# Patient Record
Sex: FEMALE | Race: WHITE | NOT HISPANIC OR LATINO | Employment: OTHER | URBAN - METROPOLITAN AREA
[De-identification: names, ages, dates, MRNs, and addresses within clinical notes are randomized per-mention and may not be internally consistent; named-entity substitution may affect disease eponyms.]

---

## 2017-10-09 ENCOUNTER — PROCEDURE VISIT (OUTPATIENT)
Dept: OBSTETRICS AND GYNECOLOGY | Facility: CLINIC | Age: 77
End: 2017-10-09

## 2017-10-09 ENCOUNTER — APPOINTMENT (OUTPATIENT)
Dept: WOMENS IMAGING | Facility: HOSPITAL | Age: 77
End: 2017-10-09

## 2017-10-09 ENCOUNTER — OFFICE VISIT (OUTPATIENT)
Dept: OBSTETRICS AND GYNECOLOGY | Facility: CLINIC | Age: 77
End: 2017-10-09

## 2017-10-09 VITALS
SYSTOLIC BLOOD PRESSURE: 136 MMHG | DIASTOLIC BLOOD PRESSURE: 76 MMHG | BODY MASS INDEX: 35.68 KG/M2 | HEIGHT: 64 IN | WEIGHT: 209 LBS | HEART RATE: 67 BPM

## 2017-10-09 DIAGNOSIS — Z01.419 PAP SMEAR, AS PART OF ROUTINE GYNECOLOGICAL EXAMINATION: ICD-10-CM

## 2017-10-09 DIAGNOSIS — Z12.31 VISIT FOR SCREENING MAMMOGRAM: Primary | ICD-10-CM

## 2017-10-09 DIAGNOSIS — Z78.0 MENOPAUSE: Primary | ICD-10-CM

## 2017-10-09 DIAGNOSIS — Z12.11 COLON CANCER SCREENING: ICD-10-CM

## 2017-10-09 DIAGNOSIS — Z01.419 ENCOUNTER FOR GYNECOLOGICAL EXAMINATION WITHOUT ABNORMAL FINDING: ICD-10-CM

## 2017-10-09 LAB — HEMOCCULT STL QL IA: NEGATIVE

## 2017-10-09 PROCEDURE — G0328 FECAL BLOOD SCRN IMMUNOASSAY: HCPCS | Performed by: OBSTETRICS & GYNECOLOGY

## 2017-10-09 PROCEDURE — G0202 SCR MAMMO BI INCL CAD: HCPCS | Performed by: RADIOLOGY

## 2017-10-09 PROCEDURE — G0202 SCR MAMMO BI INCL CAD: HCPCS | Performed by: OBSTETRICS & GYNECOLOGY

## 2017-10-09 PROCEDURE — G0101 CA SCREEN;PELVIC/BREAST EXAM: HCPCS | Performed by: OBSTETRICS & GYNECOLOGY

## 2017-10-09 NOTE — PROGRESS NOTES
Subjective   Gabbi Brandon is a 77 y.o. female  7, Para 5 AB 2, Living 5.  Last annual 1y, last pap , last mammogram today, last colonoscopy .  Cc: Annual exam  History of Present Illness  Patient denies any gynecologic problems at this time.  Patient has some urinary frequency (every 2 hours while awake.  Has been evaluated by urology with negative workup)  The following portions of the patient's history were reviewed and updated as appropriate: allergies, current medications, past family history, past medical history, past social history, past surgical history and problem list.    Review of Systems   Genitourinary: Positive for urgency.   All other systems reviewed and are negative.        Past Medical History:   Diagnosis Date   • Hypertension    • Seasonal allergies      Menstrual History:  OB History      Para Term  AB Living    7 5 5  2 5    SAB TAB Ectopic Multiple Live Births    2             No LMP recorded. Patient is postmenopausal.       Past Surgical History:   Procedure Laterality Date   • BREAST BIOPSY     • COLONOSCOPY N/A 2016    Procedure: COLONOSCOPY WITH POLYPECTOMY (HOT SNARE);  Surgeon: Paulino Narvaez MD;  Location: [facility] ENDOSCOPY;  Service:    • VAGINAL HYSTERECTOMY       OB History      Para Term  AB Living    7 5 5  2 5    SAB TAB Ectopic Multiple Live Births    2            Family History   Problem Relation Age of Onset   • Colon cancer Brother    • Heart attack Mother    • Stroke Father    • Lung cancer Sister    • Lung cancer Brother      3 brothers   • Brain cancer Brother      History   Smoking Status   • Never Smoker   Smokeless Tobacco   • Never Used     History   Alcohol Use   • Yes     Comment: occasional     Health Maintenance   Topic Date Due   • TDAP/TD VACCINES (1 - Tdap) 1957   • PNEUMOCOCCAL VACCINES (65+ LOW/MEDIUM RISK) (1 of 2 - PCV13) 2003   • MEDICARE ANNUAL WELLNESS  2016   • ZOSTER VACCINE   "09/07/2016   • INFLUENZA VACCINE  08/01/2017       Current Outpatient Prescriptions:   •  amLODIPine (NORVASC) 5 MG tablet, Take 5 mg by mouth daily., Disp: , Rfl:   •  atenolol (TENORMIN) 50 MG tablet, Take 50 mg by mouth daily., Disp: , Rfl:   •  cetirizine (ZyrTEC) 10 MG tablet, Take 10 mg by mouth daily., Disp: , Rfl:   •  omeprazole (PriLOSEC) 20 MG capsule, Take 20 mg by mouth daily., Disp: , Rfl:   •  simvastatin (ZOCOR) 20 MG tablet, Take 20 mg by mouth every night., Disp: , Rfl:   Sexual History: Not active  STD: Negative         Objective   Vitals:    10/09/17 1347   BP: 136/76   Pulse: 67   Weight: 209 lb (94.8 kg)   Height: 64\" (162.6 cm)     Physical Exam   Constitutional: She is oriented to person, place, and time. She appears well-developed and well-nourished.   HENT:   Head: Normocephalic.   Eyes: Pupils are equal, round, and reactive to light.   Neck: Normal range of motion. No thyromegaly present.   Cardiovascular: Normal rate, regular rhythm, normal heart sounds and intact distal pulses.    Pulmonary/Chest: Effort normal and breath sounds normal. No respiratory distress. She exhibits no tenderness. Right breast exhibits no inverted nipple, no mass, no nipple discharge, no skin change and no tenderness. Left breast exhibits no inverted nipple, no mass, no nipple discharge, no skin change and no tenderness. Breasts are symmetrical.   Abdominal: Soft. Bowel sounds are normal. Hernia confirmed negative in the right inguinal area and confirmed negative in the left inguinal area.   Genitourinary: Rectum normal, vagina normal and uterus normal. Rectal exam shows no external hemorrhoid, no internal hemorrhoid, no fissure, no mass, no tenderness, anal tone normal and guaiac negative stool. No breast tenderness or discharge. Pelvic exam was performed with patient supine. There is no rash, tenderness, lesion or injury on the right labia. There is no rash, tenderness, lesion or injury on the left labia. Right " adnexum displays no mass, no tenderness and no fullness. Left adnexum displays no mass, no tenderness and no fullness.   Genitourinary Comments: Cervix and uterus are surgically absent   Lymphadenopathy:     She has no cervical adenopathy.        Right: No inguinal adenopathy present.        Left: No inguinal adenopathy present.   Neurological: She is alert and oriented to person, place, and time. She has normal reflexes.   Skin: Skin is warm and dry.   Psychiatric: She has a normal mood and affect. Her behavior is normal. Judgment and thought content normal.         Assessment/Plan   Gabbi was seen today for gynecologic exam.    Diagnoses and all orders for this visit:    Menopause    Encounter for gynecological examination without abnormal finding      Discussed breast self-examination, mammograms, colonoscopy, frequency of exams

## 2017-10-11 ENCOUNTER — TELEPHONE (OUTPATIENT)
Dept: OBSTETRICS AND GYNECOLOGY | Facility: CLINIC | Age: 77
End: 2017-10-11

## 2017-10-11 LAB
CYTOLOGIST CVX/VAG CYTO: NORMAL
CYTOLOGY CVX/VAG DOC THIN PREP: NORMAL
DX ICD CODE: NORMAL
HIV 1 & 2 AB SER-IMP: NORMAL
HPV I/H RISK 4 DNA CVX QL PROBE+SIG AMP: NEGATIVE
OTHER STN SPEC: NORMAL
PATH REPORT.FINAL DX SPEC: NORMAL
STAT OF ADQ CVX/VAG CYTO-IMP: NORMAL

## 2017-10-11 NOTE — TELEPHONE ENCOUNTER
----- Message from Mello Green MD sent at 10/11/2017  1:09 PM EDT -----  Tell the patient she has a normal mammogram  ----- Message -----     From: Interface, Scans Incoming     Sent: 10/11/2017   7:37 AM       To: Mello Green MD

## 2018-10-15 ENCOUNTER — PROCEDURE VISIT (OUTPATIENT)
Dept: OBSTETRICS AND GYNECOLOGY | Facility: CLINIC | Age: 78
End: 2018-10-15

## 2018-10-15 ENCOUNTER — APPOINTMENT (OUTPATIENT)
Dept: WOMENS IMAGING | Facility: HOSPITAL | Age: 78
End: 2018-10-15

## 2018-10-15 DIAGNOSIS — Z12.31 VISIT FOR SCREENING MAMMOGRAM: Primary | ICD-10-CM

## 2018-10-15 PROCEDURE — 77067 SCR MAMMO BI INCL CAD: CPT | Performed by: RADIOLOGY

## 2018-10-15 PROCEDURE — 77067 SCR MAMMO BI INCL CAD: CPT | Performed by: OBSTETRICS & GYNECOLOGY

## 2018-10-18 ENCOUNTER — TELEPHONE (OUTPATIENT)
Dept: OBSTETRICS AND GYNECOLOGY | Facility: CLINIC | Age: 78
End: 2018-10-18

## 2018-10-18 NOTE — TELEPHONE ENCOUNTER
----- Message from Mello Green MD sent at 10/18/2018  9:22 AM EDT -----  Tell the patient her mammogram was negative  ----- Message -----  From: Interface, Scans Incoming  Sent: 10/18/2018   8:38 AM  To: Mello Green MD

## 2019-01-30 PROBLEM — I10 ESSENTIAL HYPERTENSION: Status: ACTIVE | Noted: 2018-01-10

## 2019-01-30 PROBLEM — N18.2 STAGE 2 CHRONIC KIDNEY DISEASE: Status: ACTIVE | Noted: 2018-09-18

## 2019-05-02 ENCOUNTER — OFFICE VISIT (OUTPATIENT)
Dept: INTERNAL MEDICINE | Facility: CLINIC | Age: 79
End: 2019-05-02

## 2019-05-02 VITALS
HEIGHT: 62 IN | WEIGHT: 214 LBS | SYSTOLIC BLOOD PRESSURE: 124 MMHG | BODY MASS INDEX: 39.38 KG/M2 | DIASTOLIC BLOOD PRESSURE: 68 MMHG

## 2019-05-02 DIAGNOSIS — N18.2 STAGE 2 CHRONIC KIDNEY DISEASE: Chronic | ICD-10-CM

## 2019-05-02 DIAGNOSIS — E78.5 DYSLIPIDEMIA: ICD-10-CM

## 2019-05-02 DIAGNOSIS — J30.2 SEASONAL ALLERGIES: ICD-10-CM

## 2019-05-02 DIAGNOSIS — I10 ESSENTIAL HYPERTENSION: Primary | ICD-10-CM

## 2019-05-02 DIAGNOSIS — K21.9 GASTROESOPHAGEAL REFLUX DISEASE WITHOUT ESOPHAGITIS: Chronic | ICD-10-CM

## 2019-05-02 LAB
ALBUMIN SERPL-MCNC: 3.9 G/DL (ref 3.5–5.2)
ALBUMIN/GLOB SERPL: 1.2 G/DL
ALP SERPL-CCNC: 75 U/L (ref 39–117)
ALT SERPL W P-5'-P-CCNC: 17 U/L (ref 1–33)
ANION GAP SERPL CALCULATED.3IONS-SCNC: 11.3 MMOL/L
AST SERPL-CCNC: 22 U/L (ref 1–32)
BASOPHILS # BLD AUTO: 0.02 10*3/MM3 (ref 0–0.2)
BASOPHILS NFR BLD AUTO: 0.3 % (ref 0–1.5)
BILIRUB SERPL-MCNC: 0.5 MG/DL (ref 0.2–1.2)
BUN BLD-MCNC: 17 MG/DL (ref 8–23)
BUN/CREAT SERPL: 15 (ref 7–25)
CALCIUM SPEC-SCNC: 9.4 MG/DL (ref 8.6–10.5)
CHLORIDE SERPL-SCNC: 105 MMOL/L (ref 98–107)
CHOLEST SERPL-MCNC: 118 MG/DL (ref 0–200)
CO2 SERPL-SCNC: 27.7 MMOL/L (ref 22–29)
CREAT BLD-MCNC: 1.13 MG/DL (ref 0.57–1)
DEPRECATED RDW RBC AUTO: 46.6 FL (ref 37–54)
EOSINOPHIL # BLD AUTO: 0.1 10*3/MM3 (ref 0–0.4)
EOSINOPHIL NFR BLD AUTO: 1.4 % (ref 0.3–6.2)
ERYTHROCYTE [DISTWIDTH] IN BLOOD BY AUTOMATED COUNT: 14 % (ref 12.3–15.4)
GFR SERPL CREATININE-BSD FRML MDRD: 46 ML/MIN/1.73
GLOBULIN UR ELPH-MCNC: 3.2 GM/DL
GLUCOSE BLD-MCNC: 120 MG/DL (ref 65–99)
HCT VFR BLD AUTO: 42.7 % (ref 34–46.6)
HDLC SERPL-MCNC: 47 MG/DL (ref 40–60)
HGB BLD-MCNC: 13.8 G/DL (ref 12–15.9)
LDLC SERPL CALC-MCNC: 55 MG/DL (ref 0–100)
LDLC/HDLC SERPL: 1.17 {RATIO}
LYMPHOCYTES # BLD AUTO: 3.2 10*3/MM3 (ref 0.7–3.1)
LYMPHOCYTES NFR BLD AUTO: 46.2 % (ref 19.6–45.3)
MCH RBC QN AUTO: 30.5 PG (ref 26.6–33)
MCHC RBC AUTO-ENTMCNC: 32.3 G/DL (ref 31.5–35.7)
MCV RBC AUTO: 94.5 FL (ref 79–97)
MONOCYTES # BLD AUTO: 0.62 10*3/MM3 (ref 0.1–0.9)
MONOCYTES NFR BLD AUTO: 8.9 % (ref 5–12)
NEUTROPHILS # BLD AUTO: 2.99 10*3/MM3 (ref 1.7–7)
NEUTROPHILS NFR BLD AUTO: 43.2 % (ref 42.7–76)
PLATELET # BLD AUTO: 180 10*3/MM3 (ref 140–450)
PMV BLD AUTO: 11.1 FL (ref 6–12)
POTASSIUM BLD-SCNC: 4.6 MMOL/L (ref 3.5–5.2)
PROT SERPL-MCNC: 7.1 G/DL (ref 6–8.5)
RBC # BLD AUTO: 4.52 10*6/MM3 (ref 3.77–5.28)
SODIUM BLD-SCNC: 144 MMOL/L (ref 136–145)
TRIGL SERPL-MCNC: 79 MG/DL (ref 0–150)
TSH SERPL DL<=0.05 MIU/L-ACNC: 3.19 MIU/ML (ref 0.27–4.2)
VLDLC SERPL-MCNC: 15.8 MG/DL (ref 5–40)
WBC NRBC COR # BLD: 6.93 10*3/MM3 (ref 3.4–10.8)

## 2019-05-02 PROCEDURE — 80061 LIPID PANEL: CPT | Performed by: INTERNAL MEDICINE

## 2019-05-02 PROCEDURE — 85025 COMPLETE CBC W/AUTO DIFF WBC: CPT | Performed by: INTERNAL MEDICINE

## 2019-05-02 PROCEDURE — 99204 OFFICE O/P NEW MOD 45 MIN: CPT | Performed by: INTERNAL MEDICINE

## 2019-05-02 PROCEDURE — 36415 COLL VENOUS BLD VENIPUNCTURE: CPT | Performed by: INTERNAL MEDICINE

## 2019-05-02 PROCEDURE — 84443 ASSAY THYROID STIM HORMONE: CPT | Performed by: INTERNAL MEDICINE

## 2019-05-02 PROCEDURE — 80053 COMPREHEN METABOLIC PANEL: CPT | Performed by: INTERNAL MEDICINE

## 2019-05-02 RX ORDER — FLUTICASONE PROPIONATE 50 MCG
SPRAY, SUSPENSION (ML) NASAL
COMMUNITY
End: 2019-05-03 | Stop reason: SDUPTHER

## 2019-05-02 NOTE — PROGRESS NOTES
Subjective   Gabbi Brandon is a 78 y.o. female.  Patient is here to transfer her primary care to this office.  The patient has hypertension that is well controlled, hyperlipidemia, gastroesophageal reflux disease that is well controlled chronic allergic rhinitis, moderate arthritis of her right knee that causes her some difficulty with walking but otherwise is doing exceptionally well for 78 years old.  She is very active and has a good support system around her.  She is still  and lives at home and she and her  have several children who are available to help them with any of their daily needs.  I will check a full set of labs today and continue her current medications.  One issue in particular that she has had in the past that I will be very careful about is that she has stage II kidney disease and I will check a CMP today.    History of Present Illness all of her current issues are stable    The following portions of the patient's history were reviewed and updated as appropriate: allergies, current medications, past family history, past medical history, past social history, past surgical history and problem list.    Review of Systems   Constitutional: Positive for fatigue.   HENT: Positive for congestion.    Eyes: Negative.    Respiratory: Negative.    Cardiovascular: Negative.    Gastrointestinal: Negative.    Endocrine: Negative.    Genitourinary: Negative.    Musculoskeletal: Positive for arthralgias.   Skin: Negative.    Allergic/Immunologic: Negative.    Neurological: Negative.    Hematological: Negative.    Psychiatric/Behavioral: Negative.        Objective   Physical Exam   Constitutional: She is oriented to person, place, and time. She appears well-developed and well-nourished.   HENT:   Head: Normocephalic and atraumatic.   Right Ear: External ear normal.   Left Ear: External ear normal.   Eyes: Conjunctivae and EOM are normal. Pupils are equal, round, and reactive to light.   Neck:  Normal range of motion. Neck supple.   Cardiovascular: Normal rate, regular rhythm and normal heart sounds.   Pulmonary/Chest: Effort normal and breath sounds normal.   Abdominal: Soft. Bowel sounds are normal.   Musculoskeletal: Normal range of motion.   Neurological: She is alert and oriented to person, place, and time.   Skin: Skin is warm and dry.   Psychiatric: She has a normal mood and affect. Her behavior is normal. Judgment and thought content normal.   Nursing note and vitals reviewed.        Assessment/Plan   Gabbi was seen today for new patient.    Diagnoses and all orders for this visit:    Essential hypertension  Comments:  well controlled  Orders:  -     Comprehensive metabolic panel; Future  -     CBC w AUTO Differential; Future  -     TSH; Future    Stage 2 chronic kidney disease  Comments:  check a CMP    Dyslipidemia  Comments:  check lipids  Orders:  -     Lipid Panel With LDL/HDL Ratio; Future    Gastroesophageal reflux disease without esophagitis  Comments:  well controlled    Seasonal allergies  Comments:  doing well

## 2019-05-03 ENCOUNTER — TELEPHONE (OUTPATIENT)
Dept: INTERNAL MEDICINE | Facility: CLINIC | Age: 79
End: 2019-05-03

## 2019-05-03 RX ORDER — ATENOLOL 50 MG/1
50 TABLET ORAL DAILY
Qty: 90 TABLET | Refills: 3 | Status: SHIPPED | OUTPATIENT
Start: 2019-05-03 | End: 2020-02-21

## 2019-05-03 RX ORDER — AMLODIPINE BESYLATE 5 MG/1
5 TABLET ORAL DAILY
Qty: 90 TABLET | Refills: 3 | Status: SHIPPED | OUTPATIENT
Start: 2019-05-03 | End: 2020-02-21

## 2019-05-03 RX ORDER — OMEPRAZOLE 20 MG/1
20 CAPSULE, DELAYED RELEASE ORAL DAILY
Qty: 90 CAPSULE | Refills: 3 | Status: SHIPPED | OUTPATIENT
Start: 2019-05-03 | End: 2020-05-04 | Stop reason: SDUPTHER

## 2019-05-03 RX ORDER — FLUTICASONE PROPIONATE 50 MCG
SPRAY, SUSPENSION (ML) NASAL
Qty: 3 BOTTLE | Refills: 3 | Status: SHIPPED | OUTPATIENT
Start: 2019-05-03 | End: 2020-02-21

## 2019-05-03 RX ORDER — SIMVASTATIN 20 MG
20 TABLET ORAL NIGHTLY
Qty: 90 TABLET | Refills: 3 | Status: SHIPPED | OUTPATIENT
Start: 2019-05-03 | End: 2020-02-21

## 2019-05-03 NOTE — TELEPHONE ENCOUNTER
----- Message from Zayra Melvin sent at 5/3/2019  2:14 PM EDT -----  Pt was in yesterday and needed refills on     atenolol (TENORMIN) 50 MG tablet 1/2 tablet per day  amLODIPine (NORVASC) 5 MG tablet  1 per day  simvastatin (ZOCOR) 20 MG tablet  1 per day  fluticasone (FLONASE) 50 MCG/ACT nasal spray  omeprazole (PriLOSEC) 20 MG capsule 1 per day    [facility] - [facility] [address] - [phone]  - [phone] FX

## 2019-09-06 ENCOUNTER — TELEPHONE (OUTPATIENT)
Dept: OBSTETRICS AND GYNECOLOGY | Facility: CLINIC | Age: 79
End: 2019-09-06

## 2019-09-06 DIAGNOSIS — Z12.39 SCREENING FOR BREAST CANCER: Primary | ICD-10-CM

## 2019-09-06 NOTE — TELEPHONE ENCOUNTER
Kourtney,     Order in Epic.   Please let her know.     Thanks   Dr. Perlita Bhat    [facility] pt called to request appt for mammogram.  States she is willing to go to [facility] to have this done since our mammo machine is down.  Please enter an order and I will be happy to schedule.    Thanks!  Kourtney     Pt # [phone]

## 2019-11-04 ENCOUNTER — OFFICE VISIT (OUTPATIENT)
Dept: INTERNAL MEDICINE | Facility: CLINIC | Age: 79
End: 2019-11-04

## 2019-11-04 VITALS
HEIGHT: 62 IN | BODY MASS INDEX: 39.38 KG/M2 | WEIGHT: 214 LBS | OXYGEN SATURATION: 98 % | DIASTOLIC BLOOD PRESSURE: 86 MMHG | HEART RATE: 65 BPM | SYSTOLIC BLOOD PRESSURE: 130 MMHG

## 2019-11-04 DIAGNOSIS — I10 ESSENTIAL HYPERTENSION: Primary | ICD-10-CM

## 2019-11-04 DIAGNOSIS — J30.2 SEASONAL ALLERGIES: ICD-10-CM

## 2019-11-04 DIAGNOSIS — E78.01 FAMILIAL HYPERCHOLESTEROLEMIA: ICD-10-CM

## 2019-11-04 DIAGNOSIS — N18.2 STAGE 2 CHRONIC KIDNEY DISEASE: ICD-10-CM

## 2019-11-04 DIAGNOSIS — N32.89 BLADDER SPASM: ICD-10-CM

## 2019-11-04 DIAGNOSIS — K21.9 GASTROESOPHAGEAL REFLUX DISEASE WITHOUT ESOPHAGITIS: Chronic | ICD-10-CM

## 2019-11-04 PROCEDURE — 99214 OFFICE O/P EST MOD 30 MIN: CPT | Performed by: INTERNAL MEDICINE

## 2019-11-04 NOTE — PROGRESS NOTES
Subjective   Gabbi Brandon is a 79 y.o. female.  Presents with chief complaint of chronic bladder spasms with frequent urination, essential hypertension, gastroesophageal reflux disease without esophagitis, stage II chronic kidney disease, chronic allergic rhinitis hyperlipidemia here for evaluation treatment and lab check.  She has never been on any medications to control her bladder spasms and her frequent urination and I recommended Myrbetriq 25 mg/day.    History of Present Illness doing well except for chronic bladder spasms and frequent urination    The following portions of the patient's history were reviewed and updated as appropriate: allergies, current medications, past family history, past medical history, past social history, past surgical history and problem list.    Review of Systems   Constitutional: Positive for fatigue.   HENT: Negative.    Eyes: Negative.    Respiratory: Negative.    Cardiovascular: Negative.    Gastrointestinal: Negative.    Endocrine: Negative.    Genitourinary: Positive for frequency.   Musculoskeletal: Positive for arthralgias.   Skin: Negative.    Allergic/Immunologic: Negative.    Neurological: Negative.    Hematological: Negative.    Psychiatric/Behavioral: Negative.        Objective   Physical Exam   Constitutional: She appears well-developed and well-nourished.   HENT:   Head: Normocephalic and atraumatic.   Eyes: EOM are normal. Pupils are equal, round, and reactive to light.   Neck: Normal range of motion. Neck supple.   Cardiovascular: Normal rate, regular rhythm and normal heart sounds.   Pulmonary/Chest: Effort normal and breath sounds normal.   Abdominal: Soft. Bowel sounds are normal.   Musculoskeletal: Normal range of motion.   Neurological: She is alert.   Skin: Skin is warm.   Psychiatric: She has a normal mood and affect.   Nursing note and vitals reviewed.        Assessment/Plan   Gabbi was seen today for hypertension and hyperlipidemia.    Diagnoses  and all orders for this visit:    Essential hypertension  Comments:  Check a TSH and CBC  Orders:  -     TSH; Future  -     CBC w AUTO Differential; Future    Gastroesophageal reflux disease without esophagitis  Comments:  No change in therapy at this time    Stage 2 chronic kidney disease  Comments:  Check a CMP    Seasonal allergies  Comments:  Currently doing well  Orders:  -     Comprehensive metabolic panel; Future    Familial hypercholesterolemia  Comments:  We will check a lipid panel today  Orders:  -     Lipid panel; Future    Bladder spasm  Comments:  Add Myrbetriq 25 mg/day    Other orders  -     Mirabegron ER (MYRBETRIQ) 25 MG tablet sustained-release 24 hour 24 hr tablet; Take 1 tablet by mouth Daily.

## 2019-11-05 ENCOUNTER — LAB (OUTPATIENT)
Dept: INTERNAL MEDICINE | Facility: CLINIC | Age: 79
End: 2019-11-05

## 2019-11-05 DIAGNOSIS — I10 ESSENTIAL HYPERTENSION: ICD-10-CM

## 2019-11-05 DIAGNOSIS — J30.2 SEASONAL ALLERGIES: ICD-10-CM

## 2019-11-05 DIAGNOSIS — E78.01 FAMILIAL HYPERCHOLESTEROLEMIA: ICD-10-CM

## 2019-11-05 LAB
ALBUMIN SERPL-MCNC: 4 G/DL (ref 3.5–5.2)
ALBUMIN/GLOB SERPL: 1.3 G/DL
ALP SERPL-CCNC: 71 U/L (ref 39–117)
ALT SERPL W P-5'-P-CCNC: 13 U/L (ref 1–33)
ANION GAP SERPL CALCULATED.3IONS-SCNC: 11.4 MMOL/L (ref 5–15)
AST SERPL-CCNC: 18 U/L (ref 1–32)
BASOPHILS # BLD AUTO: 0.02 10*3/MM3 (ref 0–0.2)
BASOPHILS NFR BLD AUTO: 0.2 % (ref 0–1.5)
BILIRUB SERPL-MCNC: 0.4 MG/DL (ref 0.2–1.2)
BUN BLD-MCNC: 15 MG/DL (ref 8–23)
BUN/CREAT SERPL: 11.3 (ref 7–25)
CALCIUM SPEC-SCNC: 8.8 MG/DL (ref 8.6–10.5)
CHLORIDE SERPL-SCNC: 102 MMOL/L (ref 98–107)
CHOLEST SERPL-MCNC: 108 MG/DL (ref 0–200)
CO2 SERPL-SCNC: 29.6 MMOL/L (ref 22–29)
CREAT BLD-MCNC: 1.33 MG/DL (ref 0.57–1)
DEPRECATED RDW RBC AUTO: 46.8 FL (ref 37–54)
EOSINOPHIL # BLD AUTO: 0.13 10*3/MM3 (ref 0–0.4)
EOSINOPHIL NFR BLD AUTO: 1.5 % (ref 0.3–6.2)
ERYTHROCYTE [DISTWIDTH] IN BLOOD BY AUTOMATED COUNT: 14 % (ref 12.3–15.4)
GFR SERPL CREATININE-BSD FRML MDRD: 38 ML/MIN/1.73
GLOBULIN UR ELPH-MCNC: 3.1 GM/DL
GLUCOSE BLD-MCNC: 114 MG/DL (ref 65–99)
HCT VFR BLD AUTO: 40.8 % (ref 34–46.6)
HDLC SERPL-MCNC: 40 MG/DL (ref 40–60)
HGB BLD-MCNC: 13.2 G/DL (ref 12–15.9)
LDLC SERPL CALC-MCNC: 52 MG/DL (ref 0–100)
LDLC/HDLC SERPL: 1.31 {RATIO}
LYMPHOCYTES # BLD AUTO: 3.95 10*3/MM3 (ref 0.7–3.1)
LYMPHOCYTES NFR BLD AUTO: 46.5 % (ref 19.6–45.3)
MCH RBC QN AUTO: 30.6 PG (ref 26.6–33)
MCHC RBC AUTO-ENTMCNC: 32.4 G/DL (ref 31.5–35.7)
MCV RBC AUTO: 94.7 FL (ref 79–97)
MONOCYTES # BLD AUTO: 0.78 10*3/MM3 (ref 0.1–0.9)
MONOCYTES NFR BLD AUTO: 9.2 % (ref 5–12)
NEUTROPHILS # BLD AUTO: 3.61 10*3/MM3 (ref 1.7–7)
NEUTROPHILS NFR BLD AUTO: 42.6 % (ref 42.7–76)
PLATELET # BLD AUTO: 186 10*3/MM3 (ref 140–450)
PMV BLD AUTO: 11.3 FL (ref 6–12)
POTASSIUM BLD-SCNC: 4.5 MMOL/L (ref 3.5–5.2)
PROT SERPL-MCNC: 7.1 G/DL (ref 6–8.5)
RBC # BLD AUTO: 4.31 10*6/MM3 (ref 3.77–5.28)
SODIUM BLD-SCNC: 143 MMOL/L (ref 136–145)
TRIGL SERPL-MCNC: 79 MG/DL (ref 0–150)
TSH SERPL-ACNC: 3.86 UIU/ML (ref 0.27–4.2)
VLDLC SERPL-MCNC: 15.8 MG/DL (ref 5–40)
WBC NRBC COR # BLD: 8.49 10*3/MM3 (ref 3.4–10.8)

## 2019-11-05 PROCEDURE — 85025 COMPLETE CBC W/AUTO DIFF WBC: CPT | Performed by: INTERNAL MEDICINE

## 2019-11-05 PROCEDURE — 80061 LIPID PANEL: CPT | Performed by: INTERNAL MEDICINE

## 2019-11-05 PROCEDURE — 80053 COMPREHEN METABOLIC PANEL: CPT | Performed by: INTERNAL MEDICINE

## 2019-11-14 ENCOUNTER — TELEPHONE (OUTPATIENT)
Dept: INTERNAL MEDICINE | Facility: CLINIC | Age: 79
End: 2019-11-14

## 2019-11-25 ENCOUNTER — PROCEDURE VISIT (OUTPATIENT)
Dept: OBSTETRICS AND GYNECOLOGY | Facility: CLINIC | Age: 79
End: 2019-11-25

## 2019-11-25 ENCOUNTER — APPOINTMENT (OUTPATIENT)
Dept: WOMENS IMAGING | Facility: HOSPITAL | Age: 79
End: 2019-11-25

## 2019-11-25 DIAGNOSIS — Z12.39 SCREENING FOR BREAST CANCER: ICD-10-CM

## 2019-11-25 DIAGNOSIS — Z12.31 VISIT FOR SCREENING MAMMOGRAM: Primary | ICD-10-CM

## 2019-11-25 PROCEDURE — 77067 SCR MAMMO BI INCL CAD: CPT | Performed by: OBSTETRICS & GYNECOLOGY

## 2019-11-25 PROCEDURE — 77063 BREAST TOMOSYNTHESIS BI: CPT | Performed by: RADIOLOGY

## 2019-11-25 PROCEDURE — 77067 SCR MAMMO BI INCL CAD: CPT | Performed by: RADIOLOGY

## 2019-11-25 PROCEDURE — 77063 BREAST TOMOSYNTHESIS BI: CPT | Performed by: OBSTETRICS & GYNECOLOGY

## 2019-12-02 ENCOUNTER — TELEPHONE (OUTPATIENT)
Dept: OBSTETRICS AND GYNECOLOGY | Facility: CLINIC | Age: 79
End: 2019-12-02

## 2019-12-02 DIAGNOSIS — R92.1 BREAST CALCIFICATION, LEFT: Primary | ICD-10-CM

## 2019-12-02 NOTE — TELEPHONE ENCOUNTER
----- Message from Larry Bhat MD sent at 12/2/2019 10:40 AM EST -----  Kourtney, Needs to be made aware they want to repeat mammogram on left for some calcifications. Please help her arrange. Order in Epic. Thanks, Dr Bhat

## 2019-12-04 ENCOUNTER — TELEPHONE (OUTPATIENT)
Dept: OBSTETRICS AND GYNECOLOGY | Facility: CLINIC | Age: 79
End: 2019-12-04

## 2019-12-04 ENCOUNTER — HOSPITAL ENCOUNTER (OUTPATIENT)
Dept: MAMMOGRAPHY | Facility: HOSPITAL | Age: 79
Discharge: HOME | End: 2019-12-04
Admitting: OBSTETRICS & GYNECOLOGY

## 2019-12-04 DIAGNOSIS — R92.1 BREAST CALCIFICATIONS ON MAMMOGRAM: Primary | ICD-10-CM

## 2019-12-04 PROCEDURE — 77065 DX MAMMO INCL CAD UNI: CPT

## 2019-12-04 RX ORDER — ASPIRIN 81 MG/1
81 TABLET ORAL DAILY
Status: ON HOLD | COMMUNITY
End: 2021-01-01 | Stop reason: SDUPTHER

## 2019-12-04 NOTE — TELEPHONE ENCOUNTER
----- Message from Larry Bhat MD sent at 12/4/2019 12:53 PM EST -----  Kourtney, per telephone note, stereotatic biopsy recommended and ordered in Epic. Patient is aware. Thanks, Dr. Bhat

## 2019-12-04 NOTE — TELEPHONE ENCOUNTER
Kylah,     I sent in order as requested.   Patient reports was set up for 12/11/19   Discussed with patient and she is aware.     Thanks,   Dr. Bhat    Patient had mammo/US today and was recommended for stereotactic biopsy of left breast.  Can you please put in the order?  Thank you

## 2019-12-11 ENCOUNTER — HOSPITAL ENCOUNTER (OUTPATIENT)
Dept: MAMMOGRAPHY | Facility: HOSPITAL | Age: 79
Discharge: HOME | End: 2019-12-11
Admitting: OBSTETRICS & GYNECOLOGY

## 2019-12-11 VITALS
WEIGHT: 200 LBS | DIASTOLIC BLOOD PRESSURE: 69 MMHG | HEIGHT: 64 IN | OXYGEN SATURATION: 95 % | HEART RATE: 90 BPM | BODY MASS INDEX: 34.15 KG/M2 | RESPIRATION RATE: 18 BRPM | TEMPERATURE: 98.2 F | SYSTOLIC BLOOD PRESSURE: 120 MMHG

## 2019-12-11 DIAGNOSIS — R92.1 BREAST CALCIFICATIONS ON MAMMOGRAM: ICD-10-CM

## 2019-12-11 PROCEDURE — 88305 TISSUE EXAM BY PATHOLOGIST: CPT | Performed by: OBSTETRICS & GYNECOLOGY

## 2019-12-11 RX ORDER — LIDOCAINE HYDROCHLORIDE AND EPINEPHRINE 10; 10 MG/ML; UG/ML
20 INJECTION, SOLUTION INFILTRATION; PERINEURAL ONCE
Status: COMPLETED | OUTPATIENT
Start: 2019-12-11 | End: 2019-12-11

## 2019-12-11 RX ADMIN — LIDOCAINE HYDROCHLORIDE AND EPINEPHRINE 10 ML: 10; 10 INJECTION, SOLUTION INFILTRATION; PERINEURAL at 09:02

## 2019-12-11 RX ADMIN — LIDOCAINE HYDROCHLORIDE AND EPINEPHRINE 20 ML: 10; 10 INJECTION, SOLUTION INFILTRATION; PERINEURAL at 08:50

## 2019-12-11 NOTE — H&P
Name: Gabbi Brandon ADMIT: 2019   : 1940  PCP: Gordo Mg MD    MRN: [id number] LOS: 0 days   AGE/SEX: 79 y.o. female  ROOM: Room/bed info not found       Chief complaint LEFT BREAST LESION    Present Illness or Internal History:  Patient is a 79 y.o. female presents with LEFT BREAST LESION.     Past Surgical History:  Past Surgical History:   Procedure Laterality Date   • BREAST BIOPSY     • COLONOSCOPY N/A 2016    Procedure: COLONOSCOPY WITH POLYPECTOMY (HOT SNARE);  Surgeon: Paulino Narvaez MD;  Location: [facility] ENDOSCOPY;  Service:    • VAGINAL HYSTERECTOMY         Past Medical History:  Past Medical History:   Diagnosis Date   • Hypertension    • Seasonal allergies        Home Medications:    (Not in a hospital admission)    Allergies:  Penicillins    Family History:  Family History   Problem Relation Age of Onset   • Colon cancer Brother    • Heart attack Mother    • Stroke Father    • Lung cancer Sister    • Lung cancer Brother         3 brothers   • Brain cancer Brother        Social History:  Social History     Tobacco Use   • Smoking status: Never Smoker   • Smokeless tobacco: Never Used   Substance Use Topics   • Alcohol use: Yes     Comment: occasional   • Drug use: No        Objective     Physical Exam:      General Appearance:    Alert, cooperative, in no acute distress   Head:    Normocephalic, without obvious abnormality, atraumatic   Eyes:            Lids and lashes normal, conjunctivae and sclerae normal, no   icterus, no pallor, corneas clear, PERRLA   Ears:    Ears appear intact with no abnormalities noted   Throat:   No oral lesions, no thrush, oral mucosa moist   Neck:   No adenopathy, supple, trachea midline, no thyromegaly, no     carotid bruit, no JVD   Back:     No kyphosis present, no scoliosis present, no skin lesions,       erythema or scars, no tenderness to percussion or                   palpation,   range of motion normal   Lungs:      Clear to auscultation,respirations regular, even and                   unlabored    Heart:    Regular rhythm and normal rate, normal S1 and S2, no            murmur, no gallop, no rub, no click   Breast Exam:    Deferred   Abdomen:     Normal bowel sounds, no masses, no organomegaly, soft        non-tender, non-distended, no guarding, no rebound                 tenderness   Genitalia:    Deferred   Extremities:   Moves all extremities well, no edema, no cyanosis, no              redness   Pulses:   Pulses palpable and equal bilaterally   Skin:   No bleeding, bruising or rash   Lymph nodes:   No palpable adenopathy   Neurologic:   Cranial nerves 2 - 12 grossly intact, sensation intact, DTR        present and equal bilaterally       Vital Signs  Temp:  [98.2 °F (36.8 °C)] 98.2 °F (36.8 °C)  Heart Rate:  [74] 74  Resp:  [18] 18  BP: (140)/(79) 140/79    Anticipated Surgical Procedure:  STEREOTACTIC LEFT BREAST BIOPSY    The risks, benefits and alternatives of this procedure have been discussed with the patient or responsible party: Yes        Srinivas Richards MD  12/11/19  8:31 AM

## 2019-12-11 NOTE — NURSING NOTE
Biopsy site to left mid, upper breast clear with Dermabond dry and intact. No firmness or swelling noted at or around biopsy site. Denies pain. Ice pack with protective covering applied to biopsy site. Discharge instructions discussed with understanding voiced by patient. Copies provided to patient. No distress noted. To home via private vehicle accompanied by her .

## 2019-12-12 LAB
CYTO UR: NORMAL
LAB AP CASE REPORT: NORMAL
LAB AP CLINICAL INFORMATION: NORMAL
LAB AP DIAGNOSIS COMMENT: NORMAL
PATH REPORT.FINAL DX SPEC: NORMAL
PATH REPORT.GROSS SPEC: NORMAL

## 2019-12-13 ENCOUNTER — TELEPHONE (OUTPATIENT)
Dept: OBSTETRICS AND GYNECOLOGY | Facility: CLINIC | Age: 79
End: 2019-12-13

## 2019-12-13 NOTE — TELEPHONE ENCOUNTER
----- Message from Larry Bhat MD sent at 12/12/2019  4:37 PM EST -----  Kourtney, I tried to call no answer. This is a benign - Non-cancerous result. Please let her know. Thanks, Dr. Bhat

## 2020-02-21 ENCOUNTER — OFFICE VISIT (OUTPATIENT)
Dept: OBSTETRICS AND GYNECOLOGY | Facility: CLINIC | Age: 80
End: 2020-02-21

## 2020-02-21 VITALS
WEIGHT: 213 LBS | HEART RATE: 69 BPM | BODY MASS INDEX: 36.37 KG/M2 | DIASTOLIC BLOOD PRESSURE: 78 MMHG | SYSTOLIC BLOOD PRESSURE: 134 MMHG | HEIGHT: 64 IN

## 2020-02-21 DIAGNOSIS — Z01.419 ENCOUNTER FOR GYNECOLOGICAL EXAMINATION WITHOUT ABNORMAL FINDING: Primary | ICD-10-CM

## 2020-02-21 DIAGNOSIS — Z91.89: ICD-10-CM

## 2020-02-21 DIAGNOSIS — Z09 ENCOUNTER FOR FOLLOW-UP EXAMINATION AFTER COMPLETED TREATMENT FOR CONDITIONS OTHER THAN MALIGNANT NEOPLASM: ICD-10-CM

## 2020-02-21 DIAGNOSIS — R39.15 URGENCY OF URINATION: ICD-10-CM

## 2020-02-21 PROCEDURE — 99213 OFFICE O/P EST LOW 20 MIN: CPT | Performed by: OBSTETRICS & GYNECOLOGY

## 2020-02-21 PROCEDURE — G0101 CA SCREEN;PELVIC/BREAST EXAM: HCPCS | Performed by: OBSTETRICS & GYNECOLOGY

## 2020-02-21 RX ORDER — AMLODIPINE BESYLATE 5 MG/1
TABLET ORAL
COMMUNITY
End: 2020-05-04 | Stop reason: SDUPTHER

## 2020-02-21 RX ORDER — SIMVASTATIN 20 MG
TABLET ORAL
COMMUNITY
End: 2020-05-04 | Stop reason: SDUPTHER

## 2020-02-21 RX ORDER — ATENOLOL 50 MG/1
TABLET ORAL
COMMUNITY
End: 2020-05-04 | Stop reason: SDUPTHER

## 2020-02-21 RX ORDER — FLUTICASONE PROPIONATE 50 MCG
SPRAY, SUSPENSION (ML) NASAL
COMMUNITY
End: 2020-05-07

## 2020-02-21 NOTE — PROGRESS NOTES
GYN Annual Exam     CC- Here for annual exam.     Gabbi Brandon is a 79 y.o. female who presents for annual well woman exam. Periods are absent due to Hysterectomy.    Pt c/o incontinence.     OB History        7    Para   5    Term   5            AB   2    Living   5       SAB   2    TAB        Ectopic        Molar        Multiple        Live Births                    Current contraception: status post hysterectomy  History of abnormal Pap smear: no - No prior TRISH 2+   Family history of uterine, colon or ovarian cancer: yes - Brother with colon cancer  History of abnormal mammogram: no  Family history of breast cancer: no  Last Pap : 10/09/2017 NL HPV neg  Last mammogram: 2019  Last colonoscopy: 2016  Last DEXA: 2012 NL  Parental Hip Fracture: none    Past Medical History:   Diagnosis Date   • Hypertension    • Seasonal allergies        Past Surgical History:   Procedure Laterality Date   • BREAST BIOPSY     • COLONOSCOPY N/A 2016    Procedure: COLONOSCOPY WITH POLYPECTOMY (HOT SNARE);  Surgeon: Paulino Narvaez MD;  Location: [facility] ENDOSCOPY;  Service:    • VAGINAL HYSTERECTOMY           Current Outpatient Medications:   •  amLODIPine (NORVASC) 5 MG tablet, amlodipine 5 mg tablet, Disp: , Rfl:   •  aspirin 81 MG EC tablet, Take 81 mg by mouth Daily., Disp: , Rfl:   •  atenolol (TENORMIN) 50 MG tablet, atenolol 50 mg tablet, Disp: , Rfl:   •  cetirizine (ZyrTEC) 10 MG tablet, Take 10 mg by mouth daily., Disp: , Rfl:   •  fluticasone (FLONASE) 50 MCG/ACT nasal spray, fluticasone propionate 50 mcg/actuation nasal spray,suspension, Disp: , Rfl:   •  omeprazole (priLOSEC) 20 MG capsule, Take 1 capsule by mouth Daily., Disp: 90 capsule, Rfl: 3  •  simvastatin (ZOCOR) 20 MG tablet, simvastatin 20 mg tablet, Disp: , Rfl:     Allergies   Allergen Reactions   • Penicillins Unknown (See Comments)     Caused a red streak down her leg.       Social History     Tobacco Use   •  "Smoking status: Never Smoker   • Smokeless tobacco: Never Used   Substance Use Topics   • Alcohol use: Yes     Comment: occasional   • Drug use: No       Family History   Problem Relation Age of Onset   • Colon cancer Brother    • Heart attack Mother    • Stroke Father    • Lung cancer Sister    • Lung cancer Brother         3 brothers   • Brain cancer Brother        Review of Systems   Constitutional: Negative for chills, fever and unexpected weight loss.   Gastrointestinal: Negative for abdominal pain.   Genitourinary: Positive for urgency. Negative for dysuria, pelvic pain, vaginal bleeding and vaginal discharge.   All other systems reviewed and are negative.      /78   Pulse 69   Ht 162.6 cm (64\")   Wt 96.6 kg (213 lb)   Breastfeeding No   BMI 36.56 kg/m²     Physical Exam   Constitutional: She is oriented to person, place, and time. She appears well-developed and well-nourished. No distress. She is not obese.  HENT:   Head: Normocephalic and atraumatic.   Eyes: Conjunctivae are normal. Right eye exhibits no discharge. Left eye exhibits no discharge.   Neck: Normal range of motion. Neck supple. No thyromegaly present.   Cardiovascular: Normal rate, regular rhythm and normal heart sounds.   No murmur heard.  Pulmonary/Chest: Effort normal and breath sounds normal. No respiratory distress. Right breast exhibits no inverted nipple, no mass and no nipple discharge. Left breast exhibits no inverted nipple, no mass and no nipple discharge.   Abdominal: Soft. Bowel sounds are normal. She exhibits no distension. There is no tenderness.   Genitourinary: Rectum normal. Rectal exam shows no mass and anal tone normal. Pelvic exam was performed with patient supine. There is no lesion or Bartholin's cyst on the right labia. There is no lesion or Bartholin's cyst on the left labia. Uterus is absent.   Cervix is absent. Right adnexum is non-palpable.Left adnexum is non-palpable.Vagina exhibits loss of rugae. No " bleeding in the vagina. No vaginal discharge found.   Musculoskeletal: Normal range of motion. She exhibits no edema.   Lymphadenopathy:     She has no cervical adenopathy.        Right: No inguinal adenopathy present.        Left: No inguinal adenopathy present.   Neurological: She is alert and oriented to person, place, and time.   Skin: Skin is warm and dry. No rash noted.   Psychiatric: She has a normal mood and affect. Her behavior is normal. Judgment and thought content normal.          Assessment     1) GYN annual well woman exam.   2) urgency   Check urine culture   Discussed 4 c's  Discussed use of antispasmodic   3) Increased risk of osteoporotic fracture   Check DEXA and discuss      Plan     1) Breast Health - Clinical breast exam & mammogram and ACS, Self breast awareness monthly  2) Pap - No longer needed   3) Smoking status- non-smoker   4) Colon health - screening colonoscopy up to date  5) Bone health - Weight bearing exercise, dietary calcium recommendations and vitamin D reviewed.   FRAX 23% and 6.9% without DEXA   6) Activity recommends - Adult 150-300 min/week of multi-component physical activities that include balance training, aerobic and physical strengthening.    Avoidance of distracted driving - texts/phone calls while driving.   7) Follow up prn and one year      Larry Bhat MD   2/21/2020  10:51 AM

## 2020-02-23 LAB
BACTERIA UR CULT: NORMAL
BACTERIA UR CULT: NORMAL

## 2020-02-24 ENCOUNTER — TELEPHONE (OUTPATIENT)
Dept: OBSTETRICS AND GYNECOLOGY | Facility: CLINIC | Age: 80
End: 2020-02-24

## 2020-02-24 NOTE — TELEPHONE ENCOUNTER
----- Message from Larry Bhat MD sent at 2/24/2020 11:18 AM EST -----  Cathi, normal urine culture. Please let her know. Thanks Dr. Bhat

## 2020-05-04 ENCOUNTER — OFFICE VISIT (OUTPATIENT)
Dept: INTERNAL MEDICINE | Facility: CLINIC | Age: 80
End: 2020-05-04

## 2020-05-04 DIAGNOSIS — K21.9 GASTROESOPHAGEAL REFLUX DISEASE WITHOUT ESOPHAGITIS: ICD-10-CM

## 2020-05-04 DIAGNOSIS — J30.2 SEASONAL ALLERGIES: ICD-10-CM

## 2020-05-04 DIAGNOSIS — E78.01 FAMILIAL HYPERCHOLESTEROLEMIA: Primary | ICD-10-CM

## 2020-05-04 DIAGNOSIS — I10 ESSENTIAL HYPERTENSION: ICD-10-CM

## 2020-05-04 DIAGNOSIS — N32.89 BLADDER SPASM: ICD-10-CM

## 2020-05-04 PROCEDURE — 99442 PR PHYS/QHP TELEPHONE EVALUATION 11-20 MIN: CPT | Performed by: INTERNAL MEDICINE

## 2020-05-04 RX ORDER — PHENAZOPYRIDINE HYDROCHLORIDE 95 MG/1
95 TABLET ORAL 3 TIMES DAILY PRN
Qty: 90 TABLET | Refills: 3 | Status: ON HOLD | OUTPATIENT
Start: 2020-05-04 | End: 2020-07-02

## 2020-05-04 RX ORDER — ATENOLOL 50 MG/1
50 TABLET ORAL DAILY
Qty: 90 TABLET | Refills: 3 | Status: SHIPPED | OUTPATIENT
Start: 2020-05-04 | End: 2020-05-07

## 2020-05-04 RX ORDER — SIMVASTATIN 20 MG
20 TABLET ORAL NIGHTLY
Qty: 90 TABLET | Refills: 3 | Status: SHIPPED | OUTPATIENT
Start: 2020-05-04 | End: 2020-07-23

## 2020-05-04 RX ORDER — AMLODIPINE BESYLATE 5 MG/1
5 TABLET ORAL DAILY
Qty: 90 TABLET | Refills: 3 | Status: SHIPPED | OUTPATIENT
Start: 2020-05-04 | End: 2020-05-07

## 2020-05-04 RX ORDER — OMEPRAZOLE 20 MG/1
20 CAPSULE, DELAYED RELEASE ORAL DAILY
Qty: 90 CAPSULE | Refills: 3 | Status: SHIPPED | OUTPATIENT
Start: 2020-05-04 | End: 2020-05-07

## 2020-05-04 NOTE — PROGRESS NOTES
Subjective   Gabbi Brandon is a 79 y.o. female. You have chosen to receive care through a telephone visit. Do you consent to use a telephone visit for your medical care today? Yes   Patient presents with a chief complaint of hyperlipidemia, hypertension, gastroesophageal reflux disease, bladder spasm that is chronic, seasonal allergic rhinitis, here for follow-up evaluation medication review and treatment.  The patient and her  are doing well with social isolation regarding the viral pandemic and thus far have remained safe at home.  She does need all of her medications renewed and so I reviewed each 1 with him and how she is taking them and she is on schedule without any difficulties whatsoever.  I spent 15 minutes on the telephone with this patient      There were no vitals taken for this visit.    There is no height or weight on file to calculate BMI.    History of Present Illness currently doing well except for some arthritic pain for which she takes Tylenol arthritis formula    The following portions of the patient's history were reviewed and updated as appropriate: allergies, current medications, past family history, past medical history, past social history, past surgical history and problem list.    Review of Systems   Constitutional: Negative.    Respiratory: Negative.    Cardiovascular: Negative.    Gastrointestinal: Negative.    Musculoskeletal: Positive for arthralgias.       Objective   Physical Exam   Constitutional: She appears well-developed and well-nourished.   Cardiovascular:   No cardiovascular complaints at this time   Pulmonary/Chest:   No pulmonary complaints at this time   Abdominal:   Abdominal complaints at this time   Genitourinary:   Genitourinary Comments: Some issues with bladder spasm for which I recommended Urispas 3 times daily as needed   Musculoskeletal:   Some problems with diffuse arthritis for which the patient takes Tylenol arthritis formula   Psychiatric:   Doing  very well with an excellent attitude overall   Nursing note reviewed.        Assessment/Plan   Diagnoses and all orders for this visit:    Familial hypercholesterolemia  Comments:  Well-controlled on current medications    Essential hypertension  Comments:  Well-controlled no changes needed    Gastroesophageal reflux disease without esophagitis  Comments:  Well compliant controlled, no new medications required    Bladder spasm  Comments:  Of asked the patient to use Urispas as needed    Seasonal allergies  Comments:  Continue Zyrtec and Flonase    Other orders  -     phenazopyridine (PYRIDIUM) 95 MG tablet; Take 1 tablet by mouth 3 (Three) Times a Day As Needed for Bladder Spasms.  -     Discontinue: amLODIPine (NORVASC) 5 MG tablet; Take 1 tablet by mouth Daily.  -     Discontinue: atenolol (TENORMIN) 50 MG tablet; Take 1 tablet by mouth Daily.  -     Discontinue: omeprazole (priLOSEC) 20 MG capsule; Take 1 capsule by mouth Daily.  -     simvastatin (ZOCOR) 20 MG tablet; Take 1 tablet by mouth Every Night.

## 2020-05-07 RX ORDER — OMEPRAZOLE 20 MG/1
CAPSULE, DELAYED RELEASE ORAL
Qty: 90 CAPSULE | Refills: 3 | Status: SHIPPED | OUTPATIENT
Start: 2020-05-07 | End: 2021-04-20 | Stop reason: SDUPTHER

## 2020-05-07 RX ORDER — FLUTICASONE PROPIONATE 50 MCG
SPRAY, SUSPENSION (ML) NASAL
Qty: 48 G | Refills: 2 | Status: SHIPPED | OUTPATIENT
Start: 2020-05-07 | End: 2020-12-28 | Stop reason: SDUPTHER

## 2020-05-07 RX ORDER — ATENOLOL 50 MG/1
TABLET ORAL
Qty: 90 TABLET | Refills: 3 | Status: SHIPPED | OUTPATIENT
Start: 2020-05-07 | End: 2021-01-01 | Stop reason: HOSPADM

## 2020-05-07 RX ORDER — AMLODIPINE BESYLATE 5 MG/1
TABLET ORAL
Qty: 90 TABLET | Refills: 3 | Status: SHIPPED | OUTPATIENT
Start: 2020-05-07 | End: 2021-04-20 | Stop reason: SDUPTHER

## 2020-05-29 ENCOUNTER — APPOINTMENT (OUTPATIENT)
Dept: CARDIOLOGY | Facility: HOSPITAL | Age: 80
End: 2020-05-29

## 2020-05-29 ENCOUNTER — HOSPITAL ENCOUNTER (EMERGENCY)
Facility: HOSPITAL | Age: 80
Discharge: HOME | End: 2020-05-29
Attending: EMERGENCY MEDICINE | Admitting: EMERGENCY MEDICINE

## 2020-05-29 VITALS
BODY MASS INDEX: 33.32 KG/M2 | HEART RATE: 90 BPM | TEMPERATURE: 98.6 F | WEIGHT: 200 LBS | SYSTOLIC BLOOD PRESSURE: 136 MMHG | HEIGHT: 65 IN | OXYGEN SATURATION: 97 % | DIASTOLIC BLOOD PRESSURE: 74 MMHG | RESPIRATION RATE: 16 BRPM

## 2020-05-29 DIAGNOSIS — M15.9 OSTEOARTHRITIS OF MULTIPLE JOINTS, UNSPECIFIED OSTEOARTHRITIS TYPE: Primary | ICD-10-CM

## 2020-05-29 DIAGNOSIS — R60.0 PEDAL EDEMA: ICD-10-CM

## 2020-05-29 LAB
ALBUMIN SERPL-MCNC: 3.7 G/DL (ref 3.5–5.2)
ALBUMIN/GLOB SERPL: 1.3 G/DL
ALP SERPL-CCNC: 54 U/L (ref 39–117)
ALT SERPL W P-5'-P-CCNC: 14 U/L (ref 1–33)
ANION GAP SERPL CALCULATED.3IONS-SCNC: 8.6 MMOL/L (ref 5–15)
AST SERPL-CCNC: 22 U/L (ref 1–32)
BASOPHILS # BLD AUTO: 0.02 10*3/MM3 (ref 0–0.2)
BASOPHILS NFR BLD AUTO: 0.2 % (ref 0–1.5)
BH CV LOWER VASCULAR LEFT COMMON FEMORAL AUGMENT: NORMAL
BH CV LOWER VASCULAR LEFT COMMON FEMORAL COMPETENT: NORMAL
BH CV LOWER VASCULAR LEFT COMMON FEMORAL COMPRESS: NORMAL
BH CV LOWER VASCULAR LEFT COMMON FEMORAL PHASIC: NORMAL
BH CV LOWER VASCULAR LEFT COMMON FEMORAL SPONT: NORMAL
BH CV LOWER VASCULAR LEFT DISTAL FEMORAL COMPRESS: NORMAL
BH CV LOWER VASCULAR LEFT GASTRONEMIUS COMPRESS: NORMAL
BH CV LOWER VASCULAR LEFT GREATER SAPH AK COMPRESS: NORMAL
BH CV LOWER VASCULAR LEFT GREATER SAPH BK COMPRESS: NORMAL
BH CV LOWER VASCULAR LEFT MID FEMORAL AUGMENT: NORMAL
BH CV LOWER VASCULAR LEFT MID FEMORAL COMPETENT: NORMAL
BH CV LOWER VASCULAR LEFT MID FEMORAL COMPRESS: NORMAL
BH CV LOWER VASCULAR LEFT MID FEMORAL PHASIC: NORMAL
BH CV LOWER VASCULAR LEFT MID FEMORAL SPONT: NORMAL
BH CV LOWER VASCULAR LEFT PERONEAL COMPRESS: NORMAL
BH CV LOWER VASCULAR LEFT POPLITEAL AUGMENT: NORMAL
BH CV LOWER VASCULAR LEFT POPLITEAL COMPETENT: NORMAL
BH CV LOWER VASCULAR LEFT POPLITEAL COMPRESS: NORMAL
BH CV LOWER VASCULAR LEFT POPLITEAL PHASIC: NORMAL
BH CV LOWER VASCULAR LEFT POPLITEAL SPONT: NORMAL
BH CV LOWER VASCULAR LEFT POSTERIOR TIBIAL COMPRESS: NORMAL
BH CV LOWER VASCULAR LEFT PROFUNDA FEMORAL COMPRESS: NORMAL
BH CV LOWER VASCULAR LEFT PROXIMAL FEMORAL COMPRESS: NORMAL
BH CV LOWER VASCULAR LEFT SAPHENOFEMORAL JUNCTION COMPRESS: NORMAL
BH CV LOWER VASCULAR RIGHT COMMON FEMORAL AUGMENT: NORMAL
BH CV LOWER VASCULAR RIGHT COMMON FEMORAL COMPETENT: NORMAL
BH CV LOWER VASCULAR RIGHT COMMON FEMORAL COMPRESS: NORMAL
BH CV LOWER VASCULAR RIGHT COMMON FEMORAL PHASIC: NORMAL
BH CV LOWER VASCULAR RIGHT COMMON FEMORAL SPONT: NORMAL
BILIRUB SERPL-MCNC: 0.3 MG/DL (ref 0.2–1.2)
BUN BLD-MCNC: 13 MG/DL (ref 8–23)
BUN/CREAT SERPL: 12.7 (ref 7–25)
CALCIUM SPEC-SCNC: 8.6 MG/DL (ref 8.6–10.5)
CHLORIDE SERPL-SCNC: 105 MMOL/L (ref 98–107)
CO2 SERPL-SCNC: 29.4 MMOL/L (ref 22–29)
CREAT BLD-MCNC: 1.02 MG/DL (ref 0.57–1)
CRP SERPL-MCNC: 0.33 MG/DL (ref 0–0.5)
DEPRECATED RDW RBC AUTO: 46.2 FL (ref 37–54)
EOSINOPHIL # BLD AUTO: 0.07 10*3/MM3 (ref 0–0.4)
EOSINOPHIL NFR BLD AUTO: 0.8 % (ref 0.3–6.2)
ERYTHROCYTE [DISTWIDTH] IN BLOOD BY AUTOMATED COUNT: 13.5 % (ref 12.3–15.4)
ERYTHROCYTE [SEDIMENTATION RATE] IN BLOOD: 34 MM/HR (ref 0–30)
GFR SERPL CREATININE-BSD FRML MDRD: 52 ML/MIN/1.73
GLOBULIN UR ELPH-MCNC: 2.8 GM/DL
GLUCOSE BLD-MCNC: 120 MG/DL (ref 65–99)
HCT VFR BLD AUTO: 36.6 % (ref 34–46.6)
HGB BLD-MCNC: 12 G/DL (ref 12–15.9)
IMM GRANULOCYTES # BLD AUTO: 0.03 10*3/MM3 (ref 0–0.05)
IMM GRANULOCYTES NFR BLD AUTO: 0.3 % (ref 0–0.5)
LYMPHOCYTES # BLD AUTO: 2.73 10*3/MM3 (ref 0.7–3.1)
LYMPHOCYTES NFR BLD AUTO: 29.5 % (ref 19.6–45.3)
MCH RBC QN AUTO: 30.5 PG (ref 26.6–33)
MCHC RBC AUTO-ENTMCNC: 32.8 G/DL (ref 31.5–35.7)
MCV RBC AUTO: 92.9 FL (ref 79–97)
MONOCYTES # BLD AUTO: 0.97 10*3/MM3 (ref 0.1–0.9)
MONOCYTES NFR BLD AUTO: 10.5 % (ref 5–12)
NEUTROPHILS # BLD AUTO: 5.42 10*3/MM3 (ref 1.7–7)
NEUTROPHILS NFR BLD AUTO: 58.7 % (ref 42.7–76)
NRBC BLD AUTO-RTO: 0 /100 WBC (ref 0–0.2)
PLATELET # BLD AUTO: 200 10*3/MM3 (ref 140–450)
PMV BLD AUTO: 9.9 FL (ref 6–12)
POTASSIUM BLD-SCNC: 3.5 MMOL/L (ref 3.5–5.2)
PROT SERPL-MCNC: 6.5 G/DL (ref 6–8.5)
RBC # BLD AUTO: 3.94 10*6/MM3 (ref 3.77–5.28)
SODIUM BLD-SCNC: 143 MMOL/L (ref 136–145)
URATE SERPL-MCNC: 5.3 MG/DL (ref 2.4–5.7)
WBC NRBC COR # BLD: 9.24 10*3/MM3 (ref 3.4–10.8)

## 2020-05-29 PROCEDURE — 93971 EXTREMITY STUDY: CPT

## 2020-05-29 PROCEDURE — 93005 ELECTROCARDIOGRAM TRACING: CPT | Performed by: EMERGENCY MEDICINE

## 2020-05-29 PROCEDURE — 99284 EMERGENCY DEPT VISIT MOD MDM: CPT

## 2020-05-29 PROCEDURE — 85025 COMPLETE CBC W/AUTO DIFF WBC: CPT | Performed by: EMERGENCY MEDICINE

## 2020-05-29 PROCEDURE — 86140 C-REACTIVE PROTEIN: CPT | Performed by: EMERGENCY MEDICINE

## 2020-05-29 PROCEDURE — 84550 ASSAY OF BLOOD/URIC ACID: CPT | Performed by: EMERGENCY MEDICINE

## 2020-05-29 PROCEDURE — 93010 ELECTROCARDIOGRAM REPORT: CPT | Performed by: INTERNAL MEDICINE

## 2020-05-29 PROCEDURE — 80053 COMPREHEN METABOLIC PANEL: CPT | Performed by: EMERGENCY MEDICINE

## 2020-05-29 PROCEDURE — 85652 RBC SED RATE AUTOMATED: CPT | Performed by: EMERGENCY MEDICINE

## 2020-05-29 RX ORDER — SODIUM CHLORIDE 0.9 % (FLUSH) 0.9 %
10 SYRINGE (ML) INJECTION AS NEEDED
Status: DISCONTINUED | OUTPATIENT
Start: 2020-05-29 | End: 2020-05-29 | Stop reason: HOSPADM

## 2020-05-29 RX ORDER — ACETAMINOPHEN 500 MG
1000 TABLET ORAL ONCE
Status: COMPLETED | OUTPATIENT
Start: 2020-05-29 | End: 2020-05-29

## 2020-05-29 RX ORDER — HYDROCHLOROTHIAZIDE 12.5 MG/1
12.5 TABLET ORAL DAILY
Qty: 14 TABLET | Refills: 0 | Status: SHIPPED | OUTPATIENT
Start: 2020-05-29 | End: 2020-06-19 | Stop reason: DRUGHIGH

## 2020-05-29 RX ADMIN — ACETAMINOPHEN 1000 MG: 500 TABLET, FILM COATED ORAL at 10:23

## 2020-06-04 ENCOUNTER — OFFICE VISIT (OUTPATIENT)
Dept: INTERNAL MEDICINE | Facility: CLINIC | Age: 80
End: 2020-06-04

## 2020-06-04 DIAGNOSIS — E78.01 FAMILIAL HYPERCHOLESTEROLEMIA: ICD-10-CM

## 2020-06-04 DIAGNOSIS — I10 ESSENTIAL HYPERTENSION: ICD-10-CM

## 2020-06-04 DIAGNOSIS — N18.2 STAGE 2 CHRONIC KIDNEY DISEASE: ICD-10-CM

## 2020-06-04 DIAGNOSIS — M19.90 ARTHRITIS: Primary | ICD-10-CM

## 2020-06-04 DIAGNOSIS — K21.9 GASTROESOPHAGEAL REFLUX DISEASE WITHOUT ESOPHAGITIS: ICD-10-CM

## 2020-06-04 PROCEDURE — 99214 OFFICE O/P EST MOD 30 MIN: CPT | Performed by: INTERNAL MEDICINE

## 2020-06-04 RX ORDER — CELECOXIB 200 MG/1
200 CAPSULE ORAL DAILY
Qty: 30 CAPSULE | Refills: 5 | Status: SHIPPED | OUTPATIENT
Start: 2020-06-04 | End: 2020-06-09 | Stop reason: SDUPTHER

## 2020-06-04 NOTE — PROGRESS NOTES
Subjective   Gabbi Brandon is a 79 y.o. female.  Patient presents with chief complaint by phone with worsening arthritic pain in her hips back knees and ankles over the last several months.  She has a concurrent issue with hyperlipidemia, hypertension, gastroesophageal reflux disease and stage II chronic kidney disease.  She is not currently taking any type of arthritic medication although she has chronic kidney disease she has such severe pain that I feel the risk is warranted to try her on Celebrex 200 mg/day.  I asked her to follow-up with me in the next 3 to 5 days and I will continue to monitor her kidney function closely.  I spent 15 minutes on the phone with this patient.      There were no vitals taken for this visit.    There is no height or weight on file to calculate BMI.    History of Present Illness worsening arthritic pain over the last several months    The following portions of the patient's history were reviewed and updated as appropriate: allergies, current medications, past family history, past medical history, past social history, past surgical history and problem list.    Review of Systems   Constitutional: Positive for fatigue.   Musculoskeletal: Positive for arthralgias, back pain and gait problem.       Objective   Physical Exam   Constitutional: She is oriented to person, place, and time. She appears well-developed and well-nourished.   Cardiovascular:   No cardiovascular complaints at this time   Pulmonary/Chest:   No pulmonary complaints at this time   Abdominal:   No abdominal complaints at this time   Musculoskeletal:   Worsening arthritis of her back hips and knees over the last several months   Neurological: She is alert and oriented to person, place, and time.   Psychiatric: She has a normal mood and affect. Her behavior is normal.   Nursing note reviewed.        Assessment/Plan   Diagnoses and all orders for this visit:    Arthritis  Comments:  Celebrex 200 mg/day    Familial  hypercholesterolemia  Comments:  No change in therapy warranted at this time    Essential hypertension  Comments:  No changes at this time    Gastroesophageal reflux disease without esophagitis  Comments:  Well-controlled no changes at this time    Stage 2 chronic kidney disease  Comments:  I will check a CMP at her next visit    Other orders  -     celecoxib (CeleBREX) 200 MG capsule; Take 1 capsule by mouth Daily.

## 2020-06-05 ENCOUNTER — TELEPHONE (OUTPATIENT)
Dept: INTERNAL MEDICINE | Facility: CLINIC | Age: 80
End: 2020-06-05

## 2020-06-09 ENCOUNTER — TELEPHONE (OUTPATIENT)
Dept: INTERNAL MEDICINE | Facility: CLINIC | Age: 80
End: 2020-06-09

## 2020-06-09 RX ORDER — MELOXICAM 15 MG/1
15 TABLET ORAL DAILY
Qty: 90 TABLET | Refills: 3 | Status: SHIPPED | OUTPATIENT
Start: 2020-06-09 | End: 2020-06-18 | Stop reason: SDUPTHER

## 2020-06-09 NOTE — TELEPHONE ENCOUNTER
----- Message from Marco A Betancur sent at 6/9/2020 10:51 AM EDT -----  Contact: patient   called in regards to her medication celecoxib for her arthritis and she said she has been taking it for a week now and it hasn't been helping her and she said  said she should feel a difference within a couple of days of taking it and she would like to get something else or see if she can take two a day instead of just one and would like for you to call her back at [phone]     Thank you

## 2020-06-09 NOTE — TELEPHONE ENCOUNTER
Patient wanted to know if she can take 2 Celebrex a day instead of taking Tylenol.  She said she used Tylenol Arthritis before, but it does not helo for eight hours, only about four.

## 2020-06-17 ENCOUNTER — TELEPHONE (OUTPATIENT)
Dept: INTERNAL MEDICINE | Facility: CLINIC | Age: 80
End: 2020-06-17

## 2020-06-17 NOTE — TELEPHONE ENCOUNTER
Message Summary      Patient called to request dosage increase for her arthritis medication.  Please  return call and advise.    Best Callback Number: [phone]    Patient Notes: n/a

## 2020-06-18 ENCOUNTER — TELEPHONE (OUTPATIENT)
Dept: INTERNAL MEDICINE | Facility: CLINIC | Age: 80
End: 2020-06-18

## 2020-06-18 RX ORDER — MELOXICAM 15 MG/1
15 TABLET ORAL DAILY
Qty: 90 TABLET | Refills: 3 | Status: SHIPPED | OUTPATIENT
Start: 2020-06-18 | End: 2020-08-04

## 2020-06-18 NOTE — TELEPHONE ENCOUNTER
PT CALLED STATING THE PHARMACY DOES NOT HAVE THE MEDICATION THAT WAS SUPPOSE TO BE CALLED IN     [facility] CONFIRMED     PT CALL BACK   [phone]

## 2020-06-22 ENCOUNTER — APPOINTMENT (OUTPATIENT)
Dept: CARDIOLOGY | Facility: HOSPITAL | Age: 80
End: 2020-06-22

## 2020-06-22 ENCOUNTER — HOSPITAL ENCOUNTER (EMERGENCY)
Facility: HOSPITAL | Age: 80
Discharge: HOME | End: 2020-06-22
Attending: EMERGENCY MEDICINE | Admitting: EMERGENCY MEDICINE

## 2020-06-22 VITALS
SYSTOLIC BLOOD PRESSURE: 160 MMHG | RESPIRATION RATE: 15 BRPM | TEMPERATURE: 98.6 F | BODY MASS INDEX: 34.84 KG/M2 | OXYGEN SATURATION: 94 % | DIASTOLIC BLOOD PRESSURE: 85 MMHG | WEIGHT: 209.1 LBS | HEART RATE: 69 BPM | HEIGHT: 65 IN

## 2020-06-22 DIAGNOSIS — M25.50 POLYARTHRALGIA: ICD-10-CM

## 2020-06-22 DIAGNOSIS — R60.0 PEDAL EDEMA: Primary | ICD-10-CM

## 2020-06-22 LAB
ALBUMIN SERPL-MCNC: 3.8 G/DL (ref 3.5–5.2)
ALBUMIN/GLOB SERPL: 1.2 G/DL
ALP SERPL-CCNC: 70 U/L (ref 39–117)
ALT SERPL W P-5'-P-CCNC: 14 U/L (ref 1–33)
ANION GAP SERPL CALCULATED.3IONS-SCNC: 8.2 MMOL/L (ref 5–15)
AST SERPL-CCNC: 18 U/L (ref 1–32)
BASOPHILS # BLD AUTO: 0.01 10*3/MM3 (ref 0–0.2)
BASOPHILS NFR BLD AUTO: 0.1 % (ref 0–1.5)
BH CV LOW VAS RIGHT POPLITEAL SPONT: 1
BH CV LOWER VASCULAR RIGHT COMMON FEMORAL AUGMENT: NORMAL
BH CV LOWER VASCULAR RIGHT COMMON FEMORAL COMPETENT: NORMAL
BH CV LOWER VASCULAR RIGHT COMMON FEMORAL COMPRESS: NORMAL
BH CV LOWER VASCULAR RIGHT COMMON FEMORAL PHASIC: NORMAL
BH CV LOWER VASCULAR RIGHT COMMON FEMORAL SPONT: NORMAL
BH CV LOWER VASCULAR RIGHT DISTAL FEMORAL COMPRESS: NORMAL
BH CV LOWER VASCULAR RIGHT GASTRONEMIUS COMPRESS: NORMAL
BH CV LOWER VASCULAR RIGHT GREATER SAPH AK COMPRESS: NORMAL
BH CV LOWER VASCULAR RIGHT GREATER SAPH BK COMPRESS: NORMAL
BH CV LOWER VASCULAR RIGHT MID FEMORAL AUGMENT: NORMAL
BH CV LOWER VASCULAR RIGHT MID FEMORAL COMPETENT: NORMAL
BH CV LOWER VASCULAR RIGHT MID FEMORAL COMPRESS: NORMAL
BH CV LOWER VASCULAR RIGHT MID FEMORAL PHASIC: NORMAL
BH CV LOWER VASCULAR RIGHT MID FEMORAL SPONT: NORMAL
BH CV LOWER VASCULAR RIGHT PERONEAL COMPRESS: NORMAL
BH CV LOWER VASCULAR RIGHT POPLITEAL AUGMENT: NORMAL
BH CV LOWER VASCULAR RIGHT POPLITEAL COMPETENT: NORMAL
BH CV LOWER VASCULAR RIGHT POPLITEAL COMPRESS: NORMAL
BH CV LOWER VASCULAR RIGHT POPLITEAL PHASIC: NORMAL
BH CV LOWER VASCULAR RIGHT POPLITEAL SPONT: NORMAL
BH CV LOWER VASCULAR RIGHT POSTERIOR TIBIAL COMPRESS: NORMAL
BH CV LOWER VASCULAR RIGHT PROFUNDA FEMORAL COMPRESS: NORMAL
BH CV LOWER VASCULAR RIGHT PROXIMAL FEMORAL COMPRESS: NORMAL
BH CV LOWER VASCULAR RIGHT SAPHENOFEMORAL JUNCTION COMPRESS: NORMAL
BH CV VAS POP FLUID COLLECTED: 1
BILIRUB SERPL-MCNC: 0.4 MG/DL (ref 0.2–1.2)
BUN BLD-MCNC: 12 MG/DL (ref 8–23)
BUN/CREAT SERPL: 11.4 (ref 7–25)
CALCIUM SPEC-SCNC: 9.2 MG/DL (ref 8.6–10.5)
CHLORIDE SERPL-SCNC: 96 MMOL/L (ref 98–107)
CO2 SERPL-SCNC: 30.8 MMOL/L (ref 22–29)
CREAT BLD-MCNC: 1.05 MG/DL (ref 0.57–1)
DEPRECATED RDW RBC AUTO: 48.6 FL (ref 37–54)
EOSINOPHIL # BLD AUTO: 0.04 10*3/MM3 (ref 0–0.4)
EOSINOPHIL NFR BLD AUTO: 0.4 % (ref 0.3–6.2)
ERYTHROCYTE [DISTWIDTH] IN BLOOD BY AUTOMATED COUNT: 13.9 % (ref 12.3–15.4)
GFR SERPL CREATININE-BSD FRML MDRD: 50 ML/MIN/1.73
GLOBULIN UR ELPH-MCNC: 3.3 GM/DL
GLUCOSE BLD-MCNC: 127 MG/DL (ref 65–99)
HCT VFR BLD AUTO: 39.1 % (ref 34–46.6)
HGB BLD-MCNC: 12.7 G/DL (ref 12–15.9)
IMM GRANULOCYTES # BLD AUTO: 0.02 10*3/MM3 (ref 0–0.05)
IMM GRANULOCYTES NFR BLD AUTO: 0.2 % (ref 0–0.5)
LYMPHOCYTES # BLD AUTO: 2.42 10*3/MM3 (ref 0.7–3.1)
LYMPHOCYTES NFR BLD AUTO: 25.9 % (ref 19.6–45.3)
MCH RBC QN AUTO: 30.6 PG (ref 26.6–33)
MCHC RBC AUTO-ENTMCNC: 32.5 G/DL (ref 31.5–35.7)
MCV RBC AUTO: 94.2 FL (ref 79–97)
MONOCYTES # BLD AUTO: 1.04 10*3/MM3 (ref 0.1–0.9)
MONOCYTES NFR BLD AUTO: 11.1 % (ref 5–12)
NEUTROPHILS # BLD AUTO: 5.8 10*3/MM3 (ref 1.7–7)
NEUTROPHILS NFR BLD AUTO: 62.3 % (ref 42.7–76)
NRBC BLD AUTO-RTO: 0 /100 WBC (ref 0–0.2)
NT-PROBNP SERPL-MCNC: 477.8 PG/ML (ref 5–1800)
PLATELET # BLD AUTO: 284 10*3/MM3 (ref 140–450)
PMV BLD AUTO: 9.8 FL (ref 6–12)
POTASSIUM BLD-SCNC: 3.7 MMOL/L (ref 3.5–5.2)
PROT SERPL-MCNC: 7.1 G/DL (ref 6–8.5)
RBC # BLD AUTO: 4.15 10*6/MM3 (ref 3.77–5.28)
SODIUM BLD-SCNC: 135 MMOL/L (ref 136–145)
WBC NRBC COR # BLD: 9.33 10*3/MM3 (ref 3.4–10.8)

## 2020-06-22 PROCEDURE — 83880 ASSAY OF NATRIURETIC PEPTIDE: CPT | Performed by: EMERGENCY MEDICINE

## 2020-06-22 PROCEDURE — 80053 COMPREHEN METABOLIC PANEL: CPT | Performed by: EMERGENCY MEDICINE

## 2020-06-22 PROCEDURE — 85025 COMPLETE CBC W/AUTO DIFF WBC: CPT | Performed by: EMERGENCY MEDICINE

## 2020-06-22 PROCEDURE — 99283 EMERGENCY DEPT VISIT LOW MDM: CPT

## 2020-06-22 PROCEDURE — 93971 EXTREMITY STUDY: CPT

## 2020-06-22 RX ORDER — SODIUM CHLORIDE 0.9 % (FLUSH) 0.9 %
10 SYRINGE (ML) INJECTION AS NEEDED
Status: DISCONTINUED | OUTPATIENT
Start: 2020-06-22 | End: 2020-06-22 | Stop reason: HOSPADM

## 2020-06-22 RX ORDER — HYDROCHLOROTHIAZIDE 25 MG/1
25 TABLET ORAL DAILY
Qty: 30 TABLET | Refills: 0 | Status: SHIPPED | OUTPATIENT
Start: 2020-06-22 | End: 2020-07-05 | Stop reason: HOSPADM

## 2020-06-22 RX ORDER — HYDROCHLOROTHIAZIDE 25 MG/1
25 TABLET ORAL DAILY
Qty: 30 TABLET | Refills: 5 | Status: SHIPPED | OUTPATIENT
Start: 2020-06-22 | End: 2020-06-22 | Stop reason: SDUPTHER

## 2020-06-22 NOTE — ED NOTES
"Pt states she has been having swelling for approx a month. States she was given a \"water pill\" which she states \"helped but now its come back\"  Denies tingling but states \"my toes feel like they're starting to go out\".  Pt denies any hx of DVT but states in her 40\"s she had a dx of cellulitis.  Pt c/o pain in bilateral legs but states its more prominent in right anterior lower aspect of her leg. Moderate amount of swelling noted to bilateral lower legs with warmth and tenderness noted.  Color slightly pink.   Pt walks with cane but states its becoming more difficult.    Pt had mask on when thisRN walked into  room This RN wore mask and goggles throughout our encounter.  Hand hygiene performed upon entering and exiting room       Lashaun Palacios RN  06/22/20 [id number]    "

## 2020-06-22 NOTE — DISCHARGE INSTRUCTIONS
Take medication as prescribed.  Elevate your legs.  Follow-up with your primary care doctor symptoms persist.  Return to emergency department for increased swelling, trouble breathing, joint swelling, or other concern.

## 2020-06-22 NOTE — PROGRESS NOTES
RLE Venous doppler study complete. Preliminary negative for DVT, fluid popliteal fossa called to Dr. Duffy

## 2020-06-22 NOTE — ED TRIAGE NOTES
Patient presents to er via private vehicle from home.  Patient is reporting bilateral lower extremity swelling along with some hand swelling for over a month.  Patient was placed in face mask during first look triage.  Patient was wearing a face mask throughout encounter.  I wore personal protective equipment throughout the encounter.  Hand hygiene was performed before and after patient encounter.

## 2020-06-22 NOTE — ED PROVIDER NOTES
EMERGENCY DEPARTMENT ENCOUNTER    Room Number:  17/17  Date of encounter:  6/22/2020  PCP: Gordo Mg MD  Historian: Patient     I used full protective equipment while examining this patient.  This includes face mask, gloves and protective eyewear.  I washed my hands before entering the room and immediately upon leaving the room.  Patient was wearing a surgical mask.      HPI:  Chief Complaint: Leg swelling  A complete HPI/ROS/PMH/PSH/SH/FH are unobtainable due to: None    Context: Gabbi Brandon is a 79 y.o. female who presents to the ED c/o bilateral leg swelling for the past 3 to 4 weeks.  Symptoms are worse after she has been standing.  Symptoms improve when she gets off her feet and props her legs up.  Symptoms are described as moderate.  Patient also complains of intermittent pain in the joints of her lower extremities.  She has a history of arthritis.  Pain is worse when she walks.  She denies calf pain, chest pain, shortness of breath, cough, fever, recent weight gain, numbness/tingling/weakness in her legs, abdominal pain, vomiting, or dysuria.  She also notices mild swelling in both hands intermittently.  Patient states she is on a diuretic but has been out of it for a few days.      PAST MEDICAL HISTORY  Active Ambulatory Problems     Diagnosis Date Noted   • Irregular bleeding 09/07/2016   • Urinary tract infection 09/14/2016   • Dyslipidemia 10/03/2014   • Essential hypertension 01/10/2018   • Stage 2 chronic kidney disease 09/18/2018   • Gastroesophageal reflux disease without esophagitis 05/02/2019   • Seasonal allergies 05/02/2019   • Familial hypercholesterolemia 11/04/2019   • Bladder spasm 11/04/2019     Resolved Ambulatory Problems     Diagnosis Date Noted   • No Resolved Ambulatory Problems     Past Medical History:   Diagnosis Date   • Arthritis    • GERD (gastroesophageal reflux disease)    • Hyperlipidemia    • Hypertension    • Incontinent of urine          PAST SURGICAL  HISTORY  Past Surgical History:   Procedure Laterality Date   • BREAST BIOPSY     • COLONOSCOPY N/A 8/26/2016    Procedure: COLONOSCOPY WITH POLYPECTOMY (HOT SNARE);  Surgeon: Paulino Narvaez MD;  Location: [facility] ENDOSCOPY;  Service:    • VAGINAL HYSTERECTOMY           FAMILY HISTORY  Family History   Problem Relation Age of Onset   • Colon cancer Brother    • Heart attack Mother    • Stroke Father    • Lung cancer Sister    • Lung cancer Brother         3 brothers   • Deep vein thrombosis Brother    • Brain cancer Brother    • Uterine cancer Neg Hx    • Ovarian cancer Neg Hx    • Breast cancer Neg Hx    • Pulmonary embolism Neg Hx          SOCIAL HISTORY  Social History     Socioeconomic History   • Marital status:      Spouse name: Not on file   • Number of children: Not on file   • Years of education: Not on file   • Highest education level: Not on file   Tobacco Use   • Smoking status: Never Smoker   • Smokeless tobacco: Never Used   Substance and Sexual Activity   • Alcohol use: Yes     Comment: occasional   • Drug use: No   • Sexual activity: Defer         ALLERGIES  Penicillins       REVIEW OF SYSTEMS  Review of Systems      All systems have been reviewed and are negative except as as discussed in the HPI    PHYSICAL EXAM    I have reviewed the triage vital signs and nursing notes.    ED Triage Vitals   Temp Heart Rate Resp BP SpO2   06/22/20 1044 06/22/20 1044 06/22/20 1042 06/22/20 1123 06/22/20 1044   98.6 °F (37 °C) 75 15 176/92 94 %      Temp src Heart Rate Source Patient Position BP Location FiO2 (%)   06/22/20 1044 06/22/20 1044 06/22/20 1123 06/22/20 1123 --   Tympanic Monitor Lying Right arm        Physical Exam  GENERAL: Awake, alert, elderly appearing  HENT: NCAT, nares patent, moist mucous membranes  NECK: supple, no lymphadenopathy  EYES: no scleral icterus  CV: regular rhythm, regular rate, no murmur  RESPIRATORY: normal effort, clear to auscultation bilaterally  ABDOMEN: soft,  nontender, nondistended  MUSCULOSKELETAL: There is 2+ pedal edema in both lower legs.  Extremities are nontender and without obvious deformity.  There is normal range of motion in all extremities.  There is no calf tenderness.  Pedal pulses are equal bilaterally.  There is no joint swelling.  There is no edema in the upper extremities.  NEURO: Strength, sensation, and coordination are grossly intact.  Speech and mentation are unremarkable.  No facial droop.  SKIN: Skin is warm and dry.  There are scattered flesh-colored and slightly pinkish papules on the lower extremities.  PSYCH: Normal mood and affect      LAB RESULTS  Recent Results (from the past 24 hour(s))   Comprehensive Metabolic Panel    Collection Time: 06/22/20 12:04 PM   Result Value Ref Range    Glucose 127 (H) 65 - 99 mg/dL    BUN 12 8 - 23 mg/dL    Creatinine 1.05 (H) 0.57 - 1.00 mg/dL    Sodium 135 (L) 136 - 145 mmol/L    Potassium 3.7 3.5 - 5.2 mmol/L    Chloride 96 (L) 98 - 107 mmol/L    CO2 30.8 (H) 22.0 - 29.0 mmol/L    Calcium 9.2 8.6 - 10.5 mg/dL    Total Protein 7.1 6.0 - 8.5 g/dL    Albumin 3.80 3.50 - 5.20 g/dL    ALT (SGPT) 14 1 - 33 U/L    AST (SGOT) 18 1 - 32 U/L    Alkaline Phosphatase 70 39 - 117 U/L    Total Bilirubin 0.4 0.2 - 1.2 mg/dL    eGFR Non African Amer 50 (L) >60 mL/min/1.73    Globulin 3.3 gm/dL    A/G Ratio 1.2 g/dL    BUN/Creatinine Ratio 11.4 7.0 - 25.0    Anion Gap 8.2 5.0 - 15.0 mmol/L   BNP    Collection Time: 06/22/20 12:04 PM   Result Value Ref Range    proBNP 477.8 5.0-1,800.0 pg/mL   CBC Auto Differential    Collection Time: 06/22/20 12:04 PM   Result Value Ref Range    WBC 9.33 3.40 - 10.80 10*3/mm3    RBC 4.15 3.77 - 5.28 10*6/mm3    Hemoglobin 12.7 12.0 - 15.9 g/dL    Hematocrit 39.1 34.0 - 46.6 %    MCV 94.2 79.0 - 97.0 fL    MCH 30.6 26.6 - 33.0 pg    MCHC 32.5 31.5 - 35.7 g/dL    RDW 13.9 12.3 - 15.4 %    RDW-SD 48.6 37.0 - 54.0 fl    MPV 9.8 6.0 - 12.0 fL    Platelets 284 140 - 450 10*3/mm3    Neutrophil %  62.3 42.7 - 76.0 %    Lymphocyte % 25.9 19.6 - 45.3 %    Monocyte % 11.1 5.0 - 12.0 %    Eosinophil % 0.4 0.3 - 6.2 %    Basophil % 0.1 0.0 - 1.5 %    Immature Grans % 0.2 0.0 - 0.5 %    Neutrophils, Absolute 5.80 1.70 - 7.00 10*3/mm3    Lymphocytes, Absolute 2.42 0.70 - 3.10 10*3/mm3    Monocytes, Absolute 1.04 (H) 0.10 - 0.90 10*3/mm3    Eosinophils, Absolute 0.04 0.00 - 0.40 10*3/mm3    Basophils, Absolute 0.01 0.00 - 0.20 10*3/mm3    Immature Grans, Absolute 0.02 0.00 - 0.05 10*3/mm3    nRBC 0.0 0.0 - 0.2 /100 WBC   Duplex Venous Lower Extremity - Right    Collection Time: 06/22/20 12:48 PM   Result Value Ref Range    Right Popliteal Spont 1     Right Common Femoral Spont Y     Right Common Femoral Phasic Y     Right Common Femoral Augment Y     Right Common Femoral Competent Y     Right Common Femoral Compress C     Right Saphenofemoral Junction Compress C     Right Profunda Femoral Compress C     Right Proximal Femoral Compress C     Right Mid Femoral Spont Y     Right Mid Femoral Phasic Y     Right Mid Femoral Augment Y     Right Mid Femoral Competent Y     Right Mid Femoral Compress C     Right Distal Femoral Compress C     Right Popliteal Spont Y     Right Popliteal Phasic Y     Right Popliteal Augment Y     Right Popliteal Competent Y     Right Popliteal Compress C     Right Posterior Tibial Compress C     Right Peroneal Compress C     Right GastronemiusSoleal Compress C     Right Greater Saph AK Compress C     Right Greater Saph BK Compress C     BH CV VAS POP FLUID COLLECTED 1        Ordered the above labs and independently reviewed the results.      RADIOLOGY  No Radiology Exams Resulted Within Past 24 Hours    I ordered the above noted radiological studies. Reviewed by me and discussed with radiologist.  See dictation for official radiology interpretation.      PROCEDURES  Procedures      MEDICATIONS GIVEN IN ER    Medications   sodium chloride 0.9 % flush 10 mL (has no administration in time range)          PROGRESS, DATA ANALYSIS, CONSULTS, AND MEDICAL DECISION MAKING    All labs have been independently reviewed by me.  All radiology studies have been reviewed by me and discussed with radiologist dictating the report.   EKG's independently viewed and interpreted by me.  I have reviewed the nurse's notes, vital signs, past medical history, and medication list.  Discussion below represents my analysis of pertinent findings related to patient's condition, differential diagnosis, treatment plan and final disposition.      ED Course as of Jun 22 1506   Mon Jun 22, 2020   1127 Old records reviewed.  Patient was seen here in the ER last month complaining of joint pain and left leg swelling.  Venous Doppler of the left leg was negative.  Labs were unremarkable except for a sed rate of 34 and creatinine of 1.02.  Patient saw her primary care doctor on 6/4/2020 and was started on Celebrex.    [WH]   1254 Doppler ultrasound of the right leg was negative for DVT or SVT.  There was a small fluid collection in the right popliteal fossa.    [WH]   1334 Stable   Creatinine(!): 1.05 [WH]   1355 Patient is resting comfortably.  Test results were discussed with the patient.  She states she is currently out of her diuretic.  I will give the patient a prescription for this.  She was advised follow-up with her primary care doctor.  Return precautions were discussed.    [WH]   1410 Patient's work-up was unremarkable.  There was no evidence of CHF.  She denies shortness of breath.  She was not hypoxic.  Ultrasound was negative for DVT.  Patient did not have cellulitis.  She denies any joint swelling and was afebrile.  Patient has been out of her diuretics.    [WH]      ED Course User Index  [WH] Jaun Duffy MD       AS OF 15:06 VITALS:    BP - 160/85  HR - 69  TEMP - 98.6 °F (37 °C) (Tympanic)  O2 SATS - 94%      DIAGNOSIS  Final diagnoses:   Pedal edema   Polyarthralgia         DISPOSITION  Discharge    DISCHARGE    Patient  discharged in stable condition.    Reviewed implications of results, diagnosis, meds, responsibility to follow up, warning signs and symptoms of possible worsening, potential complications and reasons to return to ER, including worsening symptoms, shortness of breath, chest pain, joint swelling, fever, or other concern.    Patient/Family voiced understanding of above instructions.    Discussed plan for discharge, as there is no emergent indication for admission. Patient referred to primary care provider for BP management due to today's BP. Pt/family is agreeable and understands need for follow up and repeat testing.  Pt is aware that discharge does not mean that nothing is wrong but it indicates no emergency is present that requires admission and they must continue care with follow-up as given below or physician of their choice.     FOLLOW-UP  Gordo Mg MD  [address] [id number] [phone]    Schedule an appointment as soon as possible for a visit   If symptoms persist         Medication List      No changes were made to your prescriptions during this visit.           Please note that this document was completed using voice recognition software     Jaun Duffy MD  06/22/20 0037

## 2020-06-23 ENCOUNTER — TELEPHONE (OUTPATIENT)
Dept: INTERNAL MEDICINE | Facility: CLINIC | Age: 80
End: 2020-06-23

## 2020-06-23 NOTE — TELEPHONE ENCOUNTER
Patient called and stated that she was in the ER yesterday (see 6/22/2020 visits) for Edema in her feet. While there, it was made known to her that her kidneys were moving just a little bit, and the patient called to ask Dr. Mg if that affects her medication at all. Specifically, she wants to know if she should continue to take her water pills.    Please call and advise.  [phone]

## 2020-06-23 NOTE — TELEPHONE ENCOUNTER
Yes she should continue to take the water pills and follow-up with 1 of the nurse practitioners for the next few days

## 2020-06-25 ENCOUNTER — OFFICE VISIT (OUTPATIENT)
Dept: INTERNAL MEDICINE | Facility: CLINIC | Age: 80
End: 2020-06-25

## 2020-06-25 DIAGNOSIS — N18.2 STAGE 2 CHRONIC KIDNEY DISEASE: ICD-10-CM

## 2020-06-25 DIAGNOSIS — E78.01 FAMILIAL HYPERCHOLESTEROLEMIA: ICD-10-CM

## 2020-06-25 DIAGNOSIS — R60.0 BILATERAL LOWER EXTREMITY EDEMA: ICD-10-CM

## 2020-06-25 DIAGNOSIS — K21.9 GASTROESOPHAGEAL REFLUX DISEASE WITHOUT ESOPHAGITIS: ICD-10-CM

## 2020-06-25 DIAGNOSIS — M19.90 ARTHRITIS: ICD-10-CM

## 2020-06-25 DIAGNOSIS — I10 ESSENTIAL HYPERTENSION: Primary | ICD-10-CM

## 2020-06-25 PROCEDURE — 99442 PR PHYS/QHP TELEPHONE EVALUATION 11-20 MIN: CPT | Performed by: INTERNAL MEDICINE

## 2020-06-25 RX ORDER — ACETAMINOPHEN AND CODEINE PHOSPHATE 300; 30 MG/1; MG/1
1 TABLET ORAL EVERY 4 HOURS PRN
Qty: 90 TABLET | Refills: 2 | Status: ON HOLD | OUTPATIENT
Start: 2020-06-25 | End: 2020-07-02

## 2020-06-25 NOTE — PROGRESS NOTES
Subjective   Gabbi Brandon is a 79 y.o. female. You have chosen to receive care through a telephone visit. Do you consent to use a telephone visit for your medical care today? Yes patient presents by phone with a chief complaint of ongoing issues with arthritic pain and lower extremity edema.  Concurrently she has issues with essential hypertension, stage II chronic kidney disease, hyperlipidemia, arthritis that is diffuse and morbid obesity.  I had a long discussion of approximately 20 minutes both both the patient and her daughter regarding various ways to treat the arthritic pain without stressing her kidneys.  I highly recommended that she rely on Tylenol 3 sparingly throughout the day to improve her arthritic pain and her mobility at the same time.        There were no vitals taken for this visit.    There is no height or weight on file to calculate BMI.    History of Present Illness ongoing issues with lower extremity edema and diffuse arthritis    The following portions of the patient's history were reviewed and updated as appropriate: allergies, current medications, past family history, past medical history, past social history, past surgical history and problem list.    Review of Systems   Constitutional: Positive for fatigue.   Cardiovascular: Positive for leg swelling.   Musculoskeletal: Positive for arthralgias and gait problem.   Psychiatric/Behavioral: Negative.        Objective   Physical Exam   Constitutional: She appears well-developed and well-nourished.   Cardiovascular:   No specific cardiovascular complaints   Pulmonary/Chest:   No shortness of breath noted   Abdominal:   No abdominal complaints   Musculoskeletal:   Lower extremity edema is improving with diuretics however she is having a great deal of difficulty with her arthritic pain   Psychiatric: She has a normal mood and affect. Her behavior is normal.   Nursing note reviewed.        Assessment/Plan   Diagnoses and all orders for  this visit:    Essential hypertension  Comments:  reportly well controlled    Gastroesophageal reflux disease without esophagitis  Comments:  well controlled    Stage 2 chronic kidney disease  Comments:  stable for now    Familial hypercholesterolemia  Comments:  no change in therapy at this time    Bilateral lower extremity edema  Comments:  continue diuretics, improving overall    Arthritis  Comments:  add tylenol 3 prn  Orders:  -     acetaminophen-codeine (TYLENOL #3) 300-30 MG per tablet; Take 1 tablet by mouth Every 4 (Four) Hours As Needed for Moderate Pain .

## 2020-06-28 ENCOUNTER — APPOINTMENT (OUTPATIENT)
Dept: GENERAL RADIOLOGY | Facility: HOSPITAL | Age: 80
End: 2020-06-28

## 2020-06-28 ENCOUNTER — HOSPITAL ENCOUNTER (EMERGENCY)
Facility: HOSPITAL | Age: 80
Discharge: HOME | End: 2020-06-29
Attending: EMERGENCY MEDICINE | Admitting: EMERGENCY MEDICINE

## 2020-06-28 DIAGNOSIS — R55 VASOVAGAL EPISODE: Primary | ICD-10-CM

## 2020-06-28 DIAGNOSIS — R60.0 PEDAL EDEMA: ICD-10-CM

## 2020-06-28 LAB
ALBUMIN SERPL-MCNC: 3.5 G/DL (ref 3.5–5.2)
ALBUMIN/GLOB SERPL: 1 G/DL
ALP SERPL-CCNC: 64 U/L (ref 39–117)
ALT SERPL W P-5'-P-CCNC: 15 U/L (ref 1–33)
ANION GAP SERPL CALCULATED.3IONS-SCNC: 12.6 MMOL/L (ref 5–15)
AST SERPL-CCNC: 25 U/L (ref 1–32)
BACTERIA UR QL AUTO: ABNORMAL /HPF
BASOPHILS # BLD AUTO: 0.02 10*3/MM3 (ref 0–0.2)
BASOPHILS NFR BLD AUTO: 0.1 % (ref 0–1.5)
BILIRUB SERPL-MCNC: 0.6 MG/DL (ref 0.2–1.2)
BILIRUB UR QL STRIP: NEGATIVE
BUN BLD-MCNC: 16 MG/DL (ref 8–23)
BUN/CREAT SERPL: 13.1 (ref 7–25)
CALCIUM SPEC-SCNC: 10.4 MG/DL (ref 8.6–10.5)
CHLORIDE SERPL-SCNC: 87 MMOL/L (ref 98–107)
CLARITY UR: CLEAR
CO2 SERPL-SCNC: 29.4 MMOL/L (ref 22–29)
COLOR UR: YELLOW
CREAT BLD-MCNC: 1.22 MG/DL (ref 0.57–1)
DEPRECATED RDW RBC AUTO: 46.4 FL (ref 37–54)
EOSINOPHIL # BLD AUTO: 0.1 10*3/MM3 (ref 0–0.4)
EOSINOPHIL NFR BLD AUTO: 0.7 % (ref 0.3–6.2)
ERYTHROCYTE [DISTWIDTH] IN BLOOD BY AUTOMATED COUNT: 13.4 % (ref 12.3–15.4)
GFR SERPL CREATININE-BSD FRML MDRD: 42 ML/MIN/1.73
GLOBULIN UR ELPH-MCNC: 3.6 GM/DL
GLUCOSE BLD-MCNC: 170 MG/DL (ref 65–99)
GLUCOSE BLDC GLUCOMTR-MCNC: 146 MG/DL (ref 70–130)
GLUCOSE UR STRIP-MCNC: NEGATIVE MG/DL
HCT VFR BLD AUTO: 40.9 % (ref 34–46.6)
HGB BLD-MCNC: 13.3 G/DL (ref 12–15.9)
HGB UR QL STRIP.AUTO: ABNORMAL
HYALINE CASTS UR QL AUTO: ABNORMAL /LPF
IMM GRANULOCYTES # BLD AUTO: 0.05 10*3/MM3 (ref 0–0.05)
IMM GRANULOCYTES NFR BLD AUTO: 0.3 % (ref 0–0.5)
KETONES UR QL STRIP: ABNORMAL
LEUKOCYTE ESTERASE UR QL STRIP.AUTO: NEGATIVE
LYMPHOCYTES # BLD AUTO: 2.7 10*3/MM3 (ref 0.7–3.1)
LYMPHOCYTES NFR BLD AUTO: 18.6 % (ref 19.6–45.3)
MAGNESIUM SERPL-MCNC: 1.4 MG/DL (ref 1.6–2.4)
MCH RBC QN AUTO: 30.4 PG (ref 26.6–33)
MCHC RBC AUTO-ENTMCNC: 32.5 G/DL (ref 31.5–35.7)
MCV RBC AUTO: 93.4 FL (ref 79–97)
MONOCYTES # BLD AUTO: 0.98 10*3/MM3 (ref 0.1–0.9)
MONOCYTES NFR BLD AUTO: 6.7 % (ref 5–12)
NEUTROPHILS # BLD AUTO: 10.67 10*3/MM3 (ref 1.7–7)
NEUTROPHILS NFR BLD AUTO: 73.6 % (ref 42.7–76)
NITRITE UR QL STRIP: NEGATIVE
NRBC BLD AUTO-RTO: 0 /100 WBC (ref 0–0.2)
PH UR STRIP.AUTO: <=5 [PH] (ref 5–8)
PLATELET # BLD AUTO: 272 10*3/MM3 (ref 140–450)
PMV BLD AUTO: 10.6 FL (ref 6–12)
POTASSIUM BLD-SCNC: 4.6 MMOL/L (ref 3.5–5.2)
PROT SERPL-MCNC: 7.1 G/DL (ref 6–8.5)
PROT UR QL STRIP: NEGATIVE
RBC # BLD AUTO: 4.38 10*6/MM3 (ref 3.77–5.28)
RBC # UR: ABNORMAL /HPF
REF LAB TEST METHOD: ABNORMAL
SODIUM BLD-SCNC: 129 MMOL/L (ref 136–145)
SP GR UR STRIP: 1.03 (ref 1–1.03)
SQUAMOUS #/AREA URNS HPF: ABNORMAL /HPF
TRANS CELLS #/AREA URNS HPF: ABNORMAL /HPF
TROPONIN T SERPL-MCNC: <0.01 NG/ML (ref 0–0.03)
UROBILINOGEN UR QL STRIP: ABNORMAL
WBC NRBC COR # BLD: 14.52 10*3/MM3 (ref 3.4–10.8)
WBC UR QL AUTO: ABNORMAL /HPF

## 2020-06-28 PROCEDURE — 82962 GLUCOSE BLOOD TEST: CPT

## 2020-06-28 PROCEDURE — 99284 EMERGENCY DEPT VISIT MOD MDM: CPT

## 2020-06-28 PROCEDURE — 81001 URINALYSIS AUTO W/SCOPE: CPT | Performed by: EMERGENCY MEDICINE

## 2020-06-28 PROCEDURE — 85025 COMPLETE CBC W/AUTO DIFF WBC: CPT | Performed by: EMERGENCY MEDICINE

## 2020-06-28 PROCEDURE — 93010 ELECTROCARDIOGRAM REPORT: CPT | Performed by: INTERNAL MEDICINE

## 2020-06-28 PROCEDURE — 83735 ASSAY OF MAGNESIUM: CPT | Performed by: EMERGENCY MEDICINE

## 2020-06-28 PROCEDURE — 93005 ELECTROCARDIOGRAM TRACING: CPT | Performed by: EMERGENCY MEDICINE

## 2020-06-28 PROCEDURE — 80053 COMPREHEN METABOLIC PANEL: CPT | Performed by: EMERGENCY MEDICINE

## 2020-06-28 PROCEDURE — 93005 ELECTROCARDIOGRAM TRACING: CPT

## 2020-06-28 PROCEDURE — 71045 X-RAY EXAM CHEST 1 VIEW: CPT

## 2020-06-28 PROCEDURE — 84484 ASSAY OF TROPONIN QUANT: CPT | Performed by: EMERGENCY MEDICINE

## 2020-06-28 RX ORDER — SODIUM CHLORIDE 0.9 % (FLUSH) 0.9 %
10 SYRINGE (ML) INJECTION AS NEEDED
Status: DISCONTINUED | OUTPATIENT
Start: 2020-06-28 | End: 2020-06-29 | Stop reason: HOSPADM

## 2020-06-29 ENCOUNTER — TELEPHONE (OUTPATIENT)
Dept: INTERNAL MEDICINE | Facility: CLINIC | Age: 80
End: 2020-06-29

## 2020-06-29 VITALS
OXYGEN SATURATION: 95 % | BODY MASS INDEX: 35.41 KG/M2 | WEIGHT: 212.8 LBS | DIASTOLIC BLOOD PRESSURE: 54 MMHG | HEART RATE: 84 BPM | SYSTOLIC BLOOD PRESSURE: 124 MMHG | RESPIRATION RATE: 16 BRPM | TEMPERATURE: 99 F

## 2020-06-29 LAB
HOLD SPECIMEN: NORMAL
HOLD SPECIMEN: NORMAL
WHOLE BLOOD HOLD SPECIMEN: NORMAL
WHOLE BLOOD HOLD SPECIMEN: NORMAL

## 2020-06-29 PROCEDURE — 96365 THER/PROPH/DIAG IV INF INIT: CPT

## 2020-06-29 PROCEDURE — 25010000002 MAGNESIUM SULFATE 2 GM/50ML SOLUTION: Performed by: EMERGENCY MEDICINE

## 2020-06-29 RX ORDER — MAGNESIUM SULFATE HEPTAHYDRATE 40 MG/ML
2 INJECTION, SOLUTION INTRAVENOUS ONCE
Status: COMPLETED | OUTPATIENT
Start: 2020-06-29 | End: 2020-06-29

## 2020-06-29 RX ADMIN — MAGNESIUM SULFATE 2 G: 2 INJECTION INTRAVENOUS at 00:15

## 2020-06-29 NOTE — TELEPHONE ENCOUNTER
Pts daughter Rosa Burton calling.  She is not sleeping and is restless still.  Was given Tylenol 3 last week but it is not helping the pain.  Please advise  Rosa # [phone]

## 2020-06-30 ENCOUNTER — OFFICE VISIT (OUTPATIENT)
Dept: INTERNAL MEDICINE | Facility: CLINIC | Age: 80
End: 2020-06-30

## 2020-06-30 ENCOUNTER — TELEPHONE (OUTPATIENT)
Dept: INTERNAL MEDICINE | Facility: CLINIC | Age: 80
End: 2020-06-30

## 2020-06-30 DIAGNOSIS — I10 ESSENTIAL HYPERTENSION: ICD-10-CM

## 2020-06-30 DIAGNOSIS — Z09 HOSPITAL DISCHARGE FOLLOW-UP: ICD-10-CM

## 2020-06-30 DIAGNOSIS — G89.4 CHRONIC PAIN SYNDROME: ICD-10-CM

## 2020-06-30 DIAGNOSIS — N18.2 STAGE 2 CHRONIC KIDNEY DISEASE: ICD-10-CM

## 2020-06-30 DIAGNOSIS — R60.0 BILATERAL LOWER EXTREMITY EDEMA: Primary | ICD-10-CM

## 2020-06-30 DIAGNOSIS — K21.9 GASTROESOPHAGEAL REFLUX DISEASE WITHOUT ESOPHAGITIS: ICD-10-CM

## 2020-06-30 DIAGNOSIS — E78.01 FAMILIAL HYPERCHOLESTEROLEMIA: ICD-10-CM

## 2020-06-30 PROCEDURE — 99214 OFFICE O/P EST MOD 30 MIN: CPT | Performed by: INTERNAL MEDICINE

## 2020-06-30 RX ORDER — OXYCODONE HYDROCHLORIDE AND ACETAMINOPHEN 5; 325 MG/1; MG/1
1 TABLET ORAL EVERY 8 HOURS PRN
Qty: 90 TABLET | Refills: 0 | Status: ON HOLD | OUTPATIENT
Start: 2020-06-30 | End: 2020-07-02

## 2020-06-30 NOTE — PROGRESS NOTES
Subjective   Gabbi Brandon is a 79 y.o. female. You have chosen to receive care through a telephone visit. Do you consent to use a telephone visit for your medical care today? Yes patient presents by phone with a chief complaint of having been hospitalized for fluid overload and lower extremity edema who is now doing substantially better but does have ongoing difficulties with diffuse intense arthritic pain for which she cannot take nonsteroidal anti-inflammatories, concurrently has issues with gastroesophageal reflux disease is well controlled hyperlipidemia is well controlled hypertension is well controlled chronic stage II kidney disease that has improved here for follow-up and evaluation.  I spoke with both Mrs. brandon and her daughters and overall she is doing better but they are still struggling to find a solution to her arthritic pain since she cannot take anti-inflammatories I recommended that she change from Tylenol 3 to Percocet 5/3/2025 3 times a day as needed.  In addition she is going to continue her current diuretics and check back with me in 2 weeks to apprise me of her progress at that time.  I spent approximately 20 minutes on the phone with the patient and her daughters and she is doing better overall.        There were no vitals taken for this visit.    There is no height or weight on file to calculate BMI.    History of Present Illness slowly improving from her fluid overload but still has a lot of arthritic pain    The following portions of the patient's history were reviewed and updated as appropriate: allergies, current medications, past family history, past medical history, past social history, past surgical history and problem list.    Review of Systems   Constitutional: Positive for fatigue.   HENT: Negative.    Respiratory: Negative.    Cardiovascular: Positive for leg swelling.   Gastrointestinal: Positive for abdominal distention.   Musculoskeletal: Positive for arthralgias and  back pain.   Psychiatric/Behavioral: Negative.        Objective   Physical Exam   Constitutional: She is oriented to person, place, and time. She appears well-developed and well-nourished.   Cardiovascular:   No chest pain or SOB   Pulmonary/Chest:   No SOB noted   Abdominal: Soft. Bowel sounds are normal.   Musculoskeletal: She exhibits edema.   Edema has improved but she is unsteady on her feet and needs a walker   Neurological: She is alert and oriented to person, place, and time.   Psychiatric: She has a normal mood and affect. Her behavior is normal.   Nursing note reviewed.        Assessment/Plan   Diagnoses and all orders for this visit:    Bilateral lower extremity edema  Comments:  vastly improved at this point    Familial hypercholesterolemia  Comments:  no change for now    Essential hypertension  Comments:  well controlled overall    Gastroesophageal reflux disease without esophagitis  Comments:  well controlled overall    Stage 2 chronic kidney disease  Comments:  stable for now    Hospital discharge follow-up  Comments:  i reviewed her hospital records with her and her daughter

## 2020-07-01 ENCOUNTER — APPOINTMENT (OUTPATIENT)
Dept: GENERAL RADIOLOGY | Facility: HOSPITAL | Age: 80
End: 2020-07-01

## 2020-07-01 ENCOUNTER — TELEPHONE (OUTPATIENT)
Dept: INTERNAL MEDICINE | Facility: CLINIC | Age: 80
End: 2020-07-01

## 2020-07-01 ENCOUNTER — HOSPITAL ENCOUNTER (INPATIENT)
Facility: HOSPITAL | Age: 80
LOS: 4 days | Discharge: HOME | End: 2020-07-05
Attending: EMERGENCY MEDICINE | Admitting: INTERNAL MEDICINE

## 2020-07-01 ENCOUNTER — TELEMEDICINE (OUTPATIENT)
Dept: INTERNAL MEDICINE | Facility: CLINIC | Age: 80
End: 2020-07-01

## 2020-07-01 ENCOUNTER — APPOINTMENT (OUTPATIENT)
Dept: CT IMAGING | Facility: HOSPITAL | Age: 80
End: 2020-07-01

## 2020-07-01 DIAGNOSIS — Z74.09 IMPAIRED MOBILITY AND ACTIVITIES OF DAILY LIVING: ICD-10-CM

## 2020-07-01 DIAGNOSIS — R09.02 HYPOXIA: ICD-10-CM

## 2020-07-01 DIAGNOSIS — E87.1 HYPONATREMIA: ICD-10-CM

## 2020-07-01 DIAGNOSIS — R11.10 VOMITING, INTRACTABILITY OF VOMITING NOT SPECIFIED, PRESENCE OF NAUSEA NOT SPECIFIED, UNSPECIFIED VOMITING TYPE: ICD-10-CM

## 2020-07-01 DIAGNOSIS — Z78.9 IMPAIRED MOBILITY AND ACTIVITIES OF DAILY LIVING: ICD-10-CM

## 2020-07-01 DIAGNOSIS — J18.9 PNEUMONIA OF RIGHT LOWER LOBE DUE TO INFECTIOUS ORGANISM: Primary | ICD-10-CM

## 2020-07-01 DIAGNOSIS — R60.0 BILATERAL LOWER EXTREMITY EDEMA: ICD-10-CM

## 2020-07-01 DIAGNOSIS — R05.9 COUGH: Primary | ICD-10-CM

## 2020-07-01 PROBLEM — Z66 DNR (DO NOT RESUSCITATE): Status: ACTIVE | Noted: 2020-07-01

## 2020-07-01 PROBLEM — R62.7 FAILURE TO THRIVE IN ADULT: Status: ACTIVE | Noted: 2020-07-01

## 2020-07-01 LAB
ALBUMIN SERPL-MCNC: 2.7 G/DL (ref 3.5–5.2)
ALBUMIN/GLOB SERPL: 0.8 G/DL
ALP SERPL-CCNC: 62 U/L (ref 39–117)
ALT SERPL W P-5'-P-CCNC: 15 U/L (ref 1–33)
ANION GAP SERPL CALCULATED.3IONS-SCNC: 10.1 MMOL/L (ref 5–15)
ARTERIAL PATENCY WRIST A: POSITIVE
AST SERPL-CCNC: 28 U/L (ref 1–32)
ATMOSPHERIC PRESS: 748.5 MMHG
BASE EXCESS BLDA CALC-SCNC: 5.7 MMOL/L (ref 0–2)
BASOPHILS # BLD AUTO: 0.01 10*3/MM3 (ref 0–0.2)
BASOPHILS NFR BLD AUTO: 0.1 % (ref 0–1.5)
BDY SITE: ABNORMAL
BILIRUB SERPL-MCNC: 0.5 MG/DL (ref 0.2–1.2)
BILIRUB UR QL STRIP: NEGATIVE
BUN SERPL-MCNC: 29 MG/DL (ref 8–23)
BUN/CREAT SERPL: 26.9 (ref 7–25)
CALCIUM SPEC-SCNC: 8.8 MG/DL (ref 8.6–10.5)
CHLORIDE SERPL-SCNC: 84 MMOL/L (ref 98–107)
CLARITY UR: ABNORMAL
CO2 SERPL-SCNC: 29.9 MMOL/L (ref 22–29)
COLOR UR: YELLOW
CREAT SERPL-MCNC: 1.08 MG/DL (ref 0.57–1)
D-LACTATE SERPL-SCNC: 1.5 MMOL/L (ref 0.5–2)
DEPRECATED RDW RBC AUTO: 46.7 FL (ref 37–54)
EOSINOPHIL # BLD AUTO: 0.1 10*3/MM3 (ref 0–0.4)
EOSINOPHIL NFR BLD AUTO: 0.9 % (ref 0.3–6.2)
ERYTHROCYTE [DISTWIDTH] IN BLOOD BY AUTOMATED COUNT: 13.8 % (ref 12.3–15.4)
GAS FLOW AIRWAY: 2 LPM
GFR SERPL CREATININE-BSD FRML MDRD: 49 ML/MIN/1.73
GLOBULIN UR ELPH-MCNC: 3.5 GM/DL
GLUCOSE SERPL-MCNC: 105 MG/DL (ref 65–99)
GLUCOSE UR STRIP-MCNC: NEGATIVE MG/DL
HCO3 BLDA-SCNC: 30.5 MMOL/L (ref 22–28)
HCT VFR BLD AUTO: 36.9 % (ref 34–46.6)
HGB BLD-MCNC: 12.5 G/DL (ref 12–15.9)
HGB UR QL STRIP.AUTO: NEGATIVE
HOLD SPECIMEN: NORMAL
IMM GRANULOCYTES # BLD AUTO: 0.05 10*3/MM3 (ref 0–0.05)
IMM GRANULOCYTES NFR BLD AUTO: 0.4 % (ref 0–0.5)
KETONES UR QL STRIP: NEGATIVE
LEUKOCYTE ESTERASE UR QL STRIP.AUTO: NEGATIVE
LYMPHOCYTES # BLD AUTO: 2.4 10*3/MM3 (ref 0.7–3.1)
LYMPHOCYTES NFR BLD AUTO: 21.4 % (ref 19.6–45.3)
MCH RBC QN AUTO: 30.6 PG (ref 26.6–33)
MCHC RBC AUTO-ENTMCNC: 33.9 G/DL (ref 31.5–35.7)
MCV RBC AUTO: 90.4 FL (ref 79–97)
MODALITY: ABNORMAL
MONOCYTES # BLD AUTO: 1.78 10*3/MM3 (ref 0.1–0.9)
MONOCYTES NFR BLD AUTO: 15.8 % (ref 5–12)
NEUTROPHILS NFR BLD AUTO: 6.9 10*3/MM3 (ref 1.7–7)
NEUTROPHILS NFR BLD AUTO: 61.4 % (ref 42.7–76)
NITRITE UR QL STRIP: NEGATIVE
NRBC BLD AUTO-RTO: 0 /100 WBC (ref 0–0.2)
NT-PROBNP SERPL-MCNC: 517.4 PG/ML (ref 5–1800)
PCO2 BLDA: 43.1 MM HG (ref 35–45)
PH BLDA: 7.46 PH UNITS (ref 7.35–7.45)
PH UR STRIP.AUTO: <=5 [PH] (ref 5–8)
PLATELET # BLD AUTO: 325 10*3/MM3 (ref 140–450)
PMV BLD AUTO: 10.1 FL (ref 6–12)
PO2 BLDA: 117.9 MM HG (ref 80–100)
POTASSIUM SERPL-SCNC: 4.7 MMOL/L (ref 3.5–5.2)
PROCALCITONIN SERPL-MCNC: 0.33 NG/ML (ref 0.1–0.25)
PROT SERPL-MCNC: 6.2 G/DL (ref 6–8.5)
PROT UR QL STRIP: NEGATIVE
RBC # BLD AUTO: 4.08 10*6/MM3 (ref 3.77–5.28)
SAO2 % BLDCOA: 98.8 % (ref 92–99)
SARS-COV-2 N GENE NPH QL NAA+PROBE: NOT DETECTED
SODIUM SERPL-SCNC: 124 MMOL/L (ref 136–145)
SP GR UR STRIP: 1.02 (ref 1–1.03)
TOTAL RATE: 16 BREATHS/MINUTE
TROPONIN T SERPL-MCNC: <0.01 NG/ML (ref 0–0.03)
UROBILINOGEN UR QL STRIP: ABNORMAL
WBC # BLD AUTO: 11.24 10*3/MM3 (ref 3.4–10.8)
WHOLE BLOOD HOLD SPECIMEN: NORMAL

## 2020-07-01 PROCEDURE — 71045 X-RAY EXAM CHEST 1 VIEW: CPT

## 2020-07-01 PROCEDURE — 80053 COMPREHEN METABOLIC PANEL: CPT | Performed by: PHYSICIAN ASSISTANT

## 2020-07-01 PROCEDURE — 87635 SARS-COV-2 COVID-19 AMP PRB: CPT | Performed by: PHYSICIAN ASSISTANT

## 2020-07-01 PROCEDURE — 99285 EMERGENCY DEPT VISIT HI MDM: CPT

## 2020-07-01 PROCEDURE — 93005 ELECTROCARDIOGRAM TRACING: CPT | Performed by: PHYSICIAN ASSISTANT

## 2020-07-01 PROCEDURE — 84484 ASSAY OF TROPONIN QUANT: CPT | Performed by: PHYSICIAN ASSISTANT

## 2020-07-01 PROCEDURE — 87040 BLOOD CULTURE FOR BACTERIA: CPT | Performed by: PHYSICIAN ASSISTANT

## 2020-07-01 PROCEDURE — 25010000002 CEFEPIME PER 500 MG: Performed by: PHYSICIAN ASSISTANT

## 2020-07-01 PROCEDURE — P9612 CATHETERIZE FOR URINE SPEC: HCPCS

## 2020-07-01 PROCEDURE — 83880 ASSAY OF NATRIURETIC PEPTIDE: CPT | Performed by: PHYSICIAN ASSISTANT

## 2020-07-01 PROCEDURE — 81003 URINALYSIS AUTO W/O SCOPE: CPT | Performed by: PHYSICIAN ASSISTANT

## 2020-07-01 PROCEDURE — 82803 BLOOD GASES ANY COMBINATION: CPT

## 2020-07-01 PROCEDURE — 85025 COMPLETE CBC W/AUTO DIFF WBC: CPT | Performed by: PHYSICIAN ASSISTANT

## 2020-07-01 PROCEDURE — 83605 ASSAY OF LACTIC ACID: CPT | Performed by: PHYSICIAN ASSISTANT

## 2020-07-01 PROCEDURE — 93010 ELECTROCARDIOGRAM REPORT: CPT | Performed by: INTERNAL MEDICINE

## 2020-07-01 PROCEDURE — 99214 OFFICE O/P EST MOD 30 MIN: CPT | Performed by: NURSE PRACTITIONER

## 2020-07-01 PROCEDURE — 36600 WITHDRAWAL OF ARTERIAL BLOOD: CPT

## 2020-07-01 PROCEDURE — 74176 CT ABD & PELVIS W/O CONTRAST: CPT

## 2020-07-01 PROCEDURE — 84145 PROCALCITONIN (PCT): CPT | Performed by: PHYSICIAN ASSISTANT

## 2020-07-01 RX ORDER — SODIUM CHLORIDE 0.9 % (FLUSH) 0.9 %
10 SYRINGE (ML) INJECTION AS NEEDED
Status: DISCONTINUED | OUTPATIENT
Start: 2020-07-01 | End: 2020-07-05 | Stop reason: HOSPADM

## 2020-07-01 RX ADMIN — CEFEPIME HYDROCHLORIDE 2 G: 2 INJECTION, POWDER, FOR SOLUTION INTRAVENOUS at 20:11

## 2020-07-01 RX ADMIN — SODIUM CHLORIDE 1000 ML: 9 INJECTION, SOLUTION INTRAVENOUS at 20:11

## 2020-07-01 RX ADMIN — SODIUM CHLORIDE, PRESERVATIVE FREE 10 ML: 5 INJECTION INTRAVENOUS at 20:16

## 2020-07-01 NOTE — TELEPHONE ENCOUNTER
MITRA CALLED IN TO FOLLOW UP ON THE HOME HEALTH AND ORDERS FOR A HOSPITAL BED .  MITRA CALL BACK [phone]

## 2020-07-01 NOTE — ED PROVIDER NOTES
I supervised care provided by the midlevel provider.   We have discussed this patient's history, physical exam, and treatment plan.  I have reviewed the note and personally saw and examined the patient and agree with the plan of care.   I have seen and evaluated this patient.  Patient has had progressive weakness and this is her third visit over the past couple weeks to emergency department.  She has a low-grade fever here.  She had a history of some coughing and some shortness of breath.    GENERAL: Elderly female that is chronically ill-appearing.  At this time she is in no acute distress  HENT: nares patent  Head/neck/ face are symmetric without gross deformity or swelling.  Oral mucosa is dry  EYES: no scleral icterus  CV: regular rhythm, regular rate with intact distal pulses  RESPIRATORY: normal effort and O2 sat is 98% on 2 L.  Patient has some decreased breath sounds in the bases bilaterally.  ABDOMEN: soft and non-tender and morbidly obese  MUSCULOSKELETAL: Plus edema to lower extremities bilaterally.  NEURO: alert and and diffusely weak.  No focal weakness.  SKIN: warm, dry    Vital signs and nursing notes reviewed.    Plan patient's has normal blood pressure normal heart rate with an O2 sat of 98% on room air.  Does not appear to be in any acute distress.  Looking at her labs she has some progressive signs of dehydration with increase in her BUN as well as some hyponatremia.  She has a low-grade temperature on arrival.  Uncertain of the source of the infection.  Very potentially she could have aspirated has a little bit of pneumonitis.  Her belly is morbidly obese we will check a scan of her belly.  We will go ahead and give her a dose of IV antibiotics here.  Her procalcitonin is mildly elevated.  We will admit her to medicine  COVID test is pending  We are currently under a pandemic from the COVID19 infection.  The patient presented to the emergency department by ambulance or personal vehicle.  During  current hospital restrictions no other visitors were present in the emergency department during my evaluation and treatment. I followed the current protocols required by Infection Control at [facility] in my evaluation and treatment of the patient. The patient was wearing a face mask during my evaluation and throughout my encounter. During my whole encounter with this patient I used appropriate personal protective equipment.  This equipment consisted of eye protection, facemask, gown, and gloves.  I applied this equipment before entering the room.           Josh Meng MD  07/01/20 4848

## 2020-07-01 NOTE — ED NOTES
Spoke with family regarding patient status and was provided patient access code.  Informed family of enhanced visitor policy and they verbalized understanding.    Please call daughter Rosa with disposition.  Contact information is in demographics.       Cassie Mccarthy RN  07/01/20 0776

## 2020-07-01 NOTE — PROGRESS NOTES
Subjective   Gabbi Brandon is a 79 y.o. female.     Patient family requesting visit due to patient rapid decline over the last 24 hours. [facility] to vasovagal episode and was discharged. Her daughters report she was doing ok. However until yesterday and she had a violent vomiting spell of bile. Since then has had moist cough and become lethargic. She had chills throughout night. She then vomited again this morning about 5:45 am and seems to be less interactive. She has been sleeping lots. Her family reports her most recent oxygen saturation 86% on room air. They are concerned due to rapid decline. They report no abdominal pain. No more syncopal episodes.     Cough   This is a new problem. The current episode started yesterday. The problem has been gradually worsening. The cough is productive of sputum. Associated symptoms include chills. Pertinent negatives include no chest pain, fever, myalgias, shortness of breath, sweats or wheezing. She has tried nothing for the symptoms.   Vomiting    This is a new problem. The current episode started yesterday. The problem occurs 2 to 4 times per day. The problem has been unchanged. The emesis has an appearance of bile. There has been no fever. Associated symptoms include arthralgias (improved ), chills and coughing. Pertinent negatives include no abdominal pain, chest pain, diarrhea, fever, myalgias or sweats. She has tried nothing for the symptoms.        The following portions of the patient's history were reviewed and updated as appropriate: allergies, current medications, past family history, past medical history, past social history, past surgical history and problem list.    Review of Systems   Constitutional: Positive for chills. Negative for activity change, appetite change, fatigue and fever.   Respiratory: Positive for cough. Negative for shortness of breath and wheezing.    Cardiovascular: Negative for chest pain,  palpitations and leg swelling.   Gastrointestinal: Positive for vomiting. Negative for abdominal distention, abdominal pain, anal bleeding, blood in stool, constipation, diarrhea and nausea.   Musculoskeletal: Positive for arthralgias (improved ). Negative for myalgias.       Objective   Physical Exam   Constitutional: She appears lethargic. She appears ill.   Pulmonary/Chest:   Audible congestion    Neurological: She appears lethargic.       Assessment/Plan   Diagnoses and all orders for this visit:    Cough  Comments:  moist cough/ needs ER to evaluate for aspiration     Vomiting, intractability of vomiting not specified, presence of nausea not specified, unspecified vomiting type    Bilateral lower extremity edema  Comments:  improved     Hypoxia              This was an audio and video enabled telemedicine encounter. I spent a total of 33 minutes in discussion with patient. The patient was accompanied by daughters-Sara and Rosa along with spouse Foster. Due to patient lethargy and audible moist cough while resting they were advised to take patient to ER to r/o aspiration pneumonia and further workup .

## 2020-07-01 NOTE — TELEPHONE ENCOUNTER
MITRA, DAUGHTER, CALLING IN TO REQUEST AN OPINION ON HOSPICE.     PLEASE CALL AND ADVISE -[phone]

## 2020-07-01 NOTE — ED PROVIDER NOTES
EMERGENCY DEPARTMENT ENCOUNTER    Room Number:  05/05  Date of encounter:  7/1/2020  PCP: Gordo Mg MD  Historian: Patient/spouse/daughter      HPI:  Chief Complaint: Shortness of breath, malaise  A complete HPI/ROS/PMH/PSH/SH/FH are unobtainable due to: Patient is a poor historian/altered mental status    Context: Gabbi Brandon is a 79 y.o. female who presents to the ED c/o progressive weakness over the past several days.  The initial call from EMS was for shortness of breath.  Both the patient and her  are extremely poor historians.  They state that patient has been coughing up phlegm the past couple of days.  He was able to check her oxygen saturations at home on a portable pulse ox, they report sats of 85%.  She is unaware of any fever, vomiting.  Patient has been reportedly bedbound for several weeks.  They were unable to expand on any further complaints.    Patient was evaluated by her family nurse practitioner with a telemedicine visit earlier today.  She was instructed to follow-up in the ER.    Review of Medical Records  I reviewed ER visits from earlier this week.  Patient seen on 6/22/2020, as well as 6/28/2020.  Patient diagnosed with pedal edema and vasovagal syncope at that time.    PAST MEDICAL HISTORY  Active Ambulatory Problems     Diagnosis Date Noted   • Irregular bleeding 09/07/2016   • Urinary tract infection 09/14/2016   • Dyslipidemia 10/03/2014   • Essential hypertension 01/10/2018   • Stage 2 chronic kidney disease 09/18/2018   • Gastroesophageal reflux disease without esophagitis 05/02/2019   • Seasonal allergies 05/02/2019   • Familial hypercholesterolemia 11/04/2019   • Bladder spasm 11/04/2019   • Bilateral lower extremity edema 06/25/2020     Resolved Ambulatory Problems     Diagnosis Date Noted   • No Resolved Ambulatory Problems     Past Medical History:   Diagnosis Date   • Arthritis    • GERD (gastroesophageal reflux disease)    • Hyperlipidemia    •  Hypertension    • Incontinent of urine          PAST SURGICAL HISTORY  Past Surgical History:   Procedure Laterality Date   • BREAST BIOPSY     • COLONOSCOPY N/A 8/26/2016    Procedure: COLONOSCOPY WITH POLYPECTOMY (HOT SNARE);  Surgeon: Paulino Narvaez MD;  Location: [facility] ENDOSCOPY;  Service:    • VAGINAL HYSTERECTOMY           FAMILY HISTORY  Family History   Problem Relation Age of Onset   • Colon cancer Brother    • Heart attack Mother    • Stroke Father    • Lung cancer Sister    • Lung cancer Brother         3 brothers   • Deep vein thrombosis Brother    • Brain cancer Brother    • Uterine cancer Neg Hx    • Ovarian cancer Neg Hx    • Breast cancer Neg Hx    • Pulmonary embolism Neg Hx          SOCIAL HISTORY  Social History     Socioeconomic History   • Marital status:      Spouse name: Not on file   • Number of children: Not on file   • Years of education: Not on file   • Highest education level: Not on file   Tobacco Use   • Smoking status: Never Smoker   • Smokeless tobacco: Never Used   Substance and Sexual Activity   • Alcohol use: Yes     Comment: occasional   • Drug use: No   • Sexual activity: Defer         ALLERGIES  Penicillins        REVIEW OF SYSTEMS  Unobtainable secondary to dementia/poor historian.      PHYSICAL EXAM    I have reviewed the triage vital signs and nursing notes.    ED Triage Vitals [07/01/20 1616]   Temp Heart Rate Resp BP SpO2   100.1 °F (37.8 °C) 80 20 139/51 90 %      Temp src Heart Rate Source Patient Position BP Location FiO2 (%)   Tympanic -- -- -- --       Physical Exam  GENERAL: Chronically ill-appearing, elderly, obese, mild distress   HENT: nares patent, head atraumatic  EYES: no scleral icterus, EOMI  CV: regular rhythm, regular rate, no murmur  RESPIRATORY: normal effort, CTA  ABDOMEN: soft, obese, nontender  MUSCULOSKELETAL: no deformity, FROM  NEURO: alert, moves all extremities, follows commands  SKIN: warm, dry        LAB RESULTS  Recent Results  (from the past 24 hour(s))   Comprehensive Metabolic Panel    Collection Time: 07/01/20  4:52 PM   Result Value Ref Range    Glucose 105 (H) 65 - 99 mg/dL    BUN 29 (H) 8 - 23 mg/dL    Creatinine 1.08 (H) 0.57 - 1.00 mg/dL    Sodium 124 (L) 136 - 145 mmol/L    Potassium 4.7 3.5 - 5.2 mmol/L    Chloride 84 (L) 98 - 107 mmol/L    CO2 29.9 (H) 22.0 - 29.0 mmol/L    Calcium 8.8 8.6 - 10.5 mg/dL    Total Protein 6.2 6.0 - 8.5 g/dL    Albumin 2.70 (L) 3.50 - 5.20 g/dL    ALT (SGPT) 15 1 - 33 U/L    AST (SGOT) 28 1 - 32 U/L    Alkaline Phosphatase 62 39 - 117 U/L    Total Bilirubin 0.5 0.2 - 1.2 mg/dL    eGFR Non African Amer 49 (L) >60 mL/min/1.73    Globulin 3.5 gm/dL    A/G Ratio 0.8 g/dL    BUN/Creatinine Ratio 26.9 (H) 7.0 - 25.0    Anion Gap 10.1 5.0 - 15.0 mmol/L   Procalcitonin    Collection Time: 07/01/20  4:52 PM   Result Value Ref Range    Procalcitonin 0.33 (H) 0.10 - 0.25 ng/mL   Lactic Acid, Plasma    Collection Time: 07/01/20  4:52 PM   Result Value Ref Range    Lactate 1.5 0.5 - 2.0 mmol/L   BNP    Collection Time: 07/01/20  4:52 PM   Result Value Ref Range    proBNP 517.4 5.0-1,800.0 pg/mL   Troponin    Collection Time: 07/01/20  4:52 PM   Result Value Ref Range    Troponin T <0.010 0.000 - 0.030 ng/mL   CBC Auto Differential    Collection Time: 07/01/20  4:52 PM   Result Value Ref Range    WBC 11.24 (H) 3.40 - 10.80 10*3/mm3    RBC 4.08 3.77 - 5.28 10*6/mm3    Hemoglobin 12.5 12.0 - 15.9 g/dL    Hematocrit 36.9 34.0 - 46.6 %    MCV 90.4 79.0 - 97.0 fL    MCH 30.6 26.6 - 33.0 pg    MCHC 33.9 31.5 - 35.7 g/dL    RDW 13.8 12.3 - 15.4 %    RDW-SD 46.7 37.0 - 54.0 fl    MPV 10.1 6.0 - 12.0 fL    Platelets 325 140 - 450 10*3/mm3    Neutrophil % 61.4 42.7 - 76.0 %    Lymphocyte % 21.4 19.6 - 45.3 %    Monocyte % 15.8 (H) 5.0 - 12.0 %    Eosinophil % 0.9 0.3 - 6.2 %    Basophil % 0.1 0.0 - 1.5 %    Immature Grans % 0.4 0.0 - 0.5 %    Neutrophils, Absolute 6.90 1.70 - 7.00 10*3/mm3    Lymphocytes, Absolute 2.40  0.70 - 3.10 10*3/mm3    Monocytes, Absolute 1.78 (H) 0.10 - 0.90 10*3/mm3    Eosinophils, Absolute 0.10 0.00 - 0.40 10*3/mm3    Basophils, Absolute 0.01 0.00 - 0.20 10*3/mm3    Immature Grans, Absolute 0.05 0.00 - 0.05 10*3/mm3    nRBC 0.0 0.0 - 0.2 /100 WBC   Light Blue Top    Collection Time: 07/01/20  4:54 PM   Result Value Ref Range    Extra Tube hold for add-on    Gold Top - SST    Collection Time: 07/01/20  4:54 PM   Result Value Ref Range    Extra Tube Hold for add-ons.    COVID-19,[facility] TETO IN-HOUSE, NP SWAB IN TRANSPORT MEDIA 8-12 HR TAT - Swab, Nasopharynx    Collection Time: 07/01/20  4:55 PM   Result Value Ref Range    COVID19 Not Detected Not Detected - Ref. Range   Blood Gas, Arterial    Collection Time: 07/01/20  4:58 PM   Result Value Ref Range    Site Arterial: right radial     Teddy's Test Positive     pH, Arterial 7.458 (H) 7.350 - 7.450 pH units    pCO2, Arterial 43.1 35.0 - 45.0 mm Hg    pO2, Arterial 117.9 (H) 80.0 - 100.0 mm Hg    HCO3, Arterial 30.5 (H) 22.0 - 28.0 mmol/L    Base Excess, Arterial 5.7 (H) 0.0 - 2.0 mmol/L    O2 Saturation Calculated 98.8 92.0 - 99.0 %    Barometric Pressure for Blood Gas 748.5 mmHg    Modality Cannula     Flow Rate 2 lpm    Rate 16 Breaths/minute   Urinalysis With Microscopic If Indicated (No Culture) - Urine, Catheter    Collection Time: 07/01/20  5:23 PM   Result Value Ref Range    Color, UA Yellow Yellow, Straw    Appearance, UA Cloudy (A) Clear    pH, UA <=5.0 5.0 - 8.0    Specific Gravity, UA 1.022 1.005 - 1.030    Glucose, UA Negative Negative    Ketones, UA Negative Negative    Bilirubin, UA Negative Negative    Blood, UA Negative Negative    Protein, UA Negative Negative    Leuk Esterase, UA Negative Negative    Nitrite, UA Negative Negative    Urobilinogen, UA 0.2 E.U./dL 0.2 - 1.0 E.U./dL       Ordered the above labs and independently reviewed the results.        RADIOLOGY  Ct Abdomen Pelvis Without Contrast    Result Date: 7/1/2020  CT ABDOMEN PELVIS  WO CONTRAST-  INDICATIONS: Fever  TECHNIQUE: Radiation dose reduction techniques were utilized, including automated exposure control and exposure modulation based on body size. Unenhanced ABDOMEN AND PELVIS CT  COMPARISON: None available  FINDINGS:  Pancreas is thinned.  Otherwise unremarkable unenhanced appearance of the liver, the gallbladder, spleen, adrenal glands, pancreas, kidneys, bladder.  No bowel obstruction or abnormal bowel thickening is identified. Large hiatal hernia (versus gastric pull-through procedure, correlate clinically). Colonic diverticula are seen that do not appear inflamed. Mild colonic fecal retention.  No free intraperitoneal gas or free fluid.  Scattered small mesenteric and para-aortic lymph nodes are seen that are not significant by size criteria.  Abdominal aorta is not aneurysmal. Aortic and other arterial calcifications are present. 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  The lung bases show small atelectasis, minimal right pleural effusion. Mild reticulonodular infiltrate is apparent in the right lower lobe, for example images 17 through 22, follow-up recommended to characterize resolution and to exclude any possibility of underlying pulmonary nodule/neoplasm.  Degenerative changes are seen in the spine. Mild age-indeterminate superior endplate depression at T10..          1. Mild reticulonodular infiltrate in the right lower lobe at the lung, follow-up recommended. 2. Colonic diverticulosis. No acute inflammatory process of bowel is identified. Follow up as indications persist. 3. No urolithiasis or hydronephrosis.  This report was finalized on 7/1/2020 8:55 PM by Dr. Boogie Tran M.D.      Xr Chest 1 View    Result Date: 7/1/2020  XR CHEST 1 VW-  HISTORY: Female who is 79 years-old,  short of breath  TECHNIQUE: Frontal view of the chest  COMPARISON: 6 /28/2020  FINDINGS: Heart size is borderline. Pulmonary vasculature is  unremarkable. Large hiatal hernia. No focal pulmonary consolidation. No large pleural effusion. No pneumothorax. No acute osseous process.      No focal pulmonary consolidation. Borderline heart size. Follow-up/further evaluation can be obtained as indications persist.  This report was finalized on 7/1/2020 6:11 PM by Dr. Boogie Tran M.D.        I ordered the above noted radiological studies. Reviewed by me and discussed with radiologist.  See dictation for official radiology interpretation.        MEDICATIONS GIVEN IN ER    Medications   sodium chloride 0.9 % flush 10 mL (10 mL Intravenous Given 7/1/20 2016)   sodium chloride 0.9 % bolus 1,000 mL (1,000 mL Intravenous New Bag 7/1/20 2011)   cefepime (MAXIPIME) 2 g/100 mL 0.9% NS (mbp) (0 g Intravenous Stopped 7/1/20 2116)         PROGRESS, DATA ANALYSIS, CONSULTS, AND MEDICAL DECISION MAKING    All labs have been independently reviewed by me.  All radiology studies have been reviewed by me and discussed with radiologist dictating the report.   EKG's independently viewed and interpreted by me.  Discussion below represents my analysis of pertinent findings related to patient's condition, differential diagnosis, treatment plan and final disposition.    I have discussed case with Dr. Meng, emergency room physician.  He has performed his own bedside examination and agrees with treatment plan.    ED Course as of Jul 01 2143   Wed Jul 01, 2020   1638 I called patient's daughter and discussed case.  She states patient has been progressively weak over the past several weeks.  Patient is more or less bedbound.  Patient has not been eating much.  She states that patient has been complaining of a suffocating feeling and coughing on phlegm.    [EE]   1639 Patient presents from home with reports of shortness of breath.  Patient is unable to elaborate on her symptoms, she simply states she does not feel well.  Patient is hypoxic and has a low-grade fever.  Differential  diagnoses include but not limited to aspiration pneumonia, COVID-19, congestive heart failure, sepsis.    [EE]   1653 EKG interpretation by myself.  Time 1643.  Sinus rhythm, 73 bpm.  Normal P/BRANDON.  QRS shows an incomplete right bundle branch block.  Normal axis.  Nonspecific T wave changes laterally.  Similar to previous EKG from 6/28/2020.    [EE]   1750 Chest x-ray interpretation by myself, shows cardiomegaly with vascular congestion.  I will await final radiologist interpretation.    [EE]   1825 Sodium(!): 124 [MM]   1825 WBC(!): 11.24 [MM]   1825 Procalcitonin(!): 0.33 [MM]   1837 This is progressively worse.   Sodium(!): 124 [EE]   1837 Procalcitonin(!): 0.33 [EE]   2100 Abdominal CT scan does show right lower lobe infiltrate.  I suspect this is likely aspiration.  Plan to admit for further evaluation.    [EE]   2136 I discussed case with Dr. Fleming.  He agrees to admit the patient to inpatient, telemetry bed.    [EE]      ED Course User Index  [EE] Clint Arreaga PA  [MM] Josh Meng MD       AS OF 21:43 VITALS:    BP - 148/70  HR - 72  TEMP - 100.1 °F (37.8 °C) (Tympanic)  O2 SATS - 99%        DIAGNOSIS  Final diagnoses:   Pneumonia of right lower lobe due to infectious organism   Hyponatremia         DISPOSITION  Admitted             Clint Arreaga PA  07/01/20 [id number]

## 2020-07-02 PROBLEM — R09.02 HYPOXIA: Status: ACTIVE | Noted: 2020-07-02

## 2020-07-02 PROBLEM — R13.12 OROPHARYNGEAL DYSPHAGIA: Status: ACTIVE | Noted: 2020-07-02

## 2020-07-02 LAB
ANION GAP SERPL CALCULATED.3IONS-SCNC: 7.4 MMOL/L (ref 5–15)
BASOPHILS # BLD AUTO: 0.01 10*3/MM3 (ref 0–0.2)
BASOPHILS NFR BLD AUTO: 0.1 % (ref 0–1.5)
BUN SERPL-MCNC: 28 MG/DL (ref 8–23)
BUN/CREAT SERPL: 35 (ref 7–25)
CALCIUM SPEC-SCNC: 8.5 MG/DL (ref 8.6–10.5)
CHLORIDE SERPL-SCNC: 92 MMOL/L (ref 98–107)
CO2 SERPL-SCNC: 29.6 MMOL/L (ref 22–29)
CREAT SERPL-MCNC: 0.8 MG/DL (ref 0.57–1)
DEPRECATED RDW RBC AUTO: 43.2 FL (ref 37–54)
EOSINOPHIL # BLD AUTO: 0.07 10*3/MM3 (ref 0–0.4)
EOSINOPHIL NFR BLD AUTO: 0.8 % (ref 0.3–6.2)
ERYTHROCYTE [DISTWIDTH] IN BLOOD BY AUTOMATED COUNT: 13.2 % (ref 12.3–15.4)
GFR SERPL CREATININE-BSD FRML MDRD: 69 ML/MIN/1.73
GLUCOSE SERPL-MCNC: 90 MG/DL (ref 65–99)
HCT VFR BLD AUTO: 33.8 % (ref 34–46.6)
HGB BLD-MCNC: 11.7 G/DL (ref 12–15.9)
IMM GRANULOCYTES # BLD AUTO: 0.04 10*3/MM3 (ref 0–0.05)
IMM GRANULOCYTES NFR BLD AUTO: 0.4 % (ref 0–0.5)
LYMPHOCYTES # BLD AUTO: 2.34 10*3/MM3 (ref 0.7–3.1)
LYMPHOCYTES NFR BLD AUTO: 25.4 % (ref 19.6–45.3)
MCH RBC QN AUTO: 30.7 PG (ref 26.6–33)
MCHC RBC AUTO-ENTMCNC: 34.6 G/DL (ref 31.5–35.7)
MCV RBC AUTO: 88.7 FL (ref 79–97)
MONOCYTES # BLD AUTO: 1.5 10*3/MM3 (ref 0.1–0.9)
MONOCYTES NFR BLD AUTO: 16.3 % (ref 5–12)
NEUTROPHILS NFR BLD AUTO: 5.24 10*3/MM3 (ref 1.7–7)
NEUTROPHILS NFR BLD AUTO: 57 % (ref 42.7–76)
NRBC BLD AUTO-RTO: 0.1 /100 WBC (ref 0–0.2)
PLATELET # BLD AUTO: 289 10*3/MM3 (ref 140–450)
PMV BLD AUTO: 10 FL (ref 6–12)
POTASSIUM SERPL-SCNC: 4.2 MMOL/L (ref 3.5–5.2)
PROCALCITONIN SERPL-MCNC: 0.29 NG/ML (ref 0.1–0.25)
RBC # BLD AUTO: 3.81 10*6/MM3 (ref 3.77–5.28)
SODIUM SERPL-SCNC: 129 MMOL/L (ref 136–145)
WBC # BLD AUTO: 9.2 10*3/MM3 (ref 3.4–10.8)

## 2020-07-02 PROCEDURE — 92610 EVALUATE SWALLOWING FUNCTION: CPT | Performed by: SPEECH-LANGUAGE PATHOLOGIST

## 2020-07-02 PROCEDURE — 25010000002 ENOXAPARIN PER 10 MG: Performed by: INTERNAL MEDICINE

## 2020-07-02 PROCEDURE — 36415 COLL VENOUS BLD VENIPUNCTURE: CPT | Performed by: INTERNAL MEDICINE

## 2020-07-02 PROCEDURE — 85025 COMPLETE CBC W/AUTO DIFF WBC: CPT | Performed by: INTERNAL MEDICINE

## 2020-07-02 PROCEDURE — 80048 BASIC METABOLIC PNL TOTAL CA: CPT | Performed by: INTERNAL MEDICINE

## 2020-07-02 PROCEDURE — 97166 OT EVAL MOD COMPLEX 45 MIN: CPT

## 2020-07-02 PROCEDURE — 97535 SELF CARE MNGMENT TRAINING: CPT

## 2020-07-02 PROCEDURE — 97110 THERAPEUTIC EXERCISES: CPT

## 2020-07-02 PROCEDURE — 84145 PROCALCITONIN (PCT): CPT | Performed by: INTERNAL MEDICINE

## 2020-07-02 PROCEDURE — 97162 PT EVAL MOD COMPLEX 30 MIN: CPT

## 2020-07-02 PROCEDURE — 25010000002 LEVOFLOXACIN PER 250 MG: Performed by: INTERNAL MEDICINE

## 2020-07-02 RX ORDER — SODIUM CHLORIDE 0.9 % (FLUSH) 0.9 %
10 SYRINGE (ML) INJECTION AS NEEDED
Status: DISCONTINUED | OUTPATIENT
Start: 2020-07-02 | End: 2020-07-05 | Stop reason: HOSPADM

## 2020-07-02 RX ORDER — LEVOFLOXACIN 5 MG/ML
500 INJECTION, SOLUTION INTRAVENOUS EVERY 24 HOURS
Status: DISCONTINUED | OUTPATIENT
Start: 2020-07-02 | End: 2020-07-04

## 2020-07-02 RX ORDER — ONDANSETRON 2 MG/ML
4 INJECTION INTRAMUSCULAR; INTRAVENOUS EVERY 6 HOURS PRN
Status: DISCONTINUED | OUTPATIENT
Start: 2020-07-02 | End: 2020-07-05 | Stop reason: HOSPADM

## 2020-07-02 RX ORDER — ACETAMINOPHEN 650 MG/1
650 SUPPOSITORY RECTAL EVERY 4 HOURS PRN
Status: DISCONTINUED | OUTPATIENT
Start: 2020-07-02 | End: 2020-07-05 | Stop reason: HOSPADM

## 2020-07-02 RX ORDER — ATENOLOL 50 MG/1
50 TABLET ORAL DAILY
Status: DISCONTINUED | OUTPATIENT
Start: 2020-07-02 | End: 2020-07-05 | Stop reason: HOSPADM

## 2020-07-02 RX ORDER — PANTOPRAZOLE SODIUM 40 MG/1
40 TABLET, DELAYED RELEASE ORAL EVERY MORNING
Status: DISCONTINUED | OUTPATIENT
Start: 2020-07-03 | End: 2020-07-05 | Stop reason: HOSPADM

## 2020-07-02 RX ORDER — ACETAMINOPHEN 160 MG/5ML
650 SOLUTION ORAL EVERY 4 HOURS PRN
Status: DISCONTINUED | OUTPATIENT
Start: 2020-07-02 | End: 2020-07-05 | Stop reason: HOSPADM

## 2020-07-02 RX ORDER — SODIUM CHLORIDE 0.9 % (FLUSH) 0.9 %
10 SYRINGE (ML) INJECTION EVERY 12 HOURS SCHEDULED
Status: DISCONTINUED | OUTPATIENT
Start: 2020-07-02 | End: 2020-07-05 | Stop reason: HOSPADM

## 2020-07-02 RX ORDER — MELOXICAM 15 MG/1
15 TABLET ORAL DAILY
Status: DISCONTINUED | OUTPATIENT
Start: 2020-07-02 | End: 2020-07-05 | Stop reason: HOSPADM

## 2020-07-02 RX ORDER — ACETAMINOPHEN 325 MG/1
650 TABLET ORAL EVERY 4 HOURS PRN
Status: DISCONTINUED | OUTPATIENT
Start: 2020-07-02 | End: 2020-07-05 | Stop reason: HOSPADM

## 2020-07-02 RX ORDER — ASPIRIN 81 MG/1
81 TABLET ORAL DAILY
Status: DISCONTINUED | OUTPATIENT
Start: 2020-07-02 | End: 2020-07-05 | Stop reason: HOSPADM

## 2020-07-02 RX ORDER — AMLODIPINE BESYLATE 5 MG/1
5 TABLET ORAL DAILY
Status: DISCONTINUED | OUTPATIENT
Start: 2020-07-02 | End: 2020-07-05 | Stop reason: HOSPADM

## 2020-07-02 RX ORDER — FLUTICASONE PROPIONATE 50 MCG
2 SPRAY, SUSPENSION (ML) NASAL DAILY
Status: DISCONTINUED | OUTPATIENT
Start: 2020-07-02 | End: 2020-07-05 | Stop reason: HOSPADM

## 2020-07-02 RX ORDER — ATORVASTATIN CALCIUM 20 MG/1
10 TABLET, FILM COATED ORAL DAILY
Status: DISCONTINUED | OUTPATIENT
Start: 2020-07-02 | End: 2020-07-05 | Stop reason: HOSPADM

## 2020-07-02 RX ORDER — CETIRIZINE HYDROCHLORIDE 10 MG/1
10 TABLET ORAL DAILY
Status: DISCONTINUED | OUTPATIENT
Start: 2020-07-02 | End: 2020-07-05 | Stop reason: HOSPADM

## 2020-07-02 RX ORDER — SODIUM CHLORIDE 9 MG/ML
75 INJECTION, SOLUTION INTRAVENOUS CONTINUOUS
Status: DISCONTINUED | OUTPATIENT
Start: 2020-07-02 | End: 2020-07-04

## 2020-07-02 RX ADMIN — ACETAMINOPHEN 650 MG: 325 TABLET, FILM COATED ORAL at 08:41

## 2020-07-02 RX ADMIN — SODIUM CHLORIDE 100 ML/HR: 9 INJECTION, SOLUTION INTRAVENOUS at 00:41

## 2020-07-02 RX ADMIN — AMLODIPINE BESYLATE 5 MG: 5 TABLET ORAL at 15:55

## 2020-07-02 RX ADMIN — MELOXICAM 15 MG: 15 TABLET ORAL at 15:55

## 2020-07-02 RX ADMIN — SODIUM CHLORIDE, PRESERVATIVE FREE 10 ML: 5 INJECTION INTRAVENOUS at 08:41

## 2020-07-02 RX ADMIN — LEVOFLOXACIN 500 MG: 5 INJECTION, SOLUTION INTRAVENOUS at 01:29

## 2020-07-02 RX ADMIN — ENOXAPARIN SODIUM 40 MG: 40 INJECTION SUBCUTANEOUS at 08:41

## 2020-07-02 RX ADMIN — SODIUM CHLORIDE 100 ML/HR: 9 INJECTION, SOLUTION INTRAVENOUS at 11:18

## 2020-07-02 RX ADMIN — ASPIRIN 81 MG: 81 TABLET, COATED ORAL at 15:55

## 2020-07-02 RX ADMIN — ATORVASTATIN CALCIUM 10 MG: 20 TABLET, FILM COATED ORAL at 15:55

## 2020-07-02 RX ADMIN — ATENOLOL 50 MG: 50 TABLET ORAL at 15:55

## 2020-07-02 RX ADMIN — ACETAMINOPHEN 650 MG: 325 TABLET, FILM COATED ORAL at 00:41

## 2020-07-02 RX ADMIN — ACETAMINOPHEN 650 MG: 325 TABLET, FILM COATED ORAL at 14:43

## 2020-07-02 RX ADMIN — SODIUM CHLORIDE, PRESERVATIVE FREE 10 ML: 5 INJECTION INTRAVENOUS at 00:41

## 2020-07-02 RX ADMIN — CETIRIZINE HYDROCHLORIDE 10 MG: 10 TABLET, FILM COATED ORAL at 15:55

## 2020-07-02 NOTE — H&P
[facility] Admission H&P    Patient Care Team:  Gordo Mg MD as PCP - General (Internal Medicine)  Gordo Mg MD as PCP - Claims Attributed    Chief complaint: Shortness of breath, fatigue, generalized weakness    History of Present Illness    This is a 79-year-old female with past medical history of hypertension who presented to the emergency room with complaints of progressive weakness over the past several days as well as shortness of breath for which EMS was called.  The patient is a very poor historian no family is at the bedside.  Evidently her oxygen saturations were 85% on room air at home.  The patient denies any cough or fever.  Evidently she has been progressively worsening over some longer period of time and there have been discussions in the outpatient setting regarding palliative care.  In the emergency room this evening she was found to have a right lower lobe pneumonia and hyponatremia.  Patient's oral intake evidently has been poor at least over the past several days and evidently family reported to the ER provider that she has been displaying some choking on food..  The patient states that she has been bedbound for at least the past several weeks.    Past Medical History:   Diagnosis Date   • Arthritis    • GERD (gastroesophageal reflux disease)    • Hyperlipidemia    • Hypertension    • Incontinent of urine    • Seasonal allergies      Past Surgical History:   Procedure Laterality Date   • BREAST BIOPSY     • COLONOSCOPY N/A 8/26/2016    Procedure: COLONOSCOPY WITH POLYPECTOMY (HOT SNARE);  Surgeon: Paulino Narvaez MD;  Location: [facility];  Service:    • VAGINAL HYSTERECTOMY       Family History   Problem Relation Age of Onset   • Colon cancer Brother    • Heart attack Mother    • Stroke Father    • Lung cancer Sister    • Lung cancer Brother         3 brothers   • Deep vein thrombosis Brother    • Brain cancer Brother    • Uterine cancer Neg Hx    • Ovarian cancer Neg Hx     • Breast cancer Neg Hx    • Pulmonary embolism Neg Hx      Social History     Tobacco Use   • Smoking status: Never Smoker   • Smokeless tobacco: Never Used   Substance Use Topics   • Alcohol use: Yes     Comment: occasional   • Drug use: No     Medications reviewed    Allergies:  Penicillins    Review of Systems   Constitutional: Negative for chills and fever.   HENT: Negative for congestion and sore throat.    Eyes: Negative for visual disturbance.   Respiratory: Positive for shortness of breath. Negative for cough, chest tightness and wheezing.    Cardiovascular: Negative for chest pain, palpitations and leg swelling.   Gastrointestinal: Negative for abdominal distention, abdominal pain, diarrhea, nausea and vomiting.   Endocrine: Negative for polydipsia and polyuria.   Genitourinary: Negative for difficulty urinating, dysuria, frequency and urgency.   Musculoskeletal: Negative for arthralgias and myalgias.   Skin: Negative for color change and rash.   Neurological: Positive for weakness. Negative for dizziness and light-headedness.        Generalized weakness.  Has not been able to walk for several weeks        PHYSICAL EXAM    Vital Signs  tMax 24 hrs:  Temp (24hrs), Av.1 °F (37.8 °C), Min:100.1 °F (37.8 °C), Max:100.1 °F (37.8 °C)    Vitals Ranges:  Temp:  [100.1 °F (37.8 °C)] 100.1 °F (37.8 °C)  Heart Rate:  [66-80] 70  Resp:  [18-20] 18  BP: (124-155)/(51-90) 146/62    Physical Exam   Constitutional: She is oriented to person, place, and time. She appears well-developed.   Elderly, frail, chronically ill-appearing   HENT:   Head: Normocephalic and atraumatic.   Eyes: Pupils are equal, round, and reactive to light. EOM are normal.   Neck: Neck supple. No tracheal deviation present.   Cardiovascular: Normal rate and regular rhythm. Exam reveals no gallop.   No murmur heard.  Pulmonary/Chest: Effort normal. No respiratory distress. She has no wheezes.   Diminished breath sounds at the bases but I do not  appreciate any rhonchi or wheezing.  No respiratory distress or increased work of breathing   Abdominal: Soft. Bowel sounds are normal. She exhibits no distension. There is no tenderness.   Musculoskeletal: She exhibits edema. She exhibits no tenderness.   Trace lower extremity edema bilaterally   Neurological: She is alert and oriented to person, place, and time. No cranial nerve deficit.   Skin: Skin is warm and dry.   Nursing note and vitals reviewed.      Results Review:  Results from last 7 days   Lab Units 07/01/20  1652   WBC 10*3/mm3 11.24*   HEMOGLOBIN g/dL 12.5   HEMATOCRIT % 36.9   PLATELETS 10*3/mm3 325     Results from last 7 days   Lab Units 07/01/20  1652   SODIUM mmol/L 124*   POTASSIUM mmol/L 4.7   CHLORIDE mmol/L 84*   CO2 mmol/L 29.9*   BUN mg/dL 29*   CREATININE mg/dL 1.08*   CALCIUM mg/dL 8.8   BILIRUBIN mg/dL 0.5   ALK PHOS U/L 62   ALT (SGPT) U/L 15   AST (SGOT) U/L 28   GLUCOSE mg/dL 105*     CT scan of the abdomen and pelvis:  1. Mild reticulonodular infiltrate in the right lower lobe at the lung,  follow-up recommended.  2. Colonic diverticulosis. No acute inflammatory process of bowel is  identified. Follow up as indications persist.  3. No urolithiasis or hydronephrosis.    Chest x-ray:  No focal pulmonary consolidation. Borderline heart size.  Follow-up/further evaluation can be obtained as indications persist.     I reviewed the patient's new clinical results.  I reviewed the patient's new imaging results and agree with the interpretation.        Active Hospital Problems    Diagnosis  POA   • **Pneumonia of right lower lobe due to infectious organism [J18.9]  Yes   • Failure to thrive in adult [R62.7]  Unknown   • Hyponatremia [E87.1]  Unknown   • DNR (do not resuscitate) [Z66]  Unknown   • Essential hypertension [I10]  Yes      Resolved Hospital Problems   No resolved problems to display.       Assessment & Plan    The patient will be admitted.  We will continue her on antibiotics for  treatment of right base pneumonia.  I am concerned for aspiration.  Given her penicillin allergy I will place her on Levaquin.  Will check urinary antigens as well.  We will have speech therapy see her and keep her n.p.o. until they do.  Start normal saline at 100 cc/h and closely monitor her hyponatremia.  If not improving overnight then consider additional work-up in the morning but suspect this is due to poor oral intake and dehydration.  I did discuss CODE STATUS with the patient this evening and she tells me that she would not want intubation or chest compressions and would want to pass away naturally in the event of cardiopulmonary arrest.  Additional plans based on her clinical course.    I discussed the patients findings and my recommendations with patient    Srinivas Fleming MD  07/01/20  10:29 PM

## 2020-07-02 NOTE — THERAPY EVALUATION
Acute Care - Occupational Therapy Initial Evaluation  [facility]     Patient Name: Gabbi Brandon  : 1940  MRN: [id number]  Today's Date: 2020             Admit Date: 2020       ICD-10-CM ICD-9-CM   1. Pneumonia of right lower lobe due to infectious organism J18.9 486   2. Hyponatremia E87.1 276.1     Patient Active Problem List   Diagnosis   • Irregular bleeding   • Urinary tract infection   • Dyslipidemia   • Essential hypertension   • Stage 2 chronic kidney disease   • Gastroesophageal reflux disease without esophagitis   • Seasonal allergies   • Familial hypercholesterolemia   • Bladder spasm   • Bilateral lower extremity edema   • Pneumonia of right lower lobe due to infectious organism   • Failure to thrive in adult   • Hyponatremia   • DNR (do not resuscitate)   • Hypoxia   • Oropharyngeal dysphagia     Past Medical History:   Diagnosis Date   • Arthritis    • GERD (gastroesophageal reflux disease)    • Hyperlipidemia    • Hypertension    • Incontinent of urine    • Seasonal allergies      Past Surgical History:   Procedure Laterality Date   • BREAST BIOPSY     • COLONOSCOPY N/A 2016    Procedure: COLONOSCOPY WITH POLYPECTOMY (HOT SNARE);  Surgeon: Paulino Narvaez MD;  Location: [facility] ENDOSCOPY;  Service:    • VAGINAL HYSTERECTOMY            OT ASSESSMENT FLOWSHEET (last 12 hours)      Occupational Therapy Evaluation     Row Name 20 0837                   OT Evaluation Time/Intention    Subjective Information  no complaints  -SK        Document Type  evaluation  -SK        Mode of Treatment  individual therapy;occupational therapy  -SK        Patient Effort  good  -SK           General Information    Patient Profile Reviewed?  yes  -SK        Patient Observations  alert;cooperative;agree to therapy  -SK        Prior Level of Function  independent:;min assist:;ADL's  -SK        Existing Precautions/Restrictions  fall  -SK           Cognitive Assessment/Intervention-  PT/OT    Orientation Status (Cognition)  oriented x 3  -SK        Follows Commands (Cognition)  follows one step commands;over 90% accuracy;verbal cues/prompting required  -SK           Safety Issues, Functional Mobility    Impairments Affecting Function (Mobility)  balance;endurance/activity tolerance;strength  -SK           Bed Mobility Assessment/Treatment    Supine-Sit [place] (Bed Mobility)  moderate assist (50% patient effort);2 person assist;maximum assist (25% patient effort)  -SK        Assistive Device (Bed Mobility)  draw sheet;head of bed elevated  -SK           Functional Mobility    Functional Mobility- Ind. Level  moderate assist (50% patient effort);2 person assist required  -SK        Functional Mobility- Device  rolling walker  -SK        Functional Mobility-Distance (Feet)  3  -SK        Functional Mobility- Comment  side steps toward HOB  -SK           Sit-Stand Transfer    Sit-Stand [place] (Transfers)  moderate assist (50% patient effort);2 person assist  -SK        Assistive Device (Sit-Stand Transfers)  walker, front-wheeled  -SK           ADL Assessment/Intervention    BADL Assessment/Intervention  bathing;upper body dressing;lower body dressing;grooming;toileting  -SK           Lower Body Dressing Assessment/Training    Lower Body Dressing [place] Level  don;socks;dependent (less than 25% patient effort)  -SK           Grooming Assessment/Training    [place] Level (Grooming)  wash face, hands;minimum assist (75% patient effort)  -SK        Grooming Position  supported sitting  -SK        Comment (Grooming)  limited due to c/o shoulder pain   -SK           Toileting Assessment/Training    [place] Level (Toileting)  dependent (less than 25% patient effort)  -SK           General ROM    GENERAL ROM COMMENTS  BUE shoulder ROM limited due to c/o pain  -SK           MMT (Manual Muscle Testing)    General MMT Comments  not formally assessed   -SK           Motor  Assessment/Interventions    Additional Documentation  Balance (Group);Balance Interventions (Group);Therapeutic Exercise (Group);Therapeutic Exercise Interventions (Group)  -SK           Static Sitting Balance    Level of [place] (Unsupported Sitting, Static Balance)  contact guard assist  -SK        Sitting Position (Unsupported Sitting, Static Balance)  sitting on edge of bed  -SK           Static Standing Balance    Level of [place] (Supported Standing, Static Balance)  minimal assist, 75% patient effort;moderate assist, 50 to 74% patient effort  -SK        Assistive Device Utilized (Supported Standing, Static Balance)  walker, rolling  -SK           Sensory Assessment/Intervention    Sensory General Assessment  no sensation deficits identified  -SK           Positioning and Restraints    Pre-Treatment Position  in bed  -SK        Post Treatment Position  bed  -SK        In Bed  supine;encouraged to call for assist;call light within reach;exit alarm on  -SK           Pain Scale: Numbers Pre/Post-Treatment    Pain Scale: Numbers, Pretreatment  6/10  -SK        Pain Scale: Numbers, Post-Treatment  6/10  -SK        Pain Location - Orientation  generalized  -SK        Pre/Post Treatment Pain Comment  B shoulders, BLE   -SK        Pain Intervention(s)  Repositioned;Rest  -SK           Plan of Care Review    Plan of Care Reviewed With  patient  -SK           Clinical Impression (OT)    OT Diagnosis  Pt presents to OT with GW, decreased functional mobility, decreased ind with ADls.    -SK        Functional Level at Time of Evaluation (OT Eval)  Pt Mod Ax2 for bed mobility, Mod A for sit to stand and Mod Ax2 for side steps using rwx.  Pt limited BUE shoulder fl, limiting ADL activity.   -SK        Patient/Family Goals Statement (OT Eval)  return to PLOF   -SK        Criteria for Skilled Therapeutic Interventions Met (OT Eval)  yes  -SK        Rehab Potential (OT Eval)  good, to achieve stated therapy goals  -SK         Therapy Frequency (OT Eval)  5 times/wk  -SK        Care Plan Review (OT)  evaluation/treatment results reviewed;care plan/treatment goals reviewed;patient/other agree to care plan  -SK        Anticipated Discharge Disposition (OT)  skilled nursing facility  -SK           Planned OT Interventions    Planned Therapy Interventions (OT Eval)  activity tolerance training;BADL retraining;ROM/therapeutic exercise;strengthening exercise;transfer/mobility retraining  -SK           OT Goals    Transfer Goal Selection (OT)  transfer, OT goal 1  -SK        Bathing Goal Selection (OT)  bathing, OT goal 1  -SK        Toileting Goal Selection (OT)  toileting, OT goal 1  -SK        Strength Goal Selection (OT)  strength, OT goal 1  -SK        Additional Documentation  Strength Goal Selection (OT) (Row)  -SK           Transfer Goal 1 (OT)    Activity/Assistive Device (Transfer Goal 1, OT)  transfers, all;sit-to-stand/stand-to-sit;toilet;commode, bedside without drop arms;walker, rolling  -SK        [place] Level/Cues Needed (Transfer Goal 1, OT)  minimum assist (75% or more patient effort)  -SK        Time Frame (Transfer Goal 1, OT)  2 weeks  -SK        Progress/Outcome (Transfer Goal 1, OT)  goal ongoing  -SK           Bathing Goal 1 (OT)    Activity/Assistive Device (Bathing Goal 1, OT)  bathing skills, all;upper body bathing;lower body bathing  -SK        [place] Level/Cues Needed (Bathing Goal 1, OT)  minimum assist (75% or more patient effort)  -SK        Time Frame (Bathing Goal 1, OT)  2 weeks  -SK        Progress/Outcomes (Bathing Goal 1, OT)  goal ongoing  -SK           Toileting Goal 1 (OT)    Activity/Device (Toileting Goal 1, OT)  toileting skills, all;adjust/manage clothing;perform perineal hygiene;commode, bedside without drop arms  -SK        [place] Level/Cues Needed (Toileting Goal 1, OT)  moderate assist (50-74% patient effort)  -SK        Time Frame (Toileting Goal 1, OT)  2 weeks  -SK         Progress/Outcome (Toileting Goal 1, OT)  goal ongoing  -SK           Strength Goal 1 (OT)    Strength Goal 1 (OT)  pt perform BUE ex to increase strength to assist with ADLs   -SK        Time Frame (Strength Goal 1, OT)  2 weeks  -SK        Progress/Outcome (Strength Goal 1, OT)  goal ongoing  -SK          User Key  (r) = Recorded By, (t) = Taken By, (c) = Cosigned By    Initials Name Effective Dates    Danette Alatorre, OT 02/25/19 -          Occupational Therapy Education                 Title: PT OT SLP Therapies (In Progress)     Topic: Occupational Therapy (Done)     Point: ADL training (Done)     Description:   Instruct learner(s) on proper safety adaptation and remediation techniques during self care or transfers.   Instruct in proper use of assistive devices.              Learning Progress Summary           Patient Acceptance, E, VU by SK at 7/2/2020 1345    Comment:  edu pt on adls, ot, dme, safety                   Point: Home exercise program (Done)     Description:   Instruct learner(s) on appropriate technique for monitoring, assisting and/or progressing therapeutic exercises/activities.              Learning Progress Summary           Patient Acceptance, E, VU by SK at 7/2/2020 1345    Comment:  edu pt on adls, ot, dme, safety                   Point: Precautions (Done)     Description:   Instruct learner(s) on prescribed precautions during self-care and functional transfers.              Learning Progress Summary           Patient Acceptance, E, VU by SK at 7/2/2020 1345    Comment:  edu pt on adls, ot, dme, safety                   Point: Body mechanics (Done)     Description:   Instruct learner(s) on proper positioning and spine alignment during self-care, functional mobility activities and/or exercises.              Learning Progress Summary           Patient Acceptance, E, VU by SK at 7/2/2020 1345    Comment:  edu pt on adls, ot, dme, safety                               User Key     Initials  Effective Dates Name Provider Type Discipline    SK 02/25/19 -  Danette Chavira OT Occupational Therapist OT                  OT Recommendation and Plan  Outcome Summary/Treatment Plan (OT)  Anticipated Discharge Disposition (OT): skilled nursing facility  Planned Therapy Interventions (OT Eval): activity tolerance training, BADL retraining, ROM/therapeutic exercise, strengthening exercise, transfer/mobility retraining  Therapy Frequency (OT Eval): 5 times/wk  Plan of Care Review  Plan of Care Reviewed With: patient  Plan of Care Reviewed With: patient  Outcome Summary: Patient is a 79 y.o. female admitted to [facility] for pneumonia, failure to thrive on 7/1/2020.   Pt presents to OT with GW, decreased functional mobility, decreased ind with ADls.  Pt Mod Ax2 for bed mobility, Mod A for sit to stand and Mod Ax2 for side steps using rwx.  Pt limited BUE shoulder fl, limiting ADL activity.  Pt will benefit from skilled OT to address deficits and inc ind with ADLs, anticipate SNU upon d/c    Outcome Measures     Row Name 07/02/20 1300             How much help from another is currently needed...    Putting on and taking off regular lower body clothing?  1  -SK      Bathing (including washing, rinsing, and drying)  2  -SK      Toileting (which includes using toilet bed pan or urinal)  2  -SK      Putting on and taking off regular upper body clothing  2  -SK      Taking care of personal grooming (such as brushing teeth)  2  -SK      Eating meals  3  -SK      AM-PAC 6 Clicks Score (OT)  12  -SK         Functional Assessment    Outcome Measure Options  AM-PAC 6 Clicks Daily Activity (OT)  -SK        User Key  (r) = Recorded By, (t) = Taken By, (c) = Cosigned By    Initials Name Provider Type    SK Danette Chavira OT Occupational Therapist          Time Calculation:   Time Calculation- OT     Row Name 07/02/20 1346             Time Calculation- OT    OT Start Time  0810  -SK      OT Stop Time  0836  -SK      OT Time Calculation  (min)  26 min  -SK      Total Timed Code Minutes- OT  20 minute(s)  -SK      OT Received On  07/02/20  -SK      OT - Next Appointment  07/03/20  -SK      OT Goal Re-Cert Due Date  07/16/20  -SK        User Key  (r) = Recorded By, (t) = Taken By, (c) = Cosigned By    Initials Name Provider Type    Danette Alatorre, OT Occupational Therapist        Therapy Charges for Today     Code Description Service Date Service Provider Modifiers Qty    35511163633 HC OT EVAL MOD COMPLEXITY 2 7/2/2020 Danette Chavira OT GO 1    27508279635 HC OT SELF CARE/MGMT/TRAIN EA 15 MIN 7/2/2020 Danette Chavira OT GO 1    28297977238 HC OT THER SUPP EA 15 MIN 7/2/2020 Danette Chavira OT GO 2               Danette Chavira OT  7/2/2020

## 2020-07-02 NOTE — PLAN OF CARE
Problem: Patient Care Overview  Goal: Plan of Care Review  Outcome: Ongoing (interventions implemented as appropriate)  Flowsheets (Taken 7/2/2020 5414)  Progress: improving  Plan of Care Reviewed With: patient  Outcome Summary: VSS, am labs, IVF and IV antibx, turn Q2, ST/PT/OT to see, tylenol for generalized aches, MD to continue home meds

## 2020-07-02 NOTE — THERAPY EVALUATION
Acute Care - Speech Language Pathology   Swallow Initial Evaluation [facility]     Patient Name: Gabbi Brandon  : 1940  MRN: [id number]  Today's Date: 2020               Admit Date: 2020    Visit Dx:     ICD-10-CM ICD-9-CM   1. Pneumonia of right lower lobe due to infectious organism J18.9 486   2. Hyponatremia E87.1 276.1     Patient Active Problem List   Diagnosis   • Irregular bleeding   • Urinary tract infection   • Dyslipidemia   • Essential hypertension   • Stage 2 chronic kidney disease   • Gastroesophageal reflux disease without esophagitis   • Seasonal allergies   • Familial hypercholesterolemia   • Bladder spasm   • Bilateral lower extremity edema   • Pneumonia of right lower lobe due to infectious organism   • Failure to thrive in adult   • Hyponatremia   • DNR (do not resuscitate)     Past Medical History:   Diagnosis Date   • Arthritis    • GERD (gastroesophageal reflux disease)    • Hyperlipidemia    • Hypertension    • Incontinent of urine    • Seasonal allergies      Past Surgical History:   Procedure Laterality Date   • BREAST BIOPSY     • COLONOSCOPY N/A 2016    Procedure: COLONOSCOPY WITH POLYPECTOMY (HOT SNARE);  Surgeon: Paulino Narvaez MD;  Location: [facility] ENDOSCOPY;  Service:    • VAGINAL HYSTERECTOMY          SWALLOW EVALUATION (last 72 hours)      SLP Adult Swallow Evaluation     Row Name 20 1100                   Rehab Evaluation    Document Type  evaluation  -SA        Subjective Information  no complaints  -SA        Patient Observations  alert;cooperative  -SA        Patient Effort  good  -SA        Symptoms Noted During/After Treatment  none  -SA           General Information    Patient Profile Reviewed  yes  -SA        Pertinent History Of Current Problem  adm w/ RLL pna; h/o GERD  -SA        Current Method of Nutrition  NPO  -SA        Precautions/Limitations, Vision  WFL with corrective lenses;for purposes of eval  -SA         Precautions/Limitations, Hearing  WFL;for purposes of eval  -SA        Prior Level of Function-Communication  WFL  -SA        Prior Level of Function-Swallowing  no diet consistency restrictions  -        Plans/Goals Discussed with  patient;agreed upon  -        Barriers to Rehab  none identified  -SA        Patient's Goals for Discharge  return to PO diet  -SA           Pain Assessment    Additional Documentation  Pain Scale: Numbers Pre/Post-Treatment (Group)  -SA           Pain Scale: Numbers Pre/Post-Treatment    Pain Scale: Numbers, Pretreatment  0/10 - no pain  -SA        Pain Scale: Numbers, Post-Treatment  0/10 - no pain  -SA           Clinical Swallow Eval    Clinical Swallow Evaluation Summary  Audible swallow with thin liquids.  No overt s/s aspiration.  Pt reports eating with dentures only and dentures were left at home.  Weak laryngeal elevation  -SA           Clinical Impression    SLP Swallowing Diagnosis  suspected pharyngeal dysfunction  -SA        Functional Impact  risk of aspiration/pneumonia  -SA        Rehab Potential/Prognosis, Swallowing  good, to achieve stated therapy goals  -SA        Swallow Criteria for Skilled Therapeutic Interventions Met  demonstrates skilled criteria  -SA           Recommendations    Therapy Frequency (Swallow)  PRN  -SA        Predicted Duration Therapy Intervention (Days)  until discharge  -SA        SLP Diet Recommendation  puree with some mashed;nectar thick liquids;water between meals after oral care, with supervision  -SA        Recommended Diagnostics  VFSS (MBS)  -SA        Recommended Precautions and Strategies  upright posture during/after eating;small bites of food and sips of liquid  -SA        SLP Rec. for Method of Medication Administration  with pudding or applesauce  -        Monitor for Signs of Aspiration  notify SLP if any concerns  -SA        Anticipated Dischage Disposition (SLP)  unknown  -SA           Swallow Goals (SLP)    Oral  Nutrition/Hydration Goal Selection (SLP)  oral nutrition/hydration, SLP goal 1  -           Oral Nutrition/Hydration Goal 1 (SLP)    Oral Nutrition/Hydration Goal 1, SLP  Pt will tolerate least restrictive diet  -        Time Frame (Oral Nutrition/Hydration Goal 1, SLP)  by discharge  -          User Key  (r) = Recorded By, (t) = Taken By, (c) = Cosigned By    Initials Name Effective Dates    Kathryn Dubose MS [facility]-SLP 03/07/18 -           EDUCATION  The patient has been educated in the following areas:   Dysphagia (Swallowing Impairment) Oral Care/Hydration Modified Diet Instruction.    SLP Recommendation and Plan  SLP Swallowing Diagnosis: suspected pharyngeal dysfunction  SLP Diet Recommendation: puree with some mashed, nectar thick liquids, water between meals after oral care, with supervision  Recommended Precautions and Strategies: upright posture during/after eating, small bites of food and sips of liquid  SLP Rec. for Method of Medication Administration: with pudding or applesauce     Monitor for Signs of Aspiration: notify SLP if any concerns  Recommended Diagnostics: VFSS ([facility])  Swallow Criteria for Skilled Therapeutic Interventions Met: demonstrates skilled criteria  Anticipated Dischage Disposition (SLP): unknown  Rehab Potential/Prognosis, Swallowing: good, to achieve stated therapy goals  Therapy Frequency (Swallow): PRN  Predicted Duration Therapy Intervention (Days): until discharge       Plan of Care Reviewed With: patient  Outcome Summary: Clinical swallow eval completed.  Pt demonstrated no overt s/s aspiration; however, audible swallow was noted with thin liquids indicating discoordination increasing risk of aspiration.  Nectar and puree swallowed w/o audible signs.  Good oral clearance.  Solids not trialed at this time as pt reports she eats only with dentures in place and that dentures were left at home.  REC fork mashed diet with nectar thick liquids.  Allow water 30 mins after meals  and oral care.  Meds w/ puree.  Upright with all po.  VFSS to verify safest po diet.    SLP GOALS     Row Name 07/02/20 1100             Oral Nutrition/Hydration Goal 1 (SLP)    Oral Nutrition/Hydration Goal 1, SLP  Pt will tolerate least restrictive diet  -      Time Frame (Oral Nutrition/Hydration Goal 1, SLP)  by discharge  -        User Key  (r) = Recorded By, (t) = Taken By, (c) = Cosigned By    Initials Name Provider Type    Kathryn Dubose MS CCC-SLP Speech and Language Pathologist           SLP Outcome Measures (last 72 hours)      SLP Outcome Measures     Row Name 07/02/20 1100             SLP Outcome Measures    Outcome Measure Used?  Adult NOMS  -         Adult FCM Scores    FCM Chosen  Swallowing  -      Swallowing FCM Score  4  -        User Key  (r) = Recorded By, (t) = Taken By, (c) = Cosigned By    Initials Name Effective Dates    Kathryn Dubose MS CCC-SLP 03/07/18 -            Time Calculation:   Time Calculation- SLP     Row Name 07/02/20 1157             Time Calculation- SLP    SLP Start Time  0900  -      SLP Received On  07/02/20  -        User Key  (r) = Recorded By, (t) = Taken By, (c) = Cosigned By    Initials Name Provider Type    Kathryn Dubose MS CCC-SLP Speech and Language Pathologist          Therapy Charges for Today     Code Description Service Date Service Provider Modifiers Qty    [id number]  ST EVAL ORAL PHARYNG SWALLOW 4 7/2/2020 Kathryn Arias MS CCC-SLP GN 1               Kathryn Arias MS CCC-WILLIAM  7/2/2020

## 2020-07-02 NOTE — PROGRESS NOTES
Pharmacy Consult - Levaquin Dosing     Gabbi Brandon is a 79 y.o. female 91.2 kg (201 lb 1.6 oz) Body mass index is 33.46 kg/m²..  Pharmacy  to dose Levaquin   for Asp Pna  per Dr Fleming's request.        Other antibiotics: 1x dose Cefepime in ED    Past Medical History:   Diagnosis Date   • Arthritis    • GERD (gastroesophageal reflux disease)    • Hyperlipidemia    • Hypertension    • Incontinent of urine    • Seasonal allergies        Estimated Creatinine Clearance: 45.6 mL/min (A) (by C-G formula based on SCr of 1.08 mg/dL (H)).  Creatinine   Date Value Ref Range Status   07/01/2020 1.08 (H) 0.57 - 1.00 mg/dL Final     WBC   Date Value Ref Range Status   07/01/2020 11.24 (H) 3.40 - 10.80 10*3/mm3 Final     Temp Readings from Last 2 Encounters:   07/02/20 98.8 °F (37.1 °C) (Oral)   06/28/20 99 °F (37.2 °C) (Tympanic)     Anti-Infectives (From admission, onward)    Ordered     Dose/Rate Route Frequency Start Stop    07/02/20 0036  levoFLOXacin (LEVAQUIN) 500 mg/100 mL D5W (premix) (LEVAQUIN) 500 mg     Ordering Provider:  Srinivas Fleming MD    500 mg Intravenous Every 24 Hours 07/02/20 0130 07/09/20 0129    07/01/20 1837  cefepime (MAXIPIME) 2 g/100 mL 0.9% NS (mbp)     Ordering Provider:  Clint Arreaga PA    2 g  200 mL/hr over 30 Minutes Intravenous Once 07/01/20 1839 07/01/20 2116          Baseline cultures:     Microbiology Results (last 10 days)     Procedure Component Value - Date/Time    COVID-19,[facility] TETO IN-HOUSE, NP SWAB IN TRANSPORT MEDIA 8-12 HR TAT - Swab, Nasopharynx [[id number]]  (Normal) Collected:  07/01/20 1655    Lab Status:  Final result Specimen:  Swab from Nasopharynx Updated:  07/01/20 1927     COVID19 Not Detected    Narrative:       Fact sheet for providers: https://www.fda.gov/media/592373/download     Fact sheet for patients: https://www.fda.gov/media/916271/download          PLAN/ ASSESSMENT  1. Will start Levaquin 500mg iv q 24 hours   2. Monitor renal function…serum  creatinine in am  3. Pharmacy will continue to follow and adjust accordingly    Janina Wilson, [facility]    07/02/20 00:43

## 2020-07-02 NOTE — PROGRESS NOTES
"   LOS: 1 day   Patient Care Team:  Gordo Mg MD as PCP - General (Internal Medicine)  Gordo Mg MD as PCP - Claims Attributed    Chief Complaint: tired    Subjective     Feeling better today. Not interested in eating. Voiding well. No BM. No resp complaints. Still very \"sore\" all over.       Subjective:  Symptoms:  Improved.  She reports malaise, weakness and anorexia.  No shortness of breath, cough, chest pain, headache, chest pressure, diarrhea or anxiety.    Diet:  Poor intake.  No nausea or vomiting.    Activity level: Impaired due to weakness.    Pain:  She complains of pain that is mild.  She reports pain is unchanged.  Pain is well controlled and requiring pain medication.        History taken from: patient chart family RN    Objective     Vital Signs  Temp:  [97.9 °F (36.6 °C)-100.1 °F (37.8 °C)] 97.9 °F (36.6 °C)  Heart Rate:  [65-80] 69  Resp:  [16-20] 18  BP: (124-158)/(51-90) 142/69    Objective:  General Appearance:  Comfortable and in no acute distress.    Vital signs: (most recent): Blood pressure 142/69, pulse 69, temperature 97.9 °F (36.6 °C), temperature source Tympanic, resp. rate 18, height 165.1 cm (65\"), weight 91.2 kg (201 lb 1.6 oz), SpO2 94 %, not currently breastfeeding.  Vital signs are normal.  No fever.    Output: Producing urine and no stool output.    HEENT: Normal HEENT exam.    Lungs:  Normal effort and normal respiratory rate.  She is not in respiratory distress.  There are decreased breath sounds (right base).    Heart: Normal rate.  Regular rhythm.    Abdomen: Abdomen is soft.  Bowel sounds are normal.   There is no abdominal tenderness.     Extremities: There is no dependent edema.    Pulses: Distal pulses are intact.    Neurological: Patient is alert and oriented to person, place and time.  Normal strength.  Patient has normal muscle tone.    Pupils:  Pupils are equal, round, and reactive to light.    Skin:  Warm and dry.  No rash.             Results Review:  "    I reviewed the patient's new clinical results.  I reviewed the patient's other test results and agree with the interpretation  I personally viewed and interpreted the patient's EKG/Telemetry data  Discussed with pt, dtr, RN, and CCP    Results from last 7 days   Lab Units 07/02/20  0500 07/01/20  1652 06/28/20  2312   WBC 10*3/mm3 9.20 11.24* 14.52*   HEMOGLOBIN g/dL 11.7* 12.5 13.3   PLATELETS 10*3/mm3 289 325 272     Results from last 7 days   Lab Units 07/02/20  0500 07/01/20  1652 06/28/20  2312   SODIUM mmol/L 129* 124* 129*   POTASSIUM mmol/L 4.2 4.7 4.6   CHLORIDE mmol/L 92* 84* 87*   CO2 mmol/L 29.6* 29.9* 29.4*   BUN mg/dL 28* 29* 16   CREATININE mg/dL 0.80 1.08* 1.22*   CALCIUM mg/dL 8.5* 8.8 10.4   MAGNESIUM mg/dL  --   --  1.4*   Estimated Creatinine Clearance: 61.6 mL/min (by C-G formula based on SCr of 0.8 mg/dL).    Medication Review: reviewed    Assessment/Plan       Pneumonia of right lower lobe due to infectious organism    Essential hypertension    Failure to thrive in adult    Hyponatremia    DNR (do not resuscitate)    Hypoxia    Oropharyngeal dysphagia          Plan:   (Continue LQN for aspiration PNA  WBC wnl now, PCT falling, no O2 requirement at present  Blood cultures NGTD, afebrile  Mental status at baseline  SLP has seen, modified diet in place  Na+ has improved with IV NS, continue for now and watch for volume overload  Cr was slightly elevated--it is normalized today  BPs a little on the high side--admission med rec was not completed, will review today and restart her home meds    DNR confirmed  SCDs for DVT ppx  Dispo: may require SNF at dc, PT following).       Corky Rubio MD  07/02/20  14:03    Time: More than 50% of time spent in counseling and coordination of care:  Total face-to-face/floor time 45 min.  Time spent in counseling 30 min. Counseling included the following topics: pathophysiology of PNA, aspiration, nature of diagnosis and treatment, plan for diet and  activity

## 2020-07-02 NOTE — PROGRESS NOTES
Discharge Planning Assessment  [facility]     Patient Name: Gabbi Brandon  MRN: [id number]  Today's Date: 7/2/2020    Admit Date: 7/1/2020    Discharge Needs Assessment     Row Name 07/02/20 1628       Living Environment    Lives With  spouse    Name(s) of Who Lives With Patient  Spouse/Foster Brandon    Current Living Arrangements  home/apartment/condo    Primary Care Provided by  self;spouse/significant other    Provides Primary Care For  no one    Family Caregiver if Needed  spouse;child(luca), adult    Quality of Family Relationships  helpful;involved;supportive    Able to Return to Prior Arrangements  yes       Resource/Environmental Concerns    Transportation Concerns  car, none       Transition Planning    Patient/Family Anticipates Transition to  home with family    Transportation Anticipated  family or friend will provide       Discharge Needs Assessment    Readmission Within the Last 30 Days  no previous admission in last 30 days    Concerns to be Addressed  no discharge needs identified    Equipment Currently Used at Home  cane, straight;shower chair    Anticipated Changes Related to Illness  none    Equipment Needed After Discharge  none;cane, straight;shower chair    Provided Post Acute Provider List?  Yes    Post Acute Provider List  -- Subacute Rehab CMS Compare List provided.    Delivered To  Patient    Method of Delivery  In person        Discharge Plan     Row Name 07/02/20 1632       Plan    Plan  SNF    Patient/Family in Agreement with Plan  yes    Plan Comments  Met with patient at bedside, CCP role explained, and facesheet verified. Patient lives in a one-story house with her /Foster Brandon. There are stairs leading to the basement with railing that the patient says she rarely uses but can ambulate well. Patient was independent with her ADLs until the past month when her /Foster has needed to assist her with preparing meals, doing laundry, cleaning, etc. She uses a cane  and has a shower chair at home that she rarely uses. Patient's PCP is Dr. Gordo Mg. Patient has never been to a SNF/Rehab nor used home health in the past. She uses the [facility] in [place], and would not like to enroll in the [facility] Meds to Beds Program at this time. Patient anticpates returning home with her /Foster. Her daughter/Rosa Singh plans to live with the patient and her /Foster after discharge, and can also transport the patient by car at discharge. Patient's PCP has already sent a referral to [facility] for discharge. After Physical Therapy eval today, CCP discussed possible need for SNF/Rehab at discharge. CMS Compare list was provided. Await patient's determination on SNF/Rehab vs HH. CCP will follow for discharge needs. --EDITH Gupta        Destination      Coordination has not been started for this encounter.      Durable Medical Equipment      Coordination has not been started for this encounter.      Dialysis/Infusion      Coordination has not been started for this encounter.      Home Medical Care      Coordination has not been started for this encounter.      Therapy      Coordination has not been started for this encounter.      Community Resources      Coordination has not been started for this encounter.          Demographic Summary     Row Name 07/02/20 1626       General Information    Admission Type  inpatient    Reason for Consult  discharge planning    Preferred Language  English     Used During This Interaction  no       Contact Information    Permission Granted to Share Info With  ;family/designee        Functional Status     Row Name 07/02/20 1627       Functional Status    Usual Activity Tolerance  good    Current Activity Tolerance  fair       Functional Status, IADL    Medications  independent    Meal Preparation  assistive person    Housekeeping  assistive person    Laundry  assistive person    Shopping  assistive  person       Mental Status    General Appearance WDL  WDL       Employment/    Employment Status  retired        Psychosocial     Row Name 07/02/20 1380       Behavior WDL    Behavior WDL  WDL       Emotion Mood WDL    Emotion/Mood/Affect WDL  WDL       Speech WDL    Speech WDL  WDL       Perceptual State WDL    Perceptual State WDL  WDL       Thought Process WDL    Thought Process WDL  WDL       Intellectual Performance WDL    Intellectual Performance WDL  WDL    Level of Consciousness  Alert       Coping/Stress    Patient Personal Strengths  able to adapt    Sources of Support  adult child(luca);spouse    Reaction to Health Status  accepting    Understanding of Condition and Treatment  adequate understanding of medical condition       Developmental Stage (Eriksson's)    Developmental Stage  Stage 8 (65 years-death/Late Adulthood) Integrity vs. Despair        Abuse/Neglect    No documentation.       Legal    No documentation.       Substance Abuse    No documentation.       Patient Forms    No documentation.           Priti Chance RN

## 2020-07-02 NOTE — PLAN OF CARE
Problem: Patient Care Overview  Goal: Plan of Care Review  Outcome: Ongoing (interventions implemented as appropriate)  Flowsheets (Taken 7/2/2020 8892)  Plan of Care Reviewed With: patient  Outcome Summary: Clinical swallow eval completed.  Pt demonstrated no overt s/s aspiration; however, audible swallow was noted with thin liquids indicating discoordination increasing risk of aspiration.  Nectar and puree swallowed w/o audible signs.  Good oral clearance.  Solids not trialed at this time as pt reports she eats only with dentures in place and that dentures were left at home.  REC fork mashed diet with nectar thick liquids.  Allow water 30 mins after meals and oral care.  Meds w/ puree.  Upright with all po.  VFSS to verify safest po diet.

## 2020-07-02 NOTE — PLAN OF CARE
Problem: Patient Care Overview  Goal: Plan of Care Review  Outcome: Ongoing (interventions implemented as appropriate)  Flowsheets (Taken 7/2/2020 0500)  Plan of Care Reviewed With: patient  Outcome Summary: Patient is a 79 y.o. female admitted to [facility] for pneumonia, failure to thrive on 7/1/2020.   Pt presents to OT with GW, decreased functional mobility, decreased ind with ADls.  Pt Mod Ax2 for bed mobility, Mod A for sit to stand and Mod Ax2 for side steps using rwx.  Pt limited BUE shoulder fl, limiting ADL activity.  Pt will benefit from skilled OT to address deficits and inc ind with ADLs, anticipate SNU upon d/c         Patient was wearing a face mask during this therapy encounter. Therapist used appropriate personal protective equipment including face shield, mask and gloves.  Mask used was standard procedure mask. Appropriate PPE was worn during the entire therapy session. Hand hygiene was completed before and after therapy session. Patient is not in enhanced droplet precautions.

## 2020-07-02 NOTE — THERAPY EVALUATION
Patient Name: Gabbi Brandon  : 1940    MRN: [id number]                              Today's Date: 2020       Admit Date: 2020    Visit Dx:     ICD-10-CM ICD-9-CM   1. Pneumonia of right lower lobe due to infectious organism J18.9 486   2. Hyponatremia E87.1 276.1     Patient Active Problem List   Diagnosis   • Irregular bleeding   • Urinary tract infection   • Dyslipidemia   • Essential hypertension   • Stage 2 chronic kidney disease   • Gastroesophageal reflux disease without esophagitis   • Seasonal allergies   • Familial hypercholesterolemia   • Bladder spasm   • Bilateral lower extremity edema   • Pneumonia of right lower lobe due to infectious organism   • Failure to thrive in adult   • Hyponatremia   • DNR (do not resuscitate)     Past Medical History:   Diagnosis Date   • Arthritis    • GERD (gastroesophageal reflux disease)    • Hyperlipidemia    • Hypertension    • Incontinent of urine    • Seasonal allergies      Past Surgical History:   Procedure Laterality Date   • BREAST BIOPSY     • COLONOSCOPY N/A 2016    Procedure: COLONOSCOPY WITH POLYPECTOMY (HOT SNARE);  Surgeon: Paulino Narvaez MD;  Location: [facility] ENDOSCOPY;  Service:    • VAGINAL HYSTERECTOMY       General Information     Row Name 20 1127          PT Evaluation Time/Intention    Document Type  evaluation  -EM     Mode of Treatment  individual therapy;physical therapy  -EM     Row Name 20 1127          General Information    Patient Profile Reviewed?  yes  -EM     Prior Level of Function  independent:;all household mobility  -EM     Existing Precautions/Restrictions  fall  -EM     Row Name 20 1127          Relationship/Environment    Lives With  spouse  -EM     Row Name 20 1127          Resource/Environmental Concerns    Current Living Arrangements  home/apartment/condo  -EM     Row Name 20 1127          Home Main Entrance    Number of Stairs, Main Entrance  one  -EM     Row Name  07/02/20 1127          Stairs Within Home, Primary    Number of Stairs, Within Home, Primary  none  -EM     Row Name 07/02/20 1127          Cognitive Assessment/Intervention- PT/OT    Orientation Status (Cognition)  oriented x 3  -EM     Row Name 07/02/20 1127          Safety Issues, Functional Mobility    Impairments Affecting Function (Mobility)  balance;endurance/activity tolerance;strength;pain;range of motion (ROM)  -EM       User Key  (r) = Recorded By, (t) = Taken By, (c) = Cosigned By    Initials Name Provider Type    Gabbi Mendoza PT Physical Therapist        Mobility     Row Name 07/02/20 1127          Bed Mobility Assessment/Treatment    Bed Mobility Assessment/Treatment  supine-sit;rolling right  -EM     Rolling Right [place] (Bed Mobility)  moderate assist (50% patient effort);verbal cues  -EM     Supine-Sit [place] (Bed Mobility)  moderate assist (50% patient effort)  -EM     Assistive Device (Bed Mobility)  draw sheet  -EM     Row Name 07/02/20 1127          Bed-Chair Transfer    Bed-Chair [place] (Transfers)  minimum assist (75% patient effort);1 person to manage equipment  -EM     Assistive Device (Bed-Chair Transfers)  walker, front-wheeled  -EM     Row Name 07/02/20 1127          Sit-Stand Transfer    Sit-Stand [place] (Transfers)  moderate assist (50% patient effort)  -EM     Assistive Device (Sit-Stand Transfers)  walker, front-wheeled  -EM     Row Name 07/02/20 1127          Gait/Stairs Assessment/Training    Comment (Gait/Stairs)  decreased weight shifting, small shuffling steps around from bed to chair   -EM       User Key  (r) = Recorded By, (t) = Taken By, (c) = Cosigned By    Initials Name Provider Type    Gabbi Mendoza PT Physical Therapist        Obj/Interventions     Row Name 07/02/20 1130          General ROM    GENERAL ROM COMMENTS  ROM limited throughout due to pain  -EM     Row Name 07/02/20 1130          MMT (Manual Muscle Testing)     General MMT Comments  no focal deficits identified   -EM     Row Name 07/02/20 1130          Therapeutic Exercise    Lower Extremity (Therapeutic Exercise)  quad sets, bilateral;LAQ (long arc quad), bilateral  -EM     Lower Extremity Range of Motion (Therapeutic Exercise)  ankle dorsiflexion/plantar flexion, bilateral  -EM     Sets/Reps (Therapeutic Exercise)  1/10  -EM     Row Name 07/02/20 1130          Static Sitting Balance    Level of [place] (Unsupported Sitting, Static Balance)  supervision  -EM     Sitting Position (Unsupported Sitting, Static Balance)  sitting on edge of bed  -EM     Row Name 07/02/20 1130          Dynamic Sitting Balance    Level of [place], Reaches Outside Midline (Sitting, Dynamic Balance)  contact guard assist  -EM     Sitting Position, Reaches Outside Midline (Sitting, Dynamic Balance)  sitting on edge of bed  -EM     Row Name 07/02/20 1130          Static Standing Balance    Level of [place] (Supported Standing, Static Balance)  minimal assist, 75% patient effort  -EM     Assistive Device Utilized (Supported Standing, Static Balance)  walker, rolling  -EM     Row Name 07/02/20 1130          Dynamic Standing Balance    Level of [place], Reaches Outside Midline (Standing, Dynamic Balance)  minimal assist, 75% patient effort  -EM     Assistive Device Utilized (Supported Standing, Dynamic Balance)  walker, rolling  -EM     Row Name 07/02/20 1130          Sensory Assessment/Intervention    Sensory General Assessment  no sensation deficits identified  -EM       User Key  (r) = Recorded By, (t) = Taken By, (c) = Cosigned By    Initials Name Provider Type    EM Gabbi Forrest, PT Physical Therapist        Goals/Plan     Row Name 07/02/20 1133          Bed Mobility Goal 1 (PT)    Activity/Assistive Device (Bed Mobility Goal 1, PT)  bed mobility activities, all  -EM     [place] Level/Cues Needed (Bed Mobility Goal 1, PT)  contact guard assist  -EM     Time Frame  "(Bed Mobility Goal 1, PT)  10 days  -EM     Row Name 07/02/20 1133          Transfer Goal 1 (PT)    Activity/Assistive Device (Transfer Goal 1, PT)  transfers, all;walker, rolling  -EM     [place] Level/Cues Needed (Transfer Goal 1, PT)  contact guard assist  -EM     Time Frame (Transfer Goal 1, PT)  10 days  -EM     Row Name 07/02/20 1133          Gait Training Goal 1 (PT)    Activity/Assistive Device (Gait Training Goal 1, PT)  gait (walking locomotion);walker, rolling  -EM     [place] Level (Gait Training Goal 1, PT)  contact guard assist  -EM     Distance (Gait Goal 1, PT)  50  -EM     Time Frame (Gait Training Goal 1, PT)  10 days  -EM       User Key  (r) = Recorded By, (t) = Taken By, (c) = Cosigned By    Initials Name Provider Type    EM Gabbi Forrest, PT Physical Therapist        Clinical Impression     Row Name 07/02/20 1132          Pain Assessment    Additional Documentation  Pain Scale: Numbers Pre/Post-Treatment (Group)  -EM     Row Name 07/02/20 1132          Pain Scale: Numbers Pre/Post-Treatment    Pain Scale: Numbers, Pretreatment  8/10  -EM     Pain Location - Orientation  generalized  -EM     Pre/Post Treatment Pain Comment  patient c/o pain \"all over\"  -EM     Pain Intervention(s)  Medication (See MAR);Repositioned  -EM     Row Name 07/02/20 1132          Plan of Care Review    Plan of Care Reviewed With  patient  -EM     Row Name 07/02/20 1132          Physical Therapy Clinical Impression    Patient/Family Goals Statement (PT Clinical Impression)  get better, go home   -EM     Criteria for Skilled Interventions Met (PT Clinical Impression)  yes;treatment indicated  -EM     Rehab Potential (PT Clinical Summary)  good, to achieve stated therapy goals  -EM     Row Name 07/02/20 1132          Positioning and Restraints    Pre-Treatment Position  in bed  -EM     Post Treatment Position  chair  -EM     In Chair  reclined;call light within reach;exit alarm on  -EM       User Key  (r) = " Recorded By, (t) = Taken By, (c) = Cosigned By    Initials Name Provider Type    Gabbi Mendoza PT Physical Therapist        Outcome Measures     Row Name 07/02/20 1134          How much help from another person do you currently need...    Turning from your back to your side while in flat bed without using bedrails?  2  -EM     Moving from lying on back to sitting on the side of a flat bed without bedrails?  2  -EM     Moving to and from a bed to a chair (including a wheelchair)?  2  -EM     Standing up from a chair using your arms (e.g., wheelchair, bedside chair)?  2  -EM     Climbing 3-5 steps with a railing?  1  -EM     To walk in hospital room?  2  -EM     AM-PAC 6 Clicks Score (PT)  11  -EM     Row Name 07/02/20 1134          Functional Assessment    Outcome Measure Options  AM-PAC 6 Clicks Basic Mobility (PT)  -EM       User Key  (r) = Recorded By, (t) = Taken By, (c) = Cosigned By    Initials Name Provider Type    Gabbi Mendoza PT Physical Therapist        Physical Therapy Education                 Title: PT OT SLP Therapies (In Progress)     Topic: Physical Therapy (In Progress)     Point: Mobility training (In Progress)     Description:   Instruct learner(s) on safety and technique for assisting patient out of bed, chair or wheelchair.  Instruct in the proper use of assistive devices, such as walker, crutches, cane or brace.              Patient Friendly Description:   It's important to get you on your feet again, but we need to do so in a way that is safe for you. Falling has serious consequences, and your personal safety is the most important thing of all.        When it's time to get out of bed, one of us or a family member will sit next to you on the bed to give you support.     If your doctor or nurse tells you to use a walker, crutches, a cane, or a brace, be sure you use it every time you get out of bed, even if you think you don't need it.    Learning Progress Summary            Patient Acceptance, E, NR by EM at 7/2/2020 1134                   Point: Home exercise program (Not Started)     Description:   Instruct learner(s) on appropriate technique for monitoring, assisting and/or progressing patient with therapeutic exercises and activities.              Learner Progress:   Not documented in this visit.          Point: Body mechanics (Not Started)     Description:   Instruct learner(s) on proper positioning and spine alignment for patient and/or caregiver during mobility tasks and/or exercises.              Learner Progress:   Not documented in this visit.          Point: Precautions (Not Started)     Description:   Instruct learner(s) on prescribed precautions during mobility and gait tasks              Learner Progress:   Not documented in this visit.                      User Key     Initials Effective Dates Name Provider Type Discipline    EM 04/03/18 -  Gabbi Forrest PT Physical Therapist PT              PT Recommendation and Plan  Planned Therapy Interventions (PT Eval): bed mobility training, gait training, home exercise program, transfer training, patient/family education  Outcome Summary/Treatment Plan (PT)  Anticipated Discharge Disposition (PT): skilled nursing facility  Plan of Care Reviewed With: patient  Outcome Summary: Patient is a 79 y.o. female admitted to [facility] for pneumonia, failure to thrive on 7/1/2020. PMHx includes HTN, HLD, GERD. Patient is independent at baseline and lives with her spouse. Patient reports she uses a cane, one step to enter home. Today, patient performed bed mobility with modA, required modA for transfers, and ambulated a couple of steps around from bed to chair with rwx and Mario.  Patient demonstrates impairments consisting of generalized weakness, decreased activity tolerance, decreased balance, pain. Patient may benefit from skilled PT services acutely to address functional deficits as well as improve level of independence prior to  discharge. Anticipate SNU upon DC.     Time Calculation:   PT Charges     Row Name 07/02/20 1137             Time Calculation    Start Time  0855  -EM      Stop Time  0918  -EM      Time Calculation (min)  23 min  -EM      PT Received On  07/02/20  -EM      PT - Next Appointment  07/03/20  -EM      PT Goal Re-Cert Due Date  07/09/20  -EM         Time Calculation- PT    Total Timed Code Minutes- PT  14 minute(s)  -EM        User Key  (r) = Recorded By, (t) = Taken By, (c) = Cosigned By    Initials Name Provider Type    EM Gabbi Forrest, PT Physical Therapist        Therapy Charges for Today     Code Description Service Date Service Provider Modifiers Qty    31253208865 HC PT EVAL MOD COMPLEXITY 2 7/2/2020 Gabbi Forrest, PT GP 1    50451564376 HC PT THER PROC EA 15 MIN 7/2/2020 Gabbi Forrest PT GP 1          PT G-Codes  Outcome Measure Options: AM-PAC 6 Clicks Basic Mobility (PT)  AM-PAC 6 Clicks Score (PT): 11    Gabbi Forrest PT  7/2/2020

## 2020-07-02 NOTE — ED NOTES
Spoke with family via phone and updated on pt current status.  Answered all appropriate questions.       Shanell Olvera RN  07/01/20 9461

## 2020-07-02 NOTE — PLAN OF CARE
Pt c/o of generalized pain today. Treated w/ PRN tylenol. Speech eval and patient on pureed some mash, nectar thick liquids, and meds w/ applesauce. Tolerating so far. Video swallow planned for tomorrow. Continue IVFs and IV abx. Working w/ PT. Assist x's 2 w/ walker. VSS on RA. Will continue to monitor.

## 2020-07-03 ENCOUNTER — APPOINTMENT (OUTPATIENT)
Dept: GENERAL RADIOLOGY | Facility: HOSPITAL | Age: 80
End: 2020-07-03

## 2020-07-03 LAB
ALBUMIN SERPL-MCNC: 2.6 G/DL (ref 3.5–5.2)
ALBUMIN/GLOB SERPL: 0.9 G/DL
ALP SERPL-CCNC: 55 U/L (ref 39–117)
ALT SERPL W P-5'-P-CCNC: 17 U/L (ref 1–33)
ANION GAP SERPL CALCULATED.3IONS-SCNC: 9 MMOL/L (ref 5–15)
AST SERPL-CCNC: 26 U/L (ref 1–32)
BILIRUB SERPL-MCNC: 0.3 MG/DL (ref 0.2–1.2)
BUN SERPL-MCNC: 23 MG/DL (ref 8–23)
BUN/CREAT SERPL: 29.5 (ref 7–25)
CALCIUM SPEC-SCNC: 8.2 MG/DL (ref 8.6–10.5)
CHLORIDE SERPL-SCNC: 99 MMOL/L (ref 98–107)
CO2 SERPL-SCNC: 27 MMOL/L (ref 22–29)
CREAT SERPL-MCNC: 0.78 MG/DL (ref 0.57–1)
DEPRECATED RDW RBC AUTO: 47.4 FL (ref 37–54)
ERYTHROCYTE [DISTWIDTH] IN BLOOD BY AUTOMATED COUNT: 13.7 % (ref 12.3–15.4)
GFR SERPL CREATININE-BSD FRML MDRD: 71 ML/MIN/1.73
GLOBULIN UR ELPH-MCNC: 2.9 GM/DL
GLUCOSE SERPL-MCNC: 96 MG/DL (ref 65–99)
HCT VFR BLD AUTO: 33.1 % (ref 34–46.6)
HGB BLD-MCNC: 10.8 G/DL (ref 12–15.9)
MCH RBC QN AUTO: 30.3 PG (ref 26.6–33)
MCHC RBC AUTO-ENTMCNC: 32.6 G/DL (ref 31.5–35.7)
MCV RBC AUTO: 93 FL (ref 79–97)
PLATELET # BLD AUTO: 285 10*3/MM3 (ref 140–450)
PMV BLD AUTO: 9.5 FL (ref 6–12)
POTASSIUM SERPL-SCNC: 4 MMOL/L (ref 3.5–5.2)
PROT SERPL-MCNC: 5.5 G/DL (ref 6–8.5)
RBC # BLD AUTO: 3.56 10*6/MM3 (ref 3.77–5.28)
SODIUM SERPL-SCNC: 135 MMOL/L (ref 136–145)
WBC # BLD AUTO: 7.27 10*3/MM3 (ref 3.4–10.8)

## 2020-07-03 PROCEDURE — 92611 MOTION FLUOROSCOPY/SWALLOW: CPT | Performed by: SPEECH-LANGUAGE PATHOLOGIST

## 2020-07-03 PROCEDURE — 85027 COMPLETE CBC AUTOMATED: CPT | Performed by: HOSPITALIST

## 2020-07-03 PROCEDURE — 97110 THERAPEUTIC EXERCISES: CPT

## 2020-07-03 PROCEDURE — 74230 X-RAY XM SWLNG FUNCJ C+: CPT

## 2020-07-03 PROCEDURE — 25010000002 LEVOFLOXACIN PER 250 MG: Performed by: INTERNAL MEDICINE

## 2020-07-03 PROCEDURE — 80053 COMPREHEN METABOLIC PANEL: CPT | Performed by: HOSPITALIST

## 2020-07-03 RX ADMIN — PANTOPRAZOLE SODIUM 40 MG: 40 TABLET, DELAYED RELEASE ORAL at 05:56

## 2020-07-03 RX ADMIN — ASPIRIN 81 MG: 81 TABLET, COATED ORAL at 10:47

## 2020-07-03 RX ADMIN — BARIUM SULFATE 50 ML: 400 SUSPENSION ORAL at 10:10

## 2020-07-03 RX ADMIN — CETIRIZINE HYDROCHLORIDE 10 MG: 10 TABLET, FILM COATED ORAL at 10:46

## 2020-07-03 RX ADMIN — BARIUM SULFATE 55 ML: 0.81 POWDER, FOR SUSPENSION ORAL at 10:09

## 2020-07-03 RX ADMIN — MELOXICAM 15 MG: 15 TABLET ORAL at 10:47

## 2020-07-03 RX ADMIN — BARIUM SULFATE 4 ML: 980 POWDER, FOR SUSPENSION ORAL at 10:10

## 2020-07-03 RX ADMIN — LEVOFLOXACIN 500 MG: 5 INJECTION, SOLUTION INTRAVENOUS at 00:50

## 2020-07-03 RX ADMIN — SODIUM CHLORIDE 75 ML/HR: 9 INJECTION, SOLUTION INTRAVENOUS at 11:30

## 2020-07-03 RX ADMIN — ATORVASTATIN CALCIUM 10 MG: 20 TABLET, FILM COATED ORAL at 10:47

## 2020-07-03 RX ADMIN — AMLODIPINE BESYLATE 5 MG: 5 TABLET ORAL at 10:47

## 2020-07-03 RX ADMIN — ATENOLOL 50 MG: 50 TABLET ORAL at 10:47

## 2020-07-03 RX ADMIN — SODIUM CHLORIDE, PRESERVATIVE FREE 10 ML: 5 INJECTION INTRAVENOUS at 10:47

## 2020-07-03 NOTE — PROGRESS NOTES
Continued Stay Note  [facility]     Patient Name: Gabbi Brandon  MRN: [id number]  Today's Date: 7/3/2020    Admit Date: 7/1/2020    Discharge Plan     Row Name 07/03/20 1502       Plan    Plan Comments  Received call from Maura Padilla/[facility] and she does not have EPIC access.  She will follow up on Monday.  Spoke with Elisa/[facility] and [facility] will review. Maricel Rain RN    Row Name 07/03/20 1312       Plan    Plan  SNf    Patient/Family in Agreement with Plan  yes    Plan Comments  Spoke with patient's daughter/Rosa who asked for CCP to place referrals also for [facility] and [facility]. Spoke with Elisa/[facility] who will look into patient's case. Call placed for admissions at [facility] - await call back. CCP continues to follow. --EDITH Gupta        Discharge Codes    No documentation.             Maricel Rain RN

## 2020-07-03 NOTE — THERAPY TREATMENT NOTE
Patient Name: Gabbi Brandon  : 1940    MRN: [id number]                              Today's Date: 7/3/2020       Admit Date: 2020    Visit Dx:     ICD-10-CM ICD-9-CM   1. Pneumonia of right lower lobe due to infectious organism J18.9 486   2. Hyponatremia E87.1 276.1     Patient Active Problem List   Diagnosis   • Irregular bleeding   • Urinary tract infection   • Dyslipidemia   • Essential hypertension   • Stage 2 chronic kidney disease   • Gastroesophageal reflux disease without esophagitis   • Seasonal allergies   • Familial hypercholesterolemia   • Bladder spasm   • Bilateral lower extremity edema   • Pneumonia of right lower lobe due to infectious organism   • Failure to thrive in adult   • Hyponatremia   • DNR (do not resuscitate)   • Hypoxia   • Oropharyngeal dysphagia     Past Medical History:   Diagnosis Date   • Arthritis    • GERD (gastroesophageal reflux disease)    • Hyperlipidemia    • Hypertension    • Incontinent of urine    • Seasonal allergies      Past Surgical History:   Procedure Laterality Date   • BREAST BIOPSY     • COLONOSCOPY N/A 2016    Procedure: COLONOSCOPY WITH POLYPECTOMY (HOT SNARE);  Surgeon: Paulino Narvaez MD;  Location: [facility] ENDOSCOPY;  Service:    • VAGINAL HYSTERECTOMY       General Information     Row Name 20 1124          PT Evaluation Time/Intention    Document Type  therapy note (daily note)  -EM     Mode of Treatment  individual therapy;physical therapy  -EM     Row Name 20 1124          General Information    Existing Precautions/Restrictions  fall  -EM       User Key  (r) = Recorded By, (t) = Taken By, (c) = Cosigned By    Initials Name Provider Type    EM Gabbi Forrest, PT Physical Therapist        Mobility     Row Name 20 1125          Bed Mobility Assessment/Treatment    Bed Mobility Assessment/Treatment  sit-supine  -EM     Supine-Sit [place] (Bed Mobility)  moderate assist (50% patient effort)  -EM      Sit-Supine [place] (Bed Mobility)  moderate assist (50% patient effort);2 person assist  -EM     Row Name 07/03/20 1125          Sit-Stand Transfer    Sit-Stand [place] (Transfers)  minimum assist (75% patient effort);2 person assist  -EM     Assistive Device (Sit-Stand Transfers)  walker, front-wheeled  -EM     Row Name 07/03/20 1125          Gait/Stairs Assessment/Training    Gait/Stairs Assessment/Training  gait/ambulation independence  -EM     [place] Level (Gait)  minimum assist (75% patient effort);1 person to manage equipment  -EM     Assistive Device (Gait)  walker, front-wheeled  -EM     Distance in Feet (Gait)  15  -EM     Deviations/Abnormal Patterns (Gait)  gait speed decreased;stride length decreased  -EM     Comment (Gait/Stairs)  decreased weight shifting, difficulty advancing LLE at first but improved as patient ambulated   -EM       User Key  (r) = Recorded By, (t) = Taken By, (c) = Cosigned By    Initials Name Provider Type    EM Gabbi Forrest, PT Physical Therapist        Obj/Interventions    No documentation.       Goals/Plan    No documentation.       Clinical Impression     Row Name 07/03/20 1126          Pain Scale: Numbers Pre/Post-Treatment    Pain Scale: Numbers, Pretreatment  6/10  -EM     Pain Location - Side  Bilateral  -EM     Pain Location - Orientation  lower  -EM     Pain Location  extremity  -EM     Pain Intervention(s)  Repositioned;Ambulation/increased activity  -EM     Row Name 07/03/20 1126          Plan of Care Review    Plan of Care Reviewed With  patient  -EM     Progress  improving  -EM     Outcome Summary  patient able to ambulate short distance from stretcher in hallway back to bed, step length and gait mechanics improved as patient ambulated. Patient fatigues quickly but improved today overall   -EM     Row Name 07/03/20 1126          Positioning and Restraints    Pre-Treatment Position  other (comment)  -EM     Post Treatment Position  bed  -EM      In Bed  side lying left;exit alarm on;call light within reach;notified nsg  -EM       User Key  (r) = Recorded By, (t) = Taken By, (c) = Cosigned By    Initials Name Provider Type    Gabbi Mendoza PT Physical Therapist        Outcome Measures     Row Name 07/03/20 1127          How much help from another person do you currently need...    Turning from your back to your side while in flat bed without using bedrails?  2  -EM     Moving from lying on back to sitting on the side of a flat bed without bedrails?  2  -EM     Moving to and from a bed to a chair (including a wheelchair)?  2  -EM     Standing up from a chair using your arms (e.g., wheelchair, bedside chair)?  2  -EM     Climbing 3-5 steps with a railing?  1  -EM     To walk in hospital room?  3  -EM     AM-PAC 6 Clicks Score (PT)  12  -EM       User Key  (r) = Recorded By, (t) = Taken By, (c) = Cosigned By    Initials Name Provider Type    Gabbi Mendoza PT Physical Therapist        Physical Therapy Education                 Title: PT OT SLP Therapies (In Progress)     Topic: Physical Therapy (In Progress)     Point: Mobility training (In Progress)     Description:   Instruct learner(s) on safety and technique for assisting patient out of bed, chair or wheelchair.  Instruct in the proper use of assistive devices, such as walker, crutches, cane or brace.              Patient Friendly Description:   It's important to get you on your feet again, but we need to do so in a way that is safe for you. Falling has serious consequences, and your personal safety is the most important thing of all.        When it's time to get out of bed, one of us or a family member will sit next to you on the bed to give you support.     If your doctor or nurse tells you to use a walker, crutches, a cane, or a brace, be sure you use it every time you get out of bed, even if you think you don't need it.    Learning Progress Summary           Patient Acceptance, E,  NR by EM at 7/3/2020 1128    Acceptance, E, NR by EM at 7/2/2020 1134                   Point: Home exercise program (Not Started)     Description:   Instruct learner(s) on appropriate technique for monitoring, assisting and/or progressing patient with therapeutic exercises and activities.              Learner Progress:   Not documented in this visit.          Point: Body mechanics (Not Started)     Description:   Instruct learner(s) on proper positioning and spine alignment for patient and/or caregiver during mobility tasks and/or exercises.              Learner Progress:   Not documented in this visit.          Point: Precautions (Not Started)     Description:   Instruct learner(s) on prescribed precautions during mobility and gait tasks              Learner Progress:   Not documented in this visit.                      User Key     Initials Effective Dates Name Provider Type Discipline    EM 04/03/18 -  Gabbi Forrest PT Physical Therapist PT              PT Recommendation and Plan  Planned Therapy Interventions (PT Eval): bed mobility training, gait training, home exercise program, transfer training, patient/family education  Outcome Summary/Treatment Plan (PT)  Anticipated Discharge Disposition (PT): skilled nursing facility  Plan of Care Reviewed With: patient  Progress: improving  Outcome Summary: patient able to ambulate short distance from stretcher in hallway back to bed, step length and gait mechanics improved as patient ambulated. Patient fatigues quickly but improved today overall      Time Calculation:   PT Charges     Row Name 07/03/20 1129             Time Calculation    Start Time  1010  -EM      Stop Time  1025  -EM      Time Calculation (min)  15 min  -EM      PT Received On  07/03/20  -EM      PT - Next Appointment  07/04/20  -EM         Time Calculation- PT    Total Timed Code Minutes- PT  15 minute(s)  -EM        User Key  (r) = Recorded By, (t) = Taken By, (c) = Cosigned By    Initials  Name Provider Type    EM Gabbi Forrest PT Physical Therapist        Therapy Charges for Today     Code Description Service Date Service Provider Modifiers Qty    96844490995 HC PT EVAL MOD COMPLEXITY 2 7/2/2020 Gabbi Forrest, PT GP 1    35924771392 HC PT THER PROC EA 15 MIN 7/2/2020 Gabbi Forrest, PT GP 1    59191541890 HC PT THER PROC EA 15 MIN 7/3/2020 Gabbi Forrest, PT GP 1    83040213837 HC PT THER SUPP EA 15 MIN 7/3/2020 Gabbi Forrest, PT GP 1          PT G-Codes  Outcome Measure Options: AM-PAC 6 Clicks Daily Activity (OT)  AM-PAC 6 Clicks Score (PT): 12  AM-PAC 6 Clicks Score (OT): 12    Gabbi Forrest PT  7/3/2020

## 2020-07-03 NOTE — PLAN OF CARE
Problem: Patient Care Overview  Goal: Plan of Care Review  Flowsheets (Taken 7/3/2020 1126)  Progress: improving  Plan of Care Reviewed With: patient  Outcome Summary: patient able to ambulate short distance from stretcher in hallway back to bed, step length and gait mechanics improved as patient ambulated. Patient fatigues quickly but improved today overall    Patient was wearing a face mask during this therapy encounter. Therapist used appropriate personal protective equipment including mask and gloves.  Mask used was standard procedure mask. Appropriate PPE was worn during the entire therapy session. Hand hygiene was completed before and after therapy session. Patient is not in enhanced droplet precautions.

## 2020-07-03 NOTE — PLAN OF CARE
Problem: Patient Care Overview  Goal: Plan of Care Review  Outcome: Ongoing (interventions implemented as appropriate)  Flowsheets (Taken 7/3/2020 1044)  Plan of Care Reviewed With: patient  Outcome Summary: VFSS completed.  Patient demonstrates a mild oral dysphagia characterized by inconsistent shallow trace penetration of thin liquids which cleared with the swallow.  Premature spillage during mastication of soft and hard solids and mixed consistencies.  Esophageal scan completed with no overt abnormalities noted. Pt noted to cough x2 while fluor was off.  One occasion during mastication of hard solids.  Second occasion was several minutes following completion of study.

## 2020-07-03 NOTE — MBS/VFSS/FEES
Acute Care - Speech Language Pathology   Swallow Initial Evaluation [facility]     Patient Name: Gabbi Brandon  : 1940  MRN: [id number]  Today's Date: 7/3/2020               Admit Date: 2020    Visit Dx:     ICD-10-CM ICD-9-CM   1. Pneumonia of right lower lobe due to infectious organism J18.9 486   2. Hyponatremia E87.1 276.1     Patient Active Problem List   Diagnosis   • Irregular bleeding   • Urinary tract infection   • Dyslipidemia   • Essential hypertension   • Stage 2 chronic kidney disease   • Gastroesophageal reflux disease without esophagitis   • Seasonal allergies   • Familial hypercholesterolemia   • Bladder spasm   • Bilateral lower extremity edema   • Pneumonia of right lower lobe due to infectious organism   • Failure to thrive in adult   • Hyponatremia   • DNR (do not resuscitate)   • Hypoxia   • Oropharyngeal dysphagia     Past Medical History:   Diagnosis Date   • Arthritis    • GERD (gastroesophageal reflux disease)    • Hyperlipidemia    • Hypertension    • Incontinent of urine    • Seasonal allergies      Past Surgical History:   Procedure Laterality Date   • BREAST BIOPSY     • COLONOSCOPY N/A 2016    Procedure: COLONOSCOPY WITH POLYPECTOMY (HOT SNARE);  Surgeon: Paulino Narvaez MD;  Location: [facility] ENDOSCOPY;  Service:    • VAGINAL HYSTERECTOMY          SWALLOW EVALUATION (last 72 hours)      SLP Adult Swallow Evaluation     Row Name 20 1000 20 1100                Rehab Evaluation    Document Type  evaluation  -SA  evaluation  -SA       Subjective Information  complains of modified diet  -SA  no complaints  -SA       Patient Observations  alert;cooperative  -SA  alert;cooperative  -SA       Patient Effort  good  -SA  good  -SA       Symptoms Noted During/After Treatment  none  -SA  none  -SA          General Information    Patient Profile Reviewed  yes  -SA  yes  -SA       Pertinent History Of Current Problem  adm w/ RLL pna; h/o GERD, pt reports h/o  'stretching of throat'  -SA  adm w/ RLL pna; h/o GERD  -SA       Current Method of Nutrition  pureed with some mashed;nectar/syrup-thick liquids  -SA  NPO  -SA       Precautions/Limitations, Vision  WFL;for purposes of eval  -SA  WFL with corrective lenses;for purposes of eval  -SA       Precautions/Limitations, Hearing  WFL;for purposes of eval  -SA  WFL;for purposes of eval  -SA       Prior Level of Function-Communication  --  WFL  -SA       Prior Level of Function-Swallowing  --  no diet consistency restrictions  -SA       Plans/Goals Discussed with  patient;agreed upon  -SA  patient;agreed upon  -SA       Barriers to Rehab  none identified  -SA  none identified  -SA       Patient's Goals for Discharge  return to regular diet  -SA  return to PO diet  -SA          Pain Assessment    Additional Documentation  Pain Scale: Numbers Pre/Post-Treatment (Group)  -SA  Pain Scale: Numbers Pre/Post-Treatment (Group)  -SA          Pain Scale: Numbers Pre/Post-Treatment    Pain Scale: Numbers, Pretreatment  0/10 - no pain  -SA  0/10 - no pain  -SA       Pain Scale: Numbers, Post-Treatment  0/10 - no pain  -SA  0/10 - no pain  -SA          Oral Motor and Function    Dentition Assessment  upper dentures/partial in place;lower dentures/partial in place  -SA  edentulous, dentures not available  -SA       Secretion Management  WNL/WFL  -SA  --          Clinical Swallow Eval    Clinical Swallow Evaluation Summary  --  Audible swallow with thin liquids.  No overt s/s aspiration.  Pt reports eating with dentures only and dentures were left at home.  Weak laryngeal elevation  -SA          MBS/VFSS    Utensils Used  spoon;cup;straw  -SA  --       Consistencies Trialed  regular textures;soft textures;pureed;thin liquids;nectar/syrup-thick liquids  -SA  --          MBS/VFSS Interpretation    Oral Prep Phase  impaired oral phase of swallowing  -SA  --       Oral Transit Phase  impaired  -SA  --       Oral Residue  WFL  -SA  --           Oral Preparatory Phase    Oral Preparatory Phase  prolonged manipulation;other (see comments) reduced bolus formation w/ premature spillage  -SA  --       Prolonged Manipulation  mixed consistency;mechanical soft;regular textures  -SA  --          Oral Transit Phase    Impaired Oral Transit Phase  premature spillage of liquids into pharynx  -SA  --       Premature Spillage of Liquids into Pharynx  mechanical soft;mixed consistency;regular textures  -SA  --          Initiation of Pharyngeal Swallow    Pharyngeal Phase  impaired pharyngeal phase of swallowing  -SA  --       Penetration During the Swallow  thin liquids  -SA  --       Response to Penetration  shallow;transient;other (see comments) trace; inconsistent  -SA  --          Esophageal Phase    Esophageal Phase  no impairments  -SA  --          SLP Communication to Radiology    Summary Statement  Patient demonstrates a mild oral dysphagia characterized by inconsistent shallow trace penetration of thin liquids which cleared with the swallow.  Premature spillage during mastication of soft and hard solids and mixed consistencies.    -SA  --          Clinical Impression    SLP Swallowing Diagnosis  mild  -SA  suspected pharyngeal dysfunction  -SA       Functional Impact  risk of aspiration/pneumonia  -SA  risk of aspiration/pneumonia  -SA       Rehab Potential/Prognosis, Swallowing  good, to achieve stated therapy goals  -SA  good, to achieve stated therapy goals  -SA       Swallow Criteria for Skilled Therapeutic Interventions Met  demonstrates skilled criteria  -SA  demonstrates skilled criteria  -SA          Recommendations    Therapy Frequency (Swallow)  PRN  -SA  PRN  -SA       Predicted Duration Therapy Intervention (Days)  until discharge  -SA  until discharge  -SA       SLP Diet Recommendation  soft textures;chopped;thin liquids  -SA  puree with some mashed;nectar thick liquids;water between meals after oral care, with supervision  -SA       Recommended  Diagnostics  --  VFSS (MBS)  -SA       Recommended Precautions and Strategies  upright posture during/after eating;small bites of food and sips of liquid;no straw  -SA  upright posture during/after eating;small bites of food and sips of liquid  -SA       SLP Rec. for Method of Medication Administration  with pudding or applesauce  -SA  with pudding or applesauce  -SA       Monitor for Signs of Aspiration  notify SLP if any concerns  -SA  notify SLP if any concerns  -SA       Anticipated Dischage Disposition (SLP)  unknown  -SA  unknown  -SA          Swallow Goals (SLP)    Oral Nutrition/Hydration Goal Selection (SLP)  --  oral nutrition/hydration, SLP goal 1  -SA          Oral Nutrition/Hydration Goal 1 (SLP)    Oral Nutrition/Hydration Goal 1, SLP  --  Pt will tolerate least restrictive diet  -SA       Time Frame (Oral Nutrition/Hydration Goal 1, SLP)  --  by discharge  -         User Key  (r) = Recorded By, (t) = Taken By, (c) = Cosigned By    Initials Name Effective Dates    Kathryn Dubose MS [facility]-SLP 03/07/18 -           EDUCATION  The patient has been educated in the following areas:   Dysphagia (Swallowing Impairment) Modified Diet Instruction.    SLP Recommendation and Plan  SLP Swallowing Diagnosis: mild  SLP Diet Recommendation: soft textures, chopped, thin liquids  Recommended Precautions and Strategies: upright posture during/after eating, small bites of food and sips of liquid, no straw  SLP Rec. for Method of Medication Administration: with pudding or applesauce     Monitor for Signs of Aspiration: notify SLP if any concerns     Swallow Criteria for Skilled Therapeutic Interventions Met: demonstrates skilled criteria  Anticipated Dischage Disposition (SLP): unknown  Rehab Potential/Prognosis, Swallowing: good, to achieve stated therapy goals  Therapy Frequency (Swallow): PRN  Predicted Duration Therapy Intervention (Days): until discharge       Plan of Care Reviewed With: patient  Outcome Summary:  VFSS completed.  Patient demonstrates a mild oral dysphagia characterized by inconsistent shallow trace penetration of thin liquids which cleared with the swallow.  Premature spillage during mastication of soft and hard solids and mixed consistencies.    SLP GOALS     Row Name 07/02/20 1100             Oral Nutrition/Hydration Goal 1 (SLP)    Oral Nutrition/Hydration Goal 1, SLP  Pt will tolerate least restrictive diet  -      Time Frame (Oral Nutrition/Hydration Goal 1, SLP)  by discharge  -        User Key  (r) = Recorded By, (t) = Taken By, (c) = Cosigned By    Initials Name Provider Type    Kathryn Dubose MS CCC-SLP Speech and Language Pathologist           SLP Outcome Measures (last 72 hours)      SLP Outcome Measures     Row Name 07/03/20 1000 07/02/20 1100          SLP Outcome Measures    Outcome Measure Used?  Adult NOMS  -SA  Adult NOMS  -SA        Adult FCM Scores    FCM Chosen  Swallowing  -SA  Swallowing  -SA     Swallowing FCM Score  4  -SA  4  -SA       User Key  (r) = Recorded By, (t) = Taken By, (c) = Cosigned By    Initials Name Effective Dates    Kathryn Dubose MS CCC-WILLIAM 03/07/18 -            Time Calculation:   Time Calculation- SLP     Row Name 07/03/20 1046             Time Calculation- SLP    SLP Start Time  0930  -      SLP Received On  07/03/20  -        User Key  (r) = Recorded By, (t) = Taken By, (c) = Cosigned By    Initials Name Provider Type    Kathryn Dubose MS CCC-SLP Speech and Language Pathologist          Therapy Charges for Today     Code Description Service Date Service Provider Modifiers Qty    44611268838 HC ST EVAL ORAL PHARYNG SWALLOW 4 7/2/2020 Kathryn Arias MS CCC-SLP GN 1    04706586284 HC ST MOTION FLUORO EVAL SWALLOW 5 7/3/2020 Kathryn Arias MS CCC-SLP GN 1               MS ANAHY Cross  7/3/2020

## 2020-07-03 NOTE — PROGRESS NOTES
LOS: 2 days     Name: Gabbi Brandon  Age/Sex: 79 y.o. female  :  1940        PCP: Gordo Mg MD  Chief Complaint   Patient presents with   • Shortness of Breath      Subjective   Overall she is feeling much better.  Tolerated the video swallow study this morning.  Denies any cough fevers chills.  Tolerating modified diet  General: No Fever or Chills, Cardiac: No Chest Pain or Palpitations, Resp: No Cough or SOA, GI: No Nausea, Vomiting, or Diarrhea and Other: No bleeding      amLODIPine 5 mg Oral Daily   aspirin 81 mg Oral Daily   atenolol 50 mg Oral Daily   atorvastatin 10 mg Oral Daily   cetirizine 10 mg Oral Daily   fluticasone 2 spray Each Nare Daily   levoFLOXacin 500 mg Intravenous Q24H   meloxicam 15 mg Oral Daily   pantoprazole 40 mg Oral QAM   sodium chloride 10 mL Intravenous Q12H       sodium chloride 75 mL/hr Last Rate: 75 mL/hr (20 1130)       Objective   Vital Signs  Temp:  [97.9 °F (36.6 °C)-98 °F (36.7 °C)] 97.9 °F (36.6 °C)  Heart Rate:  [60-73] 68  Resp:  [18] 18  BP: (126-148)/(60-72) 126/60  Body mass index is 33.46 kg/m².    Intake/Output Summary (Last 24 hours) at 7/3/2020 1551  Last data filed at 7/3/2020 1327  Gross per 24 hour   Intake 720 ml   Output 400 ml   Net 320 ml       Physical Exam   Constitutional: She is oriented to person, place, and time. She appears well-developed and well-nourished.   Cardiovascular: Normal rate, regular rhythm and normal heart sounds.   Pulmonary/Chest: Effort normal. She has no wheezes. She has rales.   Abdominal: Soft. Bowel sounds are normal.   Neurological: She is alert and oriented to person, place, and time. No cranial nerve deficit.   Skin: Skin is warm and dry. Capillary refill takes less than 2 seconds.   Psychiatric: She has a normal mood and affect. Her behavior is normal.   Nursing note and vitals reviewed.        Results Review:       I reviewed the patient's new clinical results.  Results from last 7 days   Lab  Units 07/03/20  0507 07/02/20  0500 07/01/20  1652 06/28/20  2312   WBC 10*3/mm3 7.27 9.20 11.24* 14.52*   HEMOGLOBIN g/dL 10.8* 11.7* 12.5 13.3   PLATELETS 10*3/mm3 285 289 325 272     Results from last 7 days   Lab Units 07/03/20  0507 07/02/20  0500 07/01/20  1652 06/28/20  2312   SODIUM mmol/L 135* 129* 124* 129*   POTASSIUM mmol/L 4.0 4.2 4.7 4.6   CHLORIDE mmol/L 99 92* 84* 87*   CO2 mmol/L 27.0 29.6* 29.9* 29.4*   BUN mg/dL 23 28* 29* 16   CREATININE mg/dL 0.78 0.80 1.08* 1.22*   CALCIUM mg/dL 8.2* 8.5* 8.8 10.4   MAGNESIUM mg/dL  --   --   --  1.4*   Estimated Creatinine Clearance: 61.6 mL/min (by C-G formula based on SCr of 0.78 mg/dL).      Assessment/Plan     Pneumonia of right lower lobe due to infectious organism    Essential hypertension    Failure to thrive in adult    Hyponatremia    DNR (do not resuscitate)    Hypoxia    Oropharyngeal dysphagia      PLAN  This is an 79-year-old female presents to the hospital with a left lower lobe pneumonia secondary to aspiration.  -Continue oral Levaquin  -Responding well to antibiotics  -plan video swallow today, continue modified diet as ordered  -Encourage out of bed activity  -Sodium improving slowly  -Hemoglobin down around 3 g but this is dilutional    Disposition  Plan to skilled nursing facility early next week      Kevin Chen MD  [facility]  07/03/20  15:51

## 2020-07-03 NOTE — PLAN OF CARE
Problem: Patient Care Overview  Goal: Plan of Care Review  Outcome: Ongoing (interventions implemented as appropriate)  Flowsheets (Taken 7/3/2020 8696)  Progress: improving  Plan of Care Reviewed With: patient  Outcome Summary: No complaints overnight. IVF and abx continued per order. Up to BSC with assist x 2. Incontinence care prn. Q2 turns. Will ct to monitor.

## 2020-07-03 NOTE — PROGRESS NOTES
Continued Stay Note  [facility]     Patient Name: Gabbi Brandon  MRN: [id number]  Today's Date: 7/3/2020    Admit Date: 7/1/2020    Discharge Plan     Row Name 07/03/20 1033       Plan    Plan  SNF    Patient/Family in Agreement with Plan  yes    Plan Comments  Met with the patient at bedside to discuss SNF/Rehabs she may be interested in at discharge and the patient asked CCP to speak with her daughter/Rosa Singh. Spoke with patient's daughter/Rosa who asked for referral to [facility]. Referral placed, placed call for [facility]. CCP following. --EDITH Gupta        Discharge Codes    No documentation.             Priti Chance RN

## 2020-07-03 NOTE — DISCHARGE PLACEMENT REQUEST
"Gabbi Henning (79 y.o. Female)     Date of Birth Social Security Number Address Home Phone MRN    1940  [address] [address] [phone] [id number]    Latter-day Marital Status          Cheondoism        Admission Date Admission Type Admitting Provider Attending Provider Department, Room/Bed    7/1/20 Emergency Srinivas Fleming MD Richards, Stephen J, MD [address], S610/1    Discharge Date Discharge Disposition Discharge Destination                       Attending Provider:  Kevin Chen MD    Allergies:  Penicillins    Isolation:  None   Infection:  None   Code Status:  No CPR    Ht:  165.1 cm (65\")   Wt:  91.2 kg (201 lb 1.6 oz)    Admission Cmt:  None   Principal Problem:  Pneumonia of right lower lobe due to infectious organism [J18.9]                 Active Insurance as of 7/1/2020     Primary Coverage     Payor Plan Insurance Group Employer/Plan Group    MEDICARE MEDICARE A & B      Payor Plan Address Payor Plan Phone Number Payor Plan Fax Number Effective Dates    [address] [phone]  9/1/2003 - None Entered    [facility] [id number]       Subscriber Name Subscriber Birth Date Member ID       GABBI HENNING 1940 [id number]           Secondary Coverage     Payor Plan Insurance Group Employer/Plan Group    [facility] SUPP [id number]     Payor Plan Address Payor Plan Phone Number Payor Plan Fax Number Effective Dates    [address]   12/1/2016 - None Entered    [facility] [id number]       Subscriber Name Subscriber Birth Date Member ID       GABBI HENNING 1940 [id number]                 Emergency Contacts      (Rel.) Home Phone Work Phone Mobile Phone    Sara Hernandez (Daughter) [phone] -- [phone]    SinghPeterie (Daughter) [phone] -- --              "

## 2020-07-03 NOTE — PROGRESS NOTES
Continued Stay Note  [facility]     Patient Name: Gabbi Brandon  MRN: [id number]  Today's Date: 7/3/2020    Admit Date: 7/1/2020    Discharge Plan     Row Name 07/03/20 1312       Plan    Plan  SNf    Patient/Family in Agreement with Plan  yes    Plan Comments  Spoke with patient's daughter/Rosa who asked for CCP to place referrals also for [facility] and [facility] SNF/Rehabs. Spoke with Elisa/Quintin who will look into patient's case. Call placed for admissions at [facility] - await call back. CCP continues to follow. --EDITH Gupta    Row Name 07/03/20 1033       Plan    Plan  SNF    Patient/Family in Agreement with Plan  yes    Plan Comments  Met with the patient at bedside to discuss SNF/Rehabs she may be interested in at discharge and the patient asked CCP to speak with her daughter/Rosa Singh. Spoke with patient's daughter/Rosa who asked for referral to [facility]. Referral placed, placed call for [facility]. CCP following. --EDITH Gupta        Discharge Codes    No documentation.             Priti Chance RN

## 2020-07-04 LAB
ALBUMIN SERPL-MCNC: 2.4 G/DL (ref 3.5–5.2)
ANION GAP SERPL CALCULATED.3IONS-SCNC: 7.4 MMOL/L (ref 5–15)
BUN SERPL-MCNC: 14 MG/DL (ref 8–23)
BUN/CREAT SERPL: 18.2 (ref 7–25)
CALCIUM SPEC-SCNC: 8.3 MG/DL (ref 8.6–10.5)
CHLORIDE SERPL-SCNC: 103 MMOL/L (ref 98–107)
CO2 SERPL-SCNC: 24.6 MMOL/L (ref 22–29)
CREAT SERPL-MCNC: 0.77 MG/DL (ref 0.57–1)
DEPRECATED RDW RBC AUTO: 43.7 FL (ref 37–54)
ERYTHROCYTE [DISTWIDTH] IN BLOOD BY AUTOMATED COUNT: 13.2 % (ref 12.3–15.4)
GFR SERPL CREATININE-BSD FRML MDRD: 72 ML/MIN/1.73
GLUCOSE SERPL-MCNC: 100 MG/DL (ref 65–99)
HCT VFR BLD AUTO: 33.7 % (ref 34–46.6)
HGB BLD-MCNC: 11.1 G/DL (ref 12–15.9)
MCH RBC QN AUTO: 30.2 PG (ref 26.6–33)
MCHC RBC AUTO-ENTMCNC: 32.9 G/DL (ref 31.5–35.7)
MCV RBC AUTO: 91.6 FL (ref 79–97)
PHOSPHATE SERPL-MCNC: 2.6 MG/DL (ref 2.5–4.5)
PLATELET # BLD AUTO: 317 10*3/MM3 (ref 140–450)
PMV BLD AUTO: 9.4 FL (ref 6–12)
POTASSIUM SERPL-SCNC: 3.9 MMOL/L (ref 3.5–5.2)
RBC # BLD AUTO: 3.68 10*6/MM3 (ref 3.77–5.28)
SODIUM SERPL-SCNC: 135 MMOL/L (ref 136–145)
WBC # BLD AUTO: 6.56 10*3/MM3 (ref 3.4–10.8)

## 2020-07-04 PROCEDURE — 80069 RENAL FUNCTION PANEL: CPT | Performed by: HOSPITALIST

## 2020-07-04 PROCEDURE — 25010000002 ONDANSETRON PER 1 MG: Performed by: INTERNAL MEDICINE

## 2020-07-04 PROCEDURE — 85027 COMPLETE CBC AUTOMATED: CPT | Performed by: HOSPITALIST

## 2020-07-04 PROCEDURE — 97530 THERAPEUTIC ACTIVITIES: CPT

## 2020-07-04 PROCEDURE — 97116 GAIT TRAINING THERAPY: CPT

## 2020-07-04 PROCEDURE — 25010000002 LEVOFLOXACIN PER 250 MG: Performed by: INTERNAL MEDICINE

## 2020-07-04 RX ORDER — LEVOFLOXACIN 500 MG/1
500 TABLET, FILM COATED ORAL EVERY 24 HOURS
Status: DISCONTINUED | OUTPATIENT
Start: 2020-07-04 | End: 2020-07-05 | Stop reason: HOSPADM

## 2020-07-04 RX ORDER — SACCHAROMYCES BOULARDII 250 MG
250 CAPSULE ORAL 2 TIMES DAILY
Status: DISCONTINUED | OUTPATIENT
Start: 2020-07-04 | End: 2020-07-05 | Stop reason: HOSPADM

## 2020-07-04 RX ADMIN — SODIUM CHLORIDE, PRESERVATIVE FREE 10 ML: 5 INJECTION INTRAVENOUS at 20:40

## 2020-07-04 RX ADMIN — LEVOFLOXACIN 500 MG: 5 INJECTION, SOLUTION INTRAVENOUS at 00:39

## 2020-07-04 RX ADMIN — SODIUM CHLORIDE, PRESERVATIVE FREE 10 ML: 5 INJECTION INTRAVENOUS at 08:45

## 2020-07-04 RX ADMIN — ATENOLOL 50 MG: 50 TABLET ORAL at 08:45

## 2020-07-04 RX ADMIN — CETIRIZINE HYDROCHLORIDE 10 MG: 10 TABLET, FILM COATED ORAL at 08:45

## 2020-07-04 RX ADMIN — MELOXICAM 15 MG: 15 TABLET ORAL at 08:45

## 2020-07-04 RX ADMIN — ATORVASTATIN CALCIUM 10 MG: 20 TABLET, FILM COATED ORAL at 08:45

## 2020-07-04 RX ADMIN — PANTOPRAZOLE SODIUM 40 MG: 40 TABLET, DELAYED RELEASE ORAL at 06:01

## 2020-07-04 RX ADMIN — Medication 250 MG: at 14:35

## 2020-07-04 RX ADMIN — SODIUM CHLORIDE 75 ML/HR: 9 INJECTION, SOLUTION INTRAVENOUS at 00:42

## 2020-07-04 RX ADMIN — Medication 250 MG: at 20:33

## 2020-07-04 RX ADMIN — ONDANSETRON 4 MG: 2 INJECTION INTRAMUSCULAR; INTRAVENOUS at 20:39

## 2020-07-04 RX ADMIN — LEVOFLOXACIN 500 MG: 500 TABLET, FILM COATED ORAL at 20:33

## 2020-07-04 RX ADMIN — AMLODIPINE BESYLATE 5 MG: 5 TABLET ORAL at 08:45

## 2020-07-04 RX ADMIN — ASPIRIN 81 MG: 81 TABLET, COATED ORAL at 08:45

## 2020-07-04 NOTE — PROGRESS NOTES
Continued Stay Note  [facility]     Patient Name: Gabbi Brandon  MRN: [id number]  Today's Date: 7/4/2020    Admit Date: 7/1/2020    Discharge Plan     Row Name 07/04/20 1539       Plan    Plan Comments  Incoming call from daughter, Rosa Singh  at [phone] stating they have decided for patient to return home with her and would like to use [facility](Ailyn notified).  Rosa states they would like a hospital bed for patient at discharge and CCP discussed with Ayleen with [facility] and she states patient will not qualify for Medicare to pay for hospital bed and explained they can rent a semi-electric hospital bed for $140/month or a full electric hospital bed at $165/month from [facility] and Rosa states she wants [facility] to deliver the full electric hospital bed tomorrow to patient's home and they will plan to rent it monthly.  Rosa states she will provide transportation for patient at discharge...CCP will follow and assist as needed.... Christiana Sullivan RN,CCP     Row Name 07/04/20 1524       Plan    Plan  Home with family and [facility] and [facility] for Hospital bed rental to be delivered tomorrow to home    Patient/Family in Agreement with Plan  yes             Discharge Codes    No documentation.             Christiana Sullivan RN

## 2020-07-04 NOTE — DISCHARGE PLACEMENT REQUEST
"Gabbi Henning (79 y.o. Female)     Date of Birth Social Security Number Address Home Phone MRN    1940  [address] [address] [phone] [id number]    Restorationism Marital Status          Zoroastrianism        Admission Date Admission Type Admitting Provider Attending Provider Department, Room/Bed    7/1/20 Emergency Srinivas Fleming MD Richards, Stephen J, MD [address], P696/1    Discharge Date Discharge Disposition Discharge Destination                       Attending Provider:  Kevin Chen MD    Allergies:  Penicillins    Isolation:  None   Infection:  None   Code Status:  No CPR    Ht:  165.1 cm (65\")   Wt:  91.2 kg (201 lb 1.6 oz)    Admission Cmt:  None   Principal Problem:  Pneumonia of right lower lobe due to infectious organism [J18.9]                 Active Insurance as of 7/1/2020     Primary Coverage     Payor Plan Insurance Group Employer/Plan Group    MEDICARE MEDICARE A & B      Payor Plan Address Payor Plan Phone Number Payor Plan Fax Number Effective Dates    [address] [phone]  9/1/2003 - None Entered    [facility] [id number]       Subscriber Name Subscriber Birth Date Member ID       GABBI HENNING 1940 [id number]           Secondary Coverage     Payor Plan Insurance Group Employer/Plan Group    [facility] SUPP [id number]     Payor Plan Address Payor Plan Phone Number Payor Plan Fax Number Effective Dates    [address]   12/1/2016 - None Entered    [facility] [address]       Subscriber Name Subscriber Birth Date Member ID       GABBI HENNING 1940 [id number]                 Emergency Contacts      (Rel.) Home Phone Work Phone Mobile Phone    Sara Hernandez (Daughter) [phone] -- [phone]    SinghPeterie (Daughter) [phone] -- --              "

## 2020-07-04 NOTE — PROGRESS NOTES
Continued Stay Note  [facility]     Patient Name: Gabbi Brandon  MRN: [id number]  Today's Date: 7/4/2020    Admit Date: 7/1/2020    Discharge Plan     Row Name 07/04/20 1129       Plan    Plan  To [facility] skilled bed on DC.............Dianna SHARMA     Patient/Family in Agreement with Plan  yes    Plan Comments  Spoke with patient's daughter, introduced self and role. Daughter has concerns about equipment for home when patient leaves [facility], they would like a hospital bed for the home. States they also would like Home Health to follow, PCP had been working on this but arrangements had not been completed prior to patient being admitted to hospital. Dtr states they had not chosen a HH agency yet or DME provider, she is concerned this will get lost in the shuffle. Informed CCP will update [facility] and they will make these arrangements prior to patient DC from their facility. Call to [facility] and updated, states this is not a problem and they will take care of any after rehab needs when patient approaching discharge from their facility. Dtr provided with CCP contact information should further questions/concerns arise........Dianna SHARMA     Row Name 07/04/20 0911       Plan    Plan  Referrals to [facility] and [facility] pending, accepted for [facility].......Dianna SHARMA     Patient/Family in Agreement with Plan  yes    Plan Comments  Call from [facility], accepted for their facility. CCP will continue to follow.....Dianna SHARMA         Discharge Codes    No documentation.             Cathi García, RN

## 2020-07-04 NOTE — PLAN OF CARE
A&Ox4. VSS. Up with assist x2. BSC. Some loose stool noted, probiotic started. IV and PO antibiotics. Soft/chopped diet, thin liquids, no straws, medications whole. Plans for SNF vs HH. Will cont to monitor.

## 2020-07-04 NOTE — PROGRESS NOTES
Continued Stay Note  [facility]     Patient Name: Gabbi Brandon  MRN: [id number]  Today's Date: 7/4/2020    Admit Date: 7/1/2020    Discharge Plan     Row Name 07/04/20 0911       Plan    Plan  Referrals to [facility] and [facility] pending, accepted for [facility]......Hoa SHARMA     Patient/Family in Agreement with Plan  yes    Plan Comments  Call from [facility], accepted for their facility. CCP will continue to follow.....Dianna SHARMA         Discharge Codes    No documentation.             Cathi García, RN

## 2020-07-04 NOTE — THERAPY TREATMENT NOTE
Patient Name: Gabbi Brandon  : 1940    MRN: [id number]                              Today's Date: 2020       Admit Date: 2020    Visit Dx:     ICD-10-CM ICD-9-CM   1. Pneumonia of right lower lobe due to infectious organism J18.9 486   2. Hyponatremia E87.1 276.1     Patient Active Problem List   Diagnosis   • Irregular bleeding   • Urinary tract infection   • Dyslipidemia   • Essential hypertension   • Stage 2 chronic kidney disease   • Gastroesophageal reflux disease without esophagitis   • Seasonal allergies   • Familial hypercholesterolemia   • Bladder spasm   • Bilateral lower extremity edema   • Pneumonia of right lower lobe due to infectious organism   • Failure to thrive in adult   • Hyponatremia   • DNR (do not resuscitate)   • Hypoxia   • Oropharyngeal dysphagia     Past Medical History:   Diagnosis Date   • Arthritis    • GERD (gastroesophageal reflux disease)    • Hyperlipidemia    • Hypertension    • Incontinent of urine    • Seasonal allergies      Past Surgical History:   Procedure Laterality Date   • BREAST BIOPSY     • COLONOSCOPY N/A 2016    Procedure: COLONOSCOPY WITH POLYPECTOMY (HOT SNARE);  Surgeon: Paulino Narvaez MD;  Location: [facility] ENDOSCOPY;  Service:    • VAGINAL HYSTERECTOMY       General Information     Row Name 20 1425          PT Evaluation Time/Intention    Document Type  therapy note (daily note)  -     Mode of Treatment  individual therapy;physical therapy  -     Row Name 20 1425          General Information    Existing Precautions/Restrictions  fall  -     Row Name 20 1425          Cognitive Assessment/Intervention- PT/OT    Orientation Status (Cognition)  oriented x 3  -     Row Name 20 1425          Safety Issues, Functional Mobility    Impairments Affecting Function (Mobility)  balance;endurance/activity tolerance;strength  -       User Key  (r) = Recorded By, (t) = Taken By, (c) = Cosigned By    Initials Name  Provider Type     Bettina Rogers PTA Physical Therapy Assistant        Mobility     Row Name 07/04/20 1426          Bed Mobility Assessment/Treatment    Supine-Sit [place] (Bed Mobility)  minimum assist (75% patient effort)  -     Sit-Supine [place] (Bed Mobility)  not tested  -     Assistive Device (Bed Mobility)  bed rails;head of bed elevated  -     Row Name 07/04/20 1426          Sit-Stand Transfer    Sit-Stand [place] (Transfers)  minimum assist (75% patient effort)  -     Assistive Device (Sit-Stand Transfers)  walker, front-wheeled  -     Row Name 07/04/20 1426          Gait/Stairs Assessment/Training    [place] Level (Gait)  minimum assist (75% patient effort)  -     Assistive Device (Gait)  walker, front-wheeled  -     Distance in Feet (Gait)  50  -     Deviations/Abnormal Patterns (Gait)  gait speed decreased;stride length decreased  -     Bilateral Gait Deviations  forward flexed posture;heel strike decreased  -     Comment (Gait/Stairs)  pt required some assistance with guiding walker especially around turns. Cues for pt to improve posture.  -       User Key  (r) = Recorded By, (t) = Taken By, (c) = Cosigned By    Initials Name Provider Type     Bettina Rogers PTA Physical Therapy Assistant        Obj/Interventions    No documentation.       Goals/Plan    No documentation.       Clinical Impression     Row Name 07/04/20 1427          Plan of Care Review    Plan of Care Reviewed With  patient  -     Progress  improving  -     Outcome Summary  Pt tolerated treatment with no complaints. Pt is progressing well with mobility. Pt required Liza with bed mobility and STS transfers. Pt increased ambulation distance today. Pt ambulated 50ft with rwx, Liza. Pt is mildly unsteady and has decreased activity tolerance. Pt will continue to benefit from skilled PT to improve strength/endurance and overall functional mobility.   -     Row Name 07/04/20 1425           Positioning and Restraints    Pre-Treatment Position  in bed  -EH     Post Treatment Position  chair  -EH     In Chair  reclined;call light within reach;encouraged to call for assist  -EH       User Key  (r) = Recorded By, (t) = Taken By, (c) = Cosigned By    Initials Name Provider Type    Bettina Shankar PTA Physical Therapy Assistant        Outcome Measures     Row Name 07/04/20 1428          How much help from another person do you currently need...    Turning from your back to your side while in flat bed without using bedrails?  3  -EH     Moving from lying on back to sitting on the side of a flat bed without bedrails?  3  -EH     Moving to and from a bed to a chair (including a wheelchair)?  3  -EH     Standing up from a chair using your arms (e.g., wheelchair, bedside chair)?  3  -EH     Climbing 3-5 steps with a railing?  1  -EH     To walk in hospital room?  3  -EH     AM-PAC 6 Clicks Score (PT)  16  -EH       User Key  (r) = Recorded By, (t) = Taken By, (c) = Cosigned By    Initials Name Provider Type    Bettina Shankar PTA Physical Therapy Assistant        Physical Therapy Education                 Title: PT OT SLP Therapies (In Progress)     Topic: Physical Therapy (Done)     Point: Mobility training (Done)     Description:   Instruct learner(s) on safety and technique for assisting patient out of bed, chair or wheelchair.  Instruct in the proper use of assistive devices, such as walker, crutches, cane or brace.              Patient Friendly Description:   It's important to get you on your feet again, but we need to do so in a way that is safe for you. Falling has serious consequences, and your personal safety is the most important thing of all.        When it's time to get out of bed, one of us or a family member will sit next to you on the bed to give you support.     If your doctor or nurse tells you to use a walker, crutches, a cane, or a brace, be sure you use it every time you get out of bed,  even if you think you don't need it.    Learning Progress Summary           Patient Acceptance, E,D, VU,NR by  at 7/4/2020 1429    Acceptance, E, NR by  at 7/3/2020 1128    Acceptance, E, NR by  at 7/2/2020 1134                   Point: Home exercise program (Done)     Description:   Instruct learner(s) on appropriate technique for monitoring, assisting and/or progressing patient with therapeutic exercises and activities.              Learning Progress Summary           Patient Acceptance, E,D, VU,NR by  at 7/4/2020 1429                   Point: Body mechanics (Done)     Description:   Instruct learner(s) on proper positioning and spine alignment for patient and/or caregiver during mobility tasks and/or exercises.              Learning Progress Summary           Patient Acceptance, E,D, VU,NR by  at 7/4/2020 1429                   Point: Precautions (Done)     Description:   Instruct learner(s) on prescribed precautions during mobility and gait tasks              Learning Progress Summary           Patient Acceptance, E,D, VU,NR by  at 7/4/2020 1429                               User Key     Initials Effective Dates Name Provider Type Discipline     04/03/18 -  Gabbi Forrest, PT Physical Therapist PT     08/19/18 -  Bettina Rogers PTA Physical Therapy Assistant PT              PT Recommendation and Plan     Plan of Care Reviewed With: patient  Progress: improving  Outcome Summary: Pt tolerated treatment with no complaints. Pt is progressing well with mobility. Pt required Liza with bed mobility and STS transfers. Pt increased ambulation distance today. Pt ambulated 50ft with rwx, Liza. Pt is mildly unsteady and has decreased activity tolerance. Pt will continue to benefit from skilled PT to improve strength/endurance and overall functional mobility.      Time Calculation:   PT Charges     Row Name 07/04/20 1425             Time Calculation    Start Time  1314  -      Stop Time  1342  -       Time Calculation (min)  28 min  -      PT Received On  07/04/20  -      PT - Next Appointment  07/05/20  -         Time Calculation- PT    Total Timed Code Minutes- PT  25 minute(s)  -        User Key  (r) = Recorded By, (t) = Taken By, (c) = Cosigned By    Initials Name Provider Type     Bettina Rogers PTA Physical Therapy Assistant        Therapy Charges for Today     Code Description Service Date Service Provider Modifiers Qty    22088377934 HC GAIT TRAINING EA 15 MIN 7/4/2020 Bettina Rogers PTA GP 1    07112066522 HC PT THERAPEUTIC ACT EA 15 MIN 7/4/2020 Bettina Rogers PTA GP 1          PT G-Codes  Outcome Measure Options: AM-PAC 6 Clicks Daily Activity (OT)  AM-PAC 6 Clicks Score (PT): 16  AM-PAC 6 Clicks Score (OT): 12    Bettina Rogers PTA  7/4/2020

## 2020-07-04 NOTE — PROGRESS NOTES
LOS: 3 days     Name: Gabbi Brandon  Age/Sex: 79 y.o. female  :  1940        PCP: Gordo Mg MD  Chief Complaint   Patient presents with   • Shortness of Breath      Subjective   Overall she is feeling much better.  Tolerated her modified diet with no choking or symptoms.  Denies any cough fevers chills.  She is having some diarrhea that started yesterday.  It mainly occurs after she eats.  It is not waking her up in the middle the night there is no blood or mucus  General: No Fever or Chills, Cardiac: No Chest Pain or Palpitations, Resp: No Cough or SOA, GI: No Nausea, Vomiting, or Diarrhea and Other: No bleeding      amLODIPine 5 mg Oral Daily   aspirin 81 mg Oral Daily   atenolol 50 mg Oral Daily   atorvastatin 10 mg Oral Daily   cetirizine 10 mg Oral Daily   fluticasone 2 spray Each Nare Daily   levoFLOXacin 500 mg Oral Q24H   meloxicam 15 mg Oral Daily   pantoprazole 40 mg Oral QAM   saccharomyces boulardii 250 mg Oral BID   sodium chloride 10 mL Intravenous Q12H          Objective   Vital Signs  Temp:  [97.7 °F (36.5 °C)-99 °F (37.2 °C)] 98.6 °F (37 °C)  Heart Rate:  [61-79] 74  Resp:  [18] 18  BP: (106-134)/(60-72) 106/66  Body mass index is 33.46 kg/m².    Intake/Output Summary (Last 24 hours) at 2020 1259  Last data filed at 2020 0955  Gross per 24 hour   Intake 2143.75 ml   Output --   Net 2143.75 ml       Physical Exam   Constitutional: She is oriented to person, place, and time. She appears well-developed and well-nourished.   Cardiovascular: Normal rate, regular rhythm and normal heart sounds.   Pulmonary/Chest: Effort normal. She has no wheezes. She has rales.   Abdominal: Soft. Bowel sounds are normal.   Neurological: She is alert and oriented to person, place, and time. No cranial nerve deficit.   Skin: Skin is warm and dry. Capillary refill takes less than 2 seconds.   Psychiatric: She has a normal mood and affect. Her behavior is normal.   Nursing note and vitals  reviewed.        Results Review:       I reviewed the patient's new clinical results.  Results from last 7 days   Lab Units 07/04/20  0704 07/03/20  0507 07/02/20  0500 07/01/20 1652 06/28/20  2312   WBC 10*3/mm3 6.56 7.27 9.20 11.24* 14.52*   HEMOGLOBIN g/dL 11.1* 10.8* 11.7* 12.5 13.3   PLATELETS 10*3/mm3 317 285 289 325 272     Results from last 7 days   Lab Units 07/04/20  0704 07/03/20  0507 07/02/20  0500 07/01/20 1652 06/28/20  2312   SODIUM mmol/L 135* 135* 129* 124* 129*   POTASSIUM mmol/L 3.9 4.0 4.2 4.7 4.6   CHLORIDE mmol/L 103 99 92* 84* 87*   CO2 mmol/L 24.6 27.0 29.6* 29.9* 29.4*   BUN mg/dL 14 23 28* 29* 16   CREATININE mg/dL 0.77 0.78 0.80 1.08* 1.22*   CALCIUM mg/dL 8.3* 8.2* 8.5* 8.8 10.4   MAGNESIUM mg/dL  --   --   --   --  1.4*   PHOSPHORUS mg/dL 2.6  --   --   --   --    Estimated Creatinine Clearance: 61.6 mL/min (by C-G formula based on SCr of 0.77 mg/dL).      Assessment/Plan     Pneumonia of right lower lobe due to infectious organism    Essential hypertension    Failure to thrive in adult    Hyponatremia    DNR (do not resuscitate)    Hypoxia    Oropharyngeal dysphagia      PLAN  This is an 79-year-old female presents to the hospital with a left lower lobe pneumonia secondary to aspiration.  -Continue oral Levaquin  -Add probiotic for hopeful improvement in diarrhea no concerning symptoms or findings for infectious source  -Responding well to antibiotics  -Video swallow results independently reviewed  -Encourage out of bed activity  -Sodium improving slowly  -Hemoglobin down around 3 g but this is dilutional    Disposition  Plan to skilled nursing facility tomorrow      Kevin Chen MD  [facility]  07/04/20  12:59

## 2020-07-04 NOTE — PLAN OF CARE
Problem: Patient Care Overview  Goal: Plan of Care Review  Outcome: Ongoing (interventions implemented as appropriate)  Flowsheets (Taken 7/4/2020 4980)  Progress: improving  Plan of Care Reviewed With: patient  Outcome Summary: Pt tolerated treatment with no complaints. Pt is progressing well with mobility. Pt required Liza with bed mobility and STS transfers. Pt increased ambulation distance today. Pt ambulated 50ft with rwx, Liza. Pt is mildly unsteady and has decreased activity tolerance. Pt will continue to benefit from skilled PT to improve strength/endurance and overall functional mobility.    ..Patient  wearing a face mask during this therapy encounter. Therapist used appropriate personal protective equipment including mask and gloves. Appropriate PPE was worn during the entire therapy session. Hand hygiene was completed before and after therapy session. Patient is not in enhanced droplet precautions.

## 2020-07-04 NOTE — PROGRESS NOTES
Continued Stay Note  [facility]     Patient Name: Gabbi Brandon  MRN: [id number]  Today's Date: 7/4/2020    Admit Date: 7/1/2020    Discharge Plan     Row Name 07/04/20 1748       Plan    Plan  Home with family and [facility] for Hospital bed rental and rolling walker(need DME for rolling walker)     Plan Comments  Incoming call from daughterRosa at [phone] stating patient will need a rolling walker from [facility] also(Shelby notified) and Dr. Chen notified for need of DME for rolling walker.  Plan is home with Rosa de la torre and [facility] for hospital bed rental and rolling walker... CCP will follow and assist as needed.... Christiana Sullivan RN,CCP             Discharge Codes    No documentation.             Christiana Sullivan RN

## 2020-07-04 NOTE — PLAN OF CARE
Problem: Patient Care Overview  Goal: Plan of Care Review  Outcome: Ongoing (interventions implemented as appropriate)  Flowsheets (Taken 7/4/2020 0289)  Progress: no change  Plan of Care Reviewed With: patient  Outcome Summary: temp 99. respirations unlabored.  IV antibiotic given.  no c/o verbalized.   rested quietly.  bed alarm in use for safety.

## 2020-07-05 VITALS
DIASTOLIC BLOOD PRESSURE: 73 MMHG | RESPIRATION RATE: 16 BRPM | SYSTOLIC BLOOD PRESSURE: 138 MMHG | WEIGHT: 201.1 LBS | BODY MASS INDEX: 33.51 KG/M2 | HEIGHT: 65 IN | OXYGEN SATURATION: 95 % | HEART RATE: 70 BPM | TEMPERATURE: 98.8 F

## 2020-07-05 PROCEDURE — 97110 THERAPEUTIC EXERCISES: CPT

## 2020-07-05 RX ORDER — LEVOFLOXACIN 500 MG/1
500 TABLET, FILM COATED ORAL EVERY 24 HOURS
Qty: 2 TABLET | Refills: 0 | Status: SHIPPED | OUTPATIENT
Start: 2020-07-05 | End: 2020-07-07

## 2020-07-05 RX ORDER — SACCHAROMYCES BOULARDII 250 MG
250 CAPSULE ORAL 2 TIMES DAILY
Qty: 20 CAPSULE | Refills: 0 | Status: SHIPPED | OUTPATIENT
Start: 2020-07-05 | End: 2020-07-15

## 2020-07-05 RX ORDER — LEVOFLOXACIN 500 MG/1
500 TABLET, FILM COATED ORAL EVERY 24 HOURS
Qty: 2 TABLET | Refills: 0
Start: 2020-07-05 | End: 2020-07-05

## 2020-07-05 RX ORDER — SACCHAROMYCES BOULARDII 250 MG
250 CAPSULE ORAL 2 TIMES DAILY
Start: 2020-07-05 | End: 2020-07-05

## 2020-07-05 RX ADMIN — ASPIRIN 81 MG: 81 TABLET, COATED ORAL at 08:48

## 2020-07-05 RX ADMIN — ATENOLOL 50 MG: 50 TABLET ORAL at 08:48

## 2020-07-05 RX ADMIN — PANTOPRAZOLE SODIUM 40 MG: 40 TABLET, DELAYED RELEASE ORAL at 06:17

## 2020-07-05 RX ADMIN — CETIRIZINE HYDROCHLORIDE 10 MG: 10 TABLET, FILM COATED ORAL at 08:48

## 2020-07-05 RX ADMIN — Medication 250 MG: at 08:48

## 2020-07-05 RX ADMIN — SODIUM CHLORIDE, PRESERVATIVE FREE 10 ML: 5 INJECTION INTRAVENOUS at 08:52

## 2020-07-05 RX ADMIN — AMLODIPINE BESYLATE 5 MG: 5 TABLET ORAL at 08:48

## 2020-07-05 RX ADMIN — MELOXICAM 15 MG: 15 TABLET ORAL at 08:48

## 2020-07-05 RX ADMIN — ATORVASTATIN CALCIUM 10 MG: 20 TABLET, FILM COATED ORAL at 08:51

## 2020-07-05 NOTE — DISCHARGE SUMMARY
Patient Name: Gabbi Brandon  : 1940  MRN: [id number]    Date of Admission: 2020  Date of Discharge:  2020  Primary Care Physician: Gordo Mg MD      Chief Complaint:   Shortness of Breath      Discharge Diagnoses     Active Hospital Problems    Diagnosis  POA   • **Pneumonia of right lower lobe due to infectious organism [J18.9]  Yes   • Hypoxia [R09.02]  Yes   • Oropharyngeal dysphagia [R13.12]  Yes   • Failure to thrive in adult [R62.7]  Yes   • Hyponatremia [E87.1]  Yes   • DNR (do not resuscitate) [Z66]  Yes   • Essential hypertension [I10]  Yes      Resolved Hospital Problems   No resolved problems to display.        Hospital Course     Ms. Brandon is a 79 y.o. female with a history of hypertension who presented to [facility] initially complaining of shortness of breath.  Please see the admitting history and physical for further details.  She was found to have pneumonia and was admitted to the hospital for further evaluation and treatment.  Patient was admitted to the hospital and felt to have either aspiration pneumonia or community-acquired pneumonia.  This was felt to be a bacterial pneumonia unspecified at this time.  She was covered with IV Levaquin which cover for most organisms.  She responded well to this treatment.  She was seen and evaluated by speech therapy and was found to have some mild oropharyngeal dysphasia.  She is tolerating a modified diet and has had no further signs of aspiration.  She was quite weak on presentation but has had shown significant improvement over the last 48 hours and at this point the family is agreeable to taking her home at discharge.  She will need 2 more days of oral antibiotics and can follow-up with her primary care physician in 2 weeks.  She did have some antibiotic related diarrhea and because of this I did provide her with a probiotic which provided some symptomatic improvement.  She should continue this for about  10 days following discharge from the hospital.      Day of Discharge     She is feeling better no further diarrhea wants to go home today    Physical Exam:  Temp:  [97.6 °F (36.4 °C)-98.8 °F (37.1 °C)] 98.8 °F (37.1 °C)  Heart Rate:  [64-76] 70  Resp:  [16-18] 16  BP: (123-139)/(64-74) 138/73  Body mass index is 33.46 kg/m².  Physical Exam   Constitutional: She is oriented to person, place, and time. She appears well-developed and well-nourished.   Cardiovascular: Normal rate.   Pulmonary/Chest: Effort normal. No respiratory distress.   Abdominal: Soft. She exhibits no distension.   Neurological: She is alert and oriented to person, place, and time.   Psychiatric: She has a normal mood and affect. Her behavior is normal.   Nursing note and vitals reviewed.      Consultants     Consult Orders (all) (From admission, onward)     Start     Ordered    07/02/20 0018  Inpatient consult to Case Management   Once     Provider:  (Not yet assigned)    07/02/20 0017    07/01/20 2126  [facility] (on-call MD unless specified) Details  Once     Specialty:  Hospitalist  Provider:  (Not yet assigned)    07/01/20 2125              Procedures     Imaging Results (All)     Procedure Component Value Units Date/Time    FL Video Swallow With Speech Single Contrast [[id number]] Collected:  07/03/20 1536     Updated:  07/03/20 1825    Narrative:       VIDEO ESOPHAGRAM 7/3/2020     HISTORY: Dysphagia. Fluoroscopy was used during performance of the video  esophagram by speech pathology.     Patient demonstrates a mild oral dysphagia characterized by inconsistent  shallow trace penetration of thin liquids which cleared with the  swallow. ?Premature spillage during mastication of soft and hard solids  and mixed consistencies. Please see full speech pathology report.?     FLUOROSCOPY TIME: 2 minutes 42 seconds, 2945 images.     This report was finalized on 7/3/2020 6:22 PM by Dr. Trent Self M.D.       CT Abdomen Pelvis Without  Contrast [[id number]] Collected:  07/01/20 2048     Updated:  07/01/20 2058    Narrative:       CT ABDOMEN PELVIS WO CONTRAST-     INDICATIONS: Fever     TECHNIQUE: Radiation dose reduction techniques were utilized, including  automated exposure control and exposure modulation based on body size.  Unenhanced ABDOMEN AND PELVIS CT     COMPARISON: None available     FINDINGS:     Pancreas is thinned.     Otherwise unremarkable unenhanced appearance of the liver, the  gallbladder, spleen, adrenal glands, pancreas, kidneys, bladder.     No bowel obstruction or abnormal bowel thickening is identified. Large  hiatal hernia (versus gastric pull-through procedure, correlate  clinically). Colonic diverticula are seen that do not appear inflamed.  Mild colonic fecal retention.     No free intraperitoneal gas or free fluid.     Scattered small mesenteric and para-aortic lymph nodes are seen that are  not significant by size criteria.     Abdominal aorta is not aneurysmal. Aortic and other arterial  calcifications are present. 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     The lung bases show small atelectasis, minimal right pleural effusion.  Mild reticulonodular infiltrate is apparent in the right lower lobe, for  example images 17 through 22, follow-up recommended to characterize  resolution and to exclude any possibility of underlying pulmonary  nodule/neoplasm.     Degenerative changes are seen in the spine. Mild age-indeterminate  superior endplate depression at T10..             Impression:             1. Mild reticulonodular infiltrate in the right lower lobe at the lung,  follow-up recommended.  2. Colonic diverticulosis. No acute inflammatory process of bowel is  identified. Follow up as indications persist.  3. No urolithiasis or hydronephrosis.     This report was finalized on 7/1/2020 8:55 PM by Dr. Boogie Tran M.D.       XR Chest 1 View [[id number]] Collected:   07/01/20 1810     Updated:  07/01/20 1814    Narrative:       XR CHEST 1 VW-     HISTORY: Female who is 79 years-old,  short of breath     TECHNIQUE: Frontal view of the chest     COMPARISON: 6 /28/2020     FINDINGS: Heart size is borderline. Pulmonary vasculature is  unremarkable. Large hiatal hernia. No focal pulmonary consolidation. No  large pleural effusion. No pneumothorax. No acute osseous process.       Impression:       No focal pulmonary consolidation. Borderline heart size.  Follow-up/further evaluation can be obtained as indications persist.     This report was finalized on 7/1/2020 6:11 PM by Dr. Boogie Tran M.D.             Pertinent Labs     Results from last 7 days   Lab Units 07/04/20  0704 07/03/20  0507 07/02/20  0500 07/01/20  1652   WBC 10*3/mm3 6.56 7.27 9.20 11.24*   HEMOGLOBIN g/dL 11.1* 10.8* 11.7* 12.5   PLATELETS 10*3/mm3 317 285 289 325     Results from last 7 days   Lab Units 07/04/20  0704 07/03/20  0507 07/02/20  0500 07/01/20  1652   SODIUM mmol/L 135* 135* 129* 124*   POTASSIUM mmol/L 3.9 4.0 4.2 4.7   CHLORIDE mmol/L 103 99 92* 84*   CO2 mmol/L 24.6 27.0 29.6* 29.9*   BUN mg/dL 14 23 28* 29*   CREATININE mg/dL 0.77 0.78 0.80 1.08*   GLUCOSE mg/dL 100* 96 90 105*   Estimated Creatinine Clearance: 61.6 mL/min (by C-G formula based on SCr of 0.77 mg/dL).  Results from last 7 days   Lab Units 07/04/20  0704 07/03/20  0507 07/01/20  1652 06/28/20  2312   ALBUMIN g/dL 2.40* 2.60* 2.70* 3.50   BILIRUBIN mg/dL  --  0.3 0.5 0.6   ALK PHOS U/L  --  55 62 64   AST (SGOT) U/L  --  26 28 25   ALT (SGPT) U/L  --  17 15 15     Results from last 7 days   Lab Units 07/04/20  0704 07/03/20  0507 07/02/20  0500 07/01/20 1652 06/28/20 2312   CALCIUM mg/dL 8.3* 8.2* 8.5* 8.8 10.4   ALBUMIN g/dL 2.40* 2.60*  --  2.70* 3.50   MAGNESIUM mg/dL  --   --   --   --  1.4*   PHOSPHORUS mg/dL 2.6  --   --   --   --        Results from last 7 days   Lab Units 07/01/20 1652 06/28/20 2312   TROPONIN T  ng/mL <0.010 <0.010   PROBNP pg/mL 517.4  --            Invalid input(s): LDLCALC  Results from last 7 days   Lab Units 07/01/20  1703 07/01/20  1652   BLOODCX  No growth at 3 days No growth at 3 days       Test Results Pending at Discharge      Order Current Status    Blood Culture - Blood, Arm, Left Preliminary result    Blood Culture - Blood, Arm, Right Preliminary result          Discharge Details        Discharge Medications      New Medications      Instructions Start Date   levoFLOXacin 500 MG tablet  Commonly known as:  LEVAQUIN   500 mg, Oral, Every 24 Hours      saccharomyces boulardii 250 MG capsule  Commonly known as:  FLORASTOR   250 mg, Oral, 2 Times Daily         Continue These Medications      Instructions Start Date   amLODIPine 5 MG tablet  Commonly known as:  NORVASC   TAKE 1 TABLET EVERY DAY      aspirin 81 MG EC tablet   81 mg, Oral, Daily      atenolol 50 MG tablet  Commonly known as:  TENORMIN   TAKE 1 TABLET EVERY DAY      cetirizine 10 MG tablet  Commonly known as:  zyrTEC   10 mg, Oral, Daily      fluticasone 50 MCG/ACT nasal spray  Commonly known as:  FLONASE   USE 2 SPRAYS IN EACH NOSTRIL EVERY DAY      meloxicam 15 MG tablet  Commonly known as:  MOBIC   15 mg, Oral, Daily      omeprazole 20 MG capsule  Commonly known as:  priLOSEC   TAKE 1 CAPSULE EVERY DAY      simvastatin 20 MG tablet  Commonly known as:  ZOCOR   20 mg, Oral, Nightly         Stop These Medications    hydroCHLOROthiazide 25 MG tablet  Commonly known as:  HYDRODIURIL            Allergies   Allergen Reactions   • Penicillins Unknown (See Comments)     Caused a red streak down her leg.         Discharge Disposition:  Skilled Nursing Facility (DC - External)    Discharge Diet:  Diet Order   Procedures   • Diet Soft Texture; Chopped; Thin       Discharge Activity:   Activity Instructions     Activity as Tolerated            CODE STATUS:    Code Status and Medical Interventions:   Ordered at: 07/01/20 5146     Limited Support  to NOT Include:    Antibiotics    Blood Products     Code Status:    No CPR     Medical Interventions (Level of Support Prior to Arrest):    Limited       No future appointments.  Additional Instructions for the Follow-ups that You Need to Schedule     Discharge Follow-up with PCP   As directed       Currently Documented PCP:    Gordo Mg MD    PCP Phone Number:    [phone]     Follow Up Details:  4-6 weeks           Follow-up Information     Gordo Mg MD .    Specialty:  Internal Medicine  Why:  4-6 weeks  Contact information:  [address] [place] [address]  [phone]                   Additional Instructions for the Follow-ups that You Need to Schedule     Discharge Follow-up with PCP   As directed       Currently Documented PCP:    Gordo Mg MD    PCP Phone Number:    [phone]     Follow Up Details:  4-6 weeks           Time Spent on Discharge:  Greater than 30 minutes      Kevin Chen MD  [facility]  07/05/20  1:00 PM

## 2020-07-05 NOTE — PLAN OF CARE
Problem: Patient Care Overview  Goal: Plan of Care Review  Outcome: Ongoing (interventions implemented as appropriate)  Flowsheets (Taken 7/5/2020 5789)  Progress: improving  Plan of Care Reviewed With: patient  Note:   Afebrile and VS stable. Incontinent for urine tonight. Bed alarm on for safety. Swallowing pills without difficulty. Denies pain or discomfort. No resp difficulties noted --No cough. Saturation 95% on room air

## 2020-07-05 NOTE — PLAN OF CARE
Problem: Patient Care Overview  Goal: Plan of Care Review  Outcome: Ongoing (interventions implemented as appropriate)  Flowsheets (Taken 7/5/2020 1685)  Progress: improving  Plan of Care Reviewed With: patient  Outcome Summary: Pt tolerated treatment with c/o stiffness of right knee 2/2 arthritis. Pt is improving well with mobility. Pt required supervision with bed mobility and CGA with STS transfers. Pt ambulated 150ft with rwx, CGA. Pt will continue to benefit from skilled PT (HH PT) to improve strength and endurance. Will continue to progress pt as tolerated.    ..Patient  wearing a face mask during this therapy encounter. Therapist used appropriate personal protective equipment including mask and gloves. Appropriate PPE was worn during the entire therapy session. Hand hygiene was completed before and after therapy session. Patient is not in enhanced droplet precautions.

## 2020-07-05 NOTE — PROGRESS NOTES
Continued Stay Note  [facility]     Patient Name: Gabbi Brandon  MRN: [id number]  Today's Date: 7/5/2020    Admit Date: 7/1/2020    Discharge Plan     Row Name 07/05/20 0928       Plan    Plan  Home with family and [facility] for Hospital bed renta, rolling walker and bedside commode)need DME     Plan Comments  Incoming call from daughterRosa at [phone] stating patient will need a bedside commode from [place] also(Shelby notified) and MD notified of DME need for bedside commode and rolling walker for patient.  Plan is home with daughter, [facility] for DME(Hospital bed rental, bedside commode and rolling walker) and daughter plans to provide transportation for patient at discharge.... Christiana Sullivan RN,CCP         Discharge Codes    No documentation.             Christiana Sullivan RN

## 2020-07-06 LAB
BACTERIA SPEC AEROBE CULT: NORMAL
BACTERIA SPEC AEROBE CULT: NORMAL

## 2020-07-06 NOTE — PROGRESS NOTES
Case Management Discharge Note      Final Note: Discharged home with [facility]. Kathryn Carrillo, RN    Provided Post Acute Provider List?: Yes  Post Acute Provider List: (Subacute Rehab CMS Compare List provided.)  Delivered To: Patient  Method of Delivery: In person          Durable Medical Equipment      Service Provider Request Status Selected Services Address Phone Number Fax Number    [facility] [place] Selected Durable Medical Equipment [address], [facility] [address] [phone]            Home Medical Care      Service Provider Request Status Selected Services Address Phone Number Fax Number    [facility] [place] Selected Home Health Services [address], [facility] [id number] [phone]              Transportation Services  Private: Car    Final Discharge Disposition Code: 06 - home with home health care

## 2020-07-07 ENCOUNTER — TELEPHONE (OUTPATIENT)
Dept: INTERNAL MEDICINE | Facility: CLINIC | Age: 80
End: 2020-07-07

## 2020-07-07 NOTE — TELEPHONE ENCOUNTER
----- Message from Marco A Betancur sent at 7/6/2020  9:34 AM EDT -----  Contact: home health  eileen called in regards to [facility] for physical therapy and skilled nursing and evaluate for speech and would like a call back at [phone]      Thank you

## 2020-07-09 NOTE — PROGRESS NOTES
Case Management Discharge Note      Final Note: Patient's daughter called today several times inquiring if and when home health would call or come. I contacted [facility] and spoke to Humaira and she said a Dee Dee would be coming to see them today. Called Rosa, daughter, back to let her know, and gave her the office number for [facility].    Provided Post Acute Provider List?: Yes  Post Acute Provider List: (Subacute Rehab CMS Compare List provided.)  Delivered To: Patient  Method of Delivery: In person    Destination      No service has been selected for the patient.      Durable Medical Equipment      Service Provider Request Status Selected Services Address Phone Number Fax Number    [facility] Selected Durable Medical Equipment [address], [facility] [address] [phone]      Dialysis/Infusion      No service has been selected for the patient.      Home Medical Care      Service Provider Request Status Selected Services Address Phone Number Fax Number    [facility] [place] Selected Home Health Services [address] [place] [address] [phone]      Therapy      No service has been selected for the patient.      Community Resources      No service has been selected for the patient.        Transportation Services  Private: Car    Final Discharge Disposition Code: 01 - home or self-care

## 2020-07-15 ENCOUNTER — TELEPHONE (OUTPATIENT)
Dept: INTERNAL MEDICINE | Facility: CLINIC | Age: 80
End: 2020-07-15

## 2020-07-15 NOTE — TELEPHONE ENCOUNTER
Spoke to derrick ( the daughter) she is with pt due to very limited mobility. Informed her to fax the form and will look at it and will let her know.

## 2020-07-15 NOTE — TELEPHONE ENCOUNTER
PATIENTS DAUGHTER, MITRA WOULD LIKE TO KNOW WHAT SHE NEEDS TO DO TO GET A FMLA FORM FILLED OUT FOR HER EMPLOYER. SHE IS NEEDING TO BE THE CARETAKER OF HER MOM  PLEASE ADVISE.     [phone] MITRA

## 2020-07-23 RX ORDER — SIMVASTATIN 20 MG
20 TABLET ORAL NIGHTLY
Qty: 90 TABLET | Refills: 3 | Status: SHIPPED | OUTPATIENT
Start: 2020-07-23 | End: 2021-04-20 | Stop reason: SDUPTHER

## 2020-07-27 ENCOUNTER — TELEPHONE (OUTPATIENT)
Dept: INTERNAL MEDICINE | Facility: CLINIC | Age: 80
End: 2020-07-27

## 2020-07-27 DIAGNOSIS — N28.9 RENAL INSUFFICIENCY: Primary | ICD-10-CM

## 2020-07-27 NOTE — TELEPHONE ENCOUNTER
DAUGHTER MITRA ALSTON WAS ALSO CALLING TODAY TO CHECK STATUS ON HER REQUEST TO SEND HER MOTHER TO A KIDNEY SPECIALIST. DAUGHTER THOUGHT SHE CALLED ABOUT 10 DAYS AGO TO REQUEST THE REFERRAL PROCESS TO BE STARTED.     SO SHE WAS CALLING TO SEE IF SHE CALLED OR NOT AND IF NOT SHE WANTED TO PLACE A REQUEST TO HAVE ONE DONE SO SHE CAN CALL DR WEBB'S OFFICE TO SCHEDULE THE APPOINTMENT AFTER THEY RECEIVE THE ORDER/REFERRAL.     MARCO ALSTON CAN BE REACHED [phone] AND WOULD LIKE A CALL BACK

## 2020-07-27 NOTE — TELEPHONE ENCOUNTER
DAUGHTER WAS CALLING TO CHECK THE STATUS OF THE FMLA FORMS SHE FAXED INTO THE OFFICE ON 7-23-20.     DAUGHTER WAS GIVEN [phone] NUMBER TO FAX THE FORMS TO LAST WEEK BY VAL. THE FORMS ARE FOR THE DAUGHTER TO BE ABLE TO TAKE CARE OF HER MOTHER.     DAUGHTER MITRA ALSTON CAN BE REACHED [phone] AND WOULD LIKE A CALL BACK ASAP

## 2020-07-28 NOTE — TELEPHONE ENCOUNTER
PT'S DAUGHTER, MITRA ALSTON, CALLED AND STATED THAT SHE NEEDS TO EXTEND THE DATE OF THE [facility] PAPERWORK TO October 1, INSTEAD OF September 1.    CALLBACK [phone]

## 2020-08-04 ENCOUNTER — OFFICE VISIT (OUTPATIENT)
Dept: INTERNAL MEDICINE | Facility: CLINIC | Age: 80
End: 2020-08-04

## 2020-08-04 VITALS
RESPIRATION RATE: 18 BRPM | DIASTOLIC BLOOD PRESSURE: 74 MMHG | HEIGHT: 65 IN | SYSTOLIC BLOOD PRESSURE: 126 MMHG | HEART RATE: 96 BPM | BODY MASS INDEX: 31.16 KG/M2 | WEIGHT: 187 LBS | OXYGEN SATURATION: 96 %

## 2020-08-04 DIAGNOSIS — I10 ESSENTIAL HYPERTENSION: ICD-10-CM

## 2020-08-04 DIAGNOSIS — N32.81 OVERACTIVE BLADDER: ICD-10-CM

## 2020-08-04 DIAGNOSIS — R13.12 OROPHARYNGEAL DYSPHAGIA: ICD-10-CM

## 2020-08-04 DIAGNOSIS — J18.9 PNEUMONIA OF RIGHT LOWER LOBE DUE TO INFECTIOUS ORGANISM: Primary | ICD-10-CM

## 2020-08-04 DIAGNOSIS — M19.90 ARTHRITIS: ICD-10-CM

## 2020-08-04 LAB
BASOPHILS # BLD AUTO: 0.02 10*3/MM3 (ref 0–0.2)
BASOPHILS NFR BLD AUTO: 0.2 % (ref 0–1.5)
BUN SERPL-MCNC: 18 MG/DL (ref 8–23)
BUN/CREAT SERPL: 17 (ref 7–25)
CALCIUM SERPL-MCNC: 8.6 MG/DL (ref 8.6–10.5)
CHLORIDE SERPL-SCNC: 100 MMOL/L (ref 98–107)
CO2 SERPL-SCNC: 28.2 MMOL/L (ref 22–29)
CREAT SERPL-MCNC: 1.06 MG/DL (ref 0.57–1)
EOSINOPHIL # BLD AUTO: 0.14 10*3/MM3 (ref 0–0.4)
EOSINOPHIL NFR BLD AUTO: 1.4 % (ref 0.3–6.2)
ERYTHROCYTE [DISTWIDTH] IN BLOOD BY AUTOMATED COUNT: 13.4 % (ref 12.3–15.4)
GLUCOSE SERPL-MCNC: 124 MG/DL (ref 65–99)
HCT VFR BLD AUTO: 36.3 % (ref 34–46.6)
HGB BLD-MCNC: 12 G/DL (ref 12–15.9)
IMM GRANULOCYTES # BLD AUTO: 0.03 10*3/MM3 (ref 0–0.05)
IMM GRANULOCYTES NFR BLD AUTO: 0.3 % (ref 0–0.5)
LYMPHOCYTES # BLD AUTO: 1.77 10*3/MM3 (ref 0.7–3.1)
LYMPHOCYTES NFR BLD AUTO: 18 % (ref 19.6–45.3)
MCH RBC QN AUTO: 29.9 PG (ref 26.6–33)
MCHC RBC AUTO-ENTMCNC: 33.1 G/DL (ref 31.5–35.7)
MCV RBC AUTO: 90.5 FL (ref 79–97)
MONOCYTES # BLD AUTO: 0.72 10*3/MM3 (ref 0.1–0.9)
MONOCYTES NFR BLD AUTO: 7.3 % (ref 5–12)
NEUTROPHILS # BLD AUTO: 7.14 10*3/MM3 (ref 1.7–7)
NEUTROPHILS NFR BLD AUTO: 72.8 % (ref 42.7–76)
NRBC BLD AUTO-RTO: 0 /100 WBC (ref 0–0.2)
PLATELET # BLD AUTO: 286 10*3/MM3 (ref 140–450)
POTASSIUM SERPL-SCNC: 4.3 MMOL/L (ref 3.5–5.2)
RBC # BLD AUTO: 4.01 10*6/MM3 (ref 3.77–5.28)
SODIUM SERPL-SCNC: 139 MMOL/L (ref 136–145)
WBC # BLD AUTO: 9.82 10*3/MM3 (ref 3.4–10.8)

## 2020-08-04 PROCEDURE — 99214 OFFICE O/P EST MOD 30 MIN: CPT | Performed by: NURSE PRACTITIONER

## 2020-08-04 RX ORDER — CELECOXIB 200 MG/1
200 CAPSULE ORAL DAILY
COMMUNITY
End: 2020-12-21

## 2020-08-04 NOTE — PROGRESS NOTES
Subjective   Gabbi Brandon is a 79 y.o. female.     She is here for follow from hospital stay 7/1/2020 secondary to right lower lobe pneumonia. She was discharged on 7/5/2020. She was discharged home with home health. She has completed oral Levaquin.  She was negative for Covid-19. She has been getting PT and RN help. Her last visit with PT is this week. Overall improving some.     Pneumonia   There is no chest tightness, cough, difficulty breathing, frequent throat clearing, hemoptysis, hoarse voice, shortness of breath, sputum production or wheezing. This is a new problem. The current episode started more than 1 month ago. The problem has been gradually improving. Associated symptoms include a fever (low grade intermittently ) and malaise/fatigue. Pertinent negatives include no appetite change, chest pain, dyspnea on exertion, PND, postnasal drip, sneezing or sore throat. Relieved by: levaquin  She reports significant improvement on treatment.        The following portions of the patient's history were reviewed and updated as appropriate: allergies, current medications, past family history, past medical history, past social history, past surgical history and problem list.    Review of Systems   Constitutional: Positive for fatigue (improving ), fever (low grade intermittently ) and malaise/fatigue. Negative for activity change and appetite change.   HENT: Negative for hoarse voice, postnasal drip, sneezing and sore throat.    Respiratory: Negative for cough, hemoptysis, sputum production, shortness of breath and wheezing.    Cardiovascular: Negative for chest pain, dyspnea on exertion, palpitations, leg swelling and PND.   Musculoskeletal: Positive for arthralgias (chronic, multiple ).       Objective   Physical Exam   Constitutional: She is oriented to person, place, and time. She appears well-developed and well-nourished.   HENT:   Head: Normocephalic.   Nose: Nose normal.   Neck: Carotid bruit is not  present. No thyroid mass and no thyromegaly present.   Cardiovascular: Regular rhythm and normal heart sounds. Exam reveals no S3 and no S4.   No murmur heard.  Repeat bp left arm 118/78  No pedal edema-bilateral lower leg compression stockings intact   Pulmonary/Chest: Effort normal and breath sounds normal. She has no decreased breath sounds. She has no wheezes. She has no rhonchi. She has no rales.   Musculoskeletal: She exhibits no edema.   Neurological: She is alert and oriented to person, place, and time. Gait (patient in wheelchair ) normal.   Skin: Skin is warm and dry.   Psychiatric: She has a normal mood and affect.       Assessment/Plan   Gabbi was seen today for hospital follow up.    Diagnoses and all orders for this visit:    Pneumonia of right lower lobe due to infectious organism  -     CT Chest Without Contrast; Future    Oropharyngeal dysphagia  Comments:  discussed need for soft diet, per recommendations    Overactive bladder  Comments:  will try myrebtriq  Orders:  -     Mirabegron ER (Myrbetriq) 25 MG tablet sustained-release 24 hour 24 hr tablet; Take 1 tablet by mouth Daily.    Essential hypertension  Comments:  well controlled   Orders:  -     Basic Metabolic Panel; Future  -     CBC & Differential  -     Basic Metabolic Panel    Arthritis  Comments:  recommend adding otc diclofenac gel, with daily celebrex, needs to stay active.       She is accompanied by her daughter, Rosa.     I have reviewed her hospital discharge summary and imaging and lab results. Will obtain Ct chest for f/u pneumonia. Will recheck CBC (slighlty low Hgb/Hct) and BMP (low sodium)    Current outpatient and discharge medications have been reconciled for the patient.  Reviewed by: ANA LUISA Estes         Answers for HPI/ROS submitted by the patient on 8/2/2020   What is the primary reason for your visit?: Other  Please describe your symptoms.: Follow up from hospital stay, Patient has had occasional fever  99.1, Patient gets very frustrated(depressed), when she wakes up in the morning in pain because of her arthritis , Patient gets exhausted very easily, has no stamina.(She does not like to exercise  Have you had these symptoms before?: Yes  How long have you been having these symptoms?: Greater than 2 weeks  Please list any medications you are currently taking for this condition.: Celecoxib am(arthritis), Acetaminophen/codein pm(bedtime) arthritis , If pain  from arthritis is unbearable  patient takes oxycod/acetaminophen, Note: she just started taking this Sat.  Please describe any probable cause for these symptoms. : She needs stronger arthritis medication , She needs to move more to increase her stamina, and help reduce inflammation

## 2020-08-06 DIAGNOSIS — N32.81 OVERACTIVE BLADDER: Primary | ICD-10-CM

## 2020-08-06 RX ORDER — OXYBUTYNIN CHLORIDE 5 MG/1
5 TABLET, EXTENDED RELEASE ORAL DAILY
Qty: 30 TABLET | Refills: 1 | Status: SHIPPED | OUTPATIENT
Start: 2020-08-06 | End: 2020-09-06 | Stop reason: HOSPADM

## 2020-08-07 ENCOUNTER — TELEPHONE (OUTPATIENT)
Dept: INTERNAL MEDICINE | Facility: CLINIC | Age: 80
End: 2020-08-07

## 2020-08-07 PROCEDURE — 99284 EMERGENCY DEPT VISIT MOD MDM: CPT

## 2020-08-07 NOTE — TELEPHONE ENCOUNTER
PATIENT'S DAUGHTER IS CALLING TO LET LUIZA KNOW ABOUT HER SYMPTOMS.  SHE HAS FEVER, CONGESTION, NAUSEA, STOMACH PAIN AND HER MUCUS HAS CHANGED TO GREEN. PLEASE GIVE HER A CALL AS SOON AS POSSIBLE TO LET HER KNOW WHAT OCTAVIA RECOMMENDS.    BEST PH#: [phone]

## 2020-08-07 NOTE — TELEPHONE ENCOUNTER
I returned call to patient daughter. She reports mucus as resolved-discolored. She was advised to mucinex 400 mg. She was advised to push fluids. She will monitor symptoms. Will hold antibiotic. Temp low grade  99.6. Due to have CT scan of chest next week to f/u pneumonia.

## 2020-08-08 ENCOUNTER — APPOINTMENT (OUTPATIENT)
Dept: GENERAL RADIOLOGY | Facility: HOSPITAL | Age: 80
End: 2020-08-08

## 2020-08-08 ENCOUNTER — HOSPITAL ENCOUNTER (INPATIENT)
Facility: HOSPITAL | Age: 80
LOS: 5 days | Discharge: HOME HEALTH | End: 2020-08-13
Attending: EMERGENCY MEDICINE | Admitting: INTERNAL MEDICINE

## 2020-08-08 DIAGNOSIS — J18.9 PNEUMONIA OF RIGHT LOWER LOBE DUE TO INFECTIOUS ORGANISM: Primary | ICD-10-CM

## 2020-08-08 DIAGNOSIS — R11.2 NON-INTRACTABLE VOMITING WITH NAUSEA, UNSPECIFIED VOMITING TYPE: ICD-10-CM

## 2020-08-08 PROBLEM — N18.2 CKD (CHRONIC KIDNEY DISEASE) STAGE 2, GFR 60-89 ML/MIN: Status: ACTIVE | Noted: 2020-08-08

## 2020-08-08 PROBLEM — D63.8 ANEMIA, CHRONIC DISEASE: Status: ACTIVE | Noted: 2020-08-08

## 2020-08-08 PROBLEM — I95.9 HYPOTENSION: Status: ACTIVE | Noted: 2020-08-08

## 2020-08-08 PROBLEM — N18.30 CKD (CHRONIC KIDNEY DISEASE) STAGE 3, GFR 30-59 ML/MIN: Status: ACTIVE | Noted: 2020-08-08

## 2020-08-08 LAB
ALBUMIN SERPL-MCNC: 2.7 G/DL (ref 3.5–5.2)
ALBUMIN/GLOB SERPL: 0.8 G/DL
ALP SERPL-CCNC: 67 U/L (ref 39–117)
ALT SERPL W P-5'-P-CCNC: 8 U/L (ref 1–33)
ANION GAP SERPL CALCULATED.3IONS-SCNC: 11 MMOL/L (ref 5–15)
ANION GAP SERPL CALCULATED.3IONS-SCNC: 11.2 MMOL/L (ref 5–15)
AST SERPL-CCNC: 12 U/L (ref 1–32)
B PARAPERT DNA SPEC QL NAA+PROBE: NOT DETECTED
B PERT DNA SPEC QL NAA+PROBE: NOT DETECTED
BASOPHILS # BLD AUTO: 0.03 10*3/MM3 (ref 0–0.2)
BASOPHILS NFR BLD AUTO: 0.3 % (ref 0–1.5)
BILIRUB SERPL-MCNC: 0.4 MG/DL (ref 0–1.2)
BUN SERPL-MCNC: 14 MG/DL (ref 8–23)
BUN SERPL-MCNC: 16 MG/DL (ref 8–23)
BUN/CREAT SERPL: 14 (ref 7–25)
BUN/CREAT SERPL: 17 (ref 7–25)
C PNEUM DNA NPH QL NAA+NON-PROBE: NOT DETECTED
CALCIUM SPEC-SCNC: 8.5 MG/DL (ref 8.6–10.5)
CALCIUM SPEC-SCNC: 8.7 MG/DL (ref 8.6–10.5)
CHLORIDE SERPL-SCNC: 101 MMOL/L (ref 98–107)
CHLORIDE SERPL-SCNC: 98 MMOL/L (ref 98–107)
CO2 SERPL-SCNC: 25 MMOL/L (ref 22–29)
CO2 SERPL-SCNC: 26.8 MMOL/L (ref 22–29)
CREAT SERPL-MCNC: 0.94 MG/DL (ref 0.57–1)
CREAT SERPL-MCNC: 1 MG/DL (ref 0.57–1)
DEPRECATED RDW RBC AUTO: 44.5 FL (ref 37–54)
DEPRECATED RDW RBC AUTO: 45.6 FL (ref 37–54)
EOSINOPHIL # BLD AUTO: 0.37 10*3/MM3 (ref 0–0.4)
EOSINOPHIL NFR BLD AUTO: 3.7 % (ref 0.3–6.2)
ERYTHROCYTE [DISTWIDTH] IN BLOOD BY AUTOMATED COUNT: 13.3 % (ref 12.3–15.4)
ERYTHROCYTE [DISTWIDTH] IN BLOOD BY AUTOMATED COUNT: 13.4 % (ref 12.3–15.4)
FLUAV H1 2009 PAND RNA NPH QL NAA+PROBE: NOT DETECTED
FLUAV H1 HA GENE NPH QL NAA+PROBE: NOT DETECTED
FLUAV H3 RNA NPH QL NAA+PROBE: NOT DETECTED
FLUAV SUBTYP SPEC NAA+PROBE: NOT DETECTED
FLUBV RNA ISLT QL NAA+PROBE: NOT DETECTED
GFR SERPL CREATININE-BSD FRML MDRD: 53 ML/MIN/1.73
GFR SERPL CREATININE-BSD FRML MDRD: 57 ML/MIN/1.73
GLOBULIN UR ELPH-MCNC: 3.5 GM/DL
GLUCOSE SERPL-MCNC: 110 MG/DL (ref 65–99)
GLUCOSE SERPL-MCNC: 124 MG/DL (ref 65–99)
HADV DNA SPEC NAA+PROBE: NOT DETECTED
HCOV 229E RNA SPEC QL NAA+PROBE: NOT DETECTED
HCOV HKU1 RNA SPEC QL NAA+PROBE: NOT DETECTED
HCOV NL63 RNA SPEC QL NAA+PROBE: NOT DETECTED
HCOV OC43 RNA SPEC QL NAA+PROBE: NOT DETECTED
HCT VFR BLD AUTO: 35.6 % (ref 34–46.6)
HCT VFR BLD AUTO: 37 % (ref 34–46.6)
HGB BLD-MCNC: 11.6 G/DL (ref 12–15.9)
HGB BLD-MCNC: 11.7 G/DL (ref 12–15.9)
HMPV RNA NPH QL NAA+NON-PROBE: NOT DETECTED
HPIV1 RNA SPEC QL NAA+PROBE: NOT DETECTED
HPIV2 RNA SPEC QL NAA+PROBE: NOT DETECTED
HPIV3 RNA NPH QL NAA+PROBE: NOT DETECTED
HPIV4 P GENE NPH QL NAA+PROBE: NOT DETECTED
IMM GRANULOCYTES # BLD AUTO: 0.04 10*3/MM3 (ref 0–0.05)
IMM GRANULOCYTES NFR BLD AUTO: 0.4 % (ref 0–0.5)
LYMPHOCYTES # BLD AUTO: 1.85 10*3/MM3 (ref 0.7–3.1)
LYMPHOCYTES NFR BLD AUTO: 18.5 % (ref 19.6–45.3)
M PNEUMO IGG SER IA-ACNC: NOT DETECTED
MCH RBC QN AUTO: 29.1 PG (ref 26.6–33)
MCH RBC QN AUTO: 29.5 PG (ref 26.6–33)
MCHC RBC AUTO-ENTMCNC: 31.4 G/DL (ref 31.5–35.7)
MCHC RBC AUTO-ENTMCNC: 32.9 G/DL (ref 31.5–35.7)
MCV RBC AUTO: 89.9 FL (ref 79–97)
MCV RBC AUTO: 92.7 FL (ref 79–97)
MONOCYTES # BLD AUTO: 0.96 10*3/MM3 (ref 0.1–0.9)
MONOCYTES NFR BLD AUTO: 9.6 % (ref 5–12)
MRSA DNA SPEC QL NAA+PROBE: NORMAL
NEUTROPHILS NFR BLD AUTO: 6.77 10*3/MM3 (ref 1.7–7)
NEUTROPHILS NFR BLD AUTO: 67.5 % (ref 42.7–76)
NRBC BLD AUTO-RTO: 0 /100 WBC (ref 0–0.2)
NT-PROBNP SERPL-MCNC: 888.7 PG/ML (ref 0–1800)
PLATELET # BLD AUTO: 300 10*3/MM3 (ref 140–450)
PLATELET # BLD AUTO: 318 10*3/MM3 (ref 140–450)
PMV BLD AUTO: 9.2 FL (ref 6–12)
PMV BLD AUTO: 9.4 FL (ref 6–12)
POTASSIUM SERPL-SCNC: 4.1 MMOL/L (ref 3.5–5.2)
POTASSIUM SERPL-SCNC: 4.2 MMOL/L (ref 3.5–5.2)
PROT SERPL-MCNC: 6.2 G/DL (ref 6–8.5)
RBC # BLD AUTO: 3.96 10*6/MM3 (ref 3.77–5.28)
RBC # BLD AUTO: 3.99 10*6/MM3 (ref 3.77–5.28)
RHINOVIRUS RNA SPEC NAA+PROBE: NOT DETECTED
RSV RNA NPH QL NAA+NON-PROBE: NOT DETECTED
SARS-COV-2 RNA PNL SPEC NAA+PROBE: NOT DETECTED
SODIUM SERPL-SCNC: 136 MMOL/L (ref 136–145)
SODIUM SERPL-SCNC: 137 MMOL/L (ref 136–145)
TROPONIN T SERPL-MCNC: <0.01 NG/ML (ref 0–0.03)
WBC # BLD AUTO: 10.02 10*3/MM3 (ref 3.4–10.8)
WBC # BLD AUTO: 10.19 10*3/MM3 (ref 3.4–10.8)

## 2020-08-08 PROCEDURE — 80053 COMPREHEN METABOLIC PANEL: CPT | Performed by: EMERGENCY MEDICINE

## 2020-08-08 PROCEDURE — 85027 COMPLETE CBC AUTOMATED: CPT | Performed by: NURSE PRACTITIONER

## 2020-08-08 PROCEDURE — 92610 EVALUATE SWALLOWING FUNCTION: CPT

## 2020-08-08 PROCEDURE — 83880 ASSAY OF NATRIURETIC PEPTIDE: CPT | Performed by: EMERGENCY MEDICINE

## 2020-08-08 PROCEDURE — 71045 X-RAY EXAM CHEST 1 VIEW: CPT

## 2020-08-08 PROCEDURE — 25010000002 VANCOMYCIN 10 G RECONSTITUTED SOLUTION: Performed by: EMERGENCY MEDICINE

## 2020-08-08 PROCEDURE — 25010000002 CEFEPIME PER 500 MG: Performed by: EMERGENCY MEDICINE

## 2020-08-08 PROCEDURE — 93005 ELECTROCARDIOGRAM TRACING: CPT | Performed by: EMERGENCY MEDICINE

## 2020-08-08 PROCEDURE — 25010000002 CEFEPIME PER 500 MG: Performed by: NURSE PRACTITIONER

## 2020-08-08 PROCEDURE — 25010000002 ONDANSETRON PER 1 MG: Performed by: EMERGENCY MEDICINE

## 2020-08-08 PROCEDURE — 87641 MR-STAPH DNA AMP PROBE: CPT | Performed by: INTERNAL MEDICINE

## 2020-08-08 PROCEDURE — 36415 COLL VENOUS BLD VENIPUNCTURE: CPT | Performed by: NURSE PRACTITIONER

## 2020-08-08 PROCEDURE — 93010 ELECTROCARDIOGRAM REPORT: CPT | Performed by: INTERNAL MEDICINE

## 2020-08-08 PROCEDURE — 85025 COMPLETE CBC W/AUTO DIFF WBC: CPT | Performed by: EMERGENCY MEDICINE

## 2020-08-08 PROCEDURE — 25010000002 VANCOMYCIN 10 G RECONSTITUTED SOLUTION: Performed by: NURSE PRACTITIONER

## 2020-08-08 PROCEDURE — 0202U NFCT DS 22 TRGT SARS-COV-2: CPT | Performed by: EMERGENCY MEDICINE

## 2020-08-08 PROCEDURE — 84484 ASSAY OF TROPONIN QUANT: CPT | Performed by: EMERGENCY MEDICINE

## 2020-08-08 PROCEDURE — 87040 BLOOD CULTURE FOR BACTERIA: CPT | Performed by: EMERGENCY MEDICINE

## 2020-08-08 PROCEDURE — 99221 1ST HOSP IP/OBS SF/LOW 40: CPT | Performed by: INTERNAL MEDICINE

## 2020-08-08 RX ORDER — ACETAMINOPHEN 160 MG/5ML
650 SOLUTION ORAL EVERY 4 HOURS PRN
Status: DISCONTINUED | OUTPATIENT
Start: 2020-08-08 | End: 2020-08-13 | Stop reason: HOSPADM

## 2020-08-08 RX ORDER — SODIUM CHLORIDE 0.9 % (FLUSH) 0.9 %
10 SYRINGE (ML) INJECTION AS NEEDED
Status: DISCONTINUED | OUTPATIENT
Start: 2020-08-08 | End: 2020-08-13 | Stop reason: HOSPADM

## 2020-08-08 RX ORDER — SODIUM CHLORIDE 0.9 % (FLUSH) 0.9 %
10 SYRINGE (ML) INJECTION EVERY 12 HOURS SCHEDULED
Status: DISCONTINUED | OUTPATIENT
Start: 2020-08-08 | End: 2020-08-13 | Stop reason: HOSPADM

## 2020-08-08 RX ORDER — OXYBUTYNIN CHLORIDE 5 MG/1
5 TABLET, EXTENDED RELEASE ORAL DAILY
Status: DISCONTINUED | OUTPATIENT
Start: 2020-08-08 | End: 2020-08-13 | Stop reason: HOSPADM

## 2020-08-08 RX ORDER — SODIUM CHLORIDE 9 MG/ML
50 INJECTION, SOLUTION INTRAVENOUS CONTINUOUS
Status: DISCONTINUED | OUTPATIENT
Start: 2020-08-08 | End: 2020-08-11

## 2020-08-08 RX ORDER — ATORVASTATIN CALCIUM 20 MG/1
10 TABLET, FILM COATED ORAL DAILY
Status: DISCONTINUED | OUTPATIENT
Start: 2020-08-08 | End: 2020-08-13 | Stop reason: HOSPADM

## 2020-08-08 RX ORDER — ACETAMINOPHEN 650 MG/1
650 SUPPOSITORY RECTAL EVERY 4 HOURS PRN
Status: DISCONTINUED | OUTPATIENT
Start: 2020-08-08 | End: 2020-08-13 | Stop reason: HOSPADM

## 2020-08-08 RX ORDER — ONDANSETRON 2 MG/ML
4 INJECTION INTRAMUSCULAR; INTRAVENOUS ONCE
Status: COMPLETED | OUTPATIENT
Start: 2020-08-08 | End: 2020-08-08

## 2020-08-08 RX ORDER — CALCIUM CARBONATE 200(500)MG
2 TABLET,CHEWABLE ORAL 2 TIMES DAILY PRN
Status: DISCONTINUED | OUTPATIENT
Start: 2020-08-08 | End: 2020-08-13 | Stop reason: HOSPADM

## 2020-08-08 RX ORDER — ACETAMINOPHEN 325 MG/1
650 TABLET ORAL EVERY 4 HOURS PRN
Status: DISCONTINUED | OUTPATIENT
Start: 2020-08-08 | End: 2020-08-13 | Stop reason: HOSPADM

## 2020-08-08 RX ORDER — PANTOPRAZOLE SODIUM 40 MG/1
40 TABLET, DELAYED RELEASE ORAL EVERY MORNING
Status: DISCONTINUED | OUTPATIENT
Start: 2020-08-08 | End: 2020-08-13 | Stop reason: HOSPADM

## 2020-08-08 RX ORDER — CETIRIZINE HYDROCHLORIDE 10 MG/1
10 TABLET ORAL DAILY
Status: DISCONTINUED | OUTPATIENT
Start: 2020-08-08 | End: 2020-08-13 | Stop reason: HOSPADM

## 2020-08-08 RX ORDER — BISACODYL 5 MG/1
5 TABLET, DELAYED RELEASE ORAL DAILY PRN
Status: DISCONTINUED | OUTPATIENT
Start: 2020-08-08 | End: 2020-08-13 | Stop reason: HOSPADM

## 2020-08-08 RX ORDER — FLUTICASONE PROPIONATE 50 MCG
2 SPRAY, SUSPENSION (ML) NASAL DAILY
Status: DISCONTINUED | OUTPATIENT
Start: 2020-08-08 | End: 2020-08-13 | Stop reason: HOSPADM

## 2020-08-08 RX ORDER — AMLODIPINE BESYLATE 5 MG/1
5 TABLET ORAL DAILY
Status: DISCONTINUED | OUTPATIENT
Start: 2020-08-08 | End: 2020-08-13 | Stop reason: HOSPADM

## 2020-08-08 RX ORDER — ATENOLOL 50 MG/1
50 TABLET ORAL DAILY
Status: DISCONTINUED | OUTPATIENT
Start: 2020-08-08 | End: 2020-08-13 | Stop reason: HOSPADM

## 2020-08-08 RX ORDER — NITROGLYCERIN 0.4 MG/1
0.4 TABLET SUBLINGUAL
Status: DISCONTINUED | OUTPATIENT
Start: 2020-08-08 | End: 2020-08-13 | Stop reason: HOSPADM

## 2020-08-08 RX ADMIN — OXYBUTYNIN CHLORIDE 5 MG: 5 TABLET, EXTENDED RELEASE ORAL at 11:27

## 2020-08-08 RX ADMIN — VANCOMYCIN HYDROCHLORIDE 1750 MG: 10 INJECTION, POWDER, LYOPHILIZED, FOR SOLUTION INTRAVENOUS at 15:52

## 2020-08-08 RX ADMIN — AMLODIPINE BESYLATE 5 MG: 5 TABLET ORAL at 11:27

## 2020-08-08 RX ADMIN — CEFEPIME HYDROCHLORIDE 2 G: 2 INJECTION, POWDER, FOR SOLUTION INTRAVENOUS at 14:20

## 2020-08-08 RX ADMIN — FLUTICASONE PROPIONATE 2 SPRAY: 50 SPRAY, METERED NASAL at 11:28

## 2020-08-08 RX ADMIN — ATENOLOL 50 MG: 50 TABLET ORAL at 11:27

## 2020-08-08 RX ADMIN — PANTOPRAZOLE SODIUM 40 MG: 40 TABLET, DELAYED RELEASE ORAL at 11:27

## 2020-08-08 RX ADMIN — ONDANSETRON 4 MG: 2 INJECTION INTRAMUSCULAR; INTRAVENOUS at 00:46

## 2020-08-08 RX ADMIN — VANCOMYCIN HYDROCHLORIDE 1750 MG: 10 INJECTION, POWDER, LYOPHILIZED, FOR SOLUTION INTRAVENOUS at 03:52

## 2020-08-08 RX ADMIN — SODIUM CHLORIDE 1000 ML: 9 INJECTION, SOLUTION INTRAVENOUS at 00:46

## 2020-08-08 RX ADMIN — CEFEPIME HYDROCHLORIDE 2 G: 2 INJECTION, POWDER, FOR SOLUTION INTRAVENOUS at 02:37

## 2020-08-08 RX ADMIN — ATORVASTATIN CALCIUM 10 MG: 20 TABLET, FILM COATED ORAL at 11:27

## 2020-08-08 RX ADMIN — SODIUM CHLORIDE 75 ML/HR: 9 INJECTION, SOLUTION INTRAVENOUS at 05:41

## 2020-08-08 RX ADMIN — CETIRIZINE HYDROCHLORIDE 10 MG: 10 TABLET, FILM COATED ORAL at 11:27

## 2020-08-08 RX ADMIN — SODIUM CHLORIDE, PRESERVATIVE FREE 10 ML: 5 INJECTION INTRAVENOUS at 08:47

## 2020-08-08 NOTE — THERAPY EVALUATION
Acute Care - Speech Language Pathology   Swallow Initial Evaluation [facility]     Patient Name: Gabbi Brandon  : 1940  MRN: [id number]  Today's Date: 2020               Admit Date: 2020    Visit Dx:     ICD-10-CM ICD-9-CM   1. Pneumonia of right lower lobe due to infectious organism J18.9 486   2. Non-intractable vomiting with nausea, unspecified vomiting type R11.2 787.01     Patient Active Problem List   Diagnosis   • Irregular bleeding   • Urinary tract infection   • Dyslipidemia   • Essential hypertension   • Stage 2 chronic kidney disease   • Gastroesophageal reflux disease without esophagitis   • Seasonal allergies   • Familial hypercholesterolemia   • Bladder spasm   • Bilateral lower extremity edema   • Pneumonia of right lower lobe due to infectious organism   • Failure to thrive in adult   • Hyponatremia   • DNR (do not resuscitate)   • Hypoxia   • Oropharyngeal dysphagia   • Anemia, chronic disease   • CKD (chronic kidney disease) stage 2, GFR 60-89 ml/min   • Hypotension     Past Medical History:   Diagnosis Date   • Arthritis    • GERD (gastroesophageal reflux disease)    • Hyperlipidemia    • Hypertension    • Incontinent of urine    • PNA (pneumonia)    • Seasonal allergies    • UTI (urinary tract infection)      Past Surgical History:   Procedure Laterality Date   • BREAST BIOPSY     • COLONOSCOPY N/A 2016    Procedure: COLONOSCOPY WITH POLYPECTOMY (HOT SNARE);  Surgeon: Paulino Narvaez MD;  Location: [facility] ENDOSCOPY;  Service:    • VAGINAL HYSTERECTOMY          SWALLOW EVALUATION (last 72 hours)      SLP Adult Swallow Evaluation     Row Name 20 1200                   Rehab Evaluation    Document Type  evaluation  -CP        Subjective Information  no complaints  -CP        Patient Observations  alert;cooperative  -CP        Patient Effort  good  -CP        Symptoms Noted During/After Treatment  none  -CP           General Information    Patient Profile  Reviewed  yes  -CP        Pertinent History Of Current Problem  Admitted with aspiration PNA. Recently d/c in July with asp PNA as well. VFSS 7/3 showed transient penetration with thin; rec MS/TL.   -CP        Current Method of Nutrition  NPO  -CP        Precautions/Limitations, Vision  WFL  -CP        Precautions/Limitations, Hearing  WFL  -CP        Prior Level of Function-Swallowing  no diet consistency restrictions  -CP        Plans/Goals Discussed with  patient  -CP        Barriers to Rehab  none identified  -CP        Patient's Goals for Discharge  patient did not state  -CP           Pain Assessment    Additional Documentation  Pain Scale: Numbers Pre/Post-Treatment (Group)  -CP           Pain Scale: Numbers Pre/Post-Treatment    Pain Scale: Numbers, Pretreatment  0/10 - no pain  -CP        Pain Scale: Numbers, Post-Treatment  0/10 - no pain  -CP           Oral Motor and Function    Secretion Management  WNL/WFL  -CP        Mucosal Quality  moist, healthy  -CP           Oral Musculature and Cranial Nerve Assessment    Oral Motor General Assessment  WFL  -CP           General Eating/Swallowing Observations    Respiratory Support Currently in Use  nasal cannula  -CP        Eating/Swallowing Skills  self-fed  -CP        Positioning During Eating  upright 90 degree;upright in chair  -CP        Utensils Used  spoon;cup  -CP        Consistencies Trialed  thin liquids;nectar/syrup-thick liquids;pureed;regular textures  -CP           Clinical Swallow Eval    Clinical Swallow Evaluation Summary  Clinical swallow eval completed. Overt coughing, throat clearing, and nose-running noted with thin liquids on initial 2 trials. Pt felt this was d/t water being cold. When SLP provided room-temperature water, throat clear noted x1, but she did appear to tolerate it much better. No s/s with NTL, puree, or solids. REC regular diet/NTL. Meds whole with puree. Upright for PO. Water protocol ok w/room-temp water after oral care.  Also recommend VFSS for further assessment.  -CP           Clinical Impression    SLP Swallowing Diagnosis  suspected pharyngeal dysfunction  -CP        Functional Impact  risk of aspiration/pneumonia  -CP        Rehab Potential/Prognosis, Swallowing  good, to achieve stated therapy goals  -CP        Swallow Criteria for Skilled Therapeutic Interventions Met  demonstrates skilled criteria  -CP           Recommendations    Therapy Frequency (Swallow)  PRN  -CP        Predicted Duration Therapy Intervention (Days)  until discharge  -CP        SLP Diet Recommendation  regular textures;nectar thick liquids;water between meals after oral care, with supervision  -CP        Recommended Diagnostics  VFSS (MBS)  -CP        Recommended Precautions and Strategies  upright posture during/after eating;small bites of food and sips of liquid  -CP        SLP Rec. for Method of Medication Administration  meds whole;with pudding or applesauce  -CP        Monitor for Signs of Aspiration  yes;notify SLP if any concerns  -CP        Anticipated Dischage Disposition (SLP)  unknown  -CP          User Key  (r) = Recorded By, (t) = Taken By, (c) = Cosigned By    Initials Name Effective Dates    CP Angelica Singleton, MS CCC-SLP 06/08/18 -           EDUCATION  The patient has been educated in the following areas:   Dysphagia (Swallowing Impairment) Oral Care/Hydration Modified Diet Instruction.    SLP Recommendation and Plan  SLP Swallowing Diagnosis: suspected pharyngeal dysfunction  SLP Diet Recommendation: regular textures, nectar thick liquids, water between meals after oral care, with supervision  Recommended Precautions and Strategies: upright posture during/after eating, small bites of food and sips of liquid  SLP Rec. for Method of Medication Administration: meds whole, with pudding or applesauce     Monitor for Signs of Aspiration: yes, notify SLP if any concerns  Recommended Diagnostics: VFSS (MBS)  Swallow Criteria for Skilled  Therapeutic Interventions Met: demonstrates skilled criteria  Anticipated Dischage Disposition (SLP): unknown  Rehab Potential/Prognosis, Swallowing: good, to achieve stated therapy goals  Therapy Frequency (Swallow): PRN  Predicted Duration Therapy Intervention (Days): until discharge       Plan of Care Reviewed With: patient  Outcome Summary: Clinical swallow eval completed. Overt coughing, throat clearing, and nose-running noted with thin liquids on initial 2 trials. Pt felt this was d/t water being cold. When SLP provided room-temperature water, throat clear noted x1, but she did appear to tolerate it much better. No s/s with NTL, puree, or solids. REC regular diet/NTL. Meds whole with puree. Upright for PO. Water protocol ok w/room-temp water after oral care. Also recommend VFSS for further assessment.           Time Calculation:   Time Calculation- SLP     Row Name 08/08/20 1307             Time Calculation- SLP    SLP Start Time  1015  -CP      SLP Stop Time  1100  -CP      SLP Time Calculation (min)  45 min  -CP        User Key  (r) = Recorded By, (t) = Taken By, (c) = Cosigned By    Initials Name Provider Type    CP Angelica Singleton MS CCC-SLP Speech and Language Pathologist          Therapy Charges for Today     Code Description Service Date Service Provider Modifiers Qty    [id number] HC ST EVAL ORAL PHARYNG SWALLOW 3 8/8/2020 Angelica Singleton MS CCC-WILLIAM GN 1               MS ANAHY Mejía  8/8/2020

## 2020-08-08 NOTE — ED NOTES
To ER via EMS from home.  Pt woke up coughing and SOA.  Pt recently seen here for aspiration pneumonia.      Pt in no acute distress at this time.    Pt in mask at triage.  Triage staff in appropriate PPE.      Mariaa Madera RN  08/08/20 0002

## 2020-08-08 NOTE — ED PROVIDER NOTES
EMERGENCY DEPARTMENT ENCOUNTER    CHIEF COMPLAINT  Chief Complaint: Nausea/vomiting  History given by: Patient  History limited by: None  Room Number: N533/1  PMD: Gordo Mg MD      HPI:  Pt is a 79 y.o. female who presents complaining of 2 episodes of nausea and vomiting that occurred suddenly that woke her out of sleep just prior to arrival tonight.  The patient states that she recently had aspiration pneumonia and completed antibiotics and when she began vomiting tonight she felt as though she was having difficulty breathing and had several episodes of coughing as well.  Symptoms were moderate in intensity but right now, the patient states that all symptoms have resolved and she feels much better.  She still complains of some mild nausea but does not feel the symptoms that woke her from sleep.  She did not take any medicine or treat the symptoms at the time so nothing thus far has alleviated nor aggravated the symptoms.  The patient denies chest pain, back pain, fever/chills, headache, dizziness, vertigo, or any known sick contacts.      PAST MEDICAL HISTORY  Active Ambulatory Problems     Diagnosis Date Noted   • Irregular bleeding 09/07/2016   • Urinary tract infection 09/14/2016   • Dyslipidemia 10/03/2014   • Essential hypertension 01/10/2018   • Stage 2 chronic kidney disease 09/18/2018   • Gastroesophageal reflux disease without esophagitis 05/02/2019   • Seasonal allergies 05/02/2019   • Familial hypercholesterolemia 11/04/2019   • Bladder spasm 11/04/2019   • Bilateral lower extremity edema 06/25/2020   • Pneumonia of right lower lobe due to infectious organism 07/01/2020   • Failure to thrive in adult 07/01/2020   • Hyponatremia 07/01/2020   • DNR (do not resuscitate) 07/01/2020   • Hypoxia 07/02/2020   • Oropharyngeal dysphagia 07/02/2020     Resolved Ambulatory Problems     Diagnosis Date Noted   • No Resolved Ambulatory Problems     Past Medical History:   Diagnosis Date   • Arthritis    •  GERD (gastroesophageal reflux disease)    • Hyperlipidemia    • Hypertension    • Incontinent of urine    • PNA (pneumonia)    • UTI (urinary tract infection)        PAST SURGICAL HISTORY  Past Surgical History:   Procedure Laterality Date   • BREAST BIOPSY     • COLONOSCOPY N/A 8/26/2016    Procedure: COLONOSCOPY WITH POLYPECTOMY (HOT SNARE);  Surgeon: Paulino Narvaez MD;  Location: [facility] ENDOSCOPY;  Service:    • VAGINAL HYSTERECTOMY         FAMILY HISTORY  Family History   Problem Relation Age of Onset   • Colon cancer Brother    • Heart attack Mother    • Stroke Father    • Lung cancer Sister    • Lung cancer Brother         3 brothers   • Deep vein thrombosis Brother    • Brain cancer Brother    • Uterine cancer Neg Hx    • Ovarian cancer Neg Hx    • Breast cancer Neg Hx    • Pulmonary embolism Neg Hx        SOCIAL HISTORY  Social History     Socioeconomic History   • Marital status:      Spouse name: Not on file   • Number of children: Not on file   • Years of education: Not on file   • Highest education level: Not on file   Tobacco Use   • Smoking status: Never Smoker   • Smokeless tobacco: Never Used   Substance and Sexual Activity   • Alcohol use: Not Currently   • Drug use: No   • Sexual activity: Defer       ALLERGIES  Penicillins    REVIEW OF SYSTEMS  Review of Systems   Constitutional: Negative for fever.   HENT: Negative for sore throat.    Eyes: Negative.    Respiratory: Positive for cough and shortness of breath.    Cardiovascular: Negative for chest pain.   Gastrointestinal: Positive for nausea and vomiting. Negative for abdominal pain and diarrhea.   Genitourinary: Negative for dysuria.   Musculoskeletal: Negative for neck pain.   Skin: Negative for rash.   Allergic/Immunologic: Negative.    Neurological: Negative for weakness, numbness and headaches.   Hematological: Negative.    Psychiatric/Behavioral: Negative.    All other systems reviewed and are negative.      PHYSICAL EXAM  ED  Triage Vitals [08/08/20 0000]   Temp Heart Rate Resp BP SpO2   98.8 °F (37.1 °C) 76 18 137/67 98 %      Temp src Heart Rate Source Patient Position BP Location FiO2 (%)   Tympanic -- -- -- --       Physical Exam   Constitutional: She is oriented to person, place, and time. No distress.   HENT:   Head: Normocephalic and atraumatic.   Eyes: Pupils are equal, round, and reactive to light. EOM are normal.   Neck: Normal range of motion. Neck supple.   Cardiovascular: Normal rate, regular rhythm and normal heart sounds.   Pulmonary/Chest: Effort normal and breath sounds normal. No respiratory distress.   Abdominal: Soft. There is no tenderness. There is no rebound and no guarding.   Musculoskeletal: Normal range of motion. She exhibits no edema.   Neurological: She is alert and oriented to person, place, and time. She has normal sensation and normal strength.   Skin: Skin is warm and dry. No rash noted.   Psychiatric: Mood and affect normal.   Nursing note and vitals reviewed.      LAB RESULTS  Lab Results (last 24 hours)     Procedure Component Value Units Date/Time    CBC & Differential [[id number]] Collected:  08/08/20 0022    Specimen:  Blood Updated:  08/08/20 0033    Narrative:       The following orders were created for panel order CBC & Differential.  Procedure                               Abnormality         Status                     ---------                               -----------         ------                     CBC Auto Differential[[id number]]        Abnormal            Final result                 Please view results for these tests on the individual orders.    Comprehensive Metabolic Panel [[id number]]  (Abnormal) Collected:  08/08/20 0022    Specimen:  Blood Updated:  08/08/20 0118     Glucose 124 mg/dL      BUN 16 mg/dL      Creatinine 0.94 mg/dL      Sodium 136 mmol/L      Potassium 4.2 mmol/L      Chloride 98 mmol/L      CO2 26.8 mmol/L      Calcium 8.7 mg/dL      Total Protein 6.2 g/dL       Albumin 2.70 g/dL      ALT (SGPT) 8 U/L      AST (SGOT) 12 U/L      Alkaline Phosphatase 67 U/L      Total Bilirubin 0.4 mg/dL      eGFR Non African Amer 57 mL/min/1.73      Globulin 3.5 gm/dL      A/G Ratio 0.8 g/dL      BUN/Creatinine Ratio 17.0     Anion Gap 11.2 mmol/L     Narrative:       GFR Normal >60  Chronic Kidney Disease <60  Kidney Failure <15      Troponin [[id number]]  (Normal) Collected:  08/08/20 0022    Specimen:  Blood Updated:  08/08/20 0118     Troponin T <0.010 ng/mL     Narrative:       Troponin T Reference Range:  <= 0.03 ng/mL-   Negative for AMI  >0.03 ng/mL-     Abnormal for myocardial necrosis.  Clinicians would have to utilize clinical acumen, EKG, Troponin and serial changes to determine if it is an Acute Myocardial Infarction or myocardial injury due to an underlying chronic condition.       Results may be falsely decreased if patient taking Biotin.      BNP [[id number]]  (Normal) Collected:  08/08/20 0022    Specimen:  Blood Updated:  08/08/20 0116     proBNP 888.7 pg/mL     Narrative:       Among patients with dyspnea, NT-proBNP is highly sensitive for the detection of acute congestive heart failure. In addition NT-proBNP of <300 pg/ml effectively rules out acute congestive heart failure with 99% negative predictive value.    Results may be falsely decreased if patient taking Biotin.      CBC Auto Differential [[id number]]  (Abnormal) Collected:  08/08/20 0022    Specimen:  Blood Updated:  08/08/20 0033     WBC 10.02 10*3/mm3      RBC 3.96 10*6/mm3      Hemoglobin 11.7 g/dL      Hematocrit 35.6 %      MCV 89.9 fL      MCH 29.5 pg      MCHC 32.9 g/dL      RDW 13.4 %      RDW-SD 44.5 fl      MPV 9.2 fL      Platelets 300 10*3/mm3      Neutrophil % 67.5 %      Lymphocyte % 18.5 %      Monocyte % 9.6 %      Eosinophil % 3.7 %      Basophil % 0.3 %      Immature Grans % 0.4 %      Neutrophils, Absolute 6.77 10*3/mm3      Lymphocytes, Absolute 1.85 10*3/mm3      Monocytes, Absolute 0.96  10*3/mm3      Eosinophils, Absolute 0.37 10*3/mm3      Basophils, Absolute 0.03 10*3/mm3      Immature Grans, Absolute 0.04 10*3/mm3      nRBC 0.0 /100 WBC     Blood Culture - Blood, Arm, Right [[id number]] Collected:  08/08/20 0234    Specimen:  Blood from Arm, Right Updated:  08/08/20 0245    Blood Culture - Blood, Arm, Left [[id number]] Collected:  08/08/20 0234    Specimen:  Blood from Arm, Left Updated:  08/08/20 0245    COVID PRE-OP / PRE-PROCEDURE SCREENING ORDER (NO ISOLATION) - Swab, Nasopharynx [[id number]] Collected:  08/08/20 0356    Specimen:  Swab from Nasopharynx Updated:  08/08/20 0456    Narrative:       The following orders were created for panel order COVID PRE-OP / PRE-PROCEDURE SCREENING ORDER (NO ISOLATION) - Swab, Nasopharynx.  Procedure                               Abnormality         Status                     ---------                               -----------         ------                     Respiratory Panel PCR w/...[[id number]]  Normal              Final result                 Please view results for these tests on the individual orders.    Respiratory Panel PCR w/COVID-19(SARS-CoV-2) TETO/ANY/SHERRY In-House, NP Swab in [facility], 3-4 Hr TAT - Swab, Nasopharynx [[id number]]  (Normal) Collected:  08/08/20 0356    Specimen:  Swab from Nasopharynx Updated:  08/08/20 0456     ADENOVIRUS, PCR Not Detected     Coronavirus 229E Not Detected     Coronavirus HKU1 Not Detected     Coronavirus NL63 Not Detected     Coronavirus OC43 Not Detected     COVID19 Not Detected     Human Metapneumovirus Not Detected     Human Rhinovirus/Enterovirus Not Detected     Influenza A PCR Not Detected     Influenza A H1 Not Detected     Influenza A H1 2009 PCR Not Detected     Influenza A H3 Not Detected     Influenza B PCR Not Detected     Parainfluenza Virus 1 Not Detected     Parainfluenza Virus 2 Not Detected     Parainfluenza Virus 3 Not Detected     Parainfluenza Virus 4 Not Detected     RSV, PCR Not Detected      Bordetella pertussis pcr Not Detected     Bordetella parapertussis PCR Not Detected     Chlamydophila pneumoniae PCR Not Detected     Mycoplasma pneumo by PCR Not Detected    Narrative:       Fact sheet for providers: https://docs.[employer]/wp-content/uploads/FKM3488-9158-YZ2.1-EUA-Provider-Fact-Sheet-3.pdf    Fact sheet for patients: https://docs.[employer]/wp-content/uploads/BXI2055-7789-YZ5.1-EUA-Patient-Fact-Sheet-1.pdf    Basic Metabolic Panel [[id number]]  (Abnormal) Collected:  08/08/20 0506    Specimen:  Blood Updated:  08/08/20 0648     Glucose 110 mg/dL      BUN 14 mg/dL      Creatinine 1.00 mg/dL      Sodium 137 mmol/L      Potassium 4.1 mmol/L      Chloride 101 mmol/L      CO2 25.0 mmol/L      Calcium 8.5 mg/dL      eGFR Non African Amer 53 mL/min/1.73      BUN/Creatinine Ratio 14.0     Anion Gap 11.0 mmol/L     Narrative:       GFR Normal >60  Chronic Kidney Disease <60  Kidney Failure <15      CBC (No Diff) [[id number]]  (Abnormal) Collected:  08/08/20 0506    Specimen:  Blood Updated:  08/08/20 0541     WBC 10.19 10*3/mm3      RBC 3.99 10*6/mm3      Hemoglobin 11.6 g/dL      Hematocrit 37.0 %      MCV 92.7 fL      MCH 29.1 pg      MCHC 31.4 g/dL      RDW 13.3 %      RDW-SD 45.6 fl      MPV 9.4 fL      Platelets 318 10*3/mm3           I ordered the above labs and reviewed the results    RADIOLOGY  XR Chest 1 View   Final Result   Dense right basilar consolidation, concerning for pneumonia, as well as   right pleural effusion.       This report was finalized on 8/8/2020 1:15 AM by Dr. Darlene De Anda M.D.               I ordered the above noted radiological studies. Interpreted by radiologist. Reviewed by me in PACS.       PROCEDURES  Procedures  EKG          EKG time: 0021  Rhythm/Rate: NSR, 70  P waves and GA: nml  QRS, axis: widened, RBBB, axis nml   ST and T waves: Nonspecific ST/T abnormalities    Interpreted Contemporaneously by me, independently viewed  unchanged compared to prior  7/1/20      PROGRESS AND CONSULTS     The patient was wearing a facemask upon entrance into the room and remained in such throughout their visit.  I was wearing PPE including a facemask, eye protection, as well as gloves at any point entering the room and throughout the visit    0215  Upon reevaluation, the patient appears to be resting quite comfortably with sats of 98% on room air and stable vital signs.  I did discuss with the patient and family member at bedside that it does appear that she has a significant right lower lobe consolidation on her chest x-ray, so she has persistent pneumonia of her right base.  This is all despite antibiotic therapy recently.  Secondary to the weakness as well as intermittent symptoms that the patient has been having I did suggest that we admit the patient for IV antibiotic therapy and reevaluation.  The family agreed with this plan and we will admit her to the hospital.    0320  Case discussed with ANA LUISA Sánchez for [facility], who agrees to admit the patient to the hospital for Dr. Fleming      MEDICAL DECISION MAKING  Results were reviewed/discussed with the patient and they were also made aware of online access. Pt also made aware that some labs, such as cultures, will not be resulted during ER visit and follow up with PMD is necessary.     MDM  Number of Diagnoses or Management Options  Non-intractable vomiting with nausea, unspecified vomiting type:   Pneumonia of right lower lobe due to infectious organism:      Amount and/or Complexity of Data Reviewed  Clinical lab tests: ordered and reviewed  Tests in the radiology section of CPT®: ordered and reviewed  Tests in the medicine section of CPT®: ordered and reviewed  Review and summarize past medical records: yes (Upon medical records review, the patient was last seen and evaluated on 7/1/2020 secondary to shortness of breath with pneumonia requiring admission to the hospital)  Discuss the patient with other providers:  yes (ANA LUISA Sánchez for [facility], who agrees to admit the patient to the hospital for Dr. Fleming)  Independent visualization of images, tracings, or specimens: yes (Chest x-ray showing consolidation in the right lower lobe)           DIAGNOSIS  Final diagnoses:   Pneumonia of right lower lobe due to infectious organism   Non-intractable vomiting with nausea, unspecified vomiting type       DISPOSITION  ADMISSION    Discussed treatment plan and reason for admission with pt/family and admitting physician.  Pt/family voiced understanding of the plan for admission for further testing/treatment as needed.         Latest Documented Vital Signs:  As of 07:17  BP- 138/68 HR- 75 Temp- 98.6 °F (37 °C) (Oral) O2 sat- 93%         Jose Dalal MD  08/08/20 0717

## 2020-08-08 NOTE — PLAN OF CARE
Problem: Fall Risk (Adult)  Goal: Identify Related Risk Factors and Signs and Symptoms  Outcome: Ongoing (interventions implemented as appropriate)  Flowsheets (Taken 8/8/2020 0604)  Related Risk Factors (Fall Risk): age-related changes; bladder function altered; gait/mobility problems  Signs and Symptoms (Fall Risk): presence of risk factors

## 2020-08-08 NOTE — H&P
"    Patient Name:  Gabbi Brandon  YOB: 1940  MRN:  [id number]  Admit Date:  8/8/2020  Patient Care Team:  Gordo Mg MD as PCP - General (Internal Medicine)  Gordo Mg MD as PCP - Claims Attributed      Subjective   History Present Illness     Chief Complaint   Patient presents with   • Shortness of Breath       Ms. Brandon is a 79 y.o. non-smoker with a history of hypertension, hyperlipidemia, GERD, pneumonia, urinary tract infection who presented to [facility] ER with chief complaint of this of breath admitted for pneumonia of right lower lobe due to infectious organism.    Patient reportedly lives with  and daughter and woke up with nausea, vomiting, cough, shortness of breath and proceeded to [facility] ER for further evaluation.  Recent hospital mission early July 2020 for same presentation.  Patient treated for presumed bacterial pneumonia with IV Levaquin and responded well to treatment.  She was seen by speech therapy who noted some mild oral pharyngeal dysphasia.  SLP recommended modified diet at discharge.    Patient reports epigastric \"burning\" pain and \"black\" appearing emesis.  Noted patient currently taking ASA and Celebrex; therefore, concerns for erosive gastritis, versus gastric ulcer versus other as differential diagnosis this admission.    In ER, AVSS on room air with the exception of low BP 89/51 resolved status post 1 L normal saline IV fluid bolus.    Further recommendations per hospital course.  See additional details below in assessment/plan.    History of Present Illness  Review of Systems   Constitutional: Positive for fever (subjective). Negative for chills.   HENT: Negative for congestion and rhinorrhea.    Respiratory: Positive for cough (occasionally productive) and shortness of breath (currently resolved ).    Cardiovascular: Negative for chest pain and leg swelling.   Gastrointestinal: Positive for abdominal pain (epigastric), nausea and " "vomiting (\"black\" appearing emesis). Negative for abdominal distention, constipation and diarrhea.   Endocrine: Negative for polydipsia, polyphagia and polyuria.   Genitourinary: Negative for difficulty urinating and dysuria.   Musculoskeletal: Negative for back pain and myalgias.   Skin: Negative for rash and wound.   Neurological: Negative for dizziness, weakness and headaches.   Psychiatric/Behavioral: Negative for confusion and hallucinations.        Personal History     Past Medical History:   Diagnosis Date   • Arthritis    • GERD (gastroesophageal reflux disease)    • Hyperlipidemia    • Hypertension    • Incontinent of urine    • PNA (pneumonia)    • Seasonal allergies    • UTI (urinary tract infection)      Past Surgical History:   Procedure Laterality Date   • BREAST BIOPSY     • COLONOSCOPY N/A 8/26/2016    Procedure: COLONOSCOPY WITH POLYPECTOMY (HOT SNARE);  Surgeon: Paulino Narvaez MD;  Location: [facility] ENDOSCOPY;  Service:    • VAGINAL HYSTERECTOMY       Family History   Problem Relation Age of Onset   • Colon cancer Brother    • Heart attack Mother    • Stroke Father    • Lung cancer Sister    • Lung cancer Brother         3 brothers   • Deep vein thrombosis Brother    • Brain cancer Brother    • Uterine cancer Neg Hx    • Ovarian cancer Neg Hx    • Breast cancer Neg Hx    • Pulmonary embolism Neg Hx      Social History     Tobacco Use   • Smoking status: Never Smoker   • Smokeless tobacco: Never Used   Substance Use Topics   • Alcohol use: Not Currently   • Drug use: No     No current facility-administered medications on file prior to encounter.      Current Outpatient Medications on File Prior to Encounter   Medication Sig Dispense Refill   • amLODIPine (NORVASC) 5 MG tablet TAKE 1 TABLET EVERY DAY 90 tablet 3   • aspirin 81 MG EC tablet Take 81 mg by mouth Daily.     • atenolol (TENORMIN) 50 MG tablet TAKE 1 TABLET EVERY DAY 90 tablet 3   • celecoxib (CeleBREX) 200 MG capsule Take 200 mg by " mouth Daily.     • cetirizine (ZyrTEC) 10 MG tablet Take 10 mg by mouth daily.     • fluticasone (FLONASE) 50 MCG/ACT nasal spray USE 2 SPRAYS IN EACH NOSTRIL EVERY DAY 48 g 2   • omeprazole (priLOSEC) 20 MG capsule TAKE 1 CAPSULE EVERY DAY 90 capsule 3   • oxybutynin XL (DITROPAN-XL) 5 MG 24 hr tablet Take 1 tablet by mouth Daily. 30 tablet 1   • simvastatin (ZOCOR) 20 MG tablet TAKE 1 TABLET BY MOUTH EVERY NIGHT. 90 tablet 3     Allergies   Allergen Reactions   • Penicillins Unknown (See Comments)     Caused a red streak down her leg.       Objective    Objective     Vital Signs  Temp:  [98.3 °F (36.8 °C)-98.8 °F (37.1 °C)] 98.6 °F (37 °C)  Heart Rate:  [70-83] 75  Resp:  [16-18] 16  BP: ()/(51-75) 138/68  SpO2:  [93 %-98 %] 93 %  on  Flow (L/min):  [2] 2;   Device (Oxygen Therapy): room air  Body mass index is 29.5 kg/m².    Physical Exam   Constitutional: She is oriented to person, place, and time. No distress.   Generally weak and nontoxic appearance   HENT:   Head: Normocephalic and atraumatic.   Eyes: Pupils are equal, round, and reactive to light. EOM are normal.   Neck: Normal range of motion. Neck supple.   Cardiovascular: Normal rate and normal heart sounds.   Pulmonary/Chest: Effort normal and breath sounds normal. No respiratory distress.   Abdominal: Soft. Bowel sounds are normal. There is no tenderness.   Musculoskeletal: She exhibits no edema (dependent BLE) or deformity.   Neurological: She is alert and oriented to person, place, and time. No cranial nerve deficit or sensory deficit. She exhibits normal muscle tone. Coordination normal.   Skin: Skin is warm and dry. She is not diaphoretic.   Psychiatric: She has a normal mood and affect. Her behavior is normal. Judgment and thought content normal.       Results Review:  I reviewed the patient's new clinical results.  I reviewed the patient's new imaging results and agree with the interpretation.  I reviewed the patient's other test results and  agree with the interpretation  I personally viewed and interpreted the patient's EKG/Telemetry data  Discussed with ED provider.    Lab Results (last 24 hours)     Procedure Component Value Units Date/Time    CBC & Differential [[id number]] Collected:  08/08/20 0022    Specimen:  Blood Updated:  08/08/20 0033    Narrative:       The following orders were created for panel order CBC & Differential.  Procedure                               Abnormality         Status                     ---------                               -----------         ------                     CBC Auto Differential[[id number]]        Abnormal            Final result                 Please view results for these tests on the individual orders.    Comprehensive Metabolic Panel [[id number]]  (Abnormal) Collected:  08/08/20 0022    Specimen:  Blood Updated:  08/08/20 0118     Glucose 124 mg/dL      BUN 16 mg/dL      Creatinine 0.94 mg/dL      Sodium 136 mmol/L      Potassium 4.2 mmol/L      Chloride 98 mmol/L      CO2 26.8 mmol/L      Calcium 8.7 mg/dL      Total Protein 6.2 g/dL      Albumin 2.70 g/dL      ALT (SGPT) 8 U/L      AST (SGOT) 12 U/L      Alkaline Phosphatase 67 U/L      Total Bilirubin 0.4 mg/dL      eGFR Non African Amer 57 mL/min/1.73      Globulin 3.5 gm/dL      A/G Ratio 0.8 g/dL      BUN/Creatinine Ratio 17.0     Anion Gap 11.2 mmol/L     Narrative:       GFR Normal >60  Chronic Kidney Disease <60  Kidney Failure <15      Troponin [[id number]]  (Normal) Collected:  08/08/20 0022    Specimen:  Blood Updated:  08/08/20 0118     Troponin T <0.010 ng/mL     Narrative:       Troponin T Reference Range:  <= 0.03 ng/mL-   Negative for AMI  >0.03 ng/mL-     Abnormal for myocardial necrosis.  Clinicians would have to utilize clinical acumen, EKG, Troponin and serial changes to determine if it is an Acute Myocardial Infarction or myocardial injury due to an underlying chronic condition.       Results may be falsely decreased if patient  taking Biotin.      BNP [[id number]]  (Normal) Collected:  08/08/20 0022    Specimen:  Blood Updated:  08/08/20 0116     proBNP 888.7 pg/mL     Narrative:       Among patients with dyspnea, NT-proBNP is highly sensitive for the detection of acute congestive heart failure. In addition NT-proBNP of <300 pg/ml effectively rules out acute congestive heart failure with 99% negative predictive value.    Results may be falsely decreased if patient taking Biotin.      CBC Auto Differential [[id number]]  (Abnormal) Collected:  08/08/20 0022    Specimen:  Blood Updated:  08/08/20 0033     WBC 10.02 10*3/mm3      RBC 3.96 10*6/mm3      Hemoglobin 11.7 g/dL      Hematocrit 35.6 %      MCV 89.9 fL      MCH 29.5 pg      MCHC 32.9 g/dL      RDW 13.4 %      RDW-SD 44.5 fl      MPV 9.2 fL      Platelets 300 10*3/mm3      Neutrophil % 67.5 %      Lymphocyte % 18.5 %      Monocyte % 9.6 %      Eosinophil % 3.7 %      Basophil % 0.3 %      Immature Grans % 0.4 %      Neutrophils, Absolute 6.77 10*3/mm3      Lymphocytes, Absolute 1.85 10*3/mm3      Monocytes, Absolute 0.96 10*3/mm3      Eosinophils, Absolute 0.37 10*3/mm3      Basophils, Absolute 0.03 10*3/mm3      Immature Grans, Absolute 0.04 10*3/mm3      nRBC 0.0 /100 WBC     Blood Culture - Blood, Arm, Right [[id number]] Collected:  08/08/20 0234    Specimen:  Blood from Arm, Right Updated:  08/08/20 0245    Blood Culture - Blood, Arm, Left [[id number]] Collected:  08/08/20 0234    Specimen:  Blood from Arm, Left Updated:  08/08/20 0245    COVID PRE-OP / PRE-PROCEDURE SCREENING ORDER (NO ISOLATION) - Swab, Nasopharynx [[id number]] Collected:  08/08/20 0356    Specimen:  Swab from Nasopharynx Updated:  08/08/20 0456    Narrative:       The following orders were created for panel order COVID PRE-OP / PRE-PROCEDURE SCREENING ORDER (NO ISOLATION) - Swab, Nasopharynx.  Procedure                               Abnormality         Status                     ---------                                -----------         ------                     Respiratory Panel PCR w/...[[id number]]  Normal              Final result                 Please view results for these tests on the individual orders.    Respiratory Panel PCR w/COVID-19(SARS-CoV-2) TETO/ANY/SHERRY In-House, NP Swab in [facility], 3-4 Hr TAT - Swab, Nasopharynx [[id number]]  (Normal) Collected:  08/08/20 0356    Specimen:  Swab from Nasopharynx Updated:  08/08/20 0456     ADENOVIRUS, PCR Not Detected     Coronavirus 229E Not Detected     Coronavirus HKU1 Not Detected     Coronavirus NL63 Not Detected     Coronavirus OC43 Not Detected     COVID19 Not Detected     Human Metapneumovirus Not Detected     Human Rhinovirus/Enterovirus Not Detected     Influenza A PCR Not Detected     Influenza A H1 Not Detected     Influenza A H1 2009 PCR Not Detected     Influenza A H3 Not Detected     Influenza B PCR Not Detected     Parainfluenza Virus 1 Not Detected     Parainfluenza Virus 2 Not Detected     Parainfluenza Virus 3 Not Detected     Parainfluenza Virus 4 Not Detected     RSV, PCR Not Detected     Bordetella pertussis pcr Not Detected     Bordetella parapertussis PCR Not Detected     Chlamydophila pneumoniae PCR Not Detected     Mycoplasma pneumo by PCR Not Detected    Narrative:       Fact sheet for providers: https://docs.[employer]/wp-content/uploads/VWK9303-4145-PQ6.1-EUA-Provider-Fact-Sheet-3.pdf    Fact sheet for patients: https://docs.[employer]/wp-content/uploads/ZVW4587-4835-OQ9.1-EUA-Patient-Fact-Sheet-1.pdf    Basic Metabolic Panel [[id number]]  (Abnormal) Collected:  08/08/20 0506    Specimen:  Blood Updated:  08/08/20 0648     Glucose 110 mg/dL      BUN 14 mg/dL      Creatinine 1.00 mg/dL      Sodium 137 mmol/L      Potassium 4.1 mmol/L      Chloride 101 mmol/L      CO2 25.0 mmol/L      Calcium 8.5 mg/dL      eGFR Non African Amer 53 mL/min/1.73      BUN/Creatinine Ratio 14.0     Anion Gap 11.0 mmol/L     Narrative:       GFR Normal >60  Chronic  Kidney Disease <60  Kidney Failure <15      CBC (No Diff) [[id number]]  (Abnormal) Collected:  08/08/20 0506    Specimen:  Blood Updated:  08/08/20 0541     WBC 10.19 10*3/mm3      RBC 3.99 10*6/mm3      Hemoglobin 11.6 g/dL      Hematocrit 37.0 %      MCV 92.7 fL      MCH 29.1 pg      MCHC 31.4 g/dL      RDW 13.3 %      RDW-SD 45.6 fl      MPV 9.4 fL      Platelets 318 10*3/mm3     MRSA Screen, PCR (Inpatient) - Swab, Nares [[id number]] Collected:  08/08/20 1009    Specimen:  Swab from Nares Updated:  08/08/20 1026          Imaging Results (Last 24 Hours)     Procedure Component Value Units Date/Time    XR Chest 1 View [[id number]] Collected:  08/08/20 0113     Updated:  08/08/20 0118    Narrative:       PORTABLE CHEST RADIOGRAPH     HISTORY: Cough and right-sided chest pain for 2 weeks     COMPARISON: 07/01/2020     FINDINGS:  Cardiomegaly is present. There is a hiatal hernia. Dense consolidation  is noted at the right lung base, concerning for pneumonia. There is also  a right pleural effusion. Mild atelectasis is suspected at the left lung  base.       Impression:       Dense right basilar consolidation, concerning for pneumonia, as well as  right pleural effusion.     This report was finalized on 8/8/2020 1:15 AM by Dr. Darlene De Anda M.D.                  ECG 12 Lead   Preliminary Result   HEART RATE= 70  bpm   RR Interval= 856  ms   TX Interval= 145  ms   P Horizontal Axis= 12  deg   P Front Axis= 35  deg   QRSD Interval= 124  ms   QT Interval= 425  ms   QRS Axis= -20  deg   T Wave Axis= -32  deg   - ABNORMAL ECG -   Sinus rhythm   Right bundle branch block   Electronically Signed By:    Date and Time of Study: 2020-08-08 00:21:58           Assessment/Plan     Active Hospital Problems    Diagnosis  POA   • **Pneumonia of right lower lobe due to infectious organism [J18.9]  Yes   • Anemia, chronic disease [D63.8]  Yes   • CKD (chronic kidney disease) stage 2, GFR 60-89 ml/min [N18.2]  Yes   • Hypotension  "[I95.9]  Yes   • Oropharyngeal dysphagia [R13.12]  Yes   • Gastroesophageal reflux disease without esophagitis [K21.9]  Yes   • Essential hypertension [I10]  Yes      Resolved Hospital Problems   No resolved problems to display.       Ms. Brandon is a 79 y.o. non-smoker with a history of hypertension, hyperlipidemia, GERD, pneumonia, urinary tract infection who presented to [facility] ER with chief complaint of this of breath admitted for pneumonia of right lower lobe due to infectious organism.      Pneumonia of right lower lobe due to infectious organism.  ? Aspiration with recent nausea / vomiting.  SLP to eval.     Essential hypertension / hypotension.  BP acceptable, continue home dose amlodipine, atenolol, & monitor for changes.    Gastroesophageal reflux disease without esophagitis.  PPI.  Consult GI for reported \"black\" appearing emesis.  Hold dose ASA and Celebrex for now as potential aggravators of differential diagnosis erosive gastritis versus gastric ulcer versus other.    Oropharyngeal dysphagia.  SLP to eval speech / swallow. Noted previous admission 7/1/2020 SLP noted mild oropharyngeal dysphagia.      Anemia, chronic disease.  Hemoglobin appears stable, no active signs of bleeding.    CKD (chronic kidney disease) stage 2, GFR 60-89 ml/min.  Creatinine unremarkable.  Avoid nephrotoxins.    I discussed the patient's findings and my recommendations with Dr. Dominguez.   VTE Prophylaxis - SCD  Code Status - CPR        ANA LUISA Segura  [facility]  08/08/20  11:11    "

## 2020-08-08 NOTE — CONSULTS
"[facility]  Initial Inpatient Consult Note    Referring Provider: Dr. Gordo Mg    Reason for Consultation: Epigastric burning, \"black\" emesis    Subjective     History of present illness:    79 y.o. female who presents for complaints of shortness of breath and cough with eventual gagging.  She describes episodes occur at nighttime when she is reclined and she will admit to some reflux issues which he addresses with Tums in addition to the PPI that she uses daily.  She will have breakthrough symptoms 1 or 2 episodes per week.  She recalls previous upper endoscopy but no records are currently available.  There was some question of dysphagia in the past and she had been worked up by speech pathology with recommendations for modified diet.  She describes a burning sensation that seems to coincide with episodes of nausea with vomiting that are the result of coughing and trying to clear phlegm.  She denies any bright red blood per rectum but described a \"black appearing\" vomitus.  No specific coffee-ground emesis and no reported melena.  Current working diagnosis is pneumonia.    Past Medical History:  Past Medical History:   Diagnosis Date   • Arthritis    • GERD (gastroesophageal reflux disease)    • Hyperlipidemia    • Hypertension    • Incontinent of urine    • PNA (pneumonia)    • Seasonal allergies    • UTI (urinary tract infection)      Past Surgical History:  Past Surgical History:   Procedure Laterality Date   • BREAST BIOPSY     • COLONOSCOPY N/A 8/26/2016    Procedure: COLONOSCOPY WITH POLYPECTOMY (HOT SNARE);  Surgeon: Paulino Narvaez MD;  Location: [facility];  Service:    • VAGINAL HYSTERECTOMY        Social History:   Social History     Tobacco Use   • Smoking status: Never Smoker   • Smokeless tobacco: Never Used   Substance Use Topics   • Alcohol use: Not Currently      Family History:  Family History   Problem Relation Age of Onset   • Colon cancer Brother    • " Heart attack Mother    • Stroke Father    • Lung cancer Sister    • Lung cancer Brother         3 brothers   • Deep vein thrombosis Brother    • Brain cancer Brother    • Uterine cancer Neg Hx    • Ovarian cancer Neg Hx    • Breast cancer Neg Hx    • Pulmonary embolism Neg Hx        Home Meds:  Medications Prior to Admission   Medication Sig Dispense Refill Last Dose   • amLODIPine (NORVASC) 5 MG tablet TAKE 1 TABLET EVERY DAY 90 tablet 3 Past Week at Unknown time   • aspirin 81 MG EC tablet Take 81 mg by mouth Daily.   Past Week at Unknown time   • atenolol (TENORMIN) 50 MG tablet TAKE 1 TABLET EVERY DAY 90 tablet 3 Past Week at Unknown time   • celecoxib (CeleBREX) 200 MG capsule Take 200 mg by mouth Daily.   Past Week at Unknown time   • cetirizine (ZyrTEC) 10 MG tablet Take 10 mg by mouth daily.   Past Week at Unknown time   • fluticasone (FLONASE) 50 MCG/ACT nasal spray USE 2 SPRAYS IN EACH NOSTRIL EVERY DAY 48 g 2 Past Week at Unknown time   • omeprazole (priLOSEC) 20 MG capsule TAKE 1 CAPSULE EVERY DAY 90 capsule 3 Past Week at Unknown time   • oxybutynin XL (DITROPAN-XL) 5 MG 24 hr tablet Take 1 tablet by mouth Daily. 30 tablet 1 Past Week at Unknown time   • simvastatin (ZOCOR) 20 MG tablet TAKE 1 TABLET BY MOUTH EVERY NIGHT. 90 tablet 3 Past Week at Unknown time     Current Meds:     amLODIPine 5 mg Oral Daily   atenolol 50 mg Oral Daily   atorvastatin 10 mg Oral Daily   cefepime 2 g Intravenous Q12H   cetirizine 10 mg Oral Daily   fluticasone 2 spray Each Nare Daily   oxybutynin XL 5 mg Oral Daily   pantoprazole 40 mg Oral QAM   sodium chloride 10 mL Intravenous Q12H   [START ON 8/9/2020] vancomycin 1,250 mg Intravenous Q24H     Allergies:  Allergies   Allergen Reactions   • Penicillins Unknown (See Comments)     Caused a red streak down her leg.     Review of Systems  Pertinent items are noted in HPI, all other systems reviewed and negative     Objective     Vital Signs  Temp:  [98.3 °F (36.8 °C)-98.8 °F  (37.1 °C)] 98.5 °F (36.9 °C)  Heart Rate:  [70-83] 71  Resp:  [16-18] 16  BP: ()/(51-75) 144/66  Physical Exam:  General Appearance:    Alert, cooperative, in no acute distress   Head:    Normocephalic, without obvious abnormality, atraumatic   Eyes:          conjunctivae and sclerae normal, no   icterus   Throat:   no thrush, oral mucosa moist   Neck:   Supple, no adenopathy   Lungs:     Clear to auscultation bilaterally    Heart:    Regular rhythm and normal rate    Chest Wall:    No abnormalities observed   Abdomen:     Soft, nondistended, nontender; normal bowel sounds   Extremities:   no edema, no redness   Skin:   No bruising or rash   Psychiatric:  normal mood and insight     Results Review:   I reviewed the patient's new clinical results.    Results from last 7 days   Lab Units 08/08/20  0506 08/08/20  0022 08/04/20  1011   WBC 10*3/mm3 10.19 10.02 9.82   HEMOGLOBIN g/dL 11.6* 11.7* 12.0   HEMATOCRIT % 37.0 35.6 36.3   PLATELETS 10*3/mm3 318 300 286     Results from last 7 days   Lab Units 08/08/20  0506 08/08/20  0022 08/04/20  1011   SODIUM mmol/L 137 136 139   POTASSIUM mmol/L 4.1 4.2 4.3   CHLORIDE mmol/L 101 98 100   TOTAL CO2 mmol/L  --   --  28.2   CO2 mmol/L 25.0 26.8  --    BUN mg/dL 14 16 18   CREATININE mg/dL 1.00 0.94 1.06*   CALCIUM mg/dL 8.5* 8.7 8.6   BILIRUBIN mg/dL  --  0.4  --    ALK PHOS U/L  --  67  --    ALT (SGPT) U/L  --  8  --    AST (SGOT) U/L  --  12  --    GLUCOSE mg/dL 110* 124*  --          No results found for: LIPASE    Radiology:  XR Chest 1 View   Final Result   Dense right basilar consolidation, concerning for pneumonia, as well as   right pleural effusion.       This report was finalized on 8/8/2020 1:15 AM by Dr. Darlene eD Anda M.D.              Assessment/Plan   Patient Active Problem List   Diagnosis   • Irregular bleeding   • Urinary tract infection   • Dyslipidemia   • Essential hypertension   • Stage 2 chronic kidney disease   • Gastroesophageal reflux  disease without esophagitis   • Seasonal allergies   • Familial hypercholesterolemia   • Bladder spasm   • Bilateral lower extremity edema   • Pneumonia of right lower lobe due to infectious organism   • Failure to thrive in adult   • Hyponatremia   • DNR (do not resuscitate)   • Hypoxia   • Oropharyngeal dysphagia   • Anemia, chronic disease   • CKD (chronic kidney disease) stage 2, GFR 60-89 ml/min   • Hypotension       Assessment:  1. Nausea with vomiting: Seems to be triggered by clearance of phlegm with coughing spells that been start to precipitate gagging sensation.  2. Black emesis: Not clear if this is associated with job bleeding source  3. GERD: Some breakthrough symptoms despite PPI  4. Epigastric pain/burning  5. Dysphagia: Previous speech pathology evaluation    Plan:  · Switch PPI to pantoprazole 40 mg daily  · Monitor H&H  · If symptoms persist can entertain endoscopic evaluation but will defer for the present because of her pneumonia      I discussed the patients findings and my recommendations with patient.    Paulino Narvaez MD

## 2020-08-08 NOTE — PLAN OF CARE
Problem: Patient Care Overview  Goal: Plan of Care Review  Flowsheets (Taken 8/8/2020 130)  Plan of Care Reviewed With: patient  Outcome Summary: Clinical swallow eval completed. Overt coughing, throat clearing, and nose-running noted with thin liquids on initial 2 trials. Pt felt this was d/t water being cold. When SLP provided room-temperature water, throat clear noted x1, but she did appear to tolerate it much better. No s/s with NTL, puree, or solids. REC regular diet/NTL. Meds whole with puree. Upright for PO, small bites and sips, reflux precautions. Water protocol ok w/room-temp water after oral care. Also recommend VFSS for further assessment.

## 2020-08-08 NOTE — PROGRESS NOTES
"[facility]  Clinical Pharmacy Department     Vancomycin and Cefepime Pharmacokinetic Note    Drug: Vancomycin and Cefepime  Indication: HAP  Target level: AUC24 400-600  Consulting Provider:  ANA LUISA Sánchez  Duration of Therapy: 5  Day of Therapy: 1  Other Antimicrobials:      Allergies  Allergies as of 08/07/2020 - Reviewed 08/04/2020   Allergen Reaction Noted   • Penicillins Unknown (See Comments) 10/03/2014       Microbiology and Radiology:    Microbiology Results (last 10 days)       ** No results found for the last 240 hours. **            Labs/Other    Results from last 7 days   Lab Units 08/08/20  0022 08/04/20  1011   BUN mg/dL 16 18   CREATININE mg/dL 0.94 1.06*       Estimated Creatinine Clearance: 50.5 mL/min (by C-G formula based on SCr of 0.94 mg/dL).    Results from last 7 days   Lab Units 08/08/20  0022 08/04/20  1011   WBC 10*3/mm3 10.02 9.82       Intake & Output (last 3 days)       None            Vancomycin levels:                      Dosing History:    Is Patient on Dialysis or Renal Replacement: No  Inpatient Dosing History (date/time):    1750 mg ordered for AM of 8/8/20    Assessment:  Bayesian analysis of the most recent level(s) using [employer] provides the following patient-specific pharmacokinetic parameters:   CL: 2.65 L/kg/h   Vd: 70.7 L/kg   T1/2: 20.7 h  Using these values, the current regimen gives a predicted steady-state trough of 15.8 mg/L and AUC24 of 532 mg*h/L. If the predicted trough on this regimen is not within what was previously considered a \"target trough range\", the AUC24 (a stronger predictor of vancomycin efficacy) is predicted to achieve the accepted target of 400-600 mg*h/L. To best balance efficacy and toxicity, we will be targeting AUC24 in this patient rather than steady-state trough levels.     PLAN:    Regimen: 1500 mg every 24 hours for 5 doses.  Start time: 00:00 on 08/09/2020  Exposure target: AUC24 (range)400-600 mg/L.hr  AUC24,ss: 551 " mg/L.hr  PAUC*: 82 %  Ctrough,ss: 16.4 mg/L  Pconc*: 33 %          Will order Cefepime 2 Gm iv every 12. Pharmacy will discontinue the Pharmacy to Dose consult for Cefepime at this time. Renal function will continue to be monitored and dosing adjustments will be made by pharmacy based on renal function if necessary.      Thank you for involving pharmacy in this patient's care. Please contact pharmacy with any questions or concerns.                             Brent Aquino [facility]  Clinical Pharmacist

## 2020-08-08 NOTE — ED NOTES
.  Nursing report ED to floor  Gabbi Brandon  79 y.o.  female    HPI (triage note):   Chief Complaint   Patient presents with   • Shortness of Breath       Admitting doctor:   Srinivas Fleming MD    Admitting diagnosis:   The primary encounter diagnosis was Pneumonia of right lower lobe due to infectious organism. A diagnosis of Non-intractable vomiting with nausea, unspecified vomiting type was also pertinent to this visit.    Code status:   Current Code Status     Date Active Code Status Order ID Comments User Context       8/8/2020 0318 CPR [id number]  Nataliya Edgar APRN ED       Questions for Current Code Status     Question Answer Comment    Code Status CPR     Medical Interventions (Level of Support Prior to Arrest) Full           Allergies:   Penicillins    Weight:   There were no vitals filed for this visit.    Most recent vitals:   Vitals:    08/08/20 0200 08/08/20 0230 08/08/20 0300 08/08/20 0330   BP: 135/64 125/64 132/56 (!) 89/51   Pulse:  70  79   Resp:       Temp:       TempSrc:       SpO2:           Active LDAs/IV Access:   Lines, Drains & Airways    Active LDAs     Name:   Placement date:   Placement time:   Site:   Days:    Peripheral IV 08/08/20 0022 Right Antecubital   08/08/20    0022    Antecubital   less than 1                Labs (abnormal labs have a star):   Labs Reviewed   COMPREHENSIVE METABOLIC PANEL - Abnormal; Notable for the following components:       Result Value    Glucose 124 (*)     Albumin 2.70 (*)     eGFR Non  Amer 57 (*)     All other components within normal limits    Narrative:     GFR Normal >60  Chronic Kidney Disease <60  Kidney Failure <15     CBC WITH AUTO DIFFERENTIAL - Abnormal; Notable for the following components:    Hemoglobin 11.7 (*)     Lymphocyte % 18.5 (*)     Monocytes, Absolute 0.96 (*)     All other components within normal limits   TROPONIN (IN-HOUSE) - Normal    Narrative:     Troponin T Reference Range:  <= 0.03 ng/mL-    Negative for AMI  >0.03 ng/mL-     Abnormal for myocardial necrosis.  Clinicians would have to utilize clinical acumen, EKG, Troponin and serial changes to determine if it is an Acute Myocardial Infarction or myocardial injury due to an underlying chronic condition.       Results may be falsely decreased if patient taking Biotin.     BNP (IN-HOUSE) - Normal    Narrative:     Among patients with dyspnea, NT-proBNP is highly sensitive for the detection of acute congestive heart failure. In addition NT-proBNP of <300 pg/ml effectively rules out acute congestive heart failure with 99% negative predictive value.    Results may be falsely decreased if patient taking Biotin.     BLOOD CULTURE   BLOOD CULTURE   COVID PRE-OP / PRE-PROCEDURE SCREENING ORDER (NO ISOLATION)    Narrative:     The following orders were created for panel order COVID PRE-OP / PRE-PROCEDURE SCREENING ORDER (NO ISOLATION) - Swab, Nasopharynx.  Procedure                               Abnormality         Status                     ---------                               -----------         ------                     Respiratory Panel PCR w/...[[id number]]                                                   Please view results for these tests on the individual orders.   RESPIRATORY PANEL PCR W/ COVID-19 (SARS-COV-2) TETO/ANY/SHERRY IN-HOUSE, NP SWAB IN UTM/VTP, 3-4 HR TAT   BASIC METABOLIC PANEL   CBC (NO DIFF)   CBC AND DIFFERENTIAL    Narrative:     The following orders were created for panel order CBC & Differential.  Procedure                               Abnormality         Status                     ---------                               -----------         ------                     CBC Auto Differential[[id number]]        Abnormal            Final result                 Please view results for these tests on the individual orders.       EKG:   ECG 12 Lead   Preliminary Result   HEART RATE= 70  bpm   RR Interval= 856  ms   WI Interval= 145  ms   P  Horizontal Axis= 12  deg   P Front Axis= 35  deg   QRSD Interval= 124  ms   QT Interval= 425  ms   QRS Axis= -20  deg   T Wave Axis= -32  deg   - ABNORMAL ECG -   Sinus rhythm   Right bundle branch block   Electronically Signed By:    Date and Time of Study: 2020-08-08 00:21:58          Meds given in ED:   Medications   sodium chloride 0.9 % flush 10 mL (has no administration in time range)   sodium chloride 0.9 % flush 10 mL (has no administration in time range)   vancomycin 1750 mg/500 mL 0.9% NS IVPB ([facility]) (has no administration in time range)   sodium chloride 0.9 % flush 10 mL (has no administration in time range)   sodium chloride 0.9 % flush 10 mL (has no administration in time range)   nitroglycerin (NITROSTAT) SL tablet 0.4 mg (has no administration in time range)   sodium chloride 0.9 % infusion (has no administration in time range)   acetaminophen (TYLENOL) tablet 650 mg (has no administration in time range)     Or   acetaminophen (TYLENOL) 160 MG/5ML solution 650 mg (has no administration in time range)     Or   acetaminophen (TYLENOL) suppository 650 mg (has no administration in time range)   bisacodyl (DULCOLAX) EC tablet 5 mg (has no administration in time range)   calcium carbonate (TUMS) chewable tablet 500 mg (200 mg elemental) (has no administration in time range)   Pharmacy to dose vancomycin (has no administration in time range)   Pharmacy Consult - Pharmacy to dose (has no administration in time range)   cefepime (MAXIPIME) 2 g/100 mL 0.9% NS (mbp) (has no administration in time range)   vancomycin 1500 mg/500 mL 0.9% NS IVPB ([facility]) (has no administration in time range)   sodium chloride 0.9 % bolus 1,000 mL (1,000 mL Intravenous New Bag 8/8/20 0046)   ondansetron (ZOFRAN) injection 4 mg (4 mg Intravenous Given 8/8/20 0046)   cefepime (MAXIPIME) 2 g/100 mL 0.9% NS (mbp) (2 g Intravenous New Bag 8/8/20 0237)       Imaging results:  Xr Chest 1 View    Result Date: 8/8/2020  Dense right basilar  consolidation, concerning for pneumonia, as well as right pleural effusion.  This report was finalized on 8/8/2020 1:15 AM by Dr. Darlene De Anda M.D.        Ambulatory status:   -    Social issues:   Social History     Socioeconomic History   • Marital status:      Spouse name: Not on file   • Number of children: Not on file   • Years of education: Not on file   • Highest education level: Not on file   Tobacco Use   • Smoking status: Never Smoker   • Smokeless tobacco: Never Used   Substance and Sexual Activity   • Alcohol use: Yes     Comment: occasional   • Drug use: No   • Sexual activity: hSannan Shell, EDITH  08/08/20 0336

## 2020-08-09 PROBLEM — R19.7 DIARRHEA: Status: ACTIVE | Noted: 2020-08-09

## 2020-08-09 PROBLEM — E83.51 HYPOCALCEMIA: Status: ACTIVE | Noted: 2020-08-09

## 2020-08-09 LAB
ANION GAP SERPL CALCULATED.3IONS-SCNC: 9.2 MMOL/L (ref 5–15)
BUN SERPL-MCNC: 12 MG/DL (ref 8–23)
BUN/CREAT SERPL: 14.1 (ref 7–25)
CALCIUM SPEC-SCNC: 8.1 MG/DL (ref 8.6–10.5)
CHLORIDE SERPL-SCNC: 107 MMOL/L (ref 98–107)
CO2 SERPL-SCNC: 23.8 MMOL/L (ref 22–29)
CREAT SERPL-MCNC: 0.85 MG/DL (ref 0.57–1)
DEPRECATED RDW RBC AUTO: 43.9 FL (ref 37–54)
ERYTHROCYTE [DISTWIDTH] IN BLOOD BY AUTOMATED COUNT: 13.3 % (ref 12.3–15.4)
GFR SERPL CREATININE-BSD FRML MDRD: 64 ML/MIN/1.73
GLUCOSE SERPL-MCNC: 110 MG/DL (ref 65–99)
HCT VFR BLD AUTO: 32 % (ref 34–46.6)
HGB BLD-MCNC: 10.4 G/DL (ref 12–15.9)
MAGNESIUM SERPL-MCNC: 1.7 MG/DL (ref 1.6–2.4)
MCH RBC QN AUTO: 29.1 PG (ref 26.6–33)
MCHC RBC AUTO-ENTMCNC: 32.5 G/DL (ref 31.5–35.7)
MCV RBC AUTO: 89.6 FL (ref 79–97)
PLATELET # BLD AUTO: 286 10*3/MM3 (ref 140–450)
PMV BLD AUTO: 9.6 FL (ref 6–12)
POTASSIUM SERPL-SCNC: 3.6 MMOL/L (ref 3.5–5.2)
POTASSIUM SERPL-SCNC: 4 MMOL/L (ref 3.5–5.2)
RBC # BLD AUTO: 3.57 10*6/MM3 (ref 3.77–5.28)
SODIUM SERPL-SCNC: 140 MMOL/L (ref 136–145)
TROPONIN T SERPL-MCNC: <0.01 NG/ML (ref 0–0.03)
WBC # BLD AUTO: 7.41 10*3/MM3 (ref 3.4–10.8)

## 2020-08-09 PROCEDURE — 93005 ELECTROCARDIOGRAM TRACING: CPT | Performed by: INTERNAL MEDICINE

## 2020-08-09 PROCEDURE — 99232 SBSQ HOSP IP/OBS MODERATE 35: CPT | Performed by: INTERNAL MEDICINE

## 2020-08-09 PROCEDURE — 84132 ASSAY OF SERUM POTASSIUM: CPT | Performed by: INTERNAL MEDICINE

## 2020-08-09 PROCEDURE — 25010000002 CEFEPIME PER 500 MG: Performed by: NURSE PRACTITIONER

## 2020-08-09 PROCEDURE — 83735 ASSAY OF MAGNESIUM: CPT | Performed by: INTERNAL MEDICINE

## 2020-08-09 PROCEDURE — 93010 ELECTROCARDIOGRAM REPORT: CPT | Performed by: INTERNAL MEDICINE

## 2020-08-09 PROCEDURE — 84484 ASSAY OF TROPONIN QUANT: CPT | Performed by: INTERNAL MEDICINE

## 2020-08-09 PROCEDURE — 80048 BASIC METABOLIC PNL TOTAL CA: CPT | Performed by: NURSE PRACTITIONER

## 2020-08-09 PROCEDURE — 85027 COMPLETE CBC AUTOMATED: CPT | Performed by: NURSE PRACTITIONER

## 2020-08-09 RX ORDER — L.ACID,PARA/B.BIFIDUM/S.THERM 8B CELL
1 CAPSULE ORAL DAILY
Status: DISCONTINUED | OUTPATIENT
Start: 2020-08-09 | End: 2020-08-13 | Stop reason: HOSPADM

## 2020-08-09 RX ORDER — TRAMADOL HYDROCHLORIDE 50 MG/1
25 TABLET ORAL EVERY 12 HOURS PRN
Status: DISCONTINUED | OUTPATIENT
Start: 2020-08-09 | End: 2020-08-13 | Stop reason: HOSPADM

## 2020-08-09 RX ORDER — CELECOXIB 200 MG/1
200 CAPSULE ORAL DAILY
Status: DISCONTINUED | OUTPATIENT
Start: 2020-08-09 | End: 2020-08-13 | Stop reason: HOSPADM

## 2020-08-09 RX ADMIN — CETIRIZINE HYDROCHLORIDE 10 MG: 10 TABLET, FILM COATED ORAL at 09:03

## 2020-08-09 RX ADMIN — ATORVASTATIN CALCIUM 10 MG: 20 TABLET, FILM COATED ORAL at 09:03

## 2020-08-09 RX ADMIN — CEFEPIME HYDROCHLORIDE 2 G: 2 INJECTION, POWDER, FOR SOLUTION INTRAVENOUS at 14:54

## 2020-08-09 RX ADMIN — SODIUM CHLORIDE, PRESERVATIVE FREE 10 ML: 5 INJECTION INTRAVENOUS at 09:03

## 2020-08-09 RX ADMIN — TRAMADOL HYDROCHLORIDE 25 MG: 50 TABLET, FILM COATED ORAL at 20:46

## 2020-08-09 RX ADMIN — CEFEPIME HYDROCHLORIDE 2 G: 2 INJECTION, POWDER, FOR SOLUTION INTRAVENOUS at 02:14

## 2020-08-09 RX ADMIN — SODIUM CHLORIDE, PRESERVATIVE FREE 10 ML: 5 INJECTION INTRAVENOUS at 20:47

## 2020-08-09 RX ADMIN — SODIUM CHLORIDE 50 ML/HR: 9 INJECTION, SOLUTION INTRAVENOUS at 20:48

## 2020-08-09 RX ADMIN — AMLODIPINE BESYLATE 5 MG: 5 TABLET ORAL at 09:03

## 2020-08-09 RX ADMIN — PANTOPRAZOLE SODIUM 40 MG: 40 TABLET, DELAYED RELEASE ORAL at 06:21

## 2020-08-09 RX ADMIN — Medication 1 CAPSULE: at 11:32

## 2020-08-09 RX ADMIN — OXYBUTYNIN CHLORIDE 5 MG: 5 TABLET, EXTENDED RELEASE ORAL at 09:03

## 2020-08-09 RX ADMIN — FLUTICASONE PROPIONATE 2 SPRAY: 50 SPRAY, METERED NASAL at 09:03

## 2020-08-09 RX ADMIN — CELECOXIB 200 MG: 200 CAPSULE ORAL at 11:32

## 2020-08-09 RX ADMIN — ATENOLOL 50 MG: 50 TABLET ORAL at 09:03

## 2020-08-09 RX ADMIN — SODIUM CHLORIDE 50 ML/HR: 9 INJECTION, SOLUTION INTRAVENOUS at 02:15

## 2020-08-09 NOTE — PROGRESS NOTES
"[facility]  Inpatient Progress Note    Reason for Follow Up: Epigastric burning, \"black\" emesis    Subjective     Interval History:   Discussed with RN, discussed with patient and family at bedside.  Patient denies any further epigastric burning or emesis issues.  RN recounts loose stool pattern and orders placed for GI panel as well as probiotic.  Patient inquired if \"arthritic medication\" could be causing some of her GI symptoms.  Advised this is possible.  Given her improvement, would defer endoscopic evaluation at this time but she may warrant this in the future when she is recovered from her pneumonia.    Current Facility-Administered Medications:   •  acetaminophen (TYLENOL) tablet 650 mg, 650 mg, Oral, Q4H PRN **OR** acetaminophen (TYLENOL) 160 MG/5ML solution 650 mg, 650 mg, Oral, Q4H PRN **OR** acetaminophen (TYLENOL) suppository 650 mg, 650 mg, Rectal, Q4H PRN, Nataliya Edgar APRN  •  amLODIPine (NORVASC) tablet 5 mg, 5 mg, Oral, Daily, Howie Hoff APRN, 5 mg at 08/09/20 0903  •  atenolol (TENORMIN) tablet 50 mg, 50 mg, Oral, Daily, Howie Hoff APRN, 50 mg at 08/09/20 0903  •  atorvastatin (LIPITOR) tablet 10 mg, 10 mg, Oral, Daily, Howie Hoff APRN, 10 mg at 08/09/20 0903  •  bisacodyl (DULCOLAX) EC tablet 5 mg, 5 mg, Oral, Daily PRN, Nataliya Edgar APRN  •  calcium carbonate (TUMS) chewable tablet 500 mg (200 mg elemental), 2 tablet, Oral, BID PRN, Nataliya Edgar APRN  •  cefepime (MAXIPIME) 2 g/100 mL 0.9% NS (mbp), 2 g, Intravenous, Q12H, Nataliya Edgar APRN, Stopped at 08/09/20 0307  •  celecoxib (CeleBREX) capsule 200 mg, 200 mg, Oral, Daily, Kj Dominguez, DO  •  cetirizine (zyrTEC) tablet 10 mg, 10 mg, Oral, Daily, Howie Hoff APRN, 10 mg at 08/09/20 0903  •  fluticasone (FLONASE) 50 MCG/ACT nasal spray 2 spray, 2 spray, Each Nare, Daily, Howie Hoff APRN, 2 spray at 08/09/20 0903  •  lactobacillus acidophilus " (RISAQUAD) capsule 1 capsule, 1 capsule, Oral, Daily, Howie Hoff APRN  •  nitroglycerin (NITROSTAT) SL tablet 0.4 mg, 0.4 mg, Sublingual, Q5 Min PRN, Nataliya Edgar APRN  •  oxybutynin XL (DITROPAN-XL) 24 hr tablet 5 mg, 5 mg, Oral, Daily, Howie Hoff APRN, 5 mg at 08/09/20 0903  •  pantoprazole (PROTONIX) EC tablet 40 mg, 40 mg, Oral, QAM, Howie Hoff APRN, 40 mg at 08/09/20 0621  •  [COMPLETED] Insert peripheral IV, , , Once **AND** sodium chloride 0.9 % flush 10 mL, 10 mL, Intravenous, PRN, Jose Dalal MD  •  [COMPLETED] Insert peripheral IV, , , Once **AND** sodium chloride 0.9 % flush 10 mL, 10 mL, Intravenous, PRN, Jose Dalal MD  •  sodium chloride 0.9 % flush 10 mL, 10 mL, Intravenous, Q12H, Nataliya Edgar APRN, 10 mL at 08/09/20 0903  •  sodium chloride 0.9 % flush 10 mL, 10 mL, Intravenous, PRN, Nataliya Edgar APRN  •  sodium chloride 0.9 % infusion, 50 mL/hr, Intravenous, Continuous, Howie Hoff APRN, Last Rate: 50 mL/hr at 08/09/20 1047, 50 mL/hr at 08/09/20 1047  Review of Systems:    All systems were reviewed and negative except for:  Gastrointestinal: positive for  See HPI    Objective     Vital Signs  Temp:  [97.8 °F (36.6 °C)-98.5 °F (36.9 °C)] 98.1 °F (36.7 °C)  Heart Rate:  [67-80] 71  Resp:  [16] 16  BP: (117-144)/(50-85) 134/69  Body mass index is 29.5 kg/m².    Intake/Output Summary (Last 24 hours) at 8/9/2020 1119  Last data filed at 8/9/2020 0849  Gross per 24 hour   Intake 1808.65 ml   Output --   Net 1808.65 ml     I/O this shift:  In: 117.7 [I.V.:117.7]  Out: -      Physical Exam:   General: patient awake, alert and cooperative   Eyes: Normal lids and lashes, no scleral icterus   Neck: supple, normal ROM   Skin: warm and dry, not jaundiced   Cardiovascular: regular rhythm and rate, no murmurs auscultated   Pulm: clear to auscultation bilaterally, regular and unlabored   Abdomen: soft, nontender, nondistended; normal bowel  "sounds   Extremities: no rash or edema   Psychiatric: Normal mood and behavior; memory intact     Results Review:     I reviewed the patient's new clinical results.    Results from last 7 days   Lab Units 08/09/20  0350 08/08/20  0506 08/08/20  0022   WBC 10*3/mm3 7.41 10.19 10.02   HEMOGLOBIN g/dL 10.4* 11.6* 11.7*   HEMATOCRIT % 32.0* 37.0 35.6   PLATELETS 10*3/mm3 286 318 300     Results from last 7 days   Lab Units 08/09/20  0350 08/08/20  0506 08/08/20  0022   SODIUM mmol/L 140 137 136   POTASSIUM mmol/L 4.0 4.1 4.2   CHLORIDE mmol/L 107 101 98   CO2 mmol/L 23.8 25.0 26.8   BUN mg/dL 12 14 16   CREATININE mg/dL 0.85 1.00 0.94   CALCIUM mg/dL 8.1* 8.5* 8.7   BILIRUBIN mg/dL  --   --  0.4   ALK PHOS U/L  --   --  67   ALT (SGPT) U/L  --   --  8   AST (SGOT) U/L  --   --  12   GLUCOSE mg/dL 110* 110* 124*         No results found for: LIPASE    Radiology:  XR Chest 1 View   Final Result   Dense right basilar consolidation, concerning for pneumonia, as well as   right pleural effusion.       This report was finalized on 8/8/2020 1:15 AM by Dr. Darlene De Anda M.D.              Assessment/Plan     Patient Active Problem List   Diagnosis   • Irregular bleeding   • Urinary tract infection   • Dyslipidemia   • Essential hypertension   • Stage 2 chronic kidney disease   • Gastroesophageal reflux disease without esophagitis   • Seasonal allergies   • Familial hypercholesterolemia   • Bladder spasm   • Bilateral lower extremity edema   • Pneumonia of right lower lobe due to infectious organism   • Failure to thrive in adult   • Hyponatremia   • DNR (do not resuscitate)   • Hypoxia   • Oropharyngeal dysphagia   • Anemia, chronic disease   • CKD (chronic kidney disease) stage 2, GFR 60-89 ml/min   • Hypotension   • Diarrhea       Assessment:  1. Nausea with vomiting: Improved  2. \"Black\" emesis: No recurrence  3. GERD: Switch to PPI  4. Dysphagia: Previous speech pathology assessment  5. Loose stools: Work-up in " progress      Plan:  · Continue current medical regimen  · Monitor H&H  · Await stool study results  I discussed the patients findings and my recommendations with patient, family and nursing staff.    Paulino Narvaez MD

## 2020-08-09 NOTE — NURSING NOTE
"Around 1640 Nurse and monitor tech saw abnormal rhythm on monitor, \"looked like vtach\", went into pts room and she was awake laying in the bed, she reported that she \"feels hot\" and she was a little restless in the bed. She denies chest pain or tightness and denies shortness of breath. Removed leads and replaced and repositioned on pt., ordered STAT EKG and labs. I spoke to Dr. Dominguez at 1650 and informed him and he will watch for labs and EKG, pt is asymptomatic at this time. Will continue to monitor closely and watch for lab and ekg results.   "

## 2020-08-09 NOTE — PLAN OF CARE
Problem: Patient Care Overview  Goal: Plan of Care Review  Outcome: Ongoing (interventions implemented as appropriate)  Flowsheets  Taken 8/9/2020 0301  Progress: no change  Outcome Summary: UP TO RESTROOM WITH STANDBY ASSIST AND WALKER. WEAK BUT SEEMS TO BE MOVING DECENT. PT WANTED TO SLEEP ON LEFT SIDE, SPO2 DROPPED INTO HIGH 80'S. PT PLACED ON NASAL CANNULA @ 2 LPM AND IS TOLERATING WELL. ADMINISTERED IV ABX. A/O X4. MMM. RR E/U. SKIN PWD  Taken 8/8/2020 2022  Plan of Care Reviewed With: patient

## 2020-08-09 NOTE — PROGRESS NOTES
Name: Gabbi Brandon ADMIT: 2020   : 1940  PCP: Gordo Mg MD    MRN: [id number] LOS: 1 days   AGE/SEX: 79 y.o. female  ROOM: [facility]     Subjective   Subjective   Patient appears comfortable & in generally weak & in no apparent distress.  Tolerating PO.  C/O generalized pain & inability to stand 2/2 pain as currently off Celebrex d/t concerns for hematemesis.     Review of Systems     Objective   Objective   Vital Signs  Temp:  [97.8 °F (36.6 °C)-98.3 °F (36.8 °C)] 98.3 °F (36.8 °C)  Heart Rate:  [67-80] 73  Resp:  [16] 16  BP: (117-135)/(50-85) 125/62  SpO2:  [92 %-96 %] 92 %  on  Flow (L/min):  [2] 2;   Device (Oxygen Therapy): room air  Body mass index is 29.5 kg/m².     Physical Exam   Constitutional: She is oriented to person, place, and time. No distress.   HENT:   Head: Normocephalic and atraumatic.   Eyes: Pupils are equal, round, and reactive to light. EOM are normal.   Neck: Normal range of motion. Neck supple.   Cardiovascular: Normal rate and normal heart sounds.   Pulmonary/Chest: Effort normal and breath sounds normal.   Abdominal: Soft. Bowel sounds are normal. There is no tenderness.   Musculoskeletal: She exhibits no edema or deformity.   Neurological: She is alert and oriented to person, place, and time. No cranial nerve deficit or sensory deficit. She exhibits normal muscle tone. Coordination normal.   Skin: Skin is warm and dry. She is not diaphoretic.   Psychiatric: She has a normal mood and affect. Her behavior is normal. Judgment and thought content normal.       Results Review:       I reviewed the patient's new clinical results.  Results from last 7 days   Lab Units 20  0350 20  0506 20  0022 20  1011   WBC 10*3/mm3 7.41 10.19 10.02 9.82   HEMOGLOBIN g/dL 10.4* 11.6* 11.7* 12.0   PLATELETS 10*3/mm3 286 318 300 286     Results from last 7 days   Lab Units 20  0350 20  0506 20  0022 20  1011   SODIUM mmol/L 140 137  136 139   POTASSIUM mmol/L 4.0 4.1 4.2 4.3   CHLORIDE mmol/L 107 101 98 100   TOTAL CO2 mmol/L  --   --   --  28.2   CO2 mmol/L 23.8 25.0 26.8  --    BUN mg/dL 12 14 16 18   CREATININE mg/dL 0.85 1.00 0.94 1.06*   GLUCOSE mg/dL 110* 110* 124*  --    Estimated Creatinine Clearance: 54.4 mL/min (by C-G formula based on SCr of 0.85 mg/dL).  Results from last 7 days   Lab Units 08/08/20  0022   ALBUMIN g/dL 2.70*   BILIRUBIN mg/dL 0.4   ALK PHOS U/L 67   AST (SGOT) U/L 12   ALT (SGPT) U/L 8     Results from last 7 days   Lab Units 08/09/20  0350 08/08/20  0506 08/08/20  0022 08/04/20  1011   CALCIUM mg/dL 8.1* 8.5* 8.7 8.6   ALBUMIN g/dL  --   --  2.70*  --        COVID19   Date Value Ref Range Status   08/08/2020 Not Detected Not Detected - Ref. Range Final   07/01/2020 Not Detected Not Detected - Ref. Range Final     No results found for: HGBA1C, POCGLU    XR Chest 1 View  Narrative: PORTABLE CHEST RADIOGRAPH     HISTORY: Cough and right-sided chest pain for 2 weeks     COMPARISON: 07/01/2020     FINDINGS:  Cardiomegaly is present. There is a hiatal hernia. Dense consolidation  is noted at the right lung base, concerning for pneumonia. There is also  a right pleural effusion. Mild atelectasis is suspected at the left lung  base.     Impression: Dense right basilar consolidation, concerning for pneumonia, as well as  right pleural effusion.     This report was finalized on 8/8/2020 1:15 AM by Dr. Darlene De Anda M.D.           amLODIPine 5 mg Oral Daily   atenolol 50 mg Oral Daily   atorvastatin 10 mg Oral Daily   cefepime 2 g Intravenous Q12H   celecoxib 200 mg Oral Daily   cetirizine 10 mg Oral Daily   fluticasone 2 spray Each Nare Daily   lactobacillus acidophilus 1 capsule Oral Daily   oxybutynin XL 5 mg Oral Daily   pantoprazole 40 mg Oral QAM   sodium chloride 10 mL Intravenous Q12H       sodium chloride 50 mL/hr Last Rate: 50 mL/hr (08/09/20 3952)   Diet Regular; Nectar / Syrup Thick       Assessment/Plan          Active Hospital Problems    Diagnosis  POA   • **Pneumonia of right lower lobe due to infectious organism [J18.9]  Yes   • Diarrhea [R19.7]  Clinically Undetermined   • Hypocalcemia [E83.51]  Unknown   • Anemia, chronic disease [D63.8]  Yes   • CKD (chronic kidney disease) stage 2, GFR 60-89 ml/min [N18.2]  Yes   • Hypotension [I95.9]  Yes   • Oropharyngeal dysphagia [R13.12]  Yes   • Gastroesophageal reflux disease without esophagitis [K21.9]  Yes   • Essential hypertension [I10]  Yes      Resolved Hospital Problems   No resolved problems to display.       79 y.o. female admitted with Pneumonia of right lower lobe due to infectious organism.    ·   Pneumonia of right lower lobe due to infectious organism.  ? Aspiration with recent nausea / vomiting.  SLP to eval.  MRSA not detected.  DC vanc.  Cefepime for now--recent treatment of PNA with IV Levaquin 7/1/20.  VRP & COVID-19 not detected.  BC x2 NGTD.  ·   Essential hypertension / hypotension.  BP acceptable, continue home dose amlodipine, atenolol, & monitor for changes.  ·   Gastroesophageal reflux disease without esophagitis.  PPI.  GI following, input appreciated for ? Hematemesis--? endoscope.  Hold dose ASA and Celebrex for now as potential aggravators of differential diagnosis erosive gastritis versus gastric ulcer versus other.  Ordering tramadol PRN for chronic OA pain.  Stool hemoccult pending.  Change PPI.  ·   Oropharyngeal dysphagia.  SLP to eval speech / swallow. Noted previous admission 7/1/2020 SLP noted mild oropharyngeal dysphagia.    ·   Anemia, chronic disease.  Hemoglobin trending downward, no active signs of bleeding.  ·   CKD (chronic kidney disease) stage 2, GFR 60-89 ml/min.  Creatinine unremarkable.  Avoid nephrotoxins.    · SCD for DVT prophylaxis.  · No CPR  · Discussed with Dr. Dominguez.  · Anticipate discharge TBD      ANA LUISA Segura  [facility]  08/09/20  13:39    I wore protective equipment throughout  this patient encounter including a face mask, gloves and protective eyewear.  Hand hygiene was performed before donning protective equipment and after removal when leaving the room.

## 2020-08-10 ENCOUNTER — PREP FOR SURGERY (OUTPATIENT)
Dept: OTHER | Facility: HOSPITAL | Age: 80
End: 2020-08-10

## 2020-08-10 ENCOUNTER — APPOINTMENT (OUTPATIENT)
Dept: GENERAL RADIOLOGY | Facility: HOSPITAL | Age: 80
End: 2020-08-10

## 2020-08-10 ENCOUNTER — APPOINTMENT (OUTPATIENT)
Dept: ULTRASOUND IMAGING | Facility: HOSPITAL | Age: 80
End: 2020-08-10

## 2020-08-10 DIAGNOSIS — R11.2 NON-INTRACTABLE VOMITING WITH NAUSEA, UNSPECIFIED VOMITING TYPE: Primary | ICD-10-CM

## 2020-08-10 PROBLEM — R11.10 NON-INTRACTABLE VOMITING: Status: ACTIVE | Noted: 2020-08-07

## 2020-08-10 LAB
ANION GAP SERPL CALCULATED.3IONS-SCNC: 8.8 MMOL/L (ref 5–15)
BUN SERPL-MCNC: 8 MG/DL (ref 8–23)
BUN/CREAT SERPL: 10.8 (ref 7–25)
CALCIUM SPEC-SCNC: 8 MG/DL (ref 8.6–10.5)
CHLORIDE SERPL-SCNC: 106 MMOL/L (ref 98–107)
CO2 SERPL-SCNC: 24.2 MMOL/L (ref 22–29)
CREAT SERPL-MCNC: 0.74 MG/DL (ref 0.57–1)
DEPRECATED RDW RBC AUTO: 43.4 FL (ref 37–54)
ERYTHROCYTE [DISTWIDTH] IN BLOOD BY AUTOMATED COUNT: 13.3 % (ref 12.3–15.4)
GFR SERPL CREATININE-BSD FRML MDRD: 75 ML/MIN/1.73
GLUCOSE SERPL-MCNC: 108 MG/DL (ref 65–99)
HCT VFR BLD AUTO: 31.7 % (ref 34–46.6)
HGB BLD-MCNC: 10.7 G/DL (ref 12–15.9)
MCH RBC QN AUTO: 30 PG (ref 26.6–33)
MCHC RBC AUTO-ENTMCNC: 33.8 G/DL (ref 31.5–35.7)
MCV RBC AUTO: 88.8 FL (ref 79–97)
PLATELET # BLD AUTO: 284 10*3/MM3 (ref 140–450)
PMV BLD AUTO: 9.4 FL (ref 6–12)
POTASSIUM SERPL-SCNC: 3.6 MMOL/L (ref 3.5–5.2)
RBC # BLD AUTO: 3.57 10*6/MM3 (ref 3.77–5.28)
SODIUM SERPL-SCNC: 139 MMOL/L (ref 136–145)
WBC # BLD AUTO: 7.19 10*3/MM3 (ref 3.4–10.8)

## 2020-08-10 PROCEDURE — 85027 COMPLETE CBC AUTOMATED: CPT | Performed by: NURSE PRACTITIONER

## 2020-08-10 PROCEDURE — 80048 BASIC METABOLIC PNL TOTAL CA: CPT | Performed by: NURSE PRACTITIONER

## 2020-08-10 PROCEDURE — 76705 ECHO EXAM OF ABDOMEN: CPT

## 2020-08-10 PROCEDURE — 99232 SBSQ HOSP IP/OBS MODERATE 35: CPT | Performed by: INTERNAL MEDICINE

## 2020-08-10 PROCEDURE — 25010000002 CEFEPIME PER 500 MG: Performed by: NURSE PRACTITIONER

## 2020-08-10 PROCEDURE — 74230 X-RAY XM SWLNG FUNCJ C+: CPT

## 2020-08-10 PROCEDURE — 92611 MOTION FLUOROSCOPY/SWALLOW: CPT

## 2020-08-10 RX ADMIN — BARIUM SULFATE 55 ML: 0.81 POWDER, FOR SUSPENSION ORAL at 11:49

## 2020-08-10 RX ADMIN — AMLODIPINE BESYLATE 5 MG: 5 TABLET ORAL at 10:40

## 2020-08-10 RX ADMIN — OXYBUTYNIN CHLORIDE 5 MG: 5 TABLET, EXTENDED RELEASE ORAL at 10:40

## 2020-08-10 RX ADMIN — CEFEPIME HYDROCHLORIDE 2 G: 2 INJECTION, POWDER, FOR SOLUTION INTRAVENOUS at 01:50

## 2020-08-10 RX ADMIN — Medication 1 CAPSULE: at 10:38

## 2020-08-10 RX ADMIN — CELECOXIB 200 MG: 200 CAPSULE ORAL at 10:38

## 2020-08-10 RX ADMIN — PANTOPRAZOLE SODIUM 40 MG: 40 TABLET, DELAYED RELEASE ORAL at 06:52

## 2020-08-10 RX ADMIN — FLUTICASONE PROPIONATE 2 SPRAY: 50 SPRAY, METERED NASAL at 10:41

## 2020-08-10 RX ADMIN — CEFEPIME HYDROCHLORIDE 2 G: 2 INJECTION, POWDER, FOR SOLUTION INTRAVENOUS at 13:46

## 2020-08-10 RX ADMIN — ATORVASTATIN CALCIUM 10 MG: 20 TABLET, FILM COATED ORAL at 10:38

## 2020-08-10 RX ADMIN — BARIUM SULFATE 50 ML: 400 SUSPENSION ORAL at 11:50

## 2020-08-10 RX ADMIN — CETIRIZINE HYDROCHLORIDE 10 MG: 10 TABLET, FILM COATED ORAL at 10:40

## 2020-08-10 RX ADMIN — BARIUM SULFATE 4 ML: 980 POWDER, FOR SUSPENSION ORAL at 11:50

## 2020-08-10 RX ADMIN — ATENOLOL 50 MG: 50 TABLET ORAL at 10:40

## 2020-08-10 RX ADMIN — SODIUM CHLORIDE, PRESERVATIVE FREE 10 ML: 5 INJECTION INTRAVENOUS at 20:15

## 2020-08-10 NOTE — PROGRESS NOTES
Gastroenterology   Inpatient Progress Note    Reason for Follow Up:  Epigastric pain and possible black emesis    Subjective     Interval History:   Patient reports feeling okay today.  Morning she reported a discomfort in the epigastric area.  She says that it comes and goes.  It is not there all day long but at times it is worse than others.  It is associated with some underlying nausea but she has not had any further emesis.  No hematemesis no black stools no bright red blood per rectum no melena    Current Facility-Administered Medications:   •  acetaminophen (TYLENOL) tablet 650 mg, 650 mg, Oral, Q4H PRN **OR** acetaminophen (TYLENOL) 160 MG/5ML solution 650 mg, 650 mg, Oral, Q4H PRN **OR** acetaminophen (TYLENOL) suppository 650 mg, 650 mg, Rectal, Q4H PRN, Nataliya Edgar APRN  •  amLODIPine (NORVASC) tablet 5 mg, 5 mg, Oral, Daily, Howie Hoff APRN, 5 mg at 08/09/20 0903  •  atenolol (TENORMIN) tablet 50 mg, 50 mg, Oral, Daily, Howie Hoff APRN, 50 mg at 08/09/20 0903  •  atorvastatin (LIPITOR) tablet 10 mg, 10 mg, Oral, Daily, Howie Hoff APRN, 10 mg at 08/09/20 0903  •  bisacodyl (DULCOLAX) EC tablet 5 mg, 5 mg, Oral, Daily PRN, Nataliya Edgar APRN  •  calcium carbonate (TUMS) chewable tablet 500 mg (200 mg elemental), 2 tablet, Oral, BID PRN, Nataliya Edgar APRN  •  cefepime (MAXIPIME) 2 g/100 mL 0.9% NS (mbp), 2 g, Intravenous, Q12H, Nataliya Edgar APRN, Last Rate: 0 mL/hr at 08/09/20 0307, 2 g at 08/10/20 0150  •  celecoxib (CeleBREX) capsule 200 mg, 200 mg, Oral, Daily, Kj Dominguez, DO, 200 mg at 08/09/20 1132  •  cetirizine (zyrTEC) tablet 10 mg, 10 mg, Oral, Daily, Howie Hoff APRN, 10 mg at 08/09/20 0903  •  fluticasone (FLONASE) 50 MCG/ACT nasal spray 2 spray, 2 spray, Each Nare, Daily, Howie Hoff APRN, 2 spray at 08/09/20 0903  •  lactobacillus acidophilus (RISAQUAD) capsule 1 capsule, 1 capsule, Oral, Daily, Howie Hoff APRN, 1  capsule at 08/09/20 1132  •  nitroglycerin (NITROSTAT) SL tablet 0.4 mg, 0.4 mg, Sublingual, Q5 Min PRN, Nataliya Edgar APRN  •  oxybutynin XL (DITROPAN-XL) 24 hr tablet 5 mg, 5 mg, Oral, Daily, Howie Hoff APRN, 5 mg at 08/09/20 0903  •  pantoprazole (PROTONIX) EC tablet 40 mg, 40 mg, Oral, QAM, Howie Hoff APRN, 40 mg at 08/10/20 0652  •  [COMPLETED] Insert peripheral IV, , , Once **AND** sodium chloride 0.9 % flush 10 mL, 10 mL, Intravenous, PRN, Jose Dalal MD  •  [COMPLETED] Insert peripheral IV, , , Once **AND** sodium chloride 0.9 % flush 10 mL, 10 mL, Intravenous, PRN, Jose Dalal MD  •  sodium chloride 0.9 % flush 10 mL, 10 mL, Intravenous, Q12H, Nataliya Edgar APRN, 10 mL at 08/09/20 2047  •  sodium chloride 0.9 % flush 10 mL, 10 mL, Intravenous, PRN, Nataliya Edgar APRN  •  sodium chloride 0.9 % infusion, 50 mL/hr, Intravenous, Continuous, Howie Hoff APRN, Last Rate: 50 mL/hr at 08/09/20 2048, 50 mL/hr at 08/09/20 2048  •  traMADol (ULTRAM) tablet 25 mg, 25 mg, Oral, Q12H PRN, Howie Hoff APRN, 25 mg at 08/09/20 2046  Review of Systems:               All systems were reviewed and negative except for:  Gastrointestinal: positive for  nausea and pain    Objective     Vital Signs  Temp:  [98.1 °F (36.7 °C)-98.6 °F (37 °C)] 98.4 °F (36.9 °C)  Heart Rate:  [60-77] 77  Resp:  [16-18] 16  BP: (125-142)/(56-80) 142/77  Body mass index is 29.5 kg/m².    Intake/Output Summary (Last 24 hours) at 8/10/2020 0821  Last data filed at 8/10/2020 0708  Gross per 24 hour   Intake 337.65 ml   Output 900 ml   Net -562.35 ml     I/O this shift:  In: -   Out: 600 [Urine:600]                Physical Exam:              General: patient awake, alert and cooperative              Eyes: no scleral icterus              Skin: warm and dry, not jaundiced              Abdomen: soft, mild tender to palpation in the epigastric region, nondistended; normal bowel sounds, no masses  palpated, no periumbical lymphadenopathy              Psychiatric: Appropriate affect and behavior                Results Review:                I reviewed the patient's new clinical results.    Results from last 7 days   Lab Units 08/10/20  0507 08/09/20  0350 08/08/20  0506   WBC 10*3/mm3 7.19 7.41 10.19   HEMOGLOBIN g/dL 10.7* 10.4* 11.6*   HEMATOCRIT % 31.7* 32.0* 37.0   PLATELETS 10*3/mm3 284 286 318     Results from last 7 days   Lab Units 08/10/20  0507 08/09/20  1656 08/09/20  0350 08/08/20  0506 08/08/20  0022   SODIUM mmol/L 139  --  140 137 136   POTASSIUM mmol/L 3.6 3.6 4.0 4.1 4.2   CHLORIDE mmol/L 106  --  107 101 98   CO2 mmol/L 24.2  --  23.8 25.0 26.8   BUN mg/dL 8  --  12 14 16   CREATININE mg/dL 0.74  --  0.85 1.00 0.94   CALCIUM mg/dL 8.0*  --  8.1* 8.5* 8.7   BILIRUBIN mg/dL  --   --   --   --  0.4   ALK PHOS U/L  --   --   --   --  67   ALT (SGPT) U/L  --   --   --   --  8   AST (SGOT) U/L  --   --   --   --  12   GLUCOSE mg/dL 108*  --  110* 110* 124*         No results found for: LIPASE    Radiology:  XR Chest 1 View   Final Result   Dense right basilar consolidation, concerning for pneumonia, as well as   right pleural effusion.       This report was finalized on 8/8/2020 1:15 AM by Dr. Darlene De Anda M.D.          FL Video Swallow With Speech Single Contrast    (Results Pending)       Assessment/Plan     Patient Active Problem List   Diagnosis   • Irregular bleeding   • Urinary tract infection   • Dyslipidemia   • Essential hypertension   • Stage 2 chronic kidney disease   • Gastroesophageal reflux disease without esophagitis   • Seasonal allergies   • Familial hypercholesterolemia   • Bladder spasm   • Bilateral lower extremity edema   • Pneumonia of right lower lobe due to infectious organism   • Failure to thrive in adult   • Hyponatremia   • DNR (do not resuscitate)   • Hypoxia   • Oropharyngeal dysphagia   • Anemia, chronic disease   • CKD (chronic kidney disease) stage 2, GFR  60-89 ml/min   • Hypotension   • Diarrhea   • Hypocalcemia       Assessment:  1. Nausea and vomiting.  While this appears to have improved she was complaining of nausea again to me this morning  2. Epigastric discomfort  3. Reported history of black emesis  4. GERD and now on PPI  5. History of dysphasia with a previous speech pathology assessment  6. Loose stools, await GI studies  7. Shortness of air on presentation patient does not report feeling shortness of breath today    These problems are new to me.  Plan:  · Follow-up on GI studies  · Continue supportive therapy  · Check right upper quadrant ultrasound for epigastric pain and nausea  · Schedule EGD can plan for tomorrow.  The patient does not look in any acute distress with shortness of breath and due to her evolving symptoms would rule out pathology in the upper GI tract.  We will make sure primary service is okay with the sedation prior to scheduling  I discussed the patients findings and my recommendations with patient and primary care team.             Glenn Rivas M.D.  [facility] [place] [address]  Office: [phone]

## 2020-08-10 NOTE — PROGRESS NOTES
[facility] HOSPITALIST    PROGRESS NOTE    Name:  Gabbi Brandon   Age:  79 y.o.  Sex:  female  :  1940  MRN:  [id number]   Visit Number:  [id number]  Admission Date:  2020  Date Of Service:  08/10/20  Primary Care Physician:  Gordo Mg MD     LOS: 2 days :  Patient Care Team:  Gordo Mg MD as PCP - General (Internal Medicine)  Gordo Mg MD as PCP - Claims Attributed:    History taken from:     patient family    Chief Complaint:      Aspiration pneumonia    Subjective     Interval History:     Patient seen and examined again today.    Patient 79-year-old with history of hypertension, hyperlipidemia, GERD, pneumonia, UTI who was admitted for right lower lobe pneumonia.  She had previously been admitted on early July for the same thing.  She claims she has nausea and vomiting in the middle of the night and then aspirates.  She claims she has shortness of breath with a cough.  She also claims to have hematemesis as well as a fever.  She was seen by speech therapy previously noted mild oropharyngeal dysphagia.  Speech therapy has seen the patient on this visit and video swallow.   They recommended regular diet with thin liquids.      General neurology has seen the patient and recommended EGD, to be done tomorrow.  Ultrasound was ordered by them as well, showed gallstones, however no cholecystitis and LFTs are normal.    Continues to be off oxygen.  Patient currently denies any chest pressure, shortness of breath, nausea, vomiting, or pain.  She states she feels better.  Patient claims she can ambulate around the room without difficulty. She is on room air at present.  Continue with cefepime at present.  Could transition to oral antibiotics on discharge.  Await EGD.  If patient is stable after that could be discharged home.    Spoke to the son, answered all questions.    Review of Systems:     All systems were reviewed and negative except for:   Gastrointestinal: positive for  nausea and vomiting    Objective     Vital Signs:    Temp:  [98.1 °F (36.7 °C)-98.7 °F (37.1 °C)] 98.7 °F (37.1 °C)  Heart Rate:  [60-77] 73  Resp:  [16-18] 18  BP: (125-142)/(56-80) 135/72    Physical Exam:    General: Alert and oriented x4, well-developed well-nourished, no acute distress  Heart: Regular rate and rhythm without murmur rub or thrill  Lungs: Clear to auscultation bilaterally without use of accessory muscles of respiration  Abdomen: Soft/nontender/nondistended.  No paraspinal megaly is noted.  MSK: 4/5 strength in upper/lower extremities bilaterally     Results Review:      I reviewed the patient's new clinical results.    Labs:    Lab Results (last 24 hours)     Procedure Component Value Units Date/Time    Basic Metabolic Panel [[id number]]  (Abnormal) Collected:  08/10/20 0507    Specimen:  Blood Updated:  08/10/20 0624     Glucose 108 mg/dL      BUN 8 mg/dL      Creatinine 0.74 mg/dL      Sodium 139 mmol/L      Potassium 3.6 mmol/L      Chloride 106 mmol/L      CO2 24.2 mmol/L      Calcium 8.0 mg/dL      eGFR Non African Amer 75 mL/min/1.73      BUN/Creatinine Ratio 10.8     Anion Gap 8.8 mmol/L     Narrative:       GFR Normal >60  Chronic Kidney Disease <60  Kidney Failure <15      CBC (No Diff) [[id number]]  (Abnormal) Collected:  08/10/20 0507    Specimen:  Blood Updated:  08/10/20 0605     WBC 7.19 10*3/mm3      RBC 3.57 10*6/mm3      Hemoglobin 10.7 g/dL      Hematocrit 31.7 %      MCV 88.8 fL      MCH 30.0 pg      MCHC 33.8 g/dL      RDW 13.3 %      RDW-SD 43.4 fl      MPV 9.4 fL      Platelets 284 10*3/mm3     Blood Culture - Blood, Arm, Right [[id number]] Collected:  08/08/20 0234    Specimen:  Blood from Arm, Right Updated:  08/10/20 0245     Blood Culture No growth at 2 days    Blood Culture - Blood, Arm, Left [[id number]] Collected:  08/08/20 0234    Specimen:  Blood from Arm, Left Updated:  08/10/20 0245     Blood Culture No growth at 2 days    Troponin  [[id number]]  (Normal) Collected:  08/09/20 1656    Specimen:  Blood Updated:  08/09/20 1853     Troponin T <0.010 ng/mL     Narrative:       Troponin T Reference Range:  <= 0.03 ng/mL-   Negative for AMI  >0.03 ng/mL-     Abnormal for myocardial necrosis.  Clinicians would have to utilize clinical acumen, EKG, Troponin and serial changes to determine if it is an Acute Myocardial Infarction or myocardial injury due to an underlying chronic condition.       Results may be falsely decreased if patient taking Biotin.      Potassium [[id number]]  (Normal) Collected:  08/09/20 1656    Specimen:  Blood Updated:  08/09/20 1847     Potassium 3.6 mmol/L     Magnesium [[id number]]  (Normal) Collected:  08/09/20 1656    Specimen:  Blood Updated:  08/09/20 1847     Magnesium 1.7 mg/dL            Radiology:    Imaging Results (Last 24 Hours)     Procedure Component Value Units Date/Time    FL Video Swallow With Speech Single Contrast [[id number]] Resulted:  08/10/20 1125     Updated:  08/10/20 1150    US Abdomen Limited [[id number]] Collected:  08/10/20 1000     Updated:  08/10/20 1005    Narrative:       RIGHT UPPER QUADRANT ULTRASOUND     HISTORY: Epigastric pain     COMPARISON: CT abdomen/pelvis 07/01/2020     TECHNIQUE: Real time ultrasound imaging of the right upper quadrant was  performed.      FINDINGS: Visualized portions of the pancreas are unremarkable. Liver  normal in echogenicity. No biliary ductal dilatation. Gallbladder not  distended. No gallbladder wall thickening or pericholecystic fluid. Some  layering echogenic material within the gallbladder may reflect layering  stones or sludge. Right kidney measures 10.8 cm in length without  hydronephrosis. Common duct measures 4 mm.       Impression:       Layering echogenic material within the gallbladder may reflect layering  stones or sludge. No gallbladder wall thickening or pericholecystic  fluid     This report was finalized on 8/10/2020 10:02 AM by Dr. Yong FISHER  JIE Damon.             Medication Review:       amLODIPine 5 mg Oral Daily   atenolol 50 mg Oral Daily   atorvastatin 10 mg Oral Daily   cefepime 2 g Intravenous Q12H   celecoxib 200 mg Oral Daily   cetirizine 10 mg Oral Daily   fluticasone 2 spray Each Nare Daily   lactobacillus acidophilus 1 capsule Oral Daily   oxybutynin XL 5 mg Oral Daily   pantoprazole 40 mg Oral QAM   sodium chloride 10 mL Intravenous Q12H         sodium chloride 50 mL/hr Last Rate: 50 mL/hr (08/09/20 2048)       Assessment/Plan     Problem List Items Addressed This Visit        Respiratory    * (Principal) Pneumonia of right lower lobe due to infectious organism - Primary    Relevant Medications    cetirizine (ZyrTEC) 10 MG tablet    fluticasone (FLONASE) 50 MCG/ACT nasal spray       Digestive    Non-intractable vomiting    Overview     Added automatically from request for surgery [id number]               1.  Recurrent aspiration pneumonia, continue cefepime.  Discontinue vancomycin as MRSA screen was negative.  2.  Recurrent nausea and vomiting with possible hematemesis.  GI consulted.  EGD planned.  3.  Essential hypertension, continue medications.  4.  Oropharyngeal dysphagia, speech therapy cleared for regular diet/thin liquids.  5.  Anemia of chronic disease, hemoglobin stable.  6.  Chronic kidney disease stage II, stable.    Plan:    EGD planned for tomorrow.  Continue cefepime.  Check lab work in the a.m.  Probable Home after EGD depending on results.  Further recommendations will depend on the clinical course.    Kj Dominguez DO  08/10/20  16:24

## 2020-08-10 NOTE — PLAN OF CARE
No changes in condition.  Problem: Fall Risk (Adult)  Goal: Absence of Fall  Outcome: Ongoing (interventions implemented as appropriate)     Problem: Pneumonia (Adult)  Goal: Signs and Symptoms of Listed Potential Problems Will be Absent, Minimized or Managed (Pneumonia)  Outcome: Ongoing (interventions implemented as appropriate)     Problem: Skin Injury Risk (Adult)  Goal: Identify Related Risk Factors and Signs and Symptoms  Outcome: Ongoing (interventions implemented as appropriate)

## 2020-08-10 NOTE — MBS/VFSS/FEES
Acute Care - Speech Language Pathology   Swallow Initial Evaluation [facility]     Patient Name: Gabbi Brandon  : 1940  MRN: [id number]  Today's Date: 8/10/2020               Admit Date: 2020    Visit Dx:     ICD-10-CM ICD-9-CM   1. Pneumonia of right lower lobe due to infectious organism J18.9 486   2. Non-intractable vomiting with nausea, unspecified vomiting type R11.2 787.01     Patient Active Problem List   Diagnosis   • Irregular bleeding   • Urinary tract infection   • Dyslipidemia   • Essential hypertension   • Stage 2 chronic kidney disease   • Gastroesophageal reflux disease without esophagitis   • Seasonal allergies   • Familial hypercholesterolemia   • Bladder spasm   • Bilateral lower extremity edema   • Pneumonia of right lower lobe due to infectious organism   • Failure to thrive in adult   • Hyponatremia   • DNR (do not resuscitate)   • Hypoxia   • Oropharyngeal dysphagia   • Anemia, chronic disease   • CKD (chronic kidney disease) stage 2, GFR 60-89 ml/min   • Hypotension   • Diarrhea   • Hypocalcemia   • Non-intractable vomiting     Past Medical History:   Diagnosis Date   • Arthritis    • GERD (gastroesophageal reflux disease)    • Hyperlipidemia    • Hypertension    • Incontinent of urine    • PNA (pneumonia)    • Seasonal allergies    • UTI (urinary tract infection)      Past Surgical History:   Procedure Laterality Date   • BREAST BIOPSY     • COLONOSCOPY N/A 2016    Procedure: COLONOSCOPY WITH POLYPECTOMY (HOT SNARE);  Surgeon: Paulino Narvaez MD;  Location: [facility] ENDOSCOPY;  Service:    • VAGINAL HYSTERECTOMY          SWALLOW EVALUATION (last 72 hours)      SLP Adult Swallow Evaluation     Row Name 08/10/20 1200 20 1200                Rehab Evaluation    Document Type  evaluation  -AW  evaluation  -CP       Subjective Information  no complaints  -AW  no complaints  -CP       Patient Observations  alert;cooperative;agree to therapy  -AW  alert;cooperative   -CP       Patient Effort  good  -AW  good  -CP       Symptoms Noted During/After Treatment  none  -AW  none  -CP          General Information    Patient Profile Reviewed  yes  -AW  yes  -CP       Pertinent History Of Current Problem  Admitted with aspiration PNA. Recently d/c in July with asp PNA as well. VFSS 7/3 showed transient penetration with thin; rec MS/TL.   -AW  Admitted with aspiration PNA. Recently d/c in July with asp PNA as well. VFSS 7/3 showed transient penetration with thin; rec MS/TL.   -CP       Current Method of Nutrition  regular textures;nectar/syrup-thick liquids  -AW  NPO  -CP       Precautions/Limitations, Vision  WFL;for purposes of eval  -AW  WFL  -CP       Precautions/Limitations, Hearing  WFL;for purposes of eval  -AW  WFL  -CP       Prior Level of Function-Swallowing  no diet consistency restrictions  -AW  no diet consistency restrictions  -CP       Plans/Goals Discussed with  patient;agreed upon  -AW  patient  -CP       Barriers to Rehab  none identified  -AW  none identified  -CP       Patient's Goals for Discharge  return home  -AW  patient did not state  -CP          Pain Assessment    Additional Documentation  Pain Scale: Numbers Pre/Post-Treatment (Group)  -AW  Pain Scale: Numbers Pre/Post-Treatment (Group)  -CP          Pain Scale: Numbers Pre/Post-Treatment    Pain Scale: Numbers, Pretreatment  0/10 - no pain  -AW  0/10 - no pain  -CP       Pain Scale: Numbers, Post-Treatment  0/10 - no pain  -AW  0/10 - no pain  -CP          Oral Motor and Function    Dentition Assessment  natural, present and adequate  -AW  --       Secretion Management  WNL/WFL  -AW  WNL/WFL  -CP       Mucosal Quality  moist, healthy  -AW  moist, healthy  -CP          Oral Musculature and Cranial Nerve Assessment    Oral Motor General Assessment  WFL  -AW  WFL  -CP          General Eating/Swallowing Observations    Respiratory Support Currently in Use  room air  -AW  nasal cannula  -CP       Eating/Swallowing  Skills  fed by SLP;self-fed  -AW  self-fed  -CP       Positioning During Eating  upright in bed  -AW  upright 90 degree;upright in chair  -CP       Utensils Used  --  spoon;cup  -CP       Consistencies Trialed  --  thin liquids;nectar/syrup-thick liquids;pureed;regular textures  -CP          Clinical Swallow Eval    Clinical Swallow Evaluation Summary  --  Clinical swallow eval completed. Overt coughing, throat clearing, and nose-running noted with thin liquids on initial 2 trials. Pt felt this was d/t water being cold. When SLP provided room-temperature water, throat clear noted x1, but she did appear to tolerate it much better. No s/s with NTL, puree, or solids. REC regular diet/NTL. Meds whole with puree. Upright for PO. Water protocol ok w/room-temp water after oral care. Also recommend VFSS for further assessment.  -CP          MBS/VFSS    Utensils Used  spoon;cup;straw  -AW  --       Consistencies Trialed  regular textures;soft textures;pureed;thin liquids;nectar/syrup-thick liquids  -AW  --          MBS/VFSS Interpretation    Oral Prep Phase  WFL  -AW  --       Oral Transit Phase  WFL  -AW  --       Oral Residue  WFL  -AW  --       VFSS Summary  Pt exhibited minimal oropharyngeal dysphagia characterized by mistiming. Pt tolerated thins via cup w/ no penetration or aspiration. Pt had shallow penetration with thins via straws. No penetration or aspiration was noted with nectar, pureed, soft, mixed, or regular consistencies. Mastication was functional. Slight backflow through the UES into the pyriforms noted with soft solids and mixed consistencies. No penetration was noted, however, pt is at risk.    -AW  --          Initiation of Pharyngeal Swallow    Pharyngeal Phase  impaired pharyngeal phase of swallowing  -AW  --          Clinical Impression    SLP Swallowing Diagnosis  mild;pharyngeal dysfunction  -AW  suspected pharyngeal dysfunction  -CP       Functional Impact  risk of aspiration/pneumonia  -AW  risk  of aspiration/pneumonia  -CP       Rehab Potential/Prognosis, Swallowing  good, to achieve stated therapy goals  -AW  good, to achieve stated therapy goals  -CP       Swallow Criteria for Skilled Therapeutic Interventions Met  demonstrates skilled criteria  -AW  demonstrates skilled criteria  -CP          Recommendations    Therapy Frequency (Swallow)  PRN  -AW  PRN  -CP       Predicted Duration Therapy Intervention (Days)  until discharge  -AW  until discharge  -CP       SLP Diet Recommendation  regular textures;thin liquids  -AW  regular textures;nectar thick liquids;water between meals after oral care, with supervision  -CP       Recommended Diagnostics  --  VFSS (MBS)  -CP       Recommended Precautions and Strategies  upright posture during/after eating;small bites of food and sips of liquid;no straw  -AW  upright posture during/after eating;small bites of food and sips of liquid  -CP       SLP Rec. for Method of Medication Administration  meds whole;with thin liquids;with pudding or applesauce;as tolerated  -AW  meds whole;with pudding or applesauce  -CP       Monitor for Signs of Aspiration  yes  -AW  yes;notify SLP if any concerns  -CP       Anticipated Dischage Disposition (SLP)  unknown  -AW  unknown  -CP          Swallow Goals (SLP)    Oral Nutrition/Hydration Goal Selection (SLP)  oral nutrition/hydration, SLP goal 1  -AW  --          Oral Nutrition/Hydration Goal 1 (SLP)    Oral Nutrition/Hydration Goal 1, SLP  Pt will safely tolerate a regular diet with no s/s of aspiration.  -AW  --       Time Frame (Oral Nutrition/Hydration Goal 1, SLP)  by discharge  -AW  --         User Key  (r) = Recorded By, (t) = Taken By, (c) = Cosigned By    Initials Name Effective Dates    CP Angelica Singleton, MS CCC-SLP 06/08/18 -     Cassie Mobley MS CCC-SLP 06/08/18 -           EDUCATION  The patient has been educated in the following areas:   Dysphagia (Swallowing Impairment) Oral Care/Hydration.    SLP Recommendation  and Plan  SLP Swallowing Diagnosis: mild, pharyngeal dysfunction  SLP Diet Recommendation: regular textures, thin liquids  Recommended Precautions and Strategies: upright posture during/after eating, small bites of food and sips of liquid, no straw  SLP Rec. for Method of Medication Administration: meds whole, with thin liquids, with pudding or applesauce, as tolerated     Monitor for Signs of Aspiration: yes     Swallow Criteria for Skilled Therapeutic Interventions Met: demonstrates skilled criteria  Anticipated Dischage Disposition (SLP): unknown  Rehab Potential/Prognosis, Swallowing: good, to achieve stated therapy goals  Therapy Frequency (Swallow): PRN  Predicted Duration Therapy Intervention (Days): until discharge       Plan of Care Reviewed With: patient  Outcome Summary: VFSS completed. Pt tolerated thins via cup w/ no penetration or aspiration. Pt had shallow penetration with thins via straws. Slight backflow through the UES into the pyriforms noted with soft solids and mixed consistencies. Recommend regular diet and thin liquids; meds as tolerated; no straws; upright for meals and 30 min after; slow rate; small bites/sips. ST to follow.     Patient was not wearing a face mask during this therapy encounter. Therapist used appropriate personal protective equipment including mask, eye protection and gloves.  Mask used was standard procedure mask. Appropriate PPE was worn during the entire therapy session. Hand hygiene was completed before and after therapy session. Patient is not in enhanced droplet precautions.         SLP GOALS     Row Name 08/10/20 1200             Oral Nutrition/Hydration Goal 1 (SLP)    Oral Nutrition/Hydration Goal 1, SLP  Pt will safely tolerate a regular diet with no s/s of aspiration.  -AW      Time Frame (Oral Nutrition/Hydration Goal 1, SLP)  by discharge  -AW        User Key  (r) = Recorded By, (t) = Taken By, (c) = Cosigned By    Initials Name Provider Type    Cassie Mobley  MS CCC-SLP Speech and Language Pathologist           SLP Outcome Measures (last 72 hours)      SLP Outcome Measures     Row Name 08/10/20 1300 08/08/20 1300          SLP Outcome Measures    Outcome Measure Used?  Adult NOMS  -AW  Adult NOMS  -CP        Adult FCM Scores    FCM Chosen  Swallowing  -AW  Swallowing  -CP     Swallowing FCM Score  6  -AW  5  -CP       User Key  (r) = Recorded By, (t) = Taken By, (c) = Cosigned By    Initials Name Effective Dates    CP Angelica Singleton, MS CCC-SLP 06/08/18 -     Cassie Mobley MS CCC-SLP 06/08/18 -            Time Calculation:   Time Calculation- SLP     Row Name 08/10/20 1632             Time Calculation- SLP    SLP Start Time  1200  -AW      SLP Received On  08/10/20  -AW        User Key  (r) = Recorded By, (t) = Taken By, (c) = Cosigned By    Initials Name Provider Type    Cassie Mobley MS CCC-SLP Speech and Language Pathologist          Therapy Charges for Today     Code Description Service Date Service Provider Modifiers Qty    [id number] HC ST MOTION FLUORO EVAL SWALLOW 5 8/10/2020 Cassie Mercado MS CCC-SLP GN 1               Cassie Mercado MS CCC-SLP  8/10/2020

## 2020-08-10 NOTE — PLAN OF CARE
U/s of abdomen completed today. Placed on thins, regular diet after video swallow. Possible d/c tomorrow.     Problem: Fall Risk (Adult)  Goal: Identify Related Risk Factors and Signs and Symptoms  Outcome: Ongoing (interventions implemented as appropriate)  Goal: Absence of Fall  Outcome: Ongoing (interventions implemented as appropriate)     Problem: Patient Care Overview  Goal: Plan of Care Review  Outcome: Ongoing (interventions implemented as appropriate)  Goal: Individualization and Mutuality  Outcome: Ongoing (interventions implemented as appropriate)  Goal: Discharge Needs Assessment  Outcome: Ongoing (interventions implemented as appropriate)  Goal: Interprofessional Rounds/Family Conf  Outcome: Ongoing (interventions implemented as appropriate)     Problem: Pneumonia (Adult)  Goal: Signs and Symptoms of Listed Potential Problems Will be Absent, Minimized or Managed (Pneumonia)  Outcome: Ongoing (interventions implemented as appropriate)     Problem: Skin Injury Risk (Adult)  Goal: Identify Related Risk Factors and Signs and Symptoms  Outcome: Ongoing (interventions implemented as appropriate)  Goal: Skin Health and Integrity  Outcome: Ongoing (interventions implemented as appropriate)

## 2020-08-10 NOTE — PLAN OF CARE
Problem: Patient Care Overview  Goal: Plan of Care Review  Outcome: Ongoing (interventions implemented as appropriate)  Flowsheets (Taken 8/10/2020 1350)  Plan of Care Reviewed With: patient  Outcome Summary: VFSS completed. Pt tolerated thins via cup w/ no penetration or aspiration. Pt had shallow penetration with thins via straws. Slight backflow through the UES into the pyriforms noted with soft solids and mixed consistencies. Recommend regular diet and thin liquids; meds as tolerated; no straws; upright for meals and 30 min after; slow rate; small bites/sips. Pt stated she wakes up at night choking sometime. GI following.  ST to follow.

## 2020-08-10 NOTE — PROGRESS NOTES
Discharge Planning Assessment  [facility]     Patient Name: Gabbi Brandon  MRN: [id number]  Today's Date: 8/10/2020    Admit Date: 8/8/2020    Discharge Needs Assessment     Row Name 08/10/20 1422       Living Environment    Lives With  spouse;child(luca), adult    Name(s) of Who Lives With Patient  , Ismael Brandon; daughter, Rosa Singh, [phone]    Current Living Arrangements  home/apartment/condo    Primary Care Provided by  self    Provides Primary Care For  no one    Family Caregiver if Needed  spouse;child(luca), adult    Family Caregiver Names  , daughters Rosa Singh and Sara Hernandez, [phone]    Quality of Family Relationships  helpful;involved;supportive    Able to Return to Prior Arrangements  yes       Resource/Environmental Concerns    Resource/Environmental Concerns  none       Transition Planning    Patient/Family Anticipates Transition to  home with family    Patient/Family Anticipated Services at Transition  none    Transportation Anticipated  family or friend will provide       Discharge Needs Assessment    Concerns to be Addressed  discharge planning    Equipment Currently Used at Home  commode;walker, rolling;hospital bed    Discharge Coordination/Progress  Home with family        Discharge Plan     Row Name 08/10/20 1424       Plan    Plan  Home with family, follow for home health need    Patient/Family in Agreement with Plan  yes    Plan Comments  IMM letter checked. CCP met with pt and  (Ismael Brandon) at bedside. Facesheet verified and CCP role explained. Pt resides with her  and daughter (Rosa Singh, [phone]) in a single level home, and uses walker, bedside commode and hospital bed. Pt recently completed [facility] services and would use the agency again but requests CCP wait until closer to d/c to make referral only if needed. Pt denies past sub-acute rehab, and confirms preferred pharmacy is [facility]. Pt to have EGD  tomorrow. CCP to follow for post hospital needs. Angeles Carpenter LCSW        Destination      Coordination has not been started for this encounter.      Durable Medical Equipment      Coordination has not been started for this encounter.      Dialysis/Infusion      Coordination has not been started for this encounter.      Home Medical Care      Coordination has not been started for this encounter.      Therapy      Coordination has not been started for this encounter.      Community Resources      Coordination has not been started for this encounter.          Demographic Summary     Row Name 08/10/20 1421       General Information    Admission Type  inpatient    Arrived From  home    Required Notices Provided  Important Message from Medicare    Referral Source  admission list    Reason for Consult  discharge planning    Preferred Language  English        Functional Status     Row Name 08/10/20 1422       Functional Status    Usual Activity Tolerance  moderate    Current Activity Tolerance  moderate       Functional Status, IADL    Medications  assistive equipment and person    Meal Preparation  assistive equipment and person    Housekeeping  assistive equipment and person    Laundry  assistive equipment and person    Shopping  assistive equipment and person       Mental Status Summary    Recent Changes in Mental Status/Cognitive Functioning  no changes        Psychosocial    No documentation.       Abuse/Neglect    No documentation.       Legal    No documentation.       Substance Abuse    No documentation.       Patient Forms    No documentation.           Cathi Carpenter LCSW

## 2020-08-11 ENCOUNTER — ANESTHESIA (OUTPATIENT)
Dept: GASTROENTEROLOGY | Facility: HOSPITAL | Age: 80
End: 2020-08-11

## 2020-08-11 ENCOUNTER — ANESTHESIA EVENT (OUTPATIENT)
Dept: GASTROENTEROLOGY | Facility: HOSPITAL | Age: 80
End: 2020-08-11

## 2020-08-11 ENCOUNTER — APPOINTMENT (OUTPATIENT)
Dept: CT IMAGING | Facility: HOSPITAL | Age: 80
End: 2020-08-11

## 2020-08-11 LAB
ALBUMIN SERPL-MCNC: 2.6 G/DL (ref 3.5–5.2)
ANION GAP SERPL CALCULATED.3IONS-SCNC: 7.9 MMOL/L (ref 5–15)
BASOPHILS # BLD AUTO: 0.02 10*3/MM3 (ref 0–0.2)
BASOPHILS NFR BLD AUTO: 0.3 % (ref 0–1.5)
BUN SERPL-MCNC: 6 MG/DL (ref 8–23)
BUN/CREAT SERPL: 8.6 (ref 7–25)
CALCIUM SPEC-SCNC: 8 MG/DL (ref 8.6–10.5)
CHLORIDE SERPL-SCNC: 105 MMOL/L (ref 98–107)
CO2 SERPL-SCNC: 27.1 MMOL/L (ref 22–29)
CREAT SERPL-MCNC: 0.7 MG/DL (ref 0.57–1)
DEPRECATED RDW RBC AUTO: 47.4 FL (ref 37–54)
EOSINOPHIL # BLD AUTO: 0.18 10*3/MM3 (ref 0–0.4)
EOSINOPHIL NFR BLD AUTO: 2.5 % (ref 0.3–6.2)
ERYTHROCYTE [DISTWIDTH] IN BLOOD BY AUTOMATED COUNT: 13.7 % (ref 12.3–15.4)
GFR SERPL CREATININE-BSD FRML MDRD: 80 ML/MIN/1.73
GLUCOSE SERPL-MCNC: 107 MG/DL (ref 65–99)
HCT VFR BLD AUTO: 36.4 % (ref 34–46.6)
HGB BLD-MCNC: 11.4 G/DL (ref 12–15.9)
IMM GRANULOCYTES # BLD AUTO: 0.06 10*3/MM3 (ref 0–0.05)
IMM GRANULOCYTES NFR BLD AUTO: 0.8 % (ref 0–0.5)
LYMPHOCYTES # BLD AUTO: 2.29 10*3/MM3 (ref 0.7–3.1)
LYMPHOCYTES NFR BLD AUTO: 31.8 % (ref 19.6–45.3)
MAGNESIUM SERPL-MCNC: 1.6 MG/DL (ref 1.6–2.4)
MCH RBC QN AUTO: 29.3 PG (ref 26.6–33)
MCHC RBC AUTO-ENTMCNC: 31.3 G/DL (ref 31.5–35.7)
MCV RBC AUTO: 93.6 FL (ref 79–97)
MONOCYTES # BLD AUTO: 0.68 10*3/MM3 (ref 0.1–0.9)
MONOCYTES NFR BLD AUTO: 9.4 % (ref 5–12)
NEUTROPHILS NFR BLD AUTO: 3.98 10*3/MM3 (ref 1.7–7)
NEUTROPHILS NFR BLD AUTO: 55.2 % (ref 42.7–76)
NRBC BLD AUTO-RTO: 0 /100 WBC (ref 0–0.2)
PHOSPHATE SERPL-MCNC: 3 MG/DL (ref 2.5–4.5)
PLATELET # BLD AUTO: 307 10*3/MM3 (ref 140–450)
PMV BLD AUTO: 9.4 FL (ref 6–12)
POTASSIUM SERPL-SCNC: 3.6 MMOL/L (ref 3.5–5.2)
RBC # BLD AUTO: 3.89 10*6/MM3 (ref 3.77–5.28)
SODIUM SERPL-SCNC: 140 MMOL/L (ref 136–145)
WBC # BLD AUTO: 7.21 10*3/MM3 (ref 3.4–10.8)

## 2020-08-11 PROCEDURE — 88305 TISSUE EXAM BY PATHOLOGIST: CPT | Performed by: INTERNAL MEDICINE

## 2020-08-11 PROCEDURE — 80069 RENAL FUNCTION PANEL: CPT | Performed by: INTERNAL MEDICINE

## 2020-08-11 PROCEDURE — 25010000002 PROPOFOL 10 MG/ML EMULSION: Performed by: ANESTHESIOLOGY

## 2020-08-11 PROCEDURE — 83735 ASSAY OF MAGNESIUM: CPT | Performed by: INTERNAL MEDICINE

## 2020-08-11 PROCEDURE — 97110 THERAPEUTIC EXERCISES: CPT

## 2020-08-11 PROCEDURE — 85025 COMPLETE CBC W/AUTO DIFF WBC: CPT | Performed by: INTERNAL MEDICINE

## 2020-08-11 PROCEDURE — 97162 PT EVAL MOD COMPLEX 30 MIN: CPT

## 2020-08-11 PROCEDURE — 43239 EGD BIOPSY SINGLE/MULTIPLE: CPT | Performed by: INTERNAL MEDICINE

## 2020-08-11 PROCEDURE — 25010000002 CEFEPIME PER 500 MG: Performed by: NURSE PRACTITIONER

## 2020-08-11 PROCEDURE — 25010000002 CEFEPIME PER 500 MG: Performed by: INTERNAL MEDICINE

## 2020-08-11 PROCEDURE — 0DB98ZX EXCISION OF DUODENUM, VIA NATURAL OR ARTIFICIAL OPENING ENDOSCOPIC, DIAGNOSTIC: ICD-10-PCS | Performed by: INTERNAL MEDICINE

## 2020-08-11 PROCEDURE — 0DB68ZX EXCISION OF STOMACH, VIA NATURAL OR ARTIFICIAL OPENING ENDOSCOPIC, DIAGNOSTIC: ICD-10-PCS | Performed by: INTERNAL MEDICINE

## 2020-08-11 RX ORDER — LIDOCAINE HYDROCHLORIDE 20 MG/ML
INJECTION, SOLUTION INFILTRATION; PERINEURAL AS NEEDED
Status: DISCONTINUED | OUTPATIENT
Start: 2020-08-11 | End: 2020-08-11 | Stop reason: SURG

## 2020-08-11 RX ORDER — SODIUM CHLORIDE 0.9 % (FLUSH) 0.9 %
10 SYRINGE (ML) INJECTION AS NEEDED
Status: DISCONTINUED | OUTPATIENT
Start: 2020-08-11 | End: 2020-08-11 | Stop reason: HOSPADM

## 2020-08-11 RX ORDER — PROPOFOL 10 MG/ML
VIAL (ML) INTRAVENOUS AS NEEDED
Status: DISCONTINUED | OUTPATIENT
Start: 2020-08-11 | End: 2020-08-11 | Stop reason: SURG

## 2020-08-11 RX ORDER — LIDOCAINE HYDROCHLORIDE 10 MG/ML
0.5 INJECTION, SOLUTION INFILTRATION; PERINEURAL ONCE AS NEEDED
Status: DISCONTINUED | OUTPATIENT
Start: 2020-08-11 | End: 2020-08-11 | Stop reason: HOSPADM

## 2020-08-11 RX ORDER — SODIUM CHLORIDE 9 MG/ML
1000 INJECTION, SOLUTION INTRAVENOUS CONTINUOUS
Status: DISCONTINUED | OUTPATIENT
Start: 2020-08-11 | End: 2020-08-11

## 2020-08-11 RX ADMIN — PROPOFOL 100 MG: 10 INJECTION, EMULSION INTRAVENOUS at 11:16

## 2020-08-11 RX ADMIN — CETIRIZINE HYDROCHLORIDE 10 MG: 10 TABLET, FILM COATED ORAL at 13:05

## 2020-08-11 RX ADMIN — SODIUM CHLORIDE 50 ML/HR: 9 INJECTION, SOLUTION INTRAVENOUS at 00:16

## 2020-08-11 RX ADMIN — LIDOCAINE HYDROCHLORIDE 60 MG: 20 INJECTION, SOLUTION INFILTRATION; PERINEURAL at 11:16

## 2020-08-11 RX ADMIN — TRAMADOL HYDROCHLORIDE 25 MG: 50 TABLET, FILM COATED ORAL at 00:21

## 2020-08-11 RX ADMIN — ATORVASTATIN CALCIUM 10 MG: 20 TABLET, FILM COATED ORAL at 13:04

## 2020-08-11 RX ADMIN — CEFEPIME HYDROCHLORIDE 2 G: 2 INJECTION, POWDER, FOR SOLUTION INTRAVENOUS at 01:31

## 2020-08-11 RX ADMIN — SODIUM CHLORIDE, PRESERVATIVE FREE 10 ML: 5 INJECTION INTRAVENOUS at 20:29

## 2020-08-11 RX ADMIN — SODIUM CHLORIDE 50 ML/HR: 9 INJECTION, SOLUTION INTRAVENOUS at 11:08

## 2020-08-11 RX ADMIN — ATENOLOL 50 MG: 50 TABLET ORAL at 13:04

## 2020-08-11 RX ADMIN — CELECOXIB 200 MG: 200 CAPSULE ORAL at 13:04

## 2020-08-11 RX ADMIN — AMLODIPINE BESYLATE 5 MG: 5 TABLET ORAL at 13:04

## 2020-08-11 RX ADMIN — CEFEPIME HYDROCHLORIDE 2 G: 2 INJECTION, POWDER, FOR SOLUTION INTRAVENOUS at 13:04

## 2020-08-11 RX ADMIN — SODIUM CHLORIDE, PRESERVATIVE FREE 10 ML: 5 INJECTION INTRAVENOUS at 13:12

## 2020-08-11 RX ADMIN — Medication 1 CAPSULE: at 13:04

## 2020-08-11 RX ADMIN — MICONAZOLE NITRATE: 20 CREAM TOPICAL at 20:30

## 2020-08-11 RX ADMIN — OXYBUTYNIN CHLORIDE 5 MG: 5 TABLET, EXTENDED RELEASE ORAL at 13:04

## 2020-08-11 NOTE — PLAN OF CARE
Problem: Patient Care Overview  Goal: Plan of Care Review  Flowsheets (Taken 8/11/2020 9106)  Progress: improving  Plan of Care Reviewed With: patient  Outcome Summary: Pt agreeable to PT this afternoon. She was admitted on 8/8/20 with pneumonia. Pt lives at home with  and daughter. Pt reports independence at baseline, but states her family can assist her if needed. Pt currently presents with generalized weakness, decreased activity tolerance, and overall decreased functional mobility. Pt requires SBA/CGA to sit EOB, CGA to stand, and CGA x 2 to ambulate approx 90 ft. She is limited by overall fatigue. Cues for pursed lip breathing during treatment. She will continue to benefit from skilled PT to maximize safety and independence with mobility.   Also present: Sandro Donnelly, PT tech.   Patient was intermittently wearing a face mask during this therapy encounter. Therapist used appropriate personal protective equipment including eye protection, mask, and gloves.  Mask used was standard procedure mask. Appropriate PPE was worn during the entire therapy session. Hand hygiene was completed before and after therapy session. Patient is not in enhanced droplet precautions.

## 2020-08-11 NOTE — ANESTHESIA POSTPROCEDURE EVALUATION
"Patient: Gabbi Brandon    Procedure Summary     Date:  08/11/20 Room / Location:   [facility]    Anesthesia Start:  1113 Anesthesia Stop:  1131    Procedure:  ESOPHAGOGASTRODUODENOSCOPY with biopsies (N/A Esophagus) Diagnosis:       Non-intractable vomiting with nausea, unspecified vomiting type      (Non-intractable vomiting with nausea, unspecified vomiting type [R11.2])    Surgeon:  Eugene George MD Provider:  Sebastien Gregory MD    Anesthesia Type:  MAC ASA Status:  4          Anesthesia Type: MAC    Vitals  Vitals Value Taken Time   /73 8/11/2020 11:50 AM   Temp     Pulse 64 8/11/2020 11:50 AM   Resp 16 8/11/2020 11:50 AM   SpO2 98 % 8/11/2020 11:50 AM           Post Anesthesia Care and Evaluation    Patient location during evaluation: PACU  Patient participation: complete - patient participated  Level of consciousness: awake  Pain score: 0  Pain management: adequate  Airway patency: patent  Anesthetic complications: No anesthetic complications  PONV Status: none  Cardiovascular status: acceptable  Respiratory status: acceptable  Hydration status: acceptable    Comments: /73   Pulse 64   Temp 36.7 °C (98 °F) (Oral)   Resp 16   Ht 165.1 cm (65\")   Wt 80.4 kg (177 lb 4 oz)   SpO2 98%   BMI 29.50 kg/m²       "

## 2020-08-11 NOTE — PLAN OF CARE
Condition stable, npo since midnight for possible EGD. Continues to c/o abdominal pain.  Problem: Fall Risk (Adult)  Goal: Identify Related Risk Factors and Signs and Symptoms  Outcome: Ongoing (interventions implemented as appropriate)  Flowsheets (Taken 8/8/2020 0604 by Ann Jefferson, RN)  Related Risk Factors (Fall Risk): age-related changes;bladder function altered;gait/mobility problems  Goal: Absence of Fall  Outcome: Ongoing (interventions implemented as appropriate)     Problem: Pneumonia (Adult)  Goal: Signs and Symptoms of Listed Potential Problems Will be Absent, Minimized or Managed (Pneumonia)  Outcome: Ongoing (interventions implemented as appropriate)     Problem: Skin Injury Risk (Adult)  Goal: Identify Related Risk Factors and Signs and Symptoms  Outcome: Ongoing (interventions implemented as appropriate)

## 2020-08-11 NOTE — ANESTHESIA PREPROCEDURE EVALUATION
Anesthesia Evaluation     Patient summary reviewed and Nursing notes reviewed                Airway   Mallampati: I  TM distance: >3 FB  Neck ROM: full  No difficulty expected  Dental - normal exam     Pulmonary - normal exam   (+) pneumonia ,   Cardiovascular - normal exam    ECG reviewed    (+) hypertension, hyperlipidemia,     ROS comment: Old inf MI    Neuro/Psych- negative ROS  GI/Hepatic/Renal/Endo    (+)  GERD,  renal disease,     Musculoskeletal     Abdominal  - normal exam    Bowel sounds: normal.   Substance History - negative use     OB/GYN negative ob/gyn ROS         Other   arthritis,                      Anesthesia Plan    ASA 4     MAC       Anesthetic plan, all risks, benefits, and alternatives have been provided, discussed and informed consent has been obtained with: patient.

## 2020-08-11 NOTE — PROGRESS NOTES
[facility]     LOS: 3 days     Name: Gabbi Brandon  Age: 79 y.o.  Sex: female  :  1940  MRN: [id number]         Primary Care Physician: Gordo Mg MD    Diet Regular; Thin    Subjective   Had EGD this morning. 8 cm hiatal hernia.  She has been out of bed she states she normally uses a walker at home.  She is reluctant about going home today and would like to observe another day.  Patient/problems new to me. Greater than 50% of time spent in counseling and/or coordination of care. Total face/floor time 35 minutes.     Review of Systems   Constitutional: Negative for fever.   Respiratory: Negative for cough and shortness of breath.    Cardiovascular: Negative for chest pain.   Gastrointestinal: Negative for abdominal pain.     Objective   Temp:  [97.5 °F (36.4 °C)-98.7 °F (37.1 °C)] 98 °F (36.7 °C)  Heart Rate:  [64-77] 64  Resp:  [16-18] 16  BP: (124-143)/(67-97) 143/73  SpO2:  [93 %-98 %] 98 %  on  Flow (L/min):  [5] 5;   Device (Oxygen Therapy): room air  Body mass index is 29.5 kg/m².    Physical Exam   Constitutional: She is oriented to person, place, and time. No distress.   Cardiovascular: Normal rate and regular rhythm.   Pulmonary/Chest: Effort normal and breath sounds normal. No respiratory distress.   Abdominal: Soft. Bowel sounds are normal. There is no tenderness. There is no rebound and no guarding.   Musculoskeletal: She exhibits no edema.   Neurological: She is alert and oriented to person, place, and time.   Skin: Skin is warm and dry.   Psychiatric: She has a normal mood and affect. Her behavior is normal.   Nursing note and vitals reviewed.    While in the room and during my examination of the patient I wore gloves, mask, eye protection.  I washed my hands before and after this patient encounter.     Reviewed medications and new clinical results    Scheduled Meds  amLODIPine 5 mg Oral Daily   atenolol 50 mg Oral Daily      atorvastatin 10 mg Oral Daily   cefepime 2 g Intravenous Q12H   celecoxib 200 mg Oral Daily   cetirizine 10 mg Oral Daily   fluticasone 2 spray Each Nare Daily   lactobacillus acidophilus 1 capsule Oral Daily   oxybutynin XL 5 mg Oral Daily   pantoprazole 40 mg Oral QAM   sodium chloride 10 mL Intravenous Q12H     Continuous Infusions  sodium chloride 1,000 mL    sodium chloride 50 mL/hr Last Rate: 50 mL/hr (08/11/20 1108)     PRN Meds  •  acetaminophen **OR** acetaminophen **OR** acetaminophen  •  bisacodyl  •  calcium carbonate  •  nitroglycerin  •  [COMPLETED] Insert peripheral IV **AND** sodium chloride  •  [COMPLETED] Insert peripheral IV **AND** sodium chloride  •  sodium chloride  •  traMADol    Results from last 7 days   Lab Units 08/11/20  0657 08/10/20  0507 08/09/20  0350 08/08/20  0506 08/08/20  0022   WBC 10*3/mm3 7.21 7.19 7.41 10.19 10.02   HEMOGLOBIN g/dL 11.4* 10.7* 10.4* 11.6* 11.7*   PLATELETS 10*3/mm3 307 284 286 318 300     Results from last 7 days   Lab Units 08/11/20  0657 08/10/20  0507 08/09/20  1656 08/09/20  0350 08/08/20  0506 08/08/20  0022   SODIUM mmol/L 140 139  --  140 137 136   POTASSIUM mmol/L 3.6 3.6 3.6 4.0 4.1 4.2   CHLORIDE mmol/L 105 106  --  107 101 98   CO2 mmol/L 27.1 24.2  --  23.8 25.0 26.8   BUN mg/dL 6* 8  --  12 14 16   CREATININE mg/dL 0.70 0.74  --  0.85 1.00 0.94   CALCIUM mg/dL 8.0* 8.0*  --  8.1* 8.5* 8.7   GLUCOSE mg/dL 107* 108*  --  110* 110* 124*     Lab Results   Component Value Date    ANIONGAP 7.9 08/11/2020     Estimated Creatinine Clearance: 57.8 mL/min (by C-G formula based on SCr of 0.7 mg/dL).    Lab Results   Component Value Date    COVID19 Not Detected 08/08/2020     I personally reviewed images    Assessment/Plan   Active Hospital Problems    Diagnosis  POA   • **Pneumonia of right lower lobe due to infectious organism [J18.9]  Yes   • Diarrhea [R19.7]  Clinically Undetermined   • Hypocalcemia [E83.51]  Unknown   • Anemia, chronic disease [D63.8]   Yes   • CKD (chronic kidney disease) stage 2, GFR 60-89 ml/min [N18.2]  Yes   • Hypotension [I95.9]  Yes   • Non-intractable vomiting [R11.10]  Unknown   • Oropharyngeal dysphagia [R13.12]  Yes   • Gastroesophageal reflux disease without esophagitis [K21.9]  Yes   • Essential hypertension [I10]  Yes      Resolved Hospital Problems   No resolved problems to display.     79 y.o. female admitted with aspiration pneumonia, recurrent    · Recurrent aspiration pneumonia continue antibiotics  · Status post EGD-results noted.  8 cm hiatal hernia  · Disposition probably tomorrow.  Patient does not feel ready to go home today.  Observe overnight, PT to see  · Discussed with patient, family and nursing staff.    Willie Infante MD   08/11/20  13:31

## 2020-08-11 NOTE — THERAPY EVALUATION
Patient Name: Gabbi Brandon  : 1940    MRN: [id number]                              Today's Date: 2020       Admit Date: 2020    Visit Dx:     ICD-10-CM ICD-9-CM   1. Pneumonia of right lower lobe due to infectious organism J18.9 486   2. Non-intractable vomiting with nausea, unspecified vomiting type R11.2 787.01     Patient Active Problem List   Diagnosis   • Irregular bleeding   • Urinary tract infection   • Dyslipidemia   • Essential hypertension   • Stage 2 chronic kidney disease   • Gastroesophageal reflux disease without esophagitis   • Seasonal allergies   • Familial hypercholesterolemia   • Bladder spasm   • Bilateral lower extremity edema   • Pneumonia of right lower lobe due to infectious organism   • Failure to thrive in adult   • Hyponatremia   • DNR (do not resuscitate)   • Hypoxia   • Oropharyngeal dysphagia   • Anemia, chronic disease   • CKD (chronic kidney disease) stage 2, GFR 60-89 ml/min   • Hypotension   • Diarrhea   • Hypocalcemia   • Non-intractable vomiting     Past Medical History:   Diagnosis Date   • Arthritis    • GERD (gastroesophageal reflux disease)    • Hyperlipidemia    • Hypertension    • Incontinent of urine    • PNA (pneumonia)    • Seasonal allergies    • UTI (urinary tract infection)      Past Surgical History:   Procedure Laterality Date   • BREAST BIOPSY     • COLONOSCOPY N/A 2016    Procedure: COLONOSCOPY WITH POLYPECTOMY (HOT SNARE);  Surgeon: Paulino Narvaez MD;  Location: [facility];  Service:    • VAGINAL HYSTERECTOMY       General Information     Row Name 20 1600          PT Evaluation Time/Intention    Document Type  evaluation  -EJ     Mode of Treatment  physical therapy  -EJ     Row Name 20 1600          General Information    Prior Level of Function  independent:;ADL's;all household mobility  -EJ     Existing Precautions/Restrictions  fall  -EJ     Barriers to Rehab  none identified  -EJ     Row Name 20 1600           Relationship/Environment    Lives With  child(luca), adult;spouse  -EJ     Row Name 08/11/20 1600          Resource/Environmental Concerns    Current Living Arrangements  home/apartment/condo  -EJ     Row Name 08/11/20 1600          Home Main Entrance    Number of Stairs, Main Entrance  two  -EJ     Row Name 08/11/20 1600          Cognitive Assessment/Intervention- PT/OT    Orientation Status (Cognition)  oriented x 3  -EJ     Row Name 08/11/20 1600          Safety Issues, Functional Mobility    Impairments Affecting Function (Mobility)  strength;endurance/activity tolerance  -EJ       User Key  (r) = Recorded By, (t) = Taken By, (c) = Cosigned By    Initials Name Provider Type    Katie Dutta, PT Physical Therapist        Mobility     Row Name 08/11/20 1603          Bed Mobility Assessment/Treatment    Bed Mobility Assessment/Treatment  supine-sit  -EJ     Supine-Sit Door (Bed Mobility)  verbal cues;contact guard  -EJ     Assistive Device (Bed Mobility)  head of bed elevated;bed rails  -EJ     Row Name 08/11/20 1603          Sit-Stand Transfer    Sit-Stand Door (Transfers)  verbal cues;contact guard;1 person to manage equipment  -EJ     Assistive Device (Sit-Stand Transfers)  walker, front-wheeled  -EJ     Row Name 08/11/20 1603          Gait/Stairs Assessment/Training    Gait/Stairs Assessment/Training  gait/ambulation independence  -EJ     Door Level (Gait)  verbal cues;contact guard;1 person to manage equipment  -EJ     Assistive Device (Gait)  walker, front-wheeled  -EJ     Distance in Feet (Gait)  90  -EJ     Deviations/Abnormal Patterns (Gait)  minerva decreased;stride length decreased  -EJ     Bilateral Gait Deviations  forward flexed posture;heel strike decreased  -EJ     Comment (Gait/Stairs)  limited by fatigue, cues for pursed lip breathing  -EJ       User Key  (r) = Recorded By, (t) = Taken By, (c) = Cosigned By    Initials Name Provider Type    MALIA Londono  Katie BOSTON, PT Physical Therapist        Obj/Interventions     Row Name 08/11/20 1604          General ROM    GENERAL ROM COMMENTS  WFL  -EJ     Row Name 08/11/20 1604          MMT (Manual Muscle Testing)    General MMT Comments  generalized weakness  -EJ       User Key  (r) = Recorded By, (t) = Taken By, (c) = Cosigned By    Initials Name Provider Type    EJ Katie Londono, PT Physical Therapist        Goals/Plan     Row Name 08/11/20 1619          Bed Mobility Goal 1 (PT)    Activity/Assistive Device (Bed Mobility Goal 1, PT)  bed mobility activities, all  -EJ     Russell Level/Cues Needed (Bed Mobility Goal 1, PT)  supervision required  -EJ     Time Frame (Bed Mobility Goal 1, PT)  1 week  -EJ     Row Name 08/11/20 1619          Transfer Goal 1 (PT)    Activity/Assistive Device (Transfer Goal 1, PT)  transfers, all;walker, rolling  -EJ     Russell Level/Cues Needed (Transfer Goal 1, PT)  standby assist  -EJ     Time Frame (Transfer Goal 1, PT)  1 week  -EJ     Row Name 08/11/20 1619          Gait Training Goal 1 (PT)    Activity/Assistive Device (Gait Training Goal 1, PT)  gait (walking locomotion)  -EJ     Russell Level (Gait Training Goal 1, PT)  contact guard assist  -EJ     Distance (Gait Goal 1, PT)  150  -EJ     Time Frame (Gait Training Goal 1, PT)  1 week  -EJ     Row Name 08/11/20 1619          Stairs Goal 1 (PT)    Activity/Assistive Device (Stairs Goal 1, PT)  stairs, all skills  -EJ     Russell Level/Cues Needed (Stairs Goal 1, PT)  contact guard assist  -EJ     Number of Stairs (Stairs Goal 1, PT)  2  -EJ     Time Frame (Stairs Goal 1, PT)  1 week  -EJ       User Key  (r) = Recorded By, (t) = Taken By, (c) = Cosigned By    Initials Name Provider Type    EJ Katie Londono, PT Physical Therapist        Clinical Impression     Row Name 08/11/20 1604          Pain Assessment    Additional Documentation  Pain Scale: Numbers Pre/Post-Treatment (Group)  -EJ     Row Name 08/11/20  1604          Pain Scale: Numbers Pre/Post-Treatment    Pain Scale: Numbers, Pretreatment  3/10  -EJ     Pain Scale: Numbers, Post-Treatment  3/10  -EJ     Pre/Post Treatment Pain Comment  rib pain  -EJ     Pain Intervention(s)  Repositioned  -EJ     Row Name 08/11/20 1604          Plan of Care Review    Plan of Care Reviewed With  patient  -EJ     Progress  improving  -EJ     Outcome Summary  Pt agreeable to PT this afternoon. She was admitted on 8/8/20 with pneumonia. Pt lives at home with  and daughter. Pt reports independence at baseline, but states her family can assist her if needed. Pt currently presents with generalized weakness, decreased activity tolerance, and overall decreased functional mobility. Pt requires SBA/CGA to sit EOB, CGA to stand, and CGA x 2 to ambulate approx 90 ft. She is limited by overall fatigue. Cues for pursed lip breathing during treatment. She will continue to benefit from skilled PT to maximize safety and independence with mobility.    -EJ     Row Name 08/11/20 1604          Physical Therapy Clinical Impression    Patient/Family Goals Statement (PT Clinical Impression)  home with family  -EJ     Criteria for Skilled Interventions Met (PT Clinical Impression)  yes  -EJ     Rehab Potential (PT Clinical Summary)  good, to achieve stated therapy goals  -EJ     Row Name 08/11/20 1604          Positioning and Restraints    Pre-Treatment Position  in bed  -EJ     Post Treatment Position  chair  -EJ     In Chair  notified nsg;reclined;call light within reach;encouraged to call for assist;exit alarm on  -EJ       User Key  (r) = Recorded By, (t) = Taken By, (c) = Cosigned By    Initials Name Provider Type    Katie Dutta, PT Physical Therapist        Outcome Measures     Row Name 08/11/20 1621          How much help from another person do you currently need...    Turning from your back to your side while in flat bed without using bedrails?  3  -EJ     Moving from lying on  back to sitting on the side of a flat bed without bedrails?  3  -EJ     Moving to and from a bed to a chair (including a wheelchair)?  3  -EJ     Standing up from a chair using your arms (e.g., wheelchair, bedside chair)?  3  -EJ     Climbing 3-5 steps with a railing?  2  -EJ     To walk in hospital room?  3  -EJ     AM-PAC 6 Clicks Score (PT)  17  -EJ     Row Name 08/11/20 1621          Functional Assessment    Outcome Measure Options  AM-PAC 6 Clicks Basic Mobility (PT)  -EJ       User Key  (r) = Recorded By, (t) = Taken By, (c) = Cosigned By    Initials Name Provider Type    Katie Dutta, PT Physical Therapist        Physical Therapy Education                 Title: PT OT SLP Therapies (In Progress)     Topic: Physical Therapy (In Progress)     Point: Mobility training (Done)     Description:   Instruct learner(s) on safety and technique for assisting patient out of bed, chair or wheelchair.  Instruct in the proper use of assistive devices, such as walker, crutches, cane or brace.              Patient Friendly Description:   It's important to get you on your feet again, but we need to do so in a way that is safe for you. Falling has serious consequences, and your personal safety is the most important thing of all.        When it's time to get out of bed, one of us or a family member will sit next to you on the bed to give you support.     If your doctor or nurse tells you to use a walker, crutches, a cane, or a brace, be sure you use it every time you get out of bed, even if you think you don't need it.    Learning Progress Summary           Patient Acceptance, E,TB,D, VU,NR by MALIA at 8/11/2020 1621                   Point: Home exercise program (Not Started)     Description:   Instruct learner(s) on appropriate technique for monitoring, assisting and/or progressing patient with therapeutic exercises and activities.              Learner Progress:   Not documented in this visit.          Point: Body  mechanics (Not Started)     Description:   Instruct learner(s) on proper positioning and spine alignment for patient and/or caregiver during mobility tasks and/or exercises.              Learner Progress:   Not documented in this visit.          Point: Precautions (Not Started)     Description:   Instruct learner(s) on prescribed precautions during mobility and gait tasks              Learner Progress:   Not documented in this visit.                      User Key     Initials Effective Dates Name Provider Type Discipline     04/03/18 -  Katie Londono, PT Physical Therapist PT              PT Recommendation and Plan  Planned Therapy Interventions (PT Eval): balance training, bed mobility training, gait training, home exercise program, patient/family education, strengthening, stair training, transfer training  Outcome Summary/Treatment Plan (PT)  Anticipated Discharge Disposition (PT): home with assist, home with home health  Plan of Care Reviewed With: patient  Progress: improving  Outcome Summary: Pt agreeable to PT this afternoon. She was admitted on 8/8/20 with pneumonia. Pt lives at home with  and daughter. Pt reports independence at baseline, but states her family can assist her if needed. Pt currently presents with generalized weakness, decreased activity tolerance, and overall decreased functional mobility. Pt requires SBA/CGA to sit EOB, CGA to stand, and CGA x 2 to ambulate approx 90 ft. She is limited by overall fatigue. Cues for pursed lip breathing during treatment. She will continue to benefit from skilled PT to maximize safety and independence with mobility.       Time Calculation:   PT Charges     Row Name 08/11/20 1621             Time Calculation    Start Time  1535  -EJ      Stop Time  1600  -EJ      Time Calculation (min)  25 min  -EJ      PT Received On  08/11/20  -EJ      PT - Next Appointment  08/12/20  -EJ      PT Goal Re-Cert Due Date  08/18/20  -EJ         Time Calculation- PT     Total Timed Code Minutes- PT  15 minute(s)  -MALIA        User Key  (r) = Recorded By, (t) = Taken By, (c) = Cosigned By    Initials Name Provider Type    Katie uDtta, PT Physical Therapist        Therapy Charges for Today     Code Description Service Date Service Provider Modifiers Qty    54668348004 HC PT EVAL MOD COMPLEXITY 2 8/11/2020 Katie Londono, PT GP 1    91172685448 HC PT THER PROC EA 15 MIN 8/11/2020 Katie Londono, PT GP 1    48945049991 HC PT THER SUPP EA 15 MIN 8/11/2020 Katie Londono, PT GP 1          PT G-Codes  Outcome Measure Options: AM-PAC 6 Clicks Basic Mobility (PT)  AM-PAC 6 Clicks Score (PT): 17    Katie Londono, PT  8/11/2020

## 2020-08-12 ENCOUNTER — APPOINTMENT (OUTPATIENT)
Dept: GENERAL RADIOLOGY | Facility: HOSPITAL | Age: 80
End: 2020-08-12

## 2020-08-12 LAB
CYTO UR: NORMAL
LAB AP CASE REPORT: NORMAL
PATH REPORT.FINAL DX SPEC: NORMAL
PATH REPORT.GROSS SPEC: NORMAL

## 2020-08-12 PROCEDURE — 25010000002 CEFEPIME PER 500 MG: Performed by: INTERNAL MEDICINE

## 2020-08-12 PROCEDURE — 25010000002 ONDANSETRON PER 1 MG: Performed by: HOSPITALIST

## 2020-08-12 PROCEDURE — 99232 SBSQ HOSP IP/OBS MODERATE 35: CPT | Performed by: NURSE PRACTITIONER

## 2020-08-12 PROCEDURE — 71046 X-RAY EXAM CHEST 2 VIEWS: CPT

## 2020-08-12 PROCEDURE — 97110 THERAPEUTIC EXERCISES: CPT

## 2020-08-12 RX ORDER — ONDANSETRON 2 MG/ML
4 INJECTION INTRAMUSCULAR; INTRAVENOUS EVERY 6 HOURS PRN
Status: DISCONTINUED | OUTPATIENT
Start: 2020-08-12 | End: 2020-08-13 | Stop reason: HOSPADM

## 2020-08-12 RX ADMIN — PANTOPRAZOLE SODIUM 40 MG: 40 TABLET, DELAYED RELEASE ORAL at 06:39

## 2020-08-12 RX ADMIN — ATENOLOL 50 MG: 50 TABLET ORAL at 08:40

## 2020-08-12 RX ADMIN — OXYBUTYNIN CHLORIDE 5 MG: 5 TABLET, EXTENDED RELEASE ORAL at 08:41

## 2020-08-12 RX ADMIN — MICONAZOLE NITRATE: 20 CREAM TOPICAL at 08:40

## 2020-08-12 RX ADMIN — CEFEPIME HYDROCHLORIDE 2 G: 2 INJECTION, POWDER, FOR SOLUTION INTRAVENOUS at 21:00

## 2020-08-12 RX ADMIN — ATORVASTATIN CALCIUM 10 MG: 20 TABLET, FILM COATED ORAL at 08:45

## 2020-08-12 RX ADMIN — CEFEPIME HYDROCHLORIDE 2 G: 2 INJECTION, POWDER, FOR SOLUTION INTRAVENOUS at 01:51

## 2020-08-12 RX ADMIN — SODIUM CHLORIDE, PRESERVATIVE FREE 10 ML: 5 INJECTION INTRAVENOUS at 08:44

## 2020-08-12 RX ADMIN — CETIRIZINE HYDROCHLORIDE 10 MG: 10 TABLET, FILM COATED ORAL at 08:41

## 2020-08-12 RX ADMIN — ONDANSETRON 4 MG: 2 INJECTION INTRAMUSCULAR; INTRAVENOUS at 10:47

## 2020-08-12 RX ADMIN — SODIUM CHLORIDE, PRESERVATIVE FREE 10 ML: 5 INJECTION INTRAVENOUS at 21:00

## 2020-08-12 RX ADMIN — FLUTICASONE PROPIONATE 2 SPRAY: 50 SPRAY, METERED NASAL at 08:40

## 2020-08-12 RX ADMIN — CELECOXIB 200 MG: 200 CAPSULE ORAL at 08:42

## 2020-08-12 RX ADMIN — Medication 1 CAPSULE: at 08:42

## 2020-08-12 RX ADMIN — AMLODIPINE BESYLATE 5 MG: 5 TABLET ORAL at 08:42

## 2020-08-12 NOTE — PROGRESS NOTES
[facility]               ASSOCIATES     LOS: 4 days     Name: Gabbi Brandon  Age: 79 y.o.  Sex: female  :  1940  MRN: [id number]         Primary Care Physician: Gordo Mg MD    Diet Regular; Thin    Subjective    Some nausea and patient reports diarrhea however staff reports no diarrhea.  She ate 50% of her breakfast.  Denies chest pain, shortness of breath.    Review of Systems   Constitutional: Negative for fever.   Respiratory: Negative for cough and shortness of breath.    Cardiovascular: Negative for chest pain.   Gastrointestinal: Positive for diarrhea and nausea. Negative for abdominal pain.     Objective   Temp:  [98.2 °F (36.8 °C)-98.5 °F (36.9 °C)] 98.5 °F (36.9 °C)  Heart Rate:  [58-72] 72  Resp:  [16-20] 20  BP: (124-159)/(61-97) 133/61  SpO2:  [93 %-98 %] 97 %  on  Flow (L/min):  [5] 5;   Device (Oxygen Therapy): room air  Body mass index is 29.5 kg/m².    Physical Exam   Constitutional: She is oriented to person, place, and time. No distress.   Cardiovascular: Normal rate and regular rhythm.   Pulmonary/Chest: Effort normal and breath sounds normal. No respiratory distress.   Abdominal: Soft. Bowel sounds are normal. There is no tenderness. There is no rebound and no guarding.   Musculoskeletal: She exhibits no edema.   Neurological: She is alert and oriented to person, place, and time.   Skin: Skin is warm and dry.   Psychiatric: She has a normal mood and affect. Her behavior is normal.   Nursing note and vitals reviewed.    While in the room and during my examination of the patient I wore gloves, mask, eye protection.  I washed my hands before and after this patient encounter.     Reviewed medications and new clinical results    Scheduled Meds    amLODIPine 5 mg Oral Daily   atenolol 50 mg Oral Daily   atorvastatin 10 mg Oral Daily   cefepime 2 g Intravenous Q12H   celecoxib 200 mg Oral Daily   cetirizine 10 mg Oral Daily   fluticasone 2 spray  Each Nare Daily   lactobacillus acidophilus 1 capsule Oral Daily   miconazole  Topical Q12H   oxybutynin XL 5 mg Oral Daily   pantoprazole 40 mg Oral QAM   sodium chloride 10 mL Intravenous Q12H     Continuous Infusions   PRN Meds  •  acetaminophen **OR** acetaminophen **OR** acetaminophen  •  bisacodyl  •  calcium carbonate  •  nitroglycerin  •  ondansetron  •  [COMPLETED] Insert peripheral IV **AND** sodium chloride  •  [COMPLETED] Insert peripheral IV **AND** sodium chloride  •  sodium chloride  •  traMADol    Results from last 7 days   Lab Units 08/11/20  0657 08/10/20  0507 08/09/20  0350 08/08/20  0506 08/08/20  0022   WBC 10*3/mm3 7.21 7.19 7.41 10.19 10.02   HEMOGLOBIN g/dL 11.4* 10.7* 10.4* 11.6* 11.7*   PLATELETS 10*3/mm3 307 284 286 318 300     Results from last 7 days   Lab Units 08/11/20  0657 08/10/20  0507 08/09/20  1656 08/09/20  0350 08/08/20  0506 08/08/20  0022   SODIUM mmol/L 140 139  --  140 137 136   POTASSIUM mmol/L 3.6 3.6 3.6 4.0 4.1 4.2   CHLORIDE mmol/L 105 106  --  107 101 98   CO2 mmol/L 27.1 24.2  --  23.8 25.0 26.8   BUN mg/dL 6* 8  --  12 14 16   CREATININE mg/dL 0.70 0.74  --  0.85 1.00 0.94   CALCIUM mg/dL 8.0* 8.0*  --  8.1* 8.5* 8.7   GLUCOSE mg/dL 107* 108*  --  110* 110* 124*     Lab Results   Component Value Date    ANIONGAP 7.9 08/11/2020     Estimated Creatinine Clearance: 57.8 mL/min (by C-G formula based on SCr of 0.7 mg/dL).    Lab Results   Component Value Date    COVID19 Not Detected 08/08/2020     I personally reviewed EKG,     Assessment/Plan   Active Hospital Problems    Diagnosis  POA   • **Pneumonia of right lower lobe due to infectious organism [J18.9]  Yes   • Diarrhea [R19.7]  Clinically Undetermined   • Hypocalcemia [E83.51]  Unknown   • Anemia, chronic disease [D63.8]  Yes   • CKD (chronic kidney disease) stage 2, GFR 60-89 ml/min [N18.2]  Yes   • Hypotension [I95.9]  Yes   • Non-intractable vomiting [R11.10]  Unknown   • Oropharyngeal dysphagia [R13.12]   Yes   • Gastroesophageal reflux disease without esophagitis [K21.9]  Yes   • Essential hypertension [I10]  Yes      Resolved Hospital Problems   No resolved problems to display.     79 y.o. female admitted with aspiration pneumonia, recurrent    · Recurrent aspiration pneumonia continue antibiotics.  Follow-up chest x-ray  · Status post EGD-results noted.  8 cm hiatal hernia  · Disposition probably today or tomorrow.    · Discussed with patient and nursing staff.    Willie Infante MD   08/12/20  10:20

## 2020-08-12 NOTE — PLAN OF CARE
Problem: Patient Care Overview  Goal: Plan of Care Review  Outcome: Ongoing (interventions implemented as appropriate)  Flowsheets (Taken 8/12/2020 1206)  Progress: improving  Plan of Care Reviewed With: patient  Outcome Summary: Pt tolerated treatment well this date. Only required CGA for all mobility, ambulating 30ft w/ Rw. Limited today d/t nausea. Instructed pt on a few seated LE exercises to attempt on own during the day, and encouraged pt to ambulate w/ nsg. Patient was wearing a face mask during this therapy encounter due to increased confusion and agitation. Therapist used appropriate personal protective equipment including mask, goggles and gloves.  Mask used was standard procedure mask. Appropriate PPE was worn during the entire therapy session. Hand hygiene was completed before and after therapy session. Patient is not in enhanced droplet precautions.

## 2020-08-12 NOTE — THERAPY TREATMENT NOTE
Patient Name: Gabbi Brandon  : 1940    MRN: [id number]                              Today's Date: 2020       Admit Date: 2020    Visit Dx:     ICD-10-CM ICD-9-CM   1. Pneumonia of right lower lobe due to infectious organism J18.9 486   2. Non-intractable vomiting with nausea, unspecified vomiting type R11.2 787.01     Patient Active Problem List   Diagnosis   • Irregular bleeding   • Urinary tract infection   • Dyslipidemia   • Essential hypertension   • Stage 2 chronic kidney disease   • Gastroesophageal reflux disease without esophagitis   • Seasonal allergies   • Familial hypercholesterolemia   • Bladder spasm   • Bilateral lower extremity edema   • Pneumonia of right lower lobe due to infectious organism   • Failure to thrive in adult   • Hyponatremia   • DNR (do not resuscitate)   • Hypoxia   • Oropharyngeal dysphagia   • Anemia, chronic disease   • CKD (chronic kidney disease) stage 2, GFR 60-89 ml/min   • Hypotension   • Diarrhea   • Hypocalcemia   • Non-intractable vomiting     Past Medical History:   Diagnosis Date   • Arthritis    • GERD (gastroesophageal reflux disease)    • Hyperlipidemia    • Hypertension    • Incontinent of urine    • PNA (pneumonia)    • Seasonal allergies    • UTI (urinary tract infection)      Past Surgical History:   Procedure Laterality Date   • BREAST BIOPSY     • COLONOSCOPY N/A 2016    Procedure: COLONOSCOPY WITH POLYPECTOMY (HOT SNARE);  Surgeon: Paulino Narvaez MD;  Location: [facility];  Service:    • VAGINAL HYSTERECTOMY       General Information     Row Name 20 1203          PT Evaluation Time/Intention    Document Type  therapy note (daily note)  -     Mode of Treatment  physical therapy  -     Row Name 20 1203          General Information    Existing Precautions/Restrictions  fall  -     Row Name 20 1203          Cognitive Assessment/Intervention- PT/OT    Orientation Status (Cognition)  oriented x 4  -        User Key  (r) = Recorded By, (t) = Taken By, (c) = Cosigned By    Initials Name Provider Type    Maryam Bolton PTA Physical Therapy Assistant        Mobility     Row Name 08/12/20 1203          Bed Mobility Assessment/Treatment    Bed Mobility Assessment/Treatment  supine-sit  -SM     Supine-Sit [place] (Bed Mobility)  contact guard  -SM     Assistive Device (Bed Mobility)  bed rails;head of bed elevated  -SM     Row Name 08/12/20 1203          Sit-Stand Transfer    Sit-Stand [place] (Transfers)  contact guard  -SM     Assistive Device (Sit-Stand Transfers)  walker, front-wheeled  -SM     Row Name 08/12/20 1203          Gait/Stairs Assessment/Training    [place] Level (Gait)  contact guard  -SM     Assistive Device (Gait)  walker, front-wheeled  -SM     Distance in Feet (Gait)  30  -SM     Pattern (Gait)  step-through  -SM     Deviations/Abnormal Patterns (Gait)  minerva decreased;stride length decreased  -SM     Bilateral Gait Deviations  forward flexed posture  -SM     Comment (Gait/Stairs)  limited d/t bathroom needs and some nausea  -SM       User Key  (r) = Recorded By, (t) = Taken By, (c) = Cosigned By    Initials Name Provider Type    Maryam Bolton PTA Physical Therapy Assistant        Obj/Interventions     Row Name 08/12/20 1204          Therapeutic Exercise    Lower Extremity (Therapeutic Exercise)  gluteal sets;quad sets, bilateral  -SM     Lower Extremity Range of Motion (Therapeutic Exercise)  ankle dorsiflexion/plantar flexion, bilateral  -SM     Exercise Type (Therapeutic Exercise)  AROM (active range of motion)  -SM     Position (Therapeutic Exercise)  seated  -SM     Sets/Reps (Therapeutic Exercise)  10 reps  -SM       User Key  (r) = Recorded By, (t) = Taken By, (c) = Cosigned By    Initials Name Provider Type    Maryam Bolton PTA Physical Therapy Assistant        Goals/Plan    No documentation.       Clinical Impression     Row Name 08/12/20 1205           Pain Assessment    Additional Documentation  Pain Scale: Numbers Pre/Post-Treatment (Group)  -[facility] Name 08/12/20 1205          Pain Scale: Numbers Pre/Post-Treatment    Pain Scale: Numbers, Pretreatment  0/10 - no pain  -     Pain Scale: Numbers, Post-Treatment  0/10 - no pain  -SM     Row Name 08/12/20 1205          Positioning and Restraints    Pre-Treatment Position  in bed  -SM     Post Treatment Position  chair  -SM     In Chair  reclined;call light within reach;encouraged to call for assist;exit alarm on  -       User Key  (r) = Recorded By, (t) = Taken By, (c) = Cosigned By    Initials Name Provider Type    Maryam Bolton PTA Physical Therapy Assistant        Outcome Measures     Row Name 08/12/20 1205          How much help from another person do you currently need...    Turning from your back to your side while in flat bed without using bedrails?  3  -SM     Moving from lying on back to sitting on the side of a flat bed without bedrails?  3  -SM     Moving to and from a bed to a chair (including a wheelchair)?  3  -SM     Standing up from a chair using your arms (e.g., wheelchair, bedside chair)?  3  -SM     Climbing 3-5 steps with a railing?  2  -SM     To walk in hospital room?  3  -SM     AM-PAC 6 Clicks Score (PT)  17  -SM     Row Name 08/12/20 1207          Functional Assessment    Outcome Measure Options  AM-PAC 6 Clicks Basic Mobility (PT)  -       User Key  (r) = Recorded By, (t) = Taken By, (c) = Cosigned By    Initials Name Provider Type    Maryam Bolton PTA Physical Therapy Assistant        Physical Therapy Education                 Title: PT OT SLP Therapies (Done)     Topic: Physical Therapy (Done)     Point: Mobility training (Done)     Description:   Instruct learner(s) on safety and technique for assisting patient out of bed, chair or wheelchair.  Instruct in the proper use of assistive devices, such as walker, crutches, cane or brace.              Patient  Friendly Description:   It's important to get you on your feet again, but we need to do so in a way that is safe for you. Falling has serious consequences, and your personal safety is the most important thing of all.        When it's time to get out of bed, one of us or a family member will sit next to you on the bed to give you support.     If your doctor or nurse tells you to use a walker, crutches, a cane, or a brace, be sure you use it every time you get out of bed, even if you think you don't need it.    Learning Progress Summary           Patient Acceptance, E,TB,D, VU by  at 8/12/2020 1206    Acceptance, E,TB,D, VU,NR by  at 8/11/2020 1621                   Point: Home exercise program (Done)     Description:   Instruct learner(s) on appropriate technique for monitoring, assisting and/or progressing patient with therapeutic exercises and activities.              Learning Progress Summary           Patient Acceptance, E,TB,D, VU by  at 8/12/2020 1206                   Point: Body mechanics (Done)     Description:   Instruct learner(s) on proper positioning and spine alignment for patient and/or caregiver during mobility tasks and/or exercises.              Learning Progress Summary           Patient Acceptance, E,TB,D, VU by  at 8/12/2020 1206                   Point: Precautions (Done)     Description:   Instruct learner(s) on prescribed precautions during mobility and gait tasks              Learning Progress Summary           Patient Acceptance, E,TB,D, VU by  at 8/12/2020 1206                               User Key     Initials Effective Dates Name Provider Type Discipline     04/03/18 -  Katie Londono, PT Physical Therapist PT     03/07/18 -  Maryam Dalal, LEANNA Physical Therapy Assistant PT              PT Recommendation and Plan     Outcome Summary/Treatment Plan (PT)  Anticipated Discharge Disposition (PT): home with home health  Plan of Care Reviewed With: patient  Progress:  improving  Outcome Summary: Pt tolerated treatment well this date. Only required CGA for all mobility, ambulating 30ft w/ Rw. Limited today d/t nausea. Instructed pt on a few seated LE exercises to attempt on own during the day, and encouraged pt to ambulate w/ nsg. Patient was wearing a face mask during this therapy encounter due to increased confusion and agitation. Therapist used appropriate personal protective equipment including mask, goggles and gloves.  Mask used was standard procedure mask. Appropriate PPE was worn during the entire therapy session. Hand hygiene was completed before and after therapy session. Patient is not in enhanced droplet precautions.     Time Calculation:   PT Charges     Row Name 08/12/20 1208             Time Calculation    Start Time  0853  -      Stop Time  0916  -      Time Calculation (min)  23 min  -      PT Received On  08/12/20  -      PT - Next Appointment  08/13/20  -        User Key  (r) = Recorded By, (t) = Taken By, (c) = Cosigned By    Initials Name Provider Type     Maryam Dalal PTA Physical Therapy Assistant        Therapy Charges for Today     Code Description Service Date Service Provider Modifiers Qty    19120572898 HC PT THER PROC EA 15 MIN 8/12/2020 Maryam Dalal PTA GP 2          PT G-Codes  Outcome Measure Options: AM-PAC 6 Clicks Basic Mobility (PT)  AM-PAC 6 Clicks Score (PT): 17    Maryam Dalal PTA  8/12/2020

## 2020-08-12 NOTE — PROGRESS NOTES
Gastroenterology   Inpatient Progress Note    Reason for Follow Up: For gastric pain and black emesis    Subjective     Interval History:   S/p EGD on 8/11/2020 demonstrating 8 cm hiatal hernia with possible paraesophageal component.  Random stomach biopsy demonstrates minimal nonspecific chronic inflammation.  Right upper quadrant ultrasound on 10/20/2020 demonstrates layering stones or sludge.  Patient reports that she is tolerating her diet and denies having any abdominal pain, nausea, or vomiting.  Continues to report diarrhea, but states that stool has not been collected.    Current Facility-Administered Medications:   •  acetaminophen (TYLENOL) tablet 650 mg, 650 mg, Oral, Q4H PRN **OR** acetaminophen (TYLENOL) 160 MG/5ML solution 650 mg, 650 mg, Oral, Q4H PRN **OR** acetaminophen (TYLENOL) suppository 650 mg, 650 mg, Rectal, Q4H PRN, Eugene George MD  •  amLODIPine (NORVASC) tablet 5 mg, 5 mg, Oral, Daily, Eugene George MD, 5 mg at 08/12/20 0842  •  atenolol (TENORMIN) tablet 50 mg, 50 mg, Oral, Daily, Eugene George MD, 50 mg at 08/12/20 0840  •  atorvastatin (LIPITOR) tablet 10 mg, 10 mg, Oral, Daily, Eugene George MD, 10 mg at 08/12/20 0845  •  bisacodyl (DULCOLAX) EC tablet 5 mg, 5 mg, Oral, Daily PRN, Eugene George MD  •  calcium carbonate (TUMS) chewable tablet 500 mg (200 mg elemental), 2 tablet, Oral, BID PRN, Eugene George MD  •  cefepime (MAXIPIME) 2 g/100 mL 0.9% NS (mbp), 2 g, Intravenous, Q12H, Eugene George MD, Last Rate: 0 mL/hr at 08/09/20 0307, 2 g at 08/12/20 0151  •  celecoxib (CeleBREX) capsule 200 mg, 200 mg, Oral, Daily, Eugene George MD, 200 mg at 08/12/20 0842  •  cetirizine (zyrTEC) tablet 10 mg, 10 mg, Oral, Daily, Eugene George MD, 10 mg at 08/12/20 0841  •  fluticasone (FLONASE) 50 MCG/ACT nasal spray 2 spray, 2 spray, Each Nare, Daily, Eugene George MD, 2 spray at 08/12/20 0840  •  lactobacillus acidophilus  (RISAQUAD) capsule 1 capsule, 1 capsule, Oral, Daily, Eugene George MD, 1 capsule at 08/12/20 0842  •  miconazole (MICOTIN) 2 % cream, , Topical, Q12H, Willie Infante MD  •  nitroglycerin (NITROSTAT) SL tablet 0.4 mg, 0.4 mg, Sublingual, Q5 Min PRN, Eugene George MD  •  ondansetron (ZOFRAN) injection 4 mg, 4 mg, Intravenous, Q6H PRN, Willie Infante MD, 4 mg at 08/12/20 1047  •  oxybutynin XL (DITROPAN-XL) 24 hr tablet 5 mg, 5 mg, Oral, Daily, Eugene George MD, 5 mg at 08/12/20 0841  •  pantoprazole (PROTONIX) EC tablet 40 mg, 40 mg, Oral, QAM, Eugene George MD, 40 mg at 08/12/20 0639  •  [COMPLETED] Insert peripheral IV, , , Once **AND** sodium chloride 0.9 % flush 10 mL, 10 mL, Intravenous, PRN, Eugene George MD  •  [COMPLETED] Insert peripheral IV, , , Once **AND** sodium chloride 0.9 % flush 10 mL, 10 mL, Intravenous, PRN, Eugene George MD  •  sodium chloride 0.9 % flush 10 mL, 10 mL, Intravenous, Q12H, Eugene George MD, 10 mL at 08/12/20 0844  •  sodium chloride 0.9 % flush 10 mL, 10 mL, Intravenous, PRN, Eugene George MD  •  traMADol (ULTRAM) tablet 25 mg, 25 mg, Oral, Q12H PRN, Eugene George MD, 25 mg at 08/11/20 0021  Review of Systems:    All systems were reviewed and negative except for:  Gastrointestinal: positive for  diarrhea    Objective     Vital Signs  Temp:  [98.2 °F (36.8 °C)-98.5 °F (36.9 °C)] 98.5 °F (36.9 °C)  Heart Rate:  [58-72] 72  Resp:  [18-20] 20  BP: (133-159)/(61-82) 133/61  Body mass index is 29.5 kg/m².    Intake/Output Summary (Last 24 hours) at 8/12/2020 1431  Last data filed at 8/12/2020 0840  Gross per 24 hour   Intake 220 ml   Output 1100 ml   Net -880 ml     I/O this shift:  In: 120 [P.O.:120]  Out: -      Physical Exam:   General: patient awake, alert and cooperative   Eyes: no scleral icterus   Skin: warm and dry, not jaundiced   Abdomen: soft, nontender, nondistended; normal bowel sounds, no masses palpated,  no periumbical lymphadenopathy   Psychiatric: Appropriate affect and behavior     Results Review:     I reviewed the patient's new clinical results.    Results from last 7 days   Lab Units 08/11/20  0657 08/10/20  0507 08/09/20  0350   WBC 10*3/mm3 7.21 7.19 7.41   HEMOGLOBIN g/dL 11.4* 10.7* 10.4*   HEMATOCRIT % 36.4 31.7* 32.0*   PLATELETS 10*3/mm3 307 284 286     Results from last 7 days   Lab Units 08/11/20  0657 08/10/20  0507 08/09/20  1656 08/09/20  0350  08/08/20  0022   SODIUM mmol/L 140 139  --  140   < > 136   POTASSIUM mmol/L 3.6 3.6 3.6 4.0   < > 4.2   CHLORIDE mmol/L 105 106  --  107   < > 98   CO2 mmol/L 27.1 24.2  --  23.8   < > 26.8   BUN mg/dL 6* 8  --  12   < > 16   CREATININE mg/dL 0.70 0.74  --  0.85   < > 0.94   CALCIUM mg/dL 8.0* 8.0*  --  8.1*   < > 8.7   BILIRUBIN mg/dL  --   --   --   --   --  0.4   ALK PHOS U/L  --   --   --   --   --  67   ALT (SGPT) U/L  --   --   --   --   --  8   AST (SGOT) U/L  --   --   --   --   --  12   GLUCOSE mg/dL 107* 108*  --  110*   < > 124*    < > = values in this interval not displayed.         No results found for: LIPASE    Radiology:  XR Chest PA & Lateral   Final Result   Partial improvement, continued follow-up recommended.       This report was finalized on 8/12/2020 12:59 PM by Dr. Boogie Tran M.D.          FL Video Swallow With Speech Single Contrast   Final Result      US Abdomen Limited   Final Result   Layering echogenic material within the gallbladder may reflect layering   stones or sludge. No gallbladder wall thickening or pericholecystic   fluid       This report was finalized on 8/10/2020 10:02 AM by Dr. Yong Damon M.D.          XR Chest 1 View   Final Result   Dense right basilar consolidation, concerning for pneumonia, as well as   right pleural effusion.       This report was finalized on 8/8/2020 1:15 AM by Dr. Darlene De Anda M.D.              Assessment/Plan     Patient Active Problem List   Diagnosis   • Irregular  bleeding   • Urinary tract infection   • Dyslipidemia   • Essential hypertension   • Stage 2 chronic kidney disease   • Gastroesophageal reflux disease without esophagitis   • Seasonal allergies   • Familial hypercholesterolemia   • Bladder spasm   • Bilateral lower extremity edema   • Pneumonia of right lower lobe due to infectious organism   • Failure to thrive in adult   • Hyponatremia   • DNR (do not resuscitate)   • Hypoxia   • Oropharyngeal dysphagia   • Anemia, chronic disease   • CKD (chronic kidney disease) stage 2, GFR 60-89 ml/min   • Hypotension   • Diarrhea   • Hypocalcemia   • Non-intractable vomiting       Assessment:  1. Nausea and vomiting.  Greatly improved, using Zofran intermittently.  No further emesis.  2. Epigastric pain, resolved.  Continues pantoprazole 40 mg daily for GERD.  EGD demonstrates 8 cm hiatal hernia with possible paraesophageal component. RUQ u/s demonstrating layering stones or sludge.  3. Diarrhea.    These problems are new to me.  Plan:  · Patient's nausea and vomiting is much better controlled.  She is only using Zofran intermittently and denies having any further emesis.  · GERD, continue pantoprazole 40 mg once daily.  · Discussion was held with patient today regarding findings on her EGD.  Patient does not wish to pursue any type of thoracic surgery referral for correction of her large hiatal hernia with possible paraesophageal component.  · Additionally, patient is not interested in general surgery consultation for possible findings of sludge and stones within the gallbladder.  She prefers to defer surgical intervention unless absolutely necessary. Nausea and vomiting could be do in part to both abnormal findings noted on RUQ u/s as well as her large hiatal hernia.  · Diarrhea has persisted, stool studies have yet to be obtained.  A second set of orders was placed to rule out infectious etiology. We will follow up on results once available.  · We will continue to  follow.    I discussed the patients findings and my recommendations with patient and nursing staff.    ANA LUISA Sandoval M.D.  [facility] [place] [address]  Office: [phone]

## 2020-08-12 NOTE — PLAN OF CARE
Problem: Patient Care Overview  Goal: Plan of Care Review  Outcome: Ongoing (interventions implemented as appropriate)  Flowsheets (Taken 8/12/2020 9868)  Progress: no change  Plan of Care Reviewed With: patient  Outcome Summary: Pt able to sleep most of the hs. No pain or discomfort voiced. Continue on IV atb no adverse reaction noted. Possible d/c today.     Problem: Fall Risk (Adult)  Goal: Identify Related Risk Factors and Signs and Symptoms  Outcome: Ongoing (interventions implemented as appropriate)     Problem: Fall Risk (Adult)  Goal: Absence of Fall  Outcome: Ongoing (interventions implemented as appropriate)     Problem: Patient Care Overview  Goal: Individualization and Mutuality  Outcome: Ongoing (interventions implemented as appropriate)     Problem: Pneumonia (Adult)  Goal: Signs and Symptoms of Listed Potential Problems Will be Absent, Minimized or Managed (Pneumonia)  Outcome: Ongoing (interventions implemented as appropriate)

## 2020-08-12 NOTE — PLAN OF CARE
Patient has been resting throughout shift.  Got up to bathroom with therapy and had diarrhea per patient, none was witnessed by this nurse.  Seen by GI, orders placed for stool culture and c-diff, patient placed in contact spore isolation.  VSS, ABC's intact.  A&Ox4.  Up with hands on to stand by assistance.  Pure wick in place for urination due to urgency.  NAD noted.  Will continue to monitor.  Possible discharge tomorrow.   Problem: Fall Risk (Adult)  Goal: Identify Related Risk Factors and Signs and Symptoms  Outcome: Ongoing (interventions implemented as appropriate)  Flowsheets (Taken 8/12/2020 9950)  Related Risk Factors (Fall Risk): age-related changes  Goal: Absence of Fall  Outcome: Ongoing (interventions implemented as appropriate)     Problem: Patient Care Overview  Goal: Plan of Care Review  Outcome: Ongoing (interventions implemented as appropriate)  Goal: Individualization and Mutuality  Outcome: Ongoing (interventions implemented as appropriate)  Goal: Discharge Needs Assessment  Outcome: Ongoing (interventions implemented as appropriate)  Goal: Interprofessional Rounds/Family Conf  Outcome: Ongoing (interventions implemented as appropriate)     Problem: Pneumonia (Adult)  Goal: Signs and Symptoms of Listed Potential Problems Will be Absent, Minimized or Managed (Pneumonia)  Outcome: Ongoing (interventions implemented as appropriate)     Problem: Skin Injury Risk (Adult)  Goal: Identify Related Risk Factors and Signs and Symptoms  Outcome: Ongoing (interventions implemented as appropriate)  Goal: Skin Health and Integrity  Outcome: Ongoing (interventions implemented as appropriate)

## 2020-08-13 ENCOUNTER — READMISSION MANAGEMENT (OUTPATIENT)
Dept: CALL CENTER | Facility: HOSPITAL | Age: 80
End: 2020-08-13

## 2020-08-13 VITALS
WEIGHT: 181.6 LBS | HEART RATE: 64 BPM | BODY MASS INDEX: 30.26 KG/M2 | HEIGHT: 65 IN | RESPIRATION RATE: 18 BRPM | OXYGEN SATURATION: 95 % | SYSTOLIC BLOOD PRESSURE: 117 MMHG | TEMPERATURE: 98.3 F | DIASTOLIC BLOOD PRESSURE: 62 MMHG

## 2020-08-13 LAB
BACTERIA SPEC AEROBE CULT: NORMAL
BACTERIA SPEC AEROBE CULT: NORMAL
C DIFF TOX GENS STL QL NAA+PROBE: NEGATIVE
GLUCOSE BLDC GLUCOMTR-MCNC: 93 MG/DL (ref 70–130)

## 2020-08-13 PROCEDURE — 99232 SBSQ HOSP IP/OBS MODERATE 35: CPT | Performed by: INTERNAL MEDICINE

## 2020-08-13 PROCEDURE — 87493 C DIFF AMPLIFIED PROBE: CPT | Performed by: NURSE PRACTITIONER

## 2020-08-13 PROCEDURE — 92526 ORAL FUNCTION THERAPY: CPT

## 2020-08-13 PROCEDURE — 87046 STOOL CULTR AEROBIC BACT EA: CPT | Performed by: INTERNAL MEDICINE

## 2020-08-13 PROCEDURE — 87427 SHIGA-LIKE TOXIN AG IA: CPT | Performed by: INTERNAL MEDICINE

## 2020-08-13 PROCEDURE — 25010000002 CEFEPIME PER 500 MG: Performed by: INTERNAL MEDICINE

## 2020-08-13 PROCEDURE — 87045 FECES CULTURE AEROBIC BACT: CPT | Performed by: INTERNAL MEDICINE

## 2020-08-13 PROCEDURE — 82962 GLUCOSE BLOOD TEST: CPT

## 2020-08-13 PROCEDURE — 97110 THERAPEUTIC EXERCISES: CPT

## 2020-08-13 RX ORDER — L.ACID,PARA/B.BIFIDUM/S.THERM 8B CELL
1 CAPSULE ORAL DAILY
Qty: 14 EACH | Refills: 0 | Status: SHIPPED | OUTPATIENT
Start: 2020-08-14 | End: 2021-03-22

## 2020-08-13 RX ADMIN — CEFEPIME HYDROCHLORIDE 2 G: 2 INJECTION, POWDER, FOR SOLUTION INTRAVENOUS at 08:13

## 2020-08-13 RX ADMIN — PANTOPRAZOLE SODIUM 40 MG: 40 TABLET, DELAYED RELEASE ORAL at 06:09

## 2020-08-13 RX ADMIN — CETIRIZINE HYDROCHLORIDE 10 MG: 10 TABLET, FILM COATED ORAL at 08:13

## 2020-08-13 RX ADMIN — CELECOXIB 200 MG: 200 CAPSULE ORAL at 08:14

## 2020-08-13 RX ADMIN — OXYBUTYNIN CHLORIDE 5 MG: 5 TABLET, EXTENDED RELEASE ORAL at 08:15

## 2020-08-13 RX ADMIN — ATORVASTATIN CALCIUM 10 MG: 20 TABLET, FILM COATED ORAL at 08:16

## 2020-08-13 RX ADMIN — MICONAZOLE NITRATE: 20 CREAM TOPICAL at 00:44

## 2020-08-13 RX ADMIN — AMLODIPINE BESYLATE 5 MG: 5 TABLET ORAL at 08:15

## 2020-08-13 RX ADMIN — ATENOLOL 50 MG: 50 TABLET ORAL at 08:15

## 2020-08-13 RX ADMIN — FLUTICASONE PROPIONATE 2 SPRAY: 50 SPRAY, METERED NASAL at 08:18

## 2020-08-13 RX ADMIN — ACETAMINOPHEN 650 MG: 325 TABLET, FILM COATED ORAL at 00:43

## 2020-08-13 RX ADMIN — Medication 1 CAPSULE: at 08:15

## 2020-08-13 NOTE — PROGRESS NOTES
Gastroenterology   Inpatient Progress Note    Reason for Follow Up:  Epigastric pain    Subjective     Interval History:   No new complaints.  No vomiting, chest or abdominal pain. Diarrhea improved.     Current Facility-Administered Medications:   •  acetaminophen (TYLENOL) tablet 650 mg, 650 mg, Oral, Q4H PRN, 650 mg at 08/13/20 0043 **OR** acetaminophen (TYLENOL) 160 MG/5ML solution 650 mg, 650 mg, Oral, Q4H PRN **OR** acetaminophen (TYLENOL) suppository 650 mg, 650 mg, Rectal, Q4H PRN, Eugene George MD  •  amLODIPine (NORVASC) tablet 5 mg, 5 mg, Oral, Daily, Eugene George MD, 5 mg at 08/13/20 0815  •  atenolol (TENORMIN) tablet 50 mg, 50 mg, Oral, Daily, Eugene George MD, 50 mg at 08/13/20 0815  •  atorvastatin (LIPITOR) tablet 10 mg, 10 mg, Oral, Daily, Eugene George MD, 10 mg at 08/13/20 0816  •  bisacodyl (DULCOLAX) EC tablet 5 mg, 5 mg, Oral, Daily PRN, Eugene George MD  •  calcium carbonate (TUMS) chewable tablet 500 mg (200 mg elemental), 2 tablet, Oral, BID PRN, Eugene George MD  •  celecoxib (CeleBREX) capsule 200 mg, 200 mg, Oral, Daily, Eugene George MD, 200 mg at 08/13/20 0814  •  cetirizine (zyrTEC) tablet 10 mg, 10 mg, Oral, Daily, Eugene George MD, 10 mg at 08/13/20 0813  •  fluticasone (FLONASE) 50 MCG/ACT nasal spray 2 spray, 2 spray, Each Nare, Daily, Eugene George MD, 2 spray at 08/13/20 0818  •  lactobacillus acidophilus (RISAQUAD) capsule 1 capsule, 1 capsule, Oral, Daily, Eugene George MD, 1 capsule at 08/13/20 0815  •  miconazole (MICOTIN) 2 % cream, , Topical, Q12H, Willie Infante MD  •  nitroglycerin (NITROSTAT) SL tablet 0.4 mg, 0.4 mg, Sublingual, Q5 Min PRN, Eugene George MD  •  ondansetron (ZOFRAN) injection 4 mg, 4 mg, Intravenous, Q6H PRN, Willie Infante MD, 4 mg at 08/12/20 1047  •  oxybutynin XL (DITROPAN-XL) 24 hr tablet 5 mg, 5 mg, Oral, Daily, Eugene George MD, 5 mg at 08/13/20 0815  •   pantoprazole (PROTONIX) EC tablet 40 mg, 40 mg, Oral, QAM, Eugene George MD, 40 mg at 08/13/20 0609  •  [COMPLETED] Insert peripheral IV, , , Once **AND** sodium chloride 0.9 % flush 10 mL, 10 mL, Intravenous, PRN, Eugene George MD  •  [COMPLETED] Insert peripheral IV, , , Once **AND** sodium chloride 0.9 % flush 10 mL, 10 mL, Intravenous, PRN, Eugene George MD  •  sodium chloride 0.9 % flush 10 mL, 10 mL, Intravenous, Q12H, Eugene George MD, 10 mL at 08/12/20 2100  •  sodium chloride 0.9 % flush 10 mL, 10 mL, Intravenous, PRN, Eugene George MD  •  traMADol (ULTRAM) tablet 25 mg, 25 mg, Oral, Q12H PRN, Eugene George MD, 25 mg at 08/11/20 0021  Review of Systems:    The following systems were reviewed and negative;  constitution    Objective     Vital Signs  Temp:  [97.9 °F (36.6 °C)-99.3 °F (37.4 °C)] 98.3 °F (36.8 °C)  Heart Rate:  [64-70] 64  Resp:  [18] 18  BP: (117-137)/(61-67) 117/62  Body mass index is 30.22 kg/m².    Intake/Output Summary (Last 24 hours) at 8/13/2020 1510  Last data filed at 8/13/2020 1401  Gross per 24 hour   Intake 420 ml   Output 600 ml   Net -180 ml     I/O this shift:  In: 420 [P.O.:420]  Out: -      Physical Exam:   General: patient awake, alert and cooperative   Eyes: no scleral icterus   Skin: warm and dry, not jaundiced   Abdomen: soft, nontender, nondistended; normal bowel sounds, no masses palpated, no periumbical lymphadenopathy   Psychiatric: Appropriate affect and behavior     Results Review:     I reviewed the patient's new clinical results.    Results from last 7 days   Lab Units 08/11/20  0657 08/10/20  0507 08/09/20  0350   WBC 10*3/mm3 7.21 7.19 7.41   HEMOGLOBIN g/dL 11.4* 10.7* 10.4*   HEMATOCRIT % 36.4 31.7* 32.0*   PLATELETS 10*3/mm3 307 284 286     Results from last 7 days   Lab Units 08/11/20  0657 08/10/20  0507 08/09/20  1656 08/09/20  0350  08/08/20  0022   SODIUM mmol/L 140 139  --  140   < > 136   POTASSIUM mmol/L 3.6 3.6  3.6 4.0   < > 4.2   CHLORIDE mmol/L 105 106  --  107   < > 98   CO2 mmol/L 27.1 24.2  --  23.8   < > 26.8   BUN mg/dL 6* 8  --  12   < > 16   CREATININE mg/dL 0.70 0.74  --  0.85   < > 0.94   CALCIUM mg/dL 8.0* 8.0*  --  8.1*   < > 8.7   BILIRUBIN mg/dL  --   --   --   --   --  0.4   ALK PHOS U/L  --   --   --   --   --  67   ALT (SGPT) U/L  --   --   --   --   --  8   AST (SGOT) U/L  --   --   --   --   --  12   GLUCOSE mg/dL 107* 108*  --  110*   < > 124*    < > = values in this interval not displayed.         No results found for: LIPASE    Radiology:  XR Chest PA & Lateral   Final Result   Partial improvement, continued follow-up recommended.       This report was finalized on 8/12/2020 12:59 PM by Dr. Boogie Tran M.D.          FL Video Swallow With Speech Single Contrast   Final Result      US Abdomen Limited   Final Result   Layering echogenic material within the gallbladder may reflect layering   stones or sludge. No gallbladder wall thickening or pericholecystic   fluid       This report was finalized on 8/10/2020 10:02 AM by Dr. Yong Damon M.D.          XR Chest 1 View   Final Result   Dense right basilar consolidation, concerning for pneumonia, as well as   right pleural effusion.       This report was finalized on 8/8/2020 1:15 AM by Dr. Darlene De Anda M.D.          XR Chest 2 View    (Results Pending)       Assessment/Plan     Patient Active Problem List   Diagnosis   • Irregular bleeding   • Urinary tract infection   • Dyslipidemia   • Essential hypertension   • Stage 2 chronic kidney disease   • Gastroesophageal reflux disease without esophagitis   • Seasonal allergies   • Familial hypercholesterolemia   • Bladder spasm   • Bilateral lower extremity edema   • Pneumonia of right lower lobe due to infectious organism   • Failure to thrive in adult   • Hyponatremia   • DNR (do not resuscitate)   • Hypoxia   • Oropharyngeal dysphagia   • Anemia, chronic disease   • CKD (chronic kidney  disease) stage 2, GFR 60-89 ml/min   • Hypotension   • Diarrhea   • Hypocalcemia   • Non-intractable vomiting       Assessment:  1. Hiatal hernia: Patient has a large hiatal hernia but no severe esophagitis.  2. Aspiration pneumonia.  This may be more likely to recur given the large hiatal hernia so she needs to be very strict regarding antireflux measures and not eating late at night or eating before she goes to bed.  3. GERD.  Currently asymptomatic.  4. Anemia.  Stable.  No bleeding reported.      Plan:  · Maintain strict antireflux measures.  · At this point, I do not think antireflux surgery is necessary given her advanced age and other comorbidities.  I think she will do fine with lifestyle modification to deal with the large hiatal hernia.  · Patient has asymptomatic gallstones and does not need cholecystectomy.  · Plans for management of pneumonia per hospitalist team.  · Patient may follow-up from a GI standpoint with Dr. Narvaez.  I discussed the patients findings and my recommendations with patient and nursing staff.    MD Eugene Wagner M.D.  [facility] [address] [address]  Office: [phone]

## 2020-08-13 NOTE — PLAN OF CARE
Patient discharged home with  and daughter.  Discharge instructions reviewed, medications and follow up with daughter,  and patient.  VSS.  ABC's intact. Wheelchair to private vehicle.  Denies any nausea.  NAD noted.   Problem: Fall Risk (Adult)  Goal: Identify Related Risk Factors and Signs and Symptoms  Outcome: Outcome(s) achieved  Goal: Absence of Fall  Outcome: Outcome(s) achieved     Problem: Patient Care Overview  Goal: Plan of Care Review  Outcome: Outcome(s) achieved  Goal: Individualization and Mutuality  Outcome: Outcome(s) achieved  Goal: Discharge Needs Assessment  Outcome: Outcome(s) achieved  Goal: Interprofessional Rounds/Family Conf  Outcome: Outcome(s) achieved     Problem: Pneumonia (Adult)  Goal: Signs and Symptoms of Listed Potential Problems Will be Absent, Minimized or Managed (Pneumonia)  Outcome: Outcome(s) achieved     Problem: Skin Injury Risk (Adult)  Goal: Identify Related Risk Factors and Signs and Symptoms  Outcome: Outcome(s) achieved  Goal: Skin Health and Integrity  Outcome: Outcome(s) achieved

## 2020-08-13 NOTE — DISCHARGE INSTR - DIET
Take small bites, sips of water.  No straws.  Sit upright after meals for at least 20-30 minutes.

## 2020-08-13 NOTE — PROGRESS NOTES
Continued Stay Note  [facility]     Patient Name: Gabbi Brandon  MRN: [id number]  Today's Date: 8/13/2020    Admit Date: 8/8/2020    Discharge Plan     Row Name 08/13/20 1422       Plan    Plan  Home with family and [facility], spouse to transport    Provided Post Acute Provider List?  Refused    Provided Post Acute Provider Quality & Resource List?  Refused    Patient/Family in Agreement with Plan  yes    Plan Comments  Spoke with pt, discussed dc planning and pt would like to have [facility]  for dc needs. She states her spouse will drive her. IMM notice given. [facility]        Discharge Codes    No documentation.       Expected Discharge Date and Time     Expected Discharge Date Expected Discharge Time    Aug 13, 2020             Veronica Caceres, RN

## 2020-08-13 NOTE — DISCHARGE SUMMARY
[facility]    Date of Discharge:  8/13/2020    PCP: Gordo Mg MD    Discharge Diagnosis:   Active Hospital Problems    Diagnosis  POA   • **Pneumonia of right lower lobe due to infectious organism [J18.9]  Yes   • Diarrhea [R19.7]  Clinically Undetermined   • Hypocalcemia [E83.51]  Unknown   • Anemia, chronic disease [D63.8]  Yes   • CKD (chronic kidney disease) stage 2, GFR 60-89 ml/min [N18.2]  Yes   • Hypotension [I95.9]  Yes   • Non-intractable vomiting [R11.10]  Unknown   • Oropharyngeal dysphagia [R13.12]  Yes   • Gastroesophageal reflux disease without esophagitis [K21.9]  Yes   • Essential hypertension [I10]  Yes      Resolved Hospital Problems   No resolved problems to display.     Procedures Performed  Procedure(s):  ESOPHAGOGASTRODUODENOSCOPY with biopsies  08/11 1111 [facility] ENDOSCOPY  Consults     Date and Time Order Name Status Description    8/8/2020 0933 Inpatient Gastroenterology Consult Completed     8/8/2020 0306 [facility] (on-call MD unless specified) Details Completed         Hospital Course  Please see history and physical for details. Patient is a 79 y.o. female admitted with recurrent possible aspiration pneumonia.  Patient completed a course of cefepime while in the hospital.  Follow-up x-ray showed improvement.  She will need continued follow-up imaging to ensure resolution.  Speech therapy saw the patient and aVF SS was completed and speech therapy recommended regular diet with thin liquids.    Patient had nausea and vomiting as well as epigastric pain.  EGD showed an 8 cm hiatal hernia with possible paraesophageal component.  Right upper quadrant ultrasound showed some layering of stones or sludge.  Patient declined any type of thoracic surgery referral for correction of hiatal hernia with possible paraesophageal component.  Patient also was not interested in general surgery consultation for findings of the gallbladder.  She  wished to defer intervention until absolutely necessary.  She is tolerating p.o. better.  She is tolerating p.o. and would like to go home.    I discussed the patient's findings and my recommendations with patient, family and nursing staff.    Condition on Discharge: Improved.  Patient would like to go home today.    Temp:  [97.9 °F (36.6 °C)-99.3 °F (37.4 °C)] 98.3 °F (36.8 °C)  Heart Rate:  [64-77] 64  Resp:  [18] 18  BP: (103-137)/(61-67) 117/62  Body mass index is 30.22 kg/m².    Physical Exam   Constitutional: She is oriented to person, place, and time. No distress.   Cardiovascular: Normal rate and regular rhythm.   Pulmonary/Chest: Effort normal and breath sounds normal. No respiratory distress.   Abdominal: Soft. Bowel sounds are normal. There is no tenderness. There is no rebound and no guarding.   Musculoskeletal: She exhibits no edema.   Neurological: She is alert and oriented to person, place, and time.   Skin: Skin is warm and dry.   Psychiatric: She has a normal mood and affect. Her behavior is normal.   Nursing note and vitals reviewed.    While in the room and during my examination of the patient I wore gloves, mask, eye protection.  I washed my hands before and after this patient encounter.      Discharge Medications      New Medications      Instructions Start Date   lactobacillus acidophilus capsule capsule   1 capsule, Oral, Daily   Start Date:  August 14, 2020        Continue These Medications      Instructions Start Date   amLODIPine 5 MG tablet  Commonly known as:  NORVASC   TAKE 1 TABLET EVERY DAY      aspirin 81 MG EC tablet   81 mg, Oral, Daily      atenolol 50 MG tablet  Commonly known as:  TENORMIN   TAKE 1 TABLET EVERY DAY      celecoxib 200 MG capsule  Commonly known as:  CeleBREX   200 mg, Oral, Daily      cetirizine 10 MG tablet  Commonly known as:  zyrTEC   10 mg, Oral, Daily      fluticasone 50 MCG/ACT nasal spray  Commonly known as:  FLONASE   USE 2 SPRAYS IN EACH NOSTRIL EVERY  DAY      omeprazole 20 MG capsule  Commonly known as:  priLOSEC   TAKE 1 CAPSULE EVERY DAY      oxybutynin XL 5 MG 24 hr tablet  Commonly known as:  DITROPAN-XL   5 mg, Oral, Daily      simvastatin 20 MG tablet  Commonly known as:  ZOCOR   20 mg, Oral, Nightly            Diet Instructions     Diet: Regular      Discharge Diet:  Regular         Activity Instructions     Activity as Tolerated           Additional Instructions for the Follow-ups that You Need to Schedule     Call MD for problems / concerns.   As directed      XR Chest 2 View   Aug 27, 2020      Exam reason:  Follow-up pneumonia           Follow-up Information     Gordo Mg MD .    Specialty:  Internal Medicine  Contact information:  [address] [address]  [phone]                 Test Results Pending at Discharge   Order Current Status    Stool Culture (Reference Lab) - Stool, Per Rectum In process         Willie Infante MD  08/13/20  14:03    Discharge time spent greater than 30 minutes.

## 2020-08-13 NOTE — OUTREACH NOTE
Prep Survey      Responses   [facility] patient discharged from?  [facility]   Is LACE score < 7 ?  No   Eligibility  [facility]   Date of Admission  08/08/20   Date of Discharge  08/13/20   Discharge Disposition  Home or Self Care   Discharge diagnosis  Pneumonia   COVID-19 Test Status  Negative   Does the patient have one of the following disease processes/diagnoses(primary or secondary)?  COPD/Pneumonia   Does the patient have Home health ordered?  Yes   What is the Home health agency?    [facility]    Is there a DME ordered?  No   Prep survey completed?  Yes          Janina Escalante RN

## 2020-08-13 NOTE — THERAPY RE-EVALUATION
Acute Care - Speech Language Pathology   Swallow Re-Evaluation [facility]     Patient Name: Gabbi Brandon  : 1940  MRN: [id number]  Today's Date: 2020               Admit Date: 2020    Visit Dx:     ICD-10-CM ICD-9-CM   1. Pneumonia of right lower lobe due to infectious organism J18.9 486   2. Non-intractable vomiting with nausea, unspecified vomiting type R11.2 787.01     Patient Active Problem List   Diagnosis   • Irregular bleeding   • Urinary tract infection   • Dyslipidemia   • Essential hypertension   • Stage 2 chronic kidney disease   • Gastroesophageal reflux disease without esophagitis   • Seasonal allergies   • Familial hypercholesterolemia   • Bladder spasm   • Bilateral lower extremity edema   • Pneumonia of right lower lobe due to infectious organism   • Failure to thrive in adult   • Hyponatremia   • DNR (do not resuscitate)   • Hypoxia   • Oropharyngeal dysphagia   • Anemia, chronic disease   • CKD (chronic kidney disease) stage 2, GFR 60-89 ml/min   • Hypotension   • Diarrhea   • Hypocalcemia   • Non-intractable vomiting     Past Medical History:   Diagnosis Date   • Arthritis    • GERD (gastroesophageal reflux disease)    • Hyperlipidemia    • Hypertension    • Incontinent of urine    • PNA (pneumonia)    • Seasonal allergies    • UTI (urinary tract infection)      Past Surgical History:   Procedure Laterality Date   • BREAST BIOPSY     • COLONOSCOPY N/A 2016    Procedure: COLONOSCOPY WITH POLYPECTOMY (HOT SNARE);  Surgeon: Paulino Narvaez MD;  Location: [facility] ENDOSCOPY;  Service:    • ENDOSCOPY N/A 2020    Procedure: ESOPHAGOGASTRODUODENOSCOPY with biopsies;  Surgeon: Eugene George MD;  Location: [facility] ENDOSCOPY;  Service: Gastroenterology;  Laterality: N/A;  pre-- melena and abdominal pain  post-- hiatel hernia   • VAGINAL HYSTERECTOMY          SWALLOW EVALUATION (last 72 hours)      SLP Adult Swallow Evaluation     Row Name 20 1311                    Rehab Evaluation    Document Type  re-evaluation  -LN        Subjective Information  no complaints  -LN        Patient Observations  alert;cooperative  -LN        Patient/Family Observations  Pt upright in bed and agreeable to treatment. Patient and family member wore mask and SLP wore all necessary PPE, including goggles, mask, gloves, and hand hygiene was performed before and after the encounter.   -LN        Patient Effort  good  -LN        Symptoms Noted During/After Treatment  none  -LN           General Information    Patient Profile Reviewed  yes  -LN        Pertinent History Of Current Problem  MBSS 8/10 recommended regular/thin liquid diet with no straws, small bites/sips, and slow rate. Recent CXR showed partial improvement of PNA.   -LN        Current Method of Nutrition  regular textures;thin liquids  -LN        Precautions/Limitations, Vision  WFL;for purposes of eval  -LN        Precautions/Limitations, Hearing  WFL;for purposes of eval  -LN        Prior Level of Function-Swallowing  no diet consistency restrictions  -LN        Plans/Goals Discussed with  patient;agreed upon  -LN        Barriers to Rehab  none identified  -LN        Patient's Goals for Discharge  return home  -LN           Pain Assessment    Additional Documentation  Pain Scale: Numbers Pre/Post-Treatment (Group)  -LN           Pain Scale: Numbers Pre/Post-Treatment    Pain Scale: Numbers, Pretreatment  1/10  -LN        Pain Scale: Numbers, Post-Treatment  1/10  -LN        Pre/Post Treatment Pain Comment  right arm aching near IV site  -LN        Pain Intervention(s)  Repositioned  -LN           Oral Motor and Function    Dentition Assessment  natural, present and adequate  -LN        Secretion Management  WNL/WFL  -LN        Mucosal Quality  moist, healthy  -LN           Oral Musculature and Cranial Nerve Assessment    Oral Motor General Assessment  WFL  -LN           General Eating/Swallowing Observations    Respiratory  Support Currently in Use  room air  -LN        Eating/Swallowing Skills  self-fed  -LN        Positioning During Eating  upright 90 degree;upright in bed  -LN        Utensils Used  spoon;cup  -LN        Consistencies Trialed  regular textures;pureed;thin liquids mixed  -LN           Clinical Swallow Eval    Clinical Swallow Evaluation Summary  Patient seen this date to ensure tolerance of recommended diet. Pt provided trials of thins via cup, puree, mixed, and regular solids. Patient with adequate mastication of solids and laryngeal elevation was palpated during the swallow and determined to be WFL. Pt exhibited no clinical s/s of aspiration with any consistencies. Recommend continuation of current diet with continued recommendation for remaining upright during all po, small bites/sips, slow rate, and no straws. Oral meds whole in puree.   -LN           Recommendations    Therapy Frequency (Swallow)  evaluation only  -LN        SLP Diet Recommendation  regular textures;thin liquids  -LN        Recommended Precautions and Strategies  upright posture during/after eating;small bites of food and sips of liquid;no straw;other (see comments) slow rate  -LN        SLP Rec. for Method of Medication Administration  meds whole;with pudding or applesauce;as tolerated  -LN        Monitor for Signs of Aspiration  yes;notify SLP if any concerns  -LN        Anticipated Dischage Disposition (SLP)  home  -LN           Swallow Goals (SLP)    Oral Nutrition/Hydration Goal Selection (SLP)  oral nutrition/hydration, SLP goal 1  -LN           Oral Nutrition/Hydration Goal 1 (SLP)    Oral Nutrition/Hydration Goal 1, SLP  Pt will safely tolerate a regular diet with no s/s of aspiration.  -LN        Time Frame (Oral Nutrition/Hydration Goal 1, SLP)  by discharge  -LN        Progress/Outcomes (Oral Nutrition/Hydration Goal 1, SLP)  good progress toward goal  -LN          User Key  (r) = Recorded By, (t) = Taken By, (c) = Cosigned By     Initials Name Effective Dates    Madhavi Jovel 12/17/19 -           EDUCATION  The patient has been educated in the following areas:   Dysphagia (Swallowing Impairment) Oral Care/Hydration.    SLP Recommendation and Plan     SLP Diet Recommendation: regular textures, thin liquids  Recommended Precautions and Strategies: upright posture during/after eating, small bites of food and sips of liquid, no straw, other (see comments)(slow rate)  SLP Rec. for Method of Medication Administration: meds whole, with pudding or applesauce, as tolerated     Monitor for Signs of Aspiration: yes, notify SLP if any concerns        Anticipated Dischage Disposition (SLP): home     Therapy Frequency (Swallow): evaluation only               SLP GOALS     Row Name 08/13/20 1311             Oral Nutrition/Hydration Goal 1 (SLP)    Oral Nutrition/Hydration Goal 1, SLP  Pt will safely tolerate a regular diet with no s/s of aspiration.  -LN      Time Frame (Oral Nutrition/Hydration Goal 1, SLP)  by discharge  -LN      Progress/Outcomes (Oral Nutrition/Hydration Goal 1, SLP)  good progress toward goal  -LN        User Key  (r) = Recorded By, (t) = Taken By, (c) = Cosigned By    Initials Name Provider Type    Madhavi Jovel Speech and Language Pathologist             Time Calculation:   Time Calculation- SLP     Row Name 08/13/20 1352             Time Calculation- SLP    SLP Start Time  1316  -LN      SLP Received On  08/13/20  -LN        User Key  (r) = Recorded By, (t) = Taken By, (c) = Cosigned By    Initials Name Provider Type    Madhavi Jovel Speech and Language Pathologist          Therapy Charges for Today     Code Description Service Date Service Provider Modifiers Qty    [id number]  ST TREATMENT SWALLOW 3 8/13/2020 Madhavi Smalls GN 1               Madhavi Smalls  8/13/2020

## 2020-08-13 NOTE — DISCHARGE PLACEMENT REQUEST
"Gabbi Henning (79 y.o. Female)     Date of Birth Social Security Number Address Home Phone MRN    [id number]  [id number] [facility] [address] [phone] [id number]    Adventism Marital Status          Spiritism        Admission Date Admission Type Admitting Provider Attending Provider Department, Room/Bed    8/8/20 Emergency Srinivas Fleming MD Nguyen, Minh Loc, MD [address], [address]    Discharge Date Discharge Disposition Discharge Destination         Home or Self Care              Attending Provider:  Willie Infante MD    Allergies:  Penicillins    Isolation:  None   Infection:  None   Code Status:  No CPR    Ht:  165.1 cm (65\")   Wt:  82.4 kg (181 lb 9.6 oz)    Admission Cmt:  None   Principal Problem:  Pneumonia of right lower lobe due to infectious organism [J18.9]                 Active Insurance as of 8/8/2020     Primary Coverage     Payor Plan Insurance Group Employer/Plan Group    MEDICARE MEDICARE A & B      Payor Plan Address Payor Plan Phone Number Payor Plan Fax Number Effective Dates    [address] [phone]  9/1/2003 - None Entered    [facility] [id number]       Subscriber Name Subscriber Birth Date Member ID       GABBI HENNING 1940 [id number]           Secondary Coverage     Payor Plan Insurance Group Employer/Plan Group    [facility] SUPP [id number]     Payor Plan Address Payor Plan Phone Number Payor Plan Fax Number Effective Dates    [address]   12/1/2016 - None Entered    [facility] [id number]       Subscriber Name Subscriber Birth Date Member ID       GABBI HENNING 1940 [id number]                 Emergency Contacts      (Rel.) Home Phone Work Phone Mobile Phone    Sara Hernandez (Daughter) [phone] -- [phone]    SinghPeterie (Daughter) [phone] -- --            "

## 2020-08-13 NOTE — PLAN OF CARE
Problem: Patient Care Overview  Goal: Plan of Care Review  Outcome: Ongoing (interventions implemented as appropriate)  Flowsheets (Taken 8/13/2020 0918)  Progress: improving  Plan of Care Reviewed With: patient  Outcome Summary: Pt tolerated treatment well this date. Increased gait distance to 60ft w/ Rw and CGA. Slightly unsteady, though pt was feeling somewhat dizzy and was a little anxious. Pt was instructed on bed exercises, and was encouraged to perform on own throughout the day, as well as to ambulate w/ nsg. Patient was intermittently wearing a face mask during this therapy encounter due to increased confusion and agitation. Therapist used appropriate personal protective equipment including mask, goggles and gloves.  Mask used was standard procedure mask. Appropriate PPE was worn during the entire therapy session. Hand hygiene was completed before and after therapy session. Patient is not in enhanced droplet precautions.

## 2020-08-14 ENCOUNTER — TRANSITIONAL CARE MANAGEMENT TELEPHONE ENCOUNTER (OUTPATIENT)
Dept: CALL CENTER | Facility: HOSPITAL | Age: 80
End: 2020-08-14

## 2020-08-14 ENCOUNTER — TELEPHONE (OUTPATIENT)
Dept: INTERNAL MEDICINE | Facility: CLINIC | Age: 80
End: 2020-08-14

## 2020-08-14 DIAGNOSIS — J18.9 PNEUMONIA OF RIGHT LOWER LOBE DUE TO INFECTIOUS ORGANISM: Primary | ICD-10-CM

## 2020-08-14 PROCEDURE — 71046 X-RAY EXAM CHEST 2 VIEWS: CPT | Performed by: INTERNAL MEDICINE

## 2020-08-14 NOTE — OUTREACH NOTE
Call Center TCM Note      Responses   [facility]   COVID-19 Test Status  Negative   Does the patient have one of the following disease processes/diagnoses(primary or secondary)?  COPD/Pneumonia   TCM attempt successful?  Yes   Call start time  1123   Call end time  1125   Discharge diagnosis  Pneumonia   Is patient permission given to speak with other caregiver?  Yes   List who call center can speak with  daughter- Sara   Person spoke with today (if not patient) and relationship  daughter- Sara   Meds reviewed with patient/caregiver?  Yes   Is the patient having any side effects they believe may be caused by any medication additions or changes?  No   Does the patient have all medications ordered at discharge?  Yes   Is the patient taking all medications as directed (includes completed medication regime)?  Yes   Does the patient have a primary care provider?   Yes   Does the patient have an appointment with their PCP or pulmonologist within 7 days of discharge?  Greater than 7 days   Comments regarding PCP  appt with Dr. Mg on 9/29   What is preventing the patient from scheduling follow up appointments within 7 days of discharge?  -- [daughter requested to keep previously scheduled appt.]   Nursing Interventions  Verified appointment date/time/provider   Has the patient kept scheduled appointments due by today?  N/A   What is the Home health agency?    [facility]   Did the patient receive a copy of their discharge instructions?  Yes   Nursing interventions  Reviewed instructions with patient   What is the patient's perception of their health status since discharge?  Improving   Nursing Interventions  Nurse provided patient education   Is the patient/caregiver able to teach back the hierarchy of who to call/visit for symptoms/problems? PCP, Specialist, Home health nurse, Urgent Care, ED, 911  Yes   Is the patient/caregiver able to teach back signs and symptoms  of worsening condition:  Fever/chills, Shortness of breath, Chest pain   Is the patient/caregiver able to teach back importance of completing antibiotic course of treatment?  Yes   TCM call completed?  Yes   Wrap up additional comments  Per daughter, pt is doing well, no questions or concerns at this time.          Griselda Sesay RN    8/14/2020, 11:25

## 2020-08-14 NOTE — TELEPHONE ENCOUNTER
DISCHARGE INSTRUCTIONS FROM [facility] SAID FOR HER TO HAVE A CXR SCHEDULED BY AUGUST 27, 2020.      PLEASE ADVISE  [phone]

## 2020-08-17 LAB
CAMPYLOBACTER STL CULT: NORMAL
E COLI SXT STL QL IA: NEGATIVE
Lab: NORMAL
Lab: NORMAL
SALM + SHIG STL CULT: NORMAL

## 2020-08-17 NOTE — PROGRESS NOTES
Case Management Discharge Note      Final Note: Home with [facility] and spouse. [facility]    Provided Post Acute Provider List?: Refused  Provided Post Acute Provider Quality & Resource List?: Refused    Destination      No service has been selected for the patient.      Durable Medical Equipment      No service has been selected for the patient.      Dialysis/Infusion      No service has been selected for the patient.      Home Medical Care      Service Provider Request Status Selected Services Address Phone Number Fax Number    [facility] [address] [address] [phone]      Therapy      No service has been selected for the patient.      Community Resources      No service has been selected for the patient.        Transportation Services  Private: Car    Final Discharge Disposition Code: 06 - home with home health care

## 2020-08-18 ENCOUNTER — TELEPHONE (OUTPATIENT)
Dept: INTERNAL MEDICINE | Facility: CLINIC | Age: 80
End: 2020-08-18

## 2020-08-18 NOTE — TELEPHONE ENCOUNTER
LOW FROM [facility] STATES THAT THE PATIENT IS REFUSING TO DO PHYSICAL THERAPY. PATIENT STATES THAT SHE IS GOING FINE    BEST CALL BACK   [phone]

## 2020-08-18 NOTE — TELEPHONE ENCOUNTER
PATIENT'S DAUGHTER IS CALLING BECAUSE HER MOTHER IS VERY JITTERY AND THEY WERE RECOMMENDED ZOLOFT. THEY WANTED TO GET  RECOMMENDATION ON WHAT SHE SHOULD TAKE. PLEASE GIVE HER A CALL. SHE IS USING THE [facility] ON FILE.    [facility]#: [phone]

## 2020-08-20 ENCOUNTER — READMISSION MANAGEMENT (OUTPATIENT)
Dept: CALL CENTER | Facility: HOSPITAL | Age: 80
End: 2020-08-20

## 2020-08-20 NOTE — OUTREACH NOTE
COPD/PN Week 2 Survey      Responses   [facility]   COVID-19 Test Status  Negative   Does the patient have one of the following disease processes/diagnoses(primary or secondary)?  COPD/Pneumonia   Was the primary reason for admission:  Pneumonia   Week 2 attempt successful?  Yes   Call start time  1716   Call end time  1718   Discharge diagnosis  Pneumonia   Is patient permission given to speak with other caregiver?  Yes   Person spoke with today (if not patient) and relationship  daughter- Sara Figueroa reviewed with patient/caregiver?  Yes   Is the patient having any side effects they believe may be caused by any medication additions or changes?  No   Does the patient have all medications ordered at discharge?  Yes   Is the patient taking all medications as directed (includes completed medication regime)?  Yes   Does the patient have a primary care provider?   Yes   Has the patient kept scheduled appointments due by today?  N/A   What is the Home health agency?    [facility]    Has home health visited the patient within 72 hours of discharge?  Yes   Pulse Ox monitoring  None   Psychosocial issues?  No   Did the patient receive a copy of their discharge instructions?  Yes   Nursing interventions  Reviewed instructions with patient   What is the patient's perception of their health status since discharge?  Improving   Nursing Interventions  Nurse provided patient education   Is the patient/caregiver able to teach back the hierarchy of who to call/visit for symptoms/problems? PCP, Specialist, Home health nurse, Urgent Care, ED, 911  Yes   Additional teach back comments  Encouraged to do a little bit more daily.  No fever, cough or SOA.  She washed dishes this morning.   Is the patient/caregiver able to teach back signs and symptoms of worsening condition:  Fever/chills, Shortness of breath, Chest pain   Is the patient/caregiver able to teach back importance of completing  antibiotic course of treatment?  Yes   Week 2 call completed?  Yes   Wrap up additional comments  Improving.          Gina Dailey RN

## 2020-08-26 ENCOUNTER — APPOINTMENT (OUTPATIENT)
Dept: WOMENS IMAGING | Facility: HOSPITAL | Age: 80
End: 2020-08-26

## 2020-08-26 PROCEDURE — 71046 X-RAY EXAM CHEST 2 VIEWS: CPT | Performed by: RADIOLOGY

## 2020-08-27 ENCOUNTER — READMISSION MANAGEMENT (OUTPATIENT)
Dept: CALL CENTER | Facility: HOSPITAL | Age: 80
End: 2020-08-27

## 2020-08-27 NOTE — OUTREACH NOTE
COPD/PN Week 3 Survey      Responses   [facility] patient discharged from?  [place]   COVID-19 Test Status  Negative   Does the patient have one of the following disease processes/diagnoses(primary or secondary)?  COPD/Pneumonia   Was the primary reason for admission:  Pneumonia   Week 3 attempt successful?  Yes   Call start time  1358   Call end time  1400   Meds reviewed with patient/caregiver?  Yes   Week 3 call completed?  Yes   Wrap up additional comments  Very brief call today. Pt had been napping and seemed sleepy. She did state she has been back to pcp and is fine.          Dee Dee Shin, RN

## 2020-08-31 ENCOUNTER — TELEPHONE (OUTPATIENT)
Dept: INTERNAL MEDICINE | Facility: CLINIC | Age: 80
End: 2020-08-31

## 2020-09-01 NOTE — TELEPHONE ENCOUNTER
Chris made for tomorrow at 2:00 (30 min chris) per . Pt seen NP before plus daughter requested you. Please let me know if you have any Q.Thanks

## 2020-09-02 ENCOUNTER — HOSPITAL ENCOUNTER (INPATIENT)
Facility: HOSPITAL | Age: 80
LOS: 4 days | Discharge: HOME HEALTH | End: 2020-09-06
Attending: EMERGENCY MEDICINE | Admitting: INTERNAL MEDICINE

## 2020-09-02 ENCOUNTER — READMISSION MANAGEMENT (OUTPATIENT)
Dept: CALL CENTER | Facility: HOSPITAL | Age: 80
End: 2020-09-02

## 2020-09-02 ENCOUNTER — APPOINTMENT (OUTPATIENT)
Dept: GENERAL RADIOLOGY | Facility: HOSPITAL | Age: 80
End: 2020-09-02

## 2020-09-02 ENCOUNTER — APPOINTMENT (OUTPATIENT)
Dept: CT IMAGING | Facility: HOSPITAL | Age: 80
End: 2020-09-02

## 2020-09-02 ENCOUNTER — TELEPHONE (OUTPATIENT)
Dept: INTERNAL MEDICINE | Facility: CLINIC | Age: 80
End: 2020-09-02

## 2020-09-02 DIAGNOSIS — G92.8 TOXIC METABOLIC ENCEPHALOPATHY: ICD-10-CM

## 2020-09-02 DIAGNOSIS — J18.9 PNEUMONIA OF RIGHT LOWER LOBE DUE TO INFECTIOUS ORGANISM: ICD-10-CM

## 2020-09-02 DIAGNOSIS — K44.9 HIATAL HERNIA: ICD-10-CM

## 2020-09-02 DIAGNOSIS — R13.12 OROPHARYNGEAL DYSPHAGIA: ICD-10-CM

## 2020-09-02 DIAGNOSIS — R09.02 HYPOXIA: ICD-10-CM

## 2020-09-02 DIAGNOSIS — J69.0 ASPIRATION PNEUMONIA OF RIGHT LOWER LOBE, UNSPECIFIED ASPIRATION PNEUMONIA TYPE (HCC): Primary | ICD-10-CM

## 2020-09-02 DIAGNOSIS — J69.0 ASPIRATION PNEUMONIA OF RIGHT LOWER LOBE DUE TO GASTRIC SECRETIONS (HCC): ICD-10-CM

## 2020-09-02 PROBLEM — G93.41 METABOLIC ENCEPHALOPATHY: Status: ACTIVE | Noted: 2020-09-02

## 2020-09-02 LAB
ALBUMIN SERPL-MCNC: 2.9 G/DL (ref 3.5–5.2)
ALBUMIN/GLOB SERPL: 0.8 G/DL
ALP SERPL-CCNC: 70 U/L (ref 39–117)
ALT SERPL W P-5'-P-CCNC: 9 U/L (ref 1–33)
ANION GAP SERPL CALCULATED.3IONS-SCNC: 13 MMOL/L (ref 5–15)
ARTERIAL PATENCY WRIST A: POSITIVE
AST SERPL-CCNC: 15 U/L (ref 1–32)
ATMOSPHERIC PRESS: 746.9 MMHG
B PARAPERT DNA SPEC QL NAA+PROBE: NOT DETECTED
B PERT DNA SPEC QL NAA+PROBE: NOT DETECTED
BASE EXCESS BLDA CALC-SCNC: 5.1 MMOL/L (ref 0–2)
BASOPHILS # BLD AUTO: 0.04 10*3/MM3 (ref 0–0.2)
BASOPHILS NFR BLD AUTO: 0.3 % (ref 0–1.5)
BDY SITE: ABNORMAL
BILIRUB SERPL-MCNC: 0.4 MG/DL (ref 0–1.2)
BILIRUB UR QL STRIP: NEGATIVE
BUN SERPL-MCNC: 12 MG/DL (ref 8–23)
BUN/CREAT SERPL: 14.6 (ref 7–25)
C PNEUM DNA NPH QL NAA+NON-PROBE: NOT DETECTED
CALCIUM SPEC-SCNC: 8.8 MG/DL (ref 8.6–10.5)
CHLORIDE SERPL-SCNC: 99 MMOL/L (ref 98–107)
CLARITY UR: CLEAR
CO2 SERPL-SCNC: 26 MMOL/L (ref 22–29)
COLOR UR: YELLOW
CREAT SERPL-MCNC: 0.82 MG/DL (ref 0.57–1)
D-LACTATE SERPL-SCNC: 2.1 MMOL/L (ref 0.5–2)
D-LACTATE SERPL-SCNC: 2.4 MMOL/L (ref 0.5–2)
DEPRECATED RDW RBC AUTO: 45.6 FL (ref 37–54)
EOSINOPHIL # BLD AUTO: 0.09 10*3/MM3 (ref 0–0.4)
EOSINOPHIL NFR BLD AUTO: 0.7 % (ref 0.3–6.2)
ERYTHROCYTE [DISTWIDTH] IN BLOOD BY AUTOMATED COUNT: 14.2 % (ref 12.3–15.4)
FLUAV H1 2009 PAND RNA NPH QL NAA+PROBE: NOT DETECTED
FLUAV H1 HA GENE NPH QL NAA+PROBE: NOT DETECTED
FLUAV H3 RNA NPH QL NAA+PROBE: NOT DETECTED
FLUAV SUBTYP SPEC NAA+PROBE: NOT DETECTED
FLUBV RNA ISLT QL NAA+PROBE: NOT DETECTED
GAS FLOW AIRWAY: 2 LPM
GFR SERPL CREATININE-BSD FRML MDRD: 67 ML/MIN/1.73
GLOBULIN UR ELPH-MCNC: 3.8 GM/DL
GLUCOSE SERPL-MCNC: 131 MG/DL (ref 65–99)
GLUCOSE UR STRIP-MCNC: NEGATIVE MG/DL
HADV DNA SPEC NAA+PROBE: NOT DETECTED
HCO3 BLDA-SCNC: 29 MMOL/L (ref 22–28)
HCOV 229E RNA SPEC QL NAA+PROBE: NOT DETECTED
HCOV HKU1 RNA SPEC QL NAA+PROBE: NOT DETECTED
HCOV NL63 RNA SPEC QL NAA+PROBE: NOT DETECTED
HCOV OC43 RNA SPEC QL NAA+PROBE: NOT DETECTED
HCT VFR BLD AUTO: 33.5 % (ref 34–46.6)
HGB BLD-MCNC: 11 G/DL (ref 12–15.9)
HGB UR QL STRIP.AUTO: NEGATIVE
HMPV RNA NPH QL NAA+NON-PROBE: NOT DETECTED
HOLD SPECIMEN: NORMAL
HOLD SPECIMEN: NORMAL
HPIV1 RNA SPEC QL NAA+PROBE: NOT DETECTED
HPIV2 RNA SPEC QL NAA+PROBE: NOT DETECTED
HPIV3 RNA NPH QL NAA+PROBE: NOT DETECTED
HPIV4 P GENE NPH QL NAA+PROBE: NOT DETECTED
IMM GRANULOCYTES # BLD AUTO: 0.09 10*3/MM3 (ref 0–0.05)
IMM GRANULOCYTES NFR BLD AUTO: 0.7 % (ref 0–0.5)
INR PPP: 1.08 (ref 0.9–1.1)
KETONES UR QL STRIP: ABNORMAL
L PNEUMO1 AG UR QL IA: NEGATIVE
LACTATE HOLD SPECIMEN: NORMAL
LEUKOCYTE ESTERASE UR QL STRIP.AUTO: NEGATIVE
LIPASE SERPL-CCNC: 8 U/L (ref 13–60)
LYMPHOCYTES # BLD AUTO: 0.91 10*3/MM3 (ref 0.7–3.1)
LYMPHOCYTES NFR BLD AUTO: 6.8 % (ref 19.6–45.3)
M PNEUMO IGG SER IA-ACNC: NOT DETECTED
MAGNESIUM SERPL-MCNC: 1.6 MG/DL (ref 1.6–2.4)
MCH RBC QN AUTO: 29.1 PG (ref 26.6–33)
MCHC RBC AUTO-ENTMCNC: 32.8 G/DL (ref 31.5–35.7)
MCV RBC AUTO: 88.6 FL (ref 79–97)
MODALITY: ABNORMAL
MONOCYTES # BLD AUTO: 0.77 10*3/MM3 (ref 0.1–0.9)
MONOCYTES NFR BLD AUTO: 5.8 % (ref 5–12)
NEUTROPHILS NFR BLD AUTO: 11.47 10*3/MM3 (ref 1.7–7)
NEUTROPHILS NFR BLD AUTO: 85.7 % (ref 42.7–76)
NITRITE UR QL STRIP: NEGATIVE
NRBC BLD AUTO-RTO: 0 /100 WBC (ref 0–0.2)
NT-PROBNP SERPL-MCNC: 1028 PG/ML (ref 0–1800)
PCO2 BLDA: 39.1 MM HG (ref 35–45)
PH BLDA: 7.48 PH UNITS (ref 7.35–7.45)
PH UR STRIP.AUTO: 5.5 [PH] (ref 5–8)
PLATELET # BLD AUTO: 280 10*3/MM3 (ref 140–450)
PMV BLD AUTO: 10 FL (ref 6–12)
PO2 BLDA: 71.7 MM HG (ref 80–100)
POTASSIUM SERPL-SCNC: 4.2 MMOL/L (ref 3.5–5.2)
PROCALCITONIN SERPL-MCNC: 0.06 NG/ML (ref 0–0.25)
PROT SERPL-MCNC: 6.7 G/DL (ref 6–8.5)
PROT UR QL STRIP: ABNORMAL
PROTHROMBIN TIME: 13.9 SECONDS (ref 11.7–14.2)
RBC # BLD AUTO: 3.78 10*6/MM3 (ref 3.77–5.28)
RHINOVIRUS RNA SPEC NAA+PROBE: NOT DETECTED
RSV RNA NPH QL NAA+NON-PROBE: NOT DETECTED
S PNEUM AG SPEC QL LA: NEGATIVE
SAO2 % BLDCOA: 95.3 % (ref 92–99)
SARS-COV-2 RNA NPH QL NAA+NON-PROBE: NOT DETECTED
SODIUM SERPL-SCNC: 138 MMOL/L (ref 136–145)
SP GR UR STRIP: >=1.03 (ref 1–1.03)
TOTAL RATE: 24 BREATHS/MINUTE
TROPONIN T SERPL-MCNC: <0.01 NG/ML (ref 0–0.03)
UROBILINOGEN UR QL STRIP: ABNORMAL
WBC # BLD AUTO: 13.37 10*3/MM3 (ref 3.4–10.8)
WHOLE BLOOD HOLD SPECIMEN: NORMAL
WHOLE BLOOD HOLD SPECIMEN: NORMAL

## 2020-09-02 PROCEDURE — 85610 PROTHROMBIN TIME: CPT | Performed by: EMERGENCY MEDICINE

## 2020-09-02 PROCEDURE — 87040 BLOOD CULTURE FOR BACTERIA: CPT | Performed by: EMERGENCY MEDICINE

## 2020-09-02 PROCEDURE — 80053 COMPREHEN METABOLIC PANEL: CPT | Performed by: EMERGENCY MEDICINE

## 2020-09-02 PROCEDURE — 84484 ASSAY OF TROPONIN QUANT: CPT | Performed by: EMERGENCY MEDICINE

## 2020-09-02 PROCEDURE — 83690 ASSAY OF LIPASE: CPT | Performed by: EMERGENCY MEDICINE

## 2020-09-02 PROCEDURE — 71045 X-RAY EXAM CHEST 1 VIEW: CPT

## 2020-09-02 PROCEDURE — 83880 ASSAY OF NATRIURETIC PEPTIDE: CPT | Performed by: EMERGENCY MEDICINE

## 2020-09-02 PROCEDURE — 83605 ASSAY OF LACTIC ACID: CPT | Performed by: EMERGENCY MEDICINE

## 2020-09-02 PROCEDURE — 99285 EMERGENCY DEPT VISIT HI MDM: CPT

## 2020-09-02 PROCEDURE — 94799 UNLISTED PULMONARY SVC/PX: CPT

## 2020-09-02 PROCEDURE — 93010 ELECTROCARDIOGRAM REPORT: CPT | Performed by: INTERNAL MEDICINE

## 2020-09-02 PROCEDURE — 74177 CT ABD & PELVIS W/CONTRAST: CPT

## 2020-09-02 PROCEDURE — 25010000002 CEFEPIME PER 500 MG: Performed by: EMERGENCY MEDICINE

## 2020-09-02 PROCEDURE — 83735 ASSAY OF MAGNESIUM: CPT | Performed by: EMERGENCY MEDICINE

## 2020-09-02 PROCEDURE — 93005 ELECTROCARDIOGRAM TRACING: CPT | Performed by: EMERGENCY MEDICINE

## 2020-09-02 PROCEDURE — 81003 URINALYSIS AUTO W/O SCOPE: CPT | Performed by: NURSE PRACTITIONER

## 2020-09-02 PROCEDURE — 70450 CT HEAD/BRAIN W/O DYE: CPT

## 2020-09-02 PROCEDURE — 94760 N-INVAS EAR/PLS OXIMETRY 1: CPT

## 2020-09-02 PROCEDURE — 87899 AGENT NOS ASSAY W/OPTIC: CPT | Performed by: NURSE PRACTITIONER

## 2020-09-02 PROCEDURE — 25010000002 IOPAMIDOL 61 % SOLUTION: Performed by: EMERGENCY MEDICINE

## 2020-09-02 PROCEDURE — 36600 WITHDRAWAL OF ARTERIAL BLOOD: CPT

## 2020-09-02 PROCEDURE — 94640 AIRWAY INHALATION TREATMENT: CPT

## 2020-09-02 PROCEDURE — 85025 COMPLETE CBC W/AUTO DIFF WBC: CPT | Performed by: EMERGENCY MEDICINE

## 2020-09-02 PROCEDURE — 25010000002 CEFEPIME PER 500 MG: Performed by: NURSE PRACTITIONER

## 2020-09-02 PROCEDURE — 82803 BLOOD GASES ANY COMBINATION: CPT

## 2020-09-02 PROCEDURE — 84145 PROCALCITONIN (PCT): CPT | Performed by: EMERGENCY MEDICINE

## 2020-09-02 PROCEDURE — 0202U NFCT DS 22 TRGT SARS-COV-2: CPT | Performed by: EMERGENCY MEDICINE

## 2020-09-02 RX ORDER — ACETAMINOPHEN 160 MG/5ML
650 SOLUTION ORAL EVERY 4 HOURS PRN
Status: DISCONTINUED | OUTPATIENT
Start: 2020-09-02 | End: 2020-09-06 | Stop reason: HOSPADM

## 2020-09-02 RX ORDER — SODIUM CHLORIDE, SODIUM LACTATE, POTASSIUM CHLORIDE, CALCIUM CHLORIDE 600; 310; 30; 20 MG/100ML; MG/100ML; MG/100ML; MG/100ML
75 INJECTION, SOLUTION INTRAVENOUS CONTINUOUS
Status: DISCONTINUED | OUTPATIENT
Start: 2020-09-02 | End: 2020-09-03

## 2020-09-02 RX ORDER — ACETAMINOPHEN 325 MG/1
650 TABLET ORAL EVERY 4 HOURS PRN
Status: DISCONTINUED | OUTPATIENT
Start: 2020-09-02 | End: 2020-09-06 | Stop reason: HOSPADM

## 2020-09-02 RX ORDER — ACETAMINOPHEN 500 MG
1000 TABLET ORAL ONCE
Status: COMPLETED | OUTPATIENT
Start: 2020-09-02 | End: 2020-09-02

## 2020-09-02 RX ORDER — CELECOXIB 200 MG/1
200 CAPSULE ORAL DAILY
Status: DISCONTINUED | OUTPATIENT
Start: 2020-09-02 | End: 2020-09-06 | Stop reason: HOSPADM

## 2020-09-02 RX ORDER — ATENOLOL 50 MG/1
50 TABLET ORAL DAILY
Status: DISCONTINUED | OUTPATIENT
Start: 2020-09-02 | End: 2020-09-06 | Stop reason: HOSPADM

## 2020-09-02 RX ORDER — IPRATROPIUM BROMIDE AND ALBUTEROL SULFATE 2.5; .5 MG/3ML; MG/3ML
3 SOLUTION RESPIRATORY (INHALATION) EVERY 4 HOURS PRN
Status: DISCONTINUED | OUTPATIENT
Start: 2020-09-02 | End: 2020-09-06 | Stop reason: HOSPADM

## 2020-09-02 RX ORDER — AMLODIPINE BESYLATE 5 MG/1
5 TABLET ORAL DAILY
Status: DISCONTINUED | OUTPATIENT
Start: 2020-09-02 | End: 2020-09-06 | Stop reason: HOSPADM

## 2020-09-02 RX ORDER — ASPIRIN 81 MG/1
81 TABLET ORAL DAILY
Status: DISCONTINUED | OUTPATIENT
Start: 2020-09-02 | End: 2020-09-06 | Stop reason: HOSPADM

## 2020-09-02 RX ORDER — L.ACID,PARA/B.BIFIDUM/S.THERM 8B CELL
1 CAPSULE ORAL DAILY
Status: DISCONTINUED | OUTPATIENT
Start: 2020-09-02 | End: 2020-09-06 | Stop reason: HOSPADM

## 2020-09-02 RX ORDER — ACETAMINOPHEN 650 MG/1
650 SUPPOSITORY RECTAL ONCE
Status: COMPLETED | OUTPATIENT
Start: 2020-09-02 | End: 2020-09-02

## 2020-09-02 RX ORDER — ACETAMINOPHEN 650 MG/1
650 SUPPOSITORY RECTAL EVERY 4 HOURS PRN
Status: DISCONTINUED | OUTPATIENT
Start: 2020-09-02 | End: 2020-09-06 | Stop reason: HOSPADM

## 2020-09-02 RX ORDER — PANTOPRAZOLE SODIUM 40 MG/10ML
40 INJECTION, POWDER, LYOPHILIZED, FOR SOLUTION INTRAVENOUS
Status: DISCONTINUED | OUTPATIENT
Start: 2020-09-03 | End: 2020-09-05

## 2020-09-02 RX ORDER — FLUTICASONE PROPIONATE 50 MCG
2 SPRAY, SUSPENSION (ML) NASAL DAILY
Status: DISCONTINUED | OUTPATIENT
Start: 2020-09-02 | End: 2020-09-06 | Stop reason: HOSPADM

## 2020-09-02 RX ORDER — ACETAMINOPHEN AND CODEINE PHOSPHATE 300; 30 MG/1; MG/1
2 TABLET ORAL NIGHTLY PRN
COMMUNITY
End: 2020-09-22 | Stop reason: SDUPTHER

## 2020-09-02 RX ORDER — SODIUM CHLORIDE 0.9 % (FLUSH) 0.9 %
10 SYRINGE (ML) INJECTION AS NEEDED
Status: DISCONTINUED | OUTPATIENT
Start: 2020-09-02 | End: 2020-09-06 | Stop reason: HOSPADM

## 2020-09-02 RX ORDER — NITROGLYCERIN 0.4 MG/1
0.4 TABLET SUBLINGUAL
Status: DISCONTINUED | OUTPATIENT
Start: 2020-09-02 | End: 2020-09-06 | Stop reason: HOSPADM

## 2020-09-02 RX ORDER — SODIUM CHLORIDE 0.9 % (FLUSH) 0.9 %
10 SYRINGE (ML) INJECTION AS NEEDED
Status: DISCONTINUED | OUTPATIENT
Start: 2020-09-02 | End: 2020-09-02

## 2020-09-02 RX ORDER — ONDANSETRON 2 MG/ML
4 INJECTION INTRAMUSCULAR; INTRAVENOUS EVERY 6 HOURS PRN
Status: DISCONTINUED | OUTPATIENT
Start: 2020-09-02 | End: 2020-09-06 | Stop reason: HOSPADM

## 2020-09-02 RX ADMIN — CEFEPIME HYDROCHLORIDE 2 G: 2 INJECTION, POWDER, FOR SOLUTION INTRAVENOUS at 11:46

## 2020-09-02 RX ADMIN — CELECOXIB 200 MG: 200 CAPSULE ORAL at 17:15

## 2020-09-02 RX ADMIN — ACETAMINOPHEN 650 MG: 650 SUPPOSITORY RECTAL at 11:06

## 2020-09-02 RX ADMIN — ACETAMINOPHEN 1000 MG: 500 TABLET ORAL at 17:15

## 2020-09-02 RX ADMIN — SERTRALINE 50 MG: 50 TABLET, FILM COATED ORAL at 17:15

## 2020-09-02 RX ADMIN — SODIUM CHLORIDE, POTASSIUM CHLORIDE, SODIUM LACTATE AND CALCIUM CHLORIDE 75 ML/HR: 600; 310; 30; 20 INJECTION, SOLUTION INTRAVENOUS at 17:16

## 2020-09-02 RX ADMIN — SODIUM CHLORIDE, PRESERVATIVE FREE 10 ML: 5 INJECTION INTRAVENOUS at 11:49

## 2020-09-02 RX ADMIN — SODIUM CHLORIDE, PRESERVATIVE FREE 10 ML: 5 INJECTION INTRAVENOUS at 09:34

## 2020-09-02 RX ADMIN — Medication 1 CAPSULE: at 17:15

## 2020-09-02 RX ADMIN — ATENOLOL 50 MG: 50 TABLET ORAL at 17:15

## 2020-09-02 RX ADMIN — FLUTICASONE PROPIONATE 2 SPRAY: 50 SPRAY, METERED NASAL at 17:16

## 2020-09-02 RX ADMIN — ASPIRIN 81 MG: 81 TABLET, COATED ORAL at 17:16

## 2020-09-02 RX ADMIN — IOPAMIDOL 85 ML: 612 INJECTION, SOLUTION INTRAVENOUS at 11:00

## 2020-09-02 RX ADMIN — CEFEPIME 2 G: 2 INJECTION, POWDER, FOR SOLUTION INTRAVENOUS at 20:42

## 2020-09-02 RX ADMIN — AMLODIPINE BESYLATE 5 MG: 5 TABLET ORAL at 17:15

## 2020-09-02 NOTE — ED NOTES
Pt from home via ems due to shortness f breath, abd fullness. Pt has been nauseated and having dry heaves.  Pt recently finished antibiotics for pneumonia.   Mask placed on patient in triage.  Triage RN wearing mask throughout encounter.    Howie Garcia RN  09/02/20 0911       Howie Garcia RN  09/02/20 0912

## 2020-09-02 NOTE — TELEPHONE ENCOUNTER
PT WAS RUSHED TO THE HOSPITAL FOR A HERNIA AND THEY ARE WANTING TO OPERATE AND CORRECT THAT. PTS DAUGHTER IS CONCERNED HER HEART CANNOT HANDLE IT AND WOULD LIKE TO KNOW DR. SORIA ADVICE.

## 2020-09-02 NOTE — ED NOTES
"Pt to ED via EMS from home for worsening SOA, cough, body aches and abd discomfort. Pt is a/o x 2, pt is mildly confused tho. Pt sats at 90% on RA. Intermittent wheezing, abd soft NT on palp.     Sara palma [phone]    Pt denied pain when asked if having pain, then pt was tender to touch on RA, stating my arthritis hurts\". Pt was unable to state year, and month originally when asked. Pt having trouble remembering time frames. Unknown if any dementia hx.     Patient was placed in face mask in first look. Patient was wearing facemask when I entered the room and throughout our encounter. I wore full protective equipment throughout this patient encounter including a face mask, eye shield, gown and gloves. Hand hygiene was performed before donning protective equipment and after removal when leaving the room.         Nataliya Disla RN  09/02/20 5629       Nataliya Disla RN  09/02/20 0659       Nataliya Disla RN  09/02/20 5584    "

## 2020-09-02 NOTE — H&P
Patient Name:  Gabbi Brandon  YOB: 1940  MRN:  [id number]  Admit Date:  9/2/2020  Patient Care Team:  Gordo Mg MD as PCP - General (Internal Medicine)  Gordo Mg MD as PCP - Claims Attributed      Subjective   History Present Illness     Chief Complaint   Patient presents with   • Abdominal Pain   • Shortness of Breath     Patient 79-year-old with history of hypertension, hyperlipidemia, GERD, pneumonia, UTI who has been admitted for right lower lobe pneumonia.  Patient is a poor historian and confused. History also obtained over the phone from daughter.  Patient complained of fever, cough, and shortness of breath onset yesterday but exact temperature reading is unknown.  According to daughter, yesterday patient was confused and even hallucinating seeing people that were not present.  Patient has complained intermittently of epigastric pain and daughter describes what sounds like reflux particularly in the morning.  Patient also describes indigestion but no emesis.  Right now the patient denies shortness of breath or cough but again she is confused.    Patient is known to our service from prior admissions most recently last month for possible recurrent aspiration pneumonia and epigastric pain.  She completed a course of cefepime while hospitalized.  She was seen by speech therapy and a VFSS was completed with SLP recommending regular diet with thin liquids.  She also complained of nausea vomiting epigastric pain and an EGD demonstrated 8 cm hiatal hernia with possible small paraesophageal component.  She declined thoracic surgery evaluation and declined general surgery consult for gallbladder findings.  Interestingly, she was admitted in June this year for the exact same complaints. In June, SLP consult she was noted to have mild oropharyngeal dysphagia and placed on dysphagia diet at discharge.     History of Present Illness  Review of Systems   Unable to perform ROS:  "Mental status change   Constitutional: Positive for chills and fever.   HENT: Positive for trouble swallowing. Negative for congestion.    Eyes: Negative for visual disturbance.   Respiratory: Positive for cough and shortness of breath.    Gastrointestinal: Positive for abdominal pain. Negative for blood in stool and nausea.        Pt states \"I gag a lot but I don't choke\"   Genitourinary: Positive for dysuria (mild ) and frequency.   Neurological: Negative.  Negative for dizziness, seizures, syncope, facial asymmetry, speech difficulty, weakness, light-headedness, numbness and headaches.   Psychiatric/Behavioral: Positive for confusion, decreased concentration and hallucinations.        Personal History     Past Medical History:   Diagnosis Date   • Arthritis    • GERD (gastroesophageal reflux disease)    • Hyperlipidemia    • Hypertension    • Incontinent of urine    • PNA (pneumonia)    • Seasonal allergies    • UTI (urinary tract infection)      Past Surgical History:   Procedure Laterality Date   • BREAST BIOPSY     • COLONOSCOPY N/A 8/26/2016    Procedure: COLONOSCOPY WITH POLYPECTOMY (HOT SNARE);  Surgeon: Paulino Narvaez MD;  Location: [facility] ENDOSCOPY;  Service:    • ENDOSCOPY N/A 8/11/2020    Procedure: ESOPHAGOGASTRODUODENOSCOPY with biopsies;  Surgeon: Eugene George MD;  Location: [facility] ENDOSCOPY;  Service: Gastroenterology;  Laterality: N/A;  pre-- melena and abdominal pain  post-- hiatel hernia   • VAGINAL HYSTERECTOMY       Family History   Problem Relation Age of Onset   • Colon cancer Brother    • Heart attack Mother    • Stroke Father    • Lung cancer Sister    • Lung cancer Brother         3 brothers   • Deep vein thrombosis Brother    • Brain cancer Brother    • Uterine cancer Neg Hx    • Ovarian cancer Neg Hx    • Breast cancer Neg Hx    • Pulmonary embolism Neg Hx      Social History     Tobacco Use   • Smoking status: Never Smoker   • Smokeless tobacco: Never Used   Substance Use " Topics   • Alcohol use: Not Currently   • Drug use: No     No current facility-administered medications on file prior to encounter.      Current Outpatient Medications on File Prior to Encounter   Medication Sig Dispense Refill   • amLODIPine (NORVASC) 5 MG tablet TAKE 1 TABLET EVERY DAY 90 tablet 3   • aspirin 81 MG EC tablet Take 81 mg by mouth Daily.     • atenolol (TENORMIN) 50 MG tablet TAKE 1 TABLET EVERY DAY 90 tablet 3   • celecoxib (CeleBREX) 200 MG capsule Take 200 mg by mouth Daily.     • cetirizine (ZyrTEC) 10 MG tablet Take 10 mg by mouth daily.     • fluticasone (FLONASE) 50 MCG/ACT nasal spray USE 2 SPRAYS IN EACH NOSTRIL EVERY DAY 48 g 2   • lactobacillus acidophilus (RISAQUAD) capsule capsule Take 1 capsule by mouth Daily. 14 each 0   • omeprazole (priLOSEC) 20 MG capsule TAKE 1 CAPSULE EVERY DAY 90 capsule 3   • oxybutynin XL (DITROPAN-XL) 5 MG 24 hr tablet Take 1 tablet by mouth Daily. 30 tablet 1   • sertraline (Zoloft) 50 MG tablet Take 1 tablet by mouth Daily. 90 tablet 3   • simvastatin (ZOCOR) 20 MG tablet TAKE 1 TABLET BY MOUTH EVERY NIGHT. 90 tablet 3     Allergies   Allergen Reactions   • Penicillins Unknown (See Comments)     Caused a red streak down her leg.       Objective    Objective     Vital Signs  Temp:  [100.7 °F (38.2 °C)-101.5 °F (38.6 °C)] 100.7 °F (38.2 °C)  Heart Rate:  [] 94  Resp:  [16-20] 16  BP: (112-155)/(70-90) 133/76  SpO2:  [94 %-100 %] 95 %  on  Flow (L/min):  [2-3] 2;   Device (Oxygen Therapy): nasal cannula  Body mass index is 32.82 kg/m².    Physical Exam   Constitutional: She appears well-developed and well-nourished. No distress.   Obese, elderly female, ill but nontoxic   HENT:   Head: Normocephalic and atraumatic.   Dry oral mucosa   Eyes: Pupils are equal, round, and reactive to light. EOM are normal. No scleral icterus.   Neck: Normal range of motion. No JVD present.   Cardiovascular: Normal rate, regular rhythm and normal heart sounds.      Pulmonary/Chest: Effort normal. No respiratory distress. She has rales (RLL).   Diminished breath sounds, dry cough on exam   Abdominal: Soft. Bowel sounds are normal. She exhibits no shifting dullness, no distension, no pulsatile liver, no fluid wave, no abdominal bruit, no ascites, no pulsatile midline mass and no mass. There is no hepatosplenomegaly. There is no tenderness. There is no rigidity and no guarding. No hernia.   Soft round obese nontender   Musculoskeletal: Normal range of motion. She exhibits no edema.   Neurological: She is alert. She has normal strength. No cranial nerve deficit or sensory deficit.   Oriented to person and place but confused to situation.  Illogical train of thought.  No focal neurologic deficits   Skin: Skin is warm and dry.   Psychiatric: She has a normal mood and affect. Her speech is tangential. Cognition and memory are impaired.   Inattentive but not actively hallucinating   Nursing note and vitals reviewed.      Results Review:  I reviewed the patient's new clinical results.  I reviewed the patient's new imaging results and agree with the interpretation.  I reviewed the patient's other test results and agree with the interpretation  I personally viewed and interpreted the patient's EKG/Telemetry data  Discussed with ED provider.    Lab Results (last 24 hours)     Procedure Component Value Units Date/Time    Blood Culture - Blood, Arm, Left [[id number]] Collected:  09/02/20 0935    Specimen:  Blood from Arm, Left Updated:  09/02/20 0953    Protime-INR [[id number]]  (Normal) Collected:  09/02/20 0935    Specimen:  Blood Updated:  09/02/20 1020     Protime 13.9 Seconds      INR 1.08    CBC & Differential [[id number]] Collected:  09/02/20 0936    Specimen:  Blood Updated:  09/02/20 1007    Narrative:       The following orders were created for panel order CBC & Differential.  Procedure                               Abnormality         Status                     ---------                                -----------         ------                     CBC Auto Differential[[id number]]        Abnormal            Final result                 Please view results for these tests on the individual orders.    Comprehensive Metabolic Panel [[id number]]  (Abnormal) Collected:  09/02/20 0936    Specimen:  Blood Updated:  09/02/20 1039     Glucose 131 mg/dL      BUN 12 mg/dL      Creatinine 0.82 mg/dL      Sodium 138 mmol/L      Potassium 4.2 mmol/L      Comment: Slight hemolysis detected by analyzer. Results may be affected.        Chloride 99 mmol/L      CO2 26.0 mmol/L      Calcium 8.8 mg/dL      Total Protein 6.7 g/dL      Albumin 2.90 g/dL      ALT (SGPT) 9 U/L      AST (SGOT) 15 U/L      Comment: Slight hemolysis detected by analyzer. Results may be affected.        Alkaline Phosphatase 70 U/L      Total Bilirubin 0.4 mg/dL      eGFR Non African Amer 67 mL/min/1.73      Globulin 3.8 gm/dL      A/G Ratio 0.8 g/dL      BUN/Creatinine Ratio 14.6     Anion Gap 13.0 mmol/L     Narrative:       GFR Normal >60  Chronic Kidney Disease <60  Kidney Failure <15      Lactic Acid, Plasma [[id number]]  (Abnormal) Collected:  09/02/20 0936    Specimen:  Blood Updated:  09/02/20 1039     Lactate 2.1 mmol/L     CBC Auto Differential [[id number]]  (Abnormal) Collected:  09/02/20 0936    Specimen:  Blood Updated:  09/02/20 1007     WBC 13.37 10*3/mm3      RBC 3.78 10*6/mm3      Hemoglobin 11.0 g/dL      Hematocrit 33.5 %      MCV 88.6 fL      MCH 29.1 pg      MCHC 32.8 g/dL      RDW 14.2 %      RDW-SD 45.6 fl      MPV 10.0 fL      Platelets 280 10*3/mm3      Neutrophil % 85.7 %      Lymphocyte % 6.8 %      Monocyte % 5.8 %      Eosinophil % 0.7 %      Basophil % 0.3 %      Immature Grans % 0.7 %      Neutrophils, Absolute 11.47 10*3/mm3      Lymphocytes, Absolute 0.91 10*3/mm3      Monocytes, Absolute 0.77 10*3/mm3      Eosinophils, Absolute 0.09 10*3/mm3      Basophils, Absolute 0.04 10*3/mm3      Immature Grans, Absolute  "0.09 10*3/mm3      nRBC 0.0 /100 WBC     Lipase [[id number]]  (Abnormal) Collected:  09/02/20 0936    Specimen:  Blood Updated:  09/02/20 1029     Lipase 8 U/L     BNP [[id number]]  (Normal) Collected:  09/02/20 0936    Specimen:  Blood Updated:  09/02/20 1035     proBNP 1,028.0 pg/mL     Narrative:       Among patients with dyspnea, NT-proBNP is highly sensitive for the detection of acute congestive heart failure. In addition NT-proBNP of <300 pg/ml effectively rules out acute congestive heart failure with 99% negative predictive value.    Results may be falsely decreased if patient taking Biotin.      Procalcitonin [[id number]]  (Normal) Collected:  09/02/20 0936    Specimen:  Blood Updated:  09/02/20 1035     Procalcitonin 0.06 ng/mL     Narrative:       As a Marker for Sepsis (Non-Neonates):   1. <0.5 ng/mL represents a low risk of severe sepsis and/or septic shock.  1. >2 ng/mL represents a high risk of severe sepsis and/or septic shock.    As a Marker for Lower Respiratory Tract Infections that require antibiotic therapy:  PCT on Admission     Antibiotic Therapy             6-12 Hrs later  > 0.5                Strongly Recommended            >0.25 - <0.5         Recommended  0.1 - 0.25           Discouraged                   Remeasure/reassess PCT  <0.1                 Strongly Discouraged          Remeasure/reassess PCT      As 28 day mortality risk marker: \"Change in Procalcitonin Result\" (> 80 % or <=80 %) if Day 0 (or Day 1) and Day 4 values are available. Refer to http://www.[employer]-pct-calculator.com/   Change in PCT <=80 %   A decrease of PCT levels below or equal to 80 % defines a positive change in PCT test result representing a higher risk for 28-day all-cause mortality of patients diagnosed with severe sepsis or septic shock.  Change in PCT > 80 %   A decrease of PCT levels of more than 80 % defines a negative change in PCT result representing a lower risk for 28-day all-cause mortality of patients " diagnosed with severe sepsis or septic shock.                Results may be falsely decreased if patient taking Biotin.     Troponin [[id number]]  (Normal) Collected:  09/02/20 0936    Specimen:  Blood Updated:  09/02/20 1035     Troponin T <0.010 ng/mL     Narrative:       Troponin T Reference Range:  <= 0.03 ng/mL-   Negative for AMI  >0.03 ng/mL-     Abnormal for myocardial necrosis.  Clinicians would have to utilize clinical acumen, EKG, Troponin and serial changes to determine if it is an Acute Myocardial Infarction or myocardial injury due to an underlying chronic condition.       Results may be falsely decreased if patient taking Biotin.      Magnesium [[id number]]  (Normal) Collected:  09/02/20 0936    Specimen:  Blood Updated:  09/02/20 1029     Magnesium 1.6 mg/dL     Lactic Acid, Reflex Timer (This will reflex a repeat order 3-3:15 hours after ordered.) [[id number]] Collected:  09/02/20 0936    Specimen:  Blood Updated:  09/02/20 1345     Hold Tube Hold for add-ons.     Comment: Auto resulted.       Blood Culture - Blood, Arm, Left [[id number]] Collected:  09/02/20 0943    Specimen:  Blood from Arm, Left Updated:  09/02/20 0953    Respiratory Panel PCR w/COVID-19(SARS-CoV-2) [facility] In-House, NP Swab in UTM/VTM, 3-4 HR TAT - Swab, Nasopharynx [[id number]]  (Normal) Collected:  09/02/20 0948    Specimen:  Swab from Nasopharynx Updated:  09/02/20 1215     ADENOVIRUS, PCR Not Detected     Coronavirus 229E Not Detected     Coronavirus HKU1 Not Detected     Coronavirus NL63 Not Detected     Coronavirus OC43 Not Detected     COVID19 Not Detected     Human Metapneumovirus Not Detected     Human Rhinovirus/Enterovirus Not Detected     Influenza A PCR Not Detected     Influenza A H1 Not Detected     Influenza A H1 2009 PCR Not Detected     Influenza A H3 Not Detected     Influenza B PCR Not Detected     Parainfluenza Virus 1 Not Detected     Parainfluenza Virus 2 Not Detected     Parainfluenza Virus 3  Not Detected     Parainfluenza Virus 4 Not Detected     RSV, PCR Not Detected     Bordetella pertussis pcr Not Detected     Bordetella parapertussis PCR Not Detected     Chlamydophila pneumoniae PCR Not Detected     Mycoplasma pneumo by PCR Not Detected    Narrative:       Fact sheet for providers: https://docs.[employer]/wp-content/uploads/ACM0209-2239-HG4.1-EUA-Provider-Fact-Sheet-3.pdf    Fact sheet for patients: https://docs.[employer]/wp-content/uploads/XSU0828-4690-HM2.1-EUA-Patient-Fact-Sheet-1.pdf    Blood Gas, Arterial [[id number]]  (Abnormal) Collected:  09/02/20 1003    Specimen:  Arterial Blood Updated:  09/02/20 1006     Site Arterial: right radial     Teddy's Test Positive     pH, Arterial 7.478 pH units      pCO2, Arterial 39.1 mm Hg      pO2, Arterial 71.7 mm Hg      HCO3, Arterial 29.0 mmol/L      Base Excess, Arterial 5.1 mmol/L      O2 Saturation Calculated 95.3 %      Barometric Pressure for Blood Gas 746.9 mmHg      Modality Cannula     Flow Rate 2 lpm      Rate 24 Breaths/minute           Imaging Results (Last 24 Hours)     Procedure Component Value Units Date/Time    CT Head Without Contrast [[id number]] Collected:  09/02/20 1402     Updated:  09/02/20 1402    Narrative:       CT SCAN OF THE HEAD WITHOUT CONTRAST     CLINICAL HISTORY: Altered mental status.     CT scan of the head was obtained with 3 mm axial images. No intravenous  contrast was administered.     COMPARISON: Comparison is made to previous CT scan of the head dated  11/03/2014.     FINDINGS:     The ventricles, sulci, and cisterns are age appropriate. The gray-white  matter differentiation is within normal limits. The basal ganglia and  thalami are unremarkable. The posterior fossa structures are remarkable  for cerebellar atrophy. Similar findings are seen on the previous  examination of 11/03/2014.     Partial opacification of the left mastoid air cells is seen.       Impression:          No evidence for acute  intracranial pathology.     Atrophic changes are identified within the cerebellum. Similar findings  were noted on the prior exam of 11/03/2014.     Radiation dose reduction techniques were utilized, including automated  exposure control and exposure modulation based on body size.          CT Abdomen Pelvis With Contrast [[id number]] Collected:  09/02/20 1152     Updated:  09/02/20 1213    Narrative:       CT SCAN OF THE ABDOMEN AND PELVIS WITH INTRAVENOUS CONTRAST     HISTORY: Abdominal pain and fever.     FINDINGS:  The CT scan was performed as an emergency procedure through  the abdomen and pelvis with intravenous contrast and compared to the  previous CT scans of the abdomen and pelvis dated 07/01/2020. The  following findings are present:  1. There is a large hiatus hernia extending slightly into the right  chest and measuring up to 9.1 cm that is unchanged. There is now a new  small to moderate right pleural effusion with some adjacent dense  atelectasis and probable component of pneumonia.  2. The liver, spleen, pancreas, both adrenal glands, and both kidneys  are unremarkable and unchanged. The gallbladder may contain a few very  tiny gallstones and there is no wall thickening.  3. There is no aortic aneurysm or retroperitoneal lymphadenopathy. The  large and small bowel loops are normal in caliber and show no  inflammatory change. No abnormality is seen in the pelvis.        Radiation dose reduction techniques were utilized, including automated  exposure control and exposure modulation based on body size.     This report was finalized on 9/2/2020 12:10 PM by Dr. Sammy Richards M.D.       XR Chest 1 View [[id number]] Collected:  09/02/20 1045     Updated:  09/02/20 1103    Narrative:       XR CHEST 1 VW-     Clinical: Shortness of breath     COMPARISON 8/12/2020     FINDINGS: Cardiomegaly similar to the previous examination. The left  lung is clear. Sizable hiatal hernia is again demonstrated. There is  some  atelectasis demonstrated along its right lateral border. There is a  right-sided pleural effusion with patchy infiltrate/atelectasis right  mid and lower lung zone. No pulmonary edema is demonstrated. The  remainder is unremarkable.     CONCLUSION: There is cardiac enlargement with sizable hiatal hernia  causing atelectasis at the right lung base. There is a right-sided  pleural effusion and infiltrate/atelectasis right mid and lower lung  zone.     This report was finalized on 9/2/2020 11:00 AM by Dr. Preston Jackson M.D.                  ECG 12 Lead   Preliminary Result   HEART RATE= 93  bpm   RR Interval= 644  ms   LA Interval= 146  ms   P Horizontal Axis=   deg   P Front Axis= 45  deg   QRSD Interval= 112  ms   QT Interval= 366  ms   QRS Axis= -29  deg   T Wave Axis= -7  deg   - ABNORMAL ECG -   Sinus rhythm   Incomplete right bundle branch block   Inferior infarct, old   Electronically Signed By:    Date and Time of Study: 2020-09-02 09:59:34           Assessment/Plan     Active Hospital Problems    Diagnosis  POA   • **Pneumonia of right lower lobe due to infectious organism [J18.9]  Yes   • Hiatal hernia [K44.9]  Yes   • Aspiration pneumonia of right lower lobe (CMS/HCC) [J69.0]  Yes   • Metabolic encephalopathy [G93.41]  Unknown   • CKD (chronic kidney disease) stage 2, GFR 60-89 ml/min [N18.2]  Yes   • Anemia, chronic disease [D63.8]  Yes   • Oropharyngeal dysphagia [R13.12]  Yes   • Gastroesophageal reflux disease without esophagitis [K21.9]  Yes   • Essential hypertension [I10]  Yes      Resolved Hospital Problems   No resolved problems to display.       Ms. Brandon is a 79 y.o. admitted with recurrent aspiration pneumonia and acute metabolic encephalopathy.    Recurrent aspiration pneumonia/ metabolic encephalopathy   · Continue cefepime IV  · Consult SLP again. Oropharyngeal dysphagia diagnosed in June.   · Suspect her aspiration related to hernia with possible paraesophageal component seen on EGD last  admission.  Discussed with the daughter and she would like to proceed with thoracic surgery consult to determine if paraesophageal hernia contributing to aspiration.  · Check UA with culture if indicated.  Patient complains of frequency and urgency with urination. Hold oxybutynin as possible contributor to AMS. Check bladder scan  · Continue supportive care with IVF and antipyretics    GERD   · with hernia/ paraesophageal component seen on EGD 8/2020   · Continue PPI     Essential hypertension, continue medications.   Anemia of chronic disease, hemoglobin stable.  Chronic kidney disease stage II, stable.     Consult PT   · I discussed the patient's findings and my recommendations with patient, family and ED provider.    VTE Prophylaxis - SCDs.  Code Status - DNR.       ANA LUISA George  [facility]  09/02/20  14:56

## 2020-09-02 NOTE — PLAN OF CARE
Problem: Patient Care Overview  Goal: Plan of Care Review  Outcome: Ongoing (interventions implemented as appropriate)  Flowsheets (Taken 9/2/2020 4276)  Progress: no change  Outcome Summary: new admit for pna, vss, c/o of right arm/shoulder arthritic pain, tylenol given, passed bedside swallow, resumed reg diet, speech is very familiar with pt and will follow up tomorrow with possible video swallow, will continue to monitor

## 2020-09-02 NOTE — ED NOTES
Pt reports occasionally has trouble swallowing pills, mild garbled speech noted.      Nataliya Disla RN  09/02/20 0914

## 2020-09-02 NOTE — ED PROVIDER NOTES
EMERGENCY DEPARTMENT ENCOUNTER    Room Number:  S607/1  Date of encounter:  9/2/2020  PCP: Gordo Mg MD  Historian: Patient, EMS      HPI:  Chief Complaint: Fever and abdominal pain  A complete HPI/ROS/PMH/PSH/SH/FH are unobtainable due to: Yes patient is very forgetful and history is very limited from the patient.  Report received from EMS with the patient was here for fever and abdominal pain.  Possibly increased cough and some shortness of breath.  Context: Gabbi Brandon is a 79 y.o. female who presents to the ED c/o fever and abdominal pain and cough and shortness of breath.  Patient states that she had a fever yesterday.  She is unaware who checked her temperature.  She came from home.  She is unaware of what the exact temperature reading was.  Currently she denies any abdominal pain.  She states that she has abdominal pain that comes and goes off and on.  She reports that it usually in the upper abdomen where she points.  Again currently denies any abdominal pain.  She has a mild cough on exam.  But patient denies that she has any cough at this time.  She reported to the nurse that she has had a cough.  She denies any acute shortness of breath.  She denies any chest pain.  She is uncertain of the duration of her symptoms and is uncertain of her recent hospital admission and diagnosis.  She denies vomiting or diarrhea.  She says that she has arthritis pain.  She states that it is in her arms and legs.  This is not new        Previous Episodes: It sounds like history of aspiration pneumonia in the past and episodic abdominal pain and previous old records.  Current Symptoms: See above    MEDICAL HISTORY REVIEWED  Patient was discharged from the hospital 813.  She was here for aspiration pneumonia and abdominal pain.  She has a very large hiatal hernia and refused further evaluation or work-up for the hiatal hernia as well as she had possibility of gallbladder disease and she refused further  work-up or evaluation by a surgeon.  Please see discharge summary below  Please see history and physical for details. Patient is a 79 y.o. female admitted with recurrent possible aspiration pneumonia.  Patient completed a course of cefepime while in the hospital.  Follow-up x-ray showed improvement.  She will need continued follow-up imaging to ensure resolution.  Speech therapy saw the patient and aVF SS was completed and speech therapy recommended regular diet with thin liquids.     Patient had nausea and vomiting as well as epigastric pain.  EGD showed an 8 cm hiatal hernia with possible paraesophageal component.  Right upper quadrant ultrasound showed some layering of stones or sludge.  Patient declined any type of thoracic surgery referral for correction of hiatal hernia with possible paraesophageal component.  Patient also was not interested in general surgery consultation for findings of the gallbladder.  She wished to defer intervention until absolutely necessary.  She is tolerating p.o. better.  She is tolerating p.o. and would like to go home.     I discussed the patient's findings and my recommendations with patient, family and nursing staff.     Condition on Discharge: Improved.  Patient would like to go home today.    PAST MEDICAL HISTORY  Active Ambulatory Problems     Diagnosis Date Noted   • Irregular bleeding 09/07/2016   • Urinary tract infection 09/14/2016   • Dyslipidemia 10/03/2014   • Essential hypertension 01/10/2018   • Stage 2 chronic kidney disease 09/18/2018   • Gastroesophageal reflux disease without esophagitis 05/02/2019   • Seasonal allergies 05/02/2019   • Familial hypercholesterolemia 11/04/2019   • Bladder spasm 11/04/2019   • Bilateral lower extremity edema 06/25/2020   • Pneumonia of right lower lobe due to infectious organism 07/01/2020   • Failure to thrive in adult 07/01/2020   • Hyponatremia 07/01/2020   • DNR (do not resuscitate) 07/01/2020   • Hypoxia 07/02/2020   •  Oropharyngeal dysphagia 07/02/2020   • Anemia, chronic disease 08/08/2020   • CKD (chronic kidney disease) stage 2, GFR 60-89 ml/min 08/08/2020   • Hypotension 08/08/2020   • Diarrhea 08/09/2020   • Hypocalcemia 08/09/2020   • Non-intractable vomiting 08/07/2020     Resolved Ambulatory Problems     Diagnosis Date Noted   • No Resolved Ambulatory Problems     Past Medical History:   Diagnosis Date   • Arthritis    • GERD (gastroesophageal reflux disease)    • Hyperlipidemia    • Hypertension    • Incontinent of urine    • PNA (pneumonia)    • UTI (urinary tract infection)          PAST SURGICAL HISTORY  Past Surgical History:   Procedure Laterality Date   • BREAST BIOPSY     • COLONOSCOPY N/A 8/26/2016    Procedure: COLONOSCOPY WITH POLYPECTOMY (HOT SNARE);  Surgeon: Paulino Narvaez MD;  Location: [facility] ENDOSCOPY;  Service:    • ENDOSCOPY N/A 8/11/2020    Procedure: ESOPHAGOGASTRODUODENOSCOPY with biopsies;  Surgeon: Eugene George MD;  Location: [facility] ENDOSCOPY;  Service: Gastroenterology;  Laterality: N/A;  pre-- melena and abdominal pain  post-- hiatel hernia   • VAGINAL HYSTERECTOMY           FAMILY HISTORY  Family History   Problem Relation Age of Onset   • Colon cancer Brother    • Heart attack Mother    • Stroke Father    • Lung cancer Sister    • Lung cancer Brother         3 brothers   • Deep vein thrombosis Brother    • Brain cancer Brother    • Uterine cancer Neg Hx    • Ovarian cancer Neg Hx    • Breast cancer Neg Hx    • Pulmonary embolism Neg Hx          SOCIAL HISTORY  Social History     Socioeconomic History   • Marital status:      Spouse name: Not on file   • Number of children: Not on file   • Years of education: Not on file   • Highest education level: Not on file   Tobacco Use   • Smoking status: Never Smoker   • Smokeless tobacco: Never Used   Substance and Sexual Activity   • Alcohol use: Not Currently   • Drug use: No   • Sexual activity: Defer          ALLERGIES  Penicillins        REVIEW OF SYSTEMS  Review of Systems     All systems reviewed and negative except for those discussed in HPI.       PHYSICAL EXAM    I have reviewed the triage vital signs and nursing notes.    ED Triage Vitals   Temp Heart Rate Resp BP SpO2   09/02/20 0912 09/02/20 0912 09/02/20 0912 09/02/20 0912 09/02/20 0912   (!) 101.5 °F (38.6 °C) 104 20 140/70 95 %      Temp src Heart Rate Source Patient Position BP Location FiO2 (%)   09/02/20 0912 09/02/20 0924 -- -- --   Axillary Monitor          GENERAL: Elderly female that is chronically ill-appearing no acute distress.Vital signs on my initial evaluation O2 sat on 2 L is 100%.  Patient has a mild tachycardia in the low 100s.  HENT: nares patent  Head/neck/ face are symmetric without gross deformity, signs of trauma, or swelling  EYES: no scleral icterus, no conjunctival pallor.  NECK: Supple, no meningismus  CV: regular rhythm, regular rate with intact distal pulses.  No murmur rub  RESPIRATORY: normal effort and no respiratory distress.  Very mild crackles in the base of her lungs bilaterally.  She has a mild dry cough on exam.  ABDOMEN: soft and non-tender.  Morbidly obese  MUSCULOSKELETAL: no deformity.  1+ edema to lower extremities bilaterally.  Patient has intact distal pulses  NEURO: alert to name only.  She is disoriented to the month, year, and her age.  She follows commands.  She has no gross cranial nerve deficit 2 through 12.  She has no focal weakness to her upper or lower extremities.  No sensory changes.  SKIN: warm, dry    Vital signs and nursing notes reviewed.        LAB RESULTS  Recent Results (from the past 24 hour(s))   Light Blue Top    Collection Time: 09/02/20  9:35 AM   Result Value Ref Range    Extra Tube hold for add-on    Gold Top - SST    Collection Time: 09/02/20  9:35 AM   Result Value Ref Range    Extra Tube Hold for add-ons.    Protime-INR    Collection Time: 09/02/20  9:35 AM   Result Value Ref  Range    Protime 13.9 11.7 - 14.2 Seconds    INR 1.08 0.90 - 1.10   Comprehensive Metabolic Panel    Collection Time: 09/02/20  9:36 AM   Result Value Ref Range    Glucose 131 (H) 65 - 99 mg/dL    BUN 12 8 - 23 mg/dL    Creatinine 0.82 0.57 - 1.00 mg/dL    Sodium 138 136 - 145 mmol/L    Potassium 4.2 3.5 - 5.2 mmol/L    Chloride 99 98 - 107 mmol/L    CO2 26.0 22.0 - 29.0 mmol/L    Calcium 8.8 8.6 - 10.5 mg/dL    Total Protein 6.7 6.0 - 8.5 g/dL    Albumin 2.90 (L) 3.50 - 5.20 g/dL    ALT (SGPT) 9 1 - 33 U/L    AST (SGOT) 15 1 - 32 U/L    Alkaline Phosphatase 70 39 - 117 U/L    Total Bilirubin 0.4 0.0 - 1.2 mg/dL    eGFR Non African Amer 67 >60 mL/min/1.73    Globulin 3.8 gm/dL    A/G Ratio 0.8 g/dL    BUN/Creatinine Ratio 14.6 7.0 - 25.0    Anion Gap 13.0 5.0 - 15.0 mmol/L   Lactic Acid, Plasma    Collection Time: 09/02/20  9:36 AM   Result Value Ref Range    Lactate 2.1 (C) 0.5 - 2.0 mmol/L   CBC Auto Differential    Collection Time: 09/02/20  9:36 AM   Result Value Ref Range    WBC 13.37 (H) 3.40 - 10.80 10*3/mm3    RBC 3.78 3.77 - 5.28 10*6/mm3    Hemoglobin 11.0 (L) 12.0 - 15.9 g/dL    Hematocrit 33.5 (L) 34.0 - 46.6 %    MCV 88.6 79.0 - 97.0 fL    MCH 29.1 26.6 - 33.0 pg    MCHC 32.8 31.5 - 35.7 g/dL    RDW 14.2 12.3 - 15.4 %    RDW-SD 45.6 37.0 - 54.0 fl    MPV 10.0 6.0 - 12.0 fL    Platelets 280 140 - 450 10*3/mm3    Neutrophil % 85.7 (H) 42.7 - 76.0 %    Lymphocyte % 6.8 (L) 19.6 - 45.3 %    Monocyte % 5.8 5.0 - 12.0 %    Eosinophil % 0.7 0.3 - 6.2 %    Basophil % 0.3 0.0 - 1.5 %    Immature Grans % 0.7 (H) 0.0 - 0.5 %    Neutrophils, Absolute 11.47 (H) 1.70 - 7.00 10*3/mm3    Lymphocytes, Absolute 0.91 0.70 - 3.10 10*3/mm3    Monocytes, Absolute 0.77 0.10 - 0.90 10*3/mm3    Eosinophils, Absolute 0.09 0.00 - 0.40 10*3/mm3    Basophils, Absolute 0.04 0.00 - 0.20 10*3/mm3    Immature Grans, Absolute 0.09 (H) 0.00 - 0.05 10*3/mm3    nRBC 0.0 0.0 - 0.2 /100 WBC   Green Top (Gel)    Collection Time: 09/02/20   9:36 AM   Result Value Ref Range    Extra Tube Hold for add-ons.    Lavender Top    Collection Time: 09/02/20  9:36 AM   Result Value Ref Range    Extra Tube hold for add-on    Lipase    Collection Time: 09/02/20  9:36 AM   Result Value Ref Range    Lipase 8 (L) 13 - 60 U/L   BNP    Collection Time: 09/02/20  9:36 AM   Result Value Ref Range    proBNP 1,028.0 0.0-1,800.0 pg/mL   Procalcitonin    Collection Time: 09/02/20  9:36 AM   Result Value Ref Range    Procalcitonin 0.06 0.00 - 0.25 ng/mL   Troponin    Collection Time: 09/02/20  9:36 AM   Result Value Ref Range    Troponin T <0.010 0.000 - 0.030 ng/mL   Magnesium    Collection Time: 09/02/20  9:36 AM   Result Value Ref Range    Magnesium 1.6 1.6 - 2.4 mg/dL   Lactic Acid, Reflex Timer (This will reflex a repeat order 3-3:15 hours after ordered.)    Collection Time: 09/02/20  9:36 AM   Result Value Ref Range    Hold Tube Hold for add-ons.    Respiratory Panel PCR w/COVID-19(SARS-CoV-2) TETO/ANY/[facility]Y/[facility] In-House, NP Swab in UTM/VTM, 3-4 HR TAT - Swab, Nasopharynx    Collection Time: 09/02/20  9:48 AM   Result Value Ref Range    ADENOVIRUS, PCR Not Detected Not Detected    Coronavirus 229E Not Detected Not Detected    Coronavirus HKU1 Not Detected Not Detected    Coronavirus NL63 Not Detected Not Detected    Coronavirus OC43 Not Detected Not Detected    COVID19 Not Detected Not Detected - Ref. Range    Human Metapneumovirus Not Detected Not Detected    Human Rhinovirus/Enterovirus Not Detected Not Detected    Influenza A PCR Not Detected Not Detected    Influenza A H1 Not Detected Not Detected    Influenza A H1 2009 PCR Not Detected Not Detected    Influenza A H3 Not Detected Not Detected    Influenza B PCR Not Detected Not Detected    Parainfluenza Virus 1 Not Detected Not Detected    Parainfluenza Virus 2 Not Detected Not Detected    Parainfluenza Virus 3 Not Detected Not Detected    Parainfluenza Virus 4 Not Detected Not Detected    RSV, PCR Not Detected  Not Detected    Bordetella pertussis pcr Not Detected Not Detected    Bordetella parapertussis PCR Not Detected Not Detected    Chlamydophila pneumoniae PCR Not Detected Not Detected    Mycoplasma pneumo by PCR Not Detected Not Detected   Blood Gas, Arterial    Collection Time: 09/02/20 10:03 AM   Result Value Ref Range    Site Arterial: right radial     Teddy's Test Positive     pH, Arterial 7.478 (H) 7.350 - 7.450 pH units    pCO2, Arterial 39.1 35.0 - 45.0 mm Hg    pO2, Arterial 71.7 (L) 80.0 - 100.0 mm Hg    HCO3, Arterial 29.0 (H) 22.0 - 28.0 mmol/L    Base Excess, Arterial 5.1 (H) 0.0 - 2.0 mmol/L    O2 Saturation Calculated 95.3 92.0 - 99.0 %    Barometric Pressure for Blood Gas 746.9 mmHg    Modality Cannula     Flow Rate 2 lpm    Rate 24 Breaths/minute       Ordered the above labs and independently reviewed the results.        RADIOLOGY  Ct Head Without Contrast    Result Date: 9/2/2020  CT SCAN OF THE HEAD WITHOUT CONTRAST  CLINICAL HISTORY: Altered mental status.  CT scan of the head was obtained with 3 mm axial images. No intravenous contrast was administered.  COMPARISON: Comparison is made to previous CT scan of the head dated 11/03/2014.  FINDINGS:  The ventricles, sulci, and cisterns are age appropriate. The gray-white matter differentiation is within normal limits. The basal ganglia and thalami are unremarkable. The posterior fossa structures are remarkable for cerebellar atrophy. Similar findings are seen on the previous examination of 11/03/2014.  Partial opacification of the left mastoid air cells is seen.       No evidence for acute intracranial pathology.  Atrophic changes are identified within the cerebellum. Similar findings were noted on the prior exam of 11/03/2014.  Radiation dose reduction techniques were utilized, including automated exposure control and exposure modulation based on body size.       Ct Abdomen Pelvis With Contrast    Result Date: 9/2/2020  CT SCAN OF THE ABDOMEN AND  PELVIS WITH INTRAVENOUS CONTRAST  HISTORY: Abdominal pain and fever.  FINDINGS:  The CT scan was performed as an emergency procedure through the abdomen and pelvis with intravenous contrast and compared to the previous CT scans of the abdomen and pelvis dated 07/01/2020. The following findings are present: 1. There is a large hiatus hernia extending slightly into the right chest and measuring up to 9.1 cm that is unchanged. There is now a new small to moderate right pleural effusion with some adjacent dense atelectasis and probable component of pneumonia. 2. The liver, spleen, pancreas, both adrenal glands, and both kidneys are unremarkable and unchanged. The gallbladder may contain a few very tiny gallstones and there is no wall thickening. 3. There is no aortic aneurysm or retroperitoneal lymphadenopathy. The large and small bowel loops are normal in caliber and show no inflammatory change. No abnormality is seen in the pelvis.   Radiation dose reduction techniques were utilized, including automated exposure control and exposure modulation based on body size.  This report was finalized on 9/2/2020 12:10 PM by Dr. Sammy Richards M.D.      Xr Chest 1 View    Result Date: 9/2/2020  XR CHEST 1 VW-  Clinical: Shortness of breath  COMPARISON 8/12/2020  FINDINGS: Cardiomegaly similar to the previous examination. The left lung is clear. Sizable hiatal hernia is again demonstrated. There is some atelectasis demonstrated along its right lateral border. There is a right-sided pleural effusion with patchy infiltrate/atelectasis right mid and lower lung zone. No pulmonary edema is demonstrated. The remainder is unremarkable.  CONCLUSION: There is cardiac enlargement with sizable hiatal hernia causing atelectasis at the right lung base. There is a right-sided pleural effusion and infiltrate/atelectasis right mid and lower lung zone.  This report was finalized on 9/2/2020 11:00 AM by Dr. Preston Jackson M.D.        I ordered the  above noted radiological studies. Reviewed by me and discussed with radiologist.  See dictation for official radiology interpretation.      PROCEDURES    Procedures      MEDICATIONS GIVEN IN ER    Medications   sodium chloride 0.9 % flush 10 mL (10 mL Intravenous Given 9/2/20 1149)   acetaminophen (TYLENOL) tablet 1,000 mg (1,000 mg Oral Not Given 9/2/20 1029)   ipratropium-albuterol (DUO-NEB) nebulizer solution 3 mL (has no administration in time range)   ondansetron (ZOFRAN) injection 4 mg (has no administration in time range)   nitroglycerin (NITROSTAT) SL tablet 0.4 mg (has no administration in time range)   acetaminophen (TYLENOL) tablet 650 mg (has no administration in time range)     Or   acetaminophen (TYLENOL) 160 MG/5ML solution 650 mg (has no administration in time range)     Or   acetaminophen (TYLENOL) suppository 650 mg (has no administration in time range)   lactated ringers infusion (has no administration in time range)   pantoprazole (PROTONIX) injection 40 mg (has no administration in time range)   cefepime (MAXIPIME) 2 g/100 mL 0.9% NS (mbp) (has no administration in time range)   acetaminophen (TYLENOL) suppository 650 mg (650 mg Rectal Given 9/2/20 1106)   iopamidol (ISOVUE-300) 61 % injection 100 mL (85 mL Intravenous Given by Other 9/2/20 1100)   cefepime (MAXIPIME) 2 g/100 mL 0.9% NS (mbp) (0 g Intravenous Stopped 9/2/20 1232)         PROGRESS, DATA ANALYSIS, CONSULTS, AND MEDICAL DECISION MAKING    History of recurrent aspiration pneumonia which is a definite possibility.  Reviewing old records also history of potential gallbladder disease.  We will go and check lab work and a CT scan of her abdomen pelvis.  All questions answered.  Instructed the nurse to try to get in touch with the family member to get a more precise history.  We are currently under a pandemic from the COVID19 infection.  The patient presented to the emergency department by ambulance or personal vehicle.  During current  hospital restrictions no other visitors were present in the emergency department during my evaluation and treatment. I followed the current protocols required by Infection Control at [facility] in my evaluation and treatment of the patient. The patient was wearing a face mask during my evaluation and throughout my encounter. During my whole encounter with this patient I used appropriate personal protective equipment.  This equipment consisted of eye protection, facemask, gown, and gloves.  I applied this equipment before entering the room.      All labs have been independently reviewed by me.  All radiology studies have been reviewed by me and discussed with radiologist dictating the report.   EKG's independently viewed and interpreted by me.  Discussion below represents my analysis of pertinent findings related to patient's condition, differential diagnosis, treatment plan and final disposition.      ED Course as of Sep 02 1541   Wed Sep 02, 2020   1039 EKG reading was done at 959Rate of 99 it is sinus rhythm with the appearance of an incomplete right bundle branch block.  There is left axis deviationDiffuse nonspecific T wave changes with some movement artifactQT looks normalCompared to an EKG that was done August 9, 2020 and looks very similar    [MM]   1132 I discussed the case with the radiologist Dr. Richards.  Patient has a new right pleural effusion and she has pneumonia and atelectasis in the right base.  No other acute process seen on the CT of the abdomen pelvis.  Patient has a large hiatal hernia.    [MM]   1159 Nurse Melisa spoke with the patient's daughter.  She is had a confusion and fevers for approximately 1 day.  Described as some visual hallucinations of seeing people in the room that were not there.  And more confused than baseline.  Felt like her speech is been a little bit slurred and slower than typical.  Melisa also informed the patient that she has a pneumonia and that we will need  to keep her in the hospital.   WBC(!): 13.37 [MM]   1233 WBC(!): 13.37 [MM]   1233 Lactate(!!): 2.1 [MM]   1318 I discussed with Dr. Horta the CT scan of the head.  Show some cerebellar atrophy otherwise no acute process.    [MM]   1318 I discussed the case with [facility] admitting doctor, Dr. Kayce Machado, and she agrees to admit.  Informed her the initial treatment plan and all questions were answered.    [MM]   1323 I have reevaluated this patient.  Patient is definitely more alert.  She is alert to her name and aware that she is in the hospital at [facility].  She is aware of the year and she is aware of her age.  This is definite improvement since I first saw her.  Informed her the results of the test.  She inform the pneumonia again.  So she is definitely aware that she is had past aspiration pneumonia.  Denies any abdominal pain.  Currently her heart rate is is in the 90s.  Blood pressure is normal.  O2 sat is 98% on 2 L.  She is looking better.    [MM]      ED Course User Index  [MM] Josh Meng MD       AS OF 15:41 VITALS:    BP - 114/68  HR - 91  TEMP - 98.8 °F (37.1 °C) (Oral)  02 SATS - 96%        DIAGNOSIS  Final diagnoses:   Aspiration pneumonia of right lower lobe, unspecified aspiration pneumonia type (CMS/HCC)   Toxic metabolic encephalopathy   Hiatal hernia         DISPOSITION  I have reviewed the test results with my patient and explained the current treatment plan.  I answered all of the patient's questions.  The patient will be admitted to monitor bed at this time.  The patient is not hypotensive and is tolerating their current disease condition well enough for a monitored bed at this time.  The patient's current condition does not require intensive care treatment at this time.             Josh Meng MD  09/02/20 5859

## 2020-09-03 ENCOUNTER — APPOINTMENT (OUTPATIENT)
Dept: CT IMAGING | Facility: HOSPITAL | Age: 80
End: 2020-09-03

## 2020-09-03 ENCOUNTER — APPOINTMENT (OUTPATIENT)
Dept: GENERAL RADIOLOGY | Facility: HOSPITAL | Age: 80
End: 2020-09-03

## 2020-09-03 LAB
ANION GAP SERPL CALCULATED.3IONS-SCNC: 7.7 MMOL/L (ref 5–15)
BUN SERPL-MCNC: 12 MG/DL (ref 8–23)
BUN/CREAT SERPL: 16.9 (ref 7–25)
CALCIUM SPEC-SCNC: 8.8 MG/DL (ref 8.6–10.5)
CHLORIDE SERPL-SCNC: 100 MMOL/L (ref 98–107)
CO2 SERPL-SCNC: 29.3 MMOL/L (ref 22–29)
CREAT SERPL-MCNC: 0.71 MG/DL (ref 0.57–1)
D-LACTATE SERPL-SCNC: 1 MMOL/L (ref 0.5–2)
DEPRECATED RDW RBC AUTO: 48.1 FL (ref 37–54)
ERYTHROCYTE [DISTWIDTH] IN BLOOD BY AUTOMATED COUNT: 14.5 % (ref 12.3–15.4)
GFR SERPL CREATININE-BSD FRML MDRD: 79 ML/MIN/1.73
GLUCOSE SERPL-MCNC: 99 MG/DL (ref 65–99)
HCT VFR BLD AUTO: 32 % (ref 34–46.6)
HGB BLD-MCNC: 10.2 G/DL (ref 12–15.9)
MCH RBC QN AUTO: 29 PG (ref 26.6–33)
MCHC RBC AUTO-ENTMCNC: 31.9 G/DL (ref 31.5–35.7)
MCV RBC AUTO: 90.9 FL (ref 79–97)
PLATELET # BLD AUTO: 275 10*3/MM3 (ref 140–450)
PMV BLD AUTO: 9.5 FL (ref 6–12)
POTASSIUM SERPL-SCNC: 3.9 MMOL/L (ref 3.5–5.2)
PROCALCITONIN SERPL-MCNC: 0.15 NG/ML (ref 0–0.25)
RBC # BLD AUTO: 3.52 10*6/MM3 (ref 3.77–5.28)
SODIUM SERPL-SCNC: 137 MMOL/L (ref 136–145)
WBC # BLD AUTO: 9.72 10*3/MM3 (ref 3.4–10.8)

## 2020-09-03 PROCEDURE — 97162 PT EVAL MOD COMPLEX 30 MIN: CPT

## 2020-09-03 PROCEDURE — 85027 COMPLETE CBC AUTOMATED: CPT | Performed by: NURSE PRACTITIONER

## 2020-09-03 PROCEDURE — 97166 OT EVAL MOD COMPLEX 45 MIN: CPT

## 2020-09-03 PROCEDURE — 25010000002 CEFEPIME PER 500 MG: Performed by: NURSE PRACTITIONER

## 2020-09-03 PROCEDURE — 36415 COLL VENOUS BLD VENIPUNCTURE: CPT | Performed by: NURSE PRACTITIONER

## 2020-09-03 PROCEDURE — 71046 X-RAY EXAM CHEST 2 VIEWS: CPT

## 2020-09-03 PROCEDURE — 83605 ASSAY OF LACTIC ACID: CPT | Performed by: INTERNAL MEDICINE

## 2020-09-03 PROCEDURE — 80048 BASIC METABOLIC PNL TOTAL CA: CPT | Performed by: NURSE PRACTITIONER

## 2020-09-03 PROCEDURE — 71250 CT THORAX DX C-: CPT

## 2020-09-03 PROCEDURE — 97535 SELF CARE MNGMENT TRAINING: CPT

## 2020-09-03 PROCEDURE — 97110 THERAPEUTIC EXERCISES: CPT

## 2020-09-03 PROCEDURE — 84145 PROCALCITONIN (PCT): CPT | Performed by: INTERNAL MEDICINE

## 2020-09-03 PROCEDURE — 99223 1ST HOSP IP/OBS HIGH 75: CPT | Performed by: NURSE PRACTITIONER

## 2020-09-03 PROCEDURE — 92610 EVALUATE SWALLOWING FUNCTION: CPT

## 2020-09-03 RX ORDER — ACETAMINOPHEN AND CODEINE PHOSPHATE 300; 30 MG/1; MG/1
2 TABLET ORAL NIGHTLY
Status: DISCONTINUED | OUTPATIENT
Start: 2020-09-03 | End: 2020-09-06 | Stop reason: HOSPADM

## 2020-09-03 RX ORDER — ACETAMINOPHEN 325 MG/1
650 TABLET ORAL
Status: DISCONTINUED | OUTPATIENT
Start: 2020-09-04 | End: 2020-09-06 | Stop reason: HOSPADM

## 2020-09-03 RX ADMIN — ACETAMINOPHEN AND CODEINE PHOSPHATE 2 TABLET: 300; 30 TABLET ORAL at 20:31

## 2020-09-03 RX ADMIN — PANTOPRAZOLE SODIUM 40 MG: 40 INJECTION, POWDER, FOR SOLUTION INTRAVENOUS at 06:14

## 2020-09-03 RX ADMIN — CEFEPIME 2 G: 2 INJECTION, POWDER, FOR SOLUTION INTRAVENOUS at 11:44

## 2020-09-03 RX ADMIN — ASPIRIN 81 MG: 81 TABLET, COATED ORAL at 10:26

## 2020-09-03 RX ADMIN — CELECOXIB 200 MG: 200 CAPSULE ORAL at 10:26

## 2020-09-03 RX ADMIN — Medication 1 CAPSULE: at 10:26

## 2020-09-03 RX ADMIN — SODIUM CHLORIDE, POTASSIUM CHLORIDE, SODIUM LACTATE AND CALCIUM CHLORIDE 75 ML/HR: 600; 310; 30; 20 INJECTION, SOLUTION INTRAVENOUS at 07:03

## 2020-09-03 RX ADMIN — AMLODIPINE BESYLATE 5 MG: 5 TABLET ORAL at 10:26

## 2020-09-03 RX ADMIN — SERTRALINE 50 MG: 50 TABLET, FILM COATED ORAL at 10:25

## 2020-09-03 RX ADMIN — CEFEPIME 2 G: 2 INJECTION, POWDER, FOR SOLUTION INTRAVENOUS at 20:31

## 2020-09-03 RX ADMIN — FLUTICASONE PROPIONATE 2 SPRAY: 50 SPRAY, METERED NASAL at 10:27

## 2020-09-03 RX ADMIN — ATENOLOL 50 MG: 50 TABLET ORAL at 10:26

## 2020-09-03 NOTE — PLAN OF CARE
"  Problem: Patient Care Overview  Goal: Plan of Care Review  Flowsheets (Taken 9/3/2020 0906)  Progress: improving  Plan of Care Reviewed With: patient  Outcome Summary: Patient seen for clinical swallow. Pt continues to develop recurrent pna. Currently on mech soft and honey per MD. VFSS completed 7/3/20 and 8/10/20 with recs for regular and thins, revealing mild dysphagia only. VFSS images reviewed. Aspiration risk appeared low. \"Slight backflow through the UES into the pyriforms noted with soft solids and mixed consistencies. No penetration was noted, however, pt is at risk.\". APRN indicated the following, \"Suspect her aspiration related to hernia with possible paraesophageal component seen on EGD last admission.  Discussed with the daughter and she would like to proceed with thoracic surgery consult to determine if paraesophageal hernia contributing to aspiration.\". SLP assessed patient this date. No overt s/s of aspiration observed with thins via cup/straw, mixed mech soft, or regular. Voice clear post swallow. No oral residue post swallow. SLP recs FEES to assess for micro aspiration d/t recurrent pna and previous VFSS results. Patient refused assessment this date, despite education indicating, \"I'm too old to do anything about it\". SLP encouraged patient to consider testing as implementing dysphagia exercises is a non invasive method to reduce aspiration risks if indicated. Pt continues to refuse. SLP recs regular and thins, will defer diet upgrade to MD. SLP to sign off. Please order FEES if patient in agreement.    Patient was not wearing a face mask during this therapy encounter. Therapist used appropriate personal protective equipment including mask, eye protection and gloves.  Mask used was standard procedure mask. Appropriate PPE was worn during the entire therapy session. Hand hygiene was completed before and after therapy session. Patient is not in enhanced droplet precautions.        "

## 2020-09-03 NOTE — PROGRESS NOTES
Name: Gabbi Brandon ADMIT: 2020   : 1940  PCP: Gordo Mg MD    MRN: [id number] LOS: 1 days   AGE/SEX: 79 y.o. female  ROOM: [facility]     Subjective   Subjective   Better today, not as confused.  Has just had a couple bites of dinner.  Did not eat much at lunch either.    Review of Systems     Objective   Objective   Vital Signs  Temp:  [98 °F (36.7 °C)-98.2 °F (36.8 °C)] 98.2 °F (36.8 °C)  Heart Rate:  [67-77] 74  Resp:  [16-18] 18  BP: (115-136)/(60-74) 128/74  SpO2:  [91 %-96 %] 93 %  on  Flow (L/min):  [1-2] 1;   Device (Oxygen Therapy): room air  Body mass index is 34.17 kg/m².  Physical Exam   Constitutional: She appears well-developed and well-nourished.   HENT:   Head: Normocephalic.   Neck: Neck supple. No JVD present.   Cardiovascular: Normal rate and regular rhythm.   No murmur heard.  Pulmonary/Chest: No stridor. No respiratory distress. She has no wheezes. She has no rales.   Diminished right base.  Left seems fairly clear   Abdominal: Soft. Bowel sounds are normal. She exhibits no distension.   Musculoskeletal: She exhibits edema.   Trace to 1+   Neurological: She is alert.   Nursing note and vitals reviewed.      Results Review:       I reviewed the patient's new clinical results.  Results from last 7 days   Lab Units 20  0710 20  0936   WBC 10*3/mm3 9.72 13.37*   HEMOGLOBIN g/dL 10.2* 11.0*   PLATELETS 10*3/mm3 275 280     Results from last 7 days   Lab Units 20  0710 20  0936   SODIUM mmol/L 137 138   POTASSIUM mmol/L 3.9 4.2   CHLORIDE mmol/L 100 99   CO2 mmol/L 29.3* 26.0   BUN mg/dL 12 12   CREATININE mg/dL 0.71 0.82   GLUCOSE mg/dL 99 131*   Estimated Creatinine Clearance: 57.8 mL/min (by C-G formula based on SCr of 0.71 mg/dL).  Results from last 7 days   Lab Units 20  0936   ALBUMIN g/dL 2.90*   BILIRUBIN mg/dL 0.4   ALK PHOS U/L 70   AST (SGOT) U/L 15   ALT (SGPT) U/L 9     Results from last 7 days   Lab Units 20  0710  09/02/20  0936   CALCIUM mg/dL 8.8 8.8   ALBUMIN g/dL  --  2.90*   MAGNESIUM mg/dL  --  1.6     Results from last 7 days   Lab Units 09/03/20  0710 09/02/20  1550 09/02/20  0936   PROCALCITONIN ng/mL 0.15  --  0.06   LACTATE mmol/L 1.0 2.4* 2.1*     No results found for: HGBA1C, POCGLU      [START ON 9/4/2020] acetaminophen 650 mg Oral Daily Before Lunch   acetaminophen-codeine 2 tablet Oral Nightly   amLODIPine 5 mg Oral Daily   aspirin 81 mg Oral Daily   atenolol 50 mg Oral Daily   cefepime 2 g Intravenous Q12H   celecoxib 200 mg Oral Daily   fluticasone 2 spray Each Nare Daily   lactobacillus acidophilus 1 capsule Oral Daily   pantoprazole 40 mg Intravenous Q AM   sertraline 50 mg Oral Daily       hold 1 each    lactated ringers 75 mL/hr Last Rate: 75 mL/hr (09/03/20 0703)   Diet Dysphagia; IV - Mechanical Soft No Mixed Consistencies; Honey Thick       Assessment/Plan     Active Hospital Problems    Diagnosis  POA   • **Pneumonia of right lower lobe due to infectious organism [J18.9]  Yes   • Hiatal hernia [K44.9]  Yes   • Aspiration pneumonia of right lower lobe (CMS/HCC) [J69.0]  Yes   • Metabolic encephalopathy [G93.41]  Unknown   • CKD (chronic kidney disease) stage 2, GFR 60-89 ml/min [N18.2]  Yes   • Anemia, chronic disease [D63.8]  Yes   • Oropharyngeal dysphagia [R13.12]  Yes   • Gastroesophageal reflux disease without esophagitis [K21.9]  Yes   • Essential hypertension [I10]  Yes      Resolved Hospital Problems   No resolved problems to display.       · Suspected pneumonia and pleural effusion.  CT shows possible atypical pneumonia at the left lung with compressive atelectasis on the right.  Mental status improved today.  Discussed earlier with Dr. Dc.  Surgery not recommended for hiatal hernia.  I did order thoracentesis to which patient agrees.  Continue cefepime.  Patient is not complaining about the current diet.  We will continue same for now.  Speech therapy is suggested FEES but patient  declines.  · Arthritis.  Patient takes Tylenol with codeine 2 at night and plain Tylenol at noon.  She does take the Celebrex in the morning.  Will change her meds here to fit the schedule.  · Metabolic encephalopathy, much improved  · Weakness.  Appreciate input from PT and OT  · Anemia.  Hemoglobin is less than yesterday but could be secondary to the fluids.  Recheck in a.m.    Chaparrita Machado MD  [facility]  09/03/20  18:13

## 2020-09-03 NOTE — THERAPY EVALUATION
Patient Name: Gabbi Brandon  : 1940    MRN: [id number]                              Today's Date: 9/3/2020       Admit Date: 2020    Visit Dx:     ICD-10-CM ICD-9-CM   1. Aspiration pneumonia of right lower lobe, unspecified aspiration pneumonia type (CMS/HCC) J69.0 507.0   2. Toxic metabolic encephalopathy G92 349.82   3. Hiatal hernia K44.9 553.3     Patient Active Problem List   Diagnosis   • Irregular bleeding   • Urinary tract infection   • Dyslipidemia   • Essential hypertension   • Stage 2 chronic kidney disease   • Gastroesophageal reflux disease without esophagitis   • Seasonal allergies   • Familial hypercholesterolemia   • Bladder spasm   • Bilateral lower extremity edema   • Pneumonia of right lower lobe due to infectious organism   • Failure to thrive in adult   • Hyponatremia   • DNR (do not resuscitate)   • Hypoxia   • Oropharyngeal dysphagia   • Anemia, chronic disease   • CKD (chronic kidney disease) stage 2, GFR 60-89 ml/min   • Hypotension   • Diarrhea   • Hypocalcemia   • Non-intractable vomiting   • Hiatal hernia   • Aspiration pneumonia of right lower lobe (CMS/HCC)   • Metabolic encephalopathy     Past Medical History:   Diagnosis Date   • Arthritis    • GERD (gastroesophageal reflux disease)    • Hyperlipidemia    • Hypertension    • Incontinent of urine    • PNA (pneumonia)    • Seasonal allergies    • UTI (urinary tract infection)      Past Surgical History:   Procedure Laterality Date   • BREAST BIOPSY     • COLONOSCOPY N/A 2016    Procedure: COLONOSCOPY WITH POLYPECTOMY (HOT SNARE);  Surgeon: Paulino Narvaez MD;  Location: [facility] ENDOSCOPY;  Service:    • ENDOSCOPY N/A 2020    Procedure: ESOPHAGOGASTRODUODENOSCOPY with biopsies;  Surgeon: Eugene George MD;  Location: [facility] ENDOSCOPY;  Service: Gastroenterology;  Laterality: N/A;  pre-- melena and abdominal pain  post-- hiatel hernia   • VAGINAL HYSTERECTOMY       General Information     Row Name  09/03/20 1011          PT Evaluation Time/Intention    Document Type  evaluation;therapy note (daily note)  -     Mode of Treatment  individual therapy;physical therapy  -     Row Name 09/03/20 1011          General Information    Prior Level of Function  independent:;gait;transfer;bed mobility walks with walker  -     Existing Precautions/Restrictions  fall  -     Barriers to Rehab  medically complex  -     Row Name 09/03/20 1011          Relationship/Environment    Lives With  child(luca), adult;spouse  -     Row Name 09/03/20 1011          Resource/Environmental Concerns    Current Living Arrangements  home/apartment/condo  -     Row Name 09/03/20 1011          Cognitive Assessment/Intervention- PT/OT    Orientation Status (Cognition)  oriented x 3  -     Row Name 09/03/20 1011          Safety Issues, Functional Mobility    Impairments Affecting Function (Mobility)  balance;endurance/activity tolerance;shortness of breath;strength  -       User Key  (r) = Recorded By, (t) = Taken By, (c) = Cosigned By    Initials Name Provider Type     Bia Mesa, PT Physical Therapist        Mobility     Row Name 09/03/20 1012          Bed Mobility Assessment/Treatment    Supine-Sit [place] (Bed Mobility)  verbal cues;nonverbal cues (demo/gesture);contact guard  -     Sit-Supine [place] (Bed Mobility)  verbal cues;nonverbal cues (demo/gesture);contact guard  -     Row Name 09/03/20 1012          Transfer Assessment/Treatment    Comment (Transfers)  pt required some increased assistance to stand from low surface of toilet, upon standing, pt showed PT that IV had come out of R hand. Nsg notified.  -     Row Name 09/03/20 1012          Sit-Stand Transfer    Sit-Stand [place] (Transfers)  verbal cues;nonverbal cues (demo/gesture);contact guard;minimum assist (75% patient effort)  -     Assistive Device (Sit-Stand Transfers)  walker, front-wheeled  -     Row Name 09/03/20 1012           Gait/Stairs Assessment/Training    [place] Level (Gait)  verbal cues;nonverbal cues (demo/gesture);contact guard;minimum assist (75% patient effort)  -     Assistive Device (Gait)  walker, front-wheeled  -     Distance in Feet (Gait)  15 ft x2  -     Deviations/Abnormal Patterns (Gait)  minerva decreased;gait speed decreased;stride length decreased  -     Bilateral Gait Deviations  forward flexed posture  -       User Key  (r) = Recorded By, (t) = Taken By, (c) = Cosigned By    Initials Name Provider Type     Bia Mesa, PT Physical Therapist        Obj/Interventions     Row Name 09/03/20 1015          General ROM    GENERAL ROM COMMENTS  B LE AROM WFL  -     Row Name 09/03/20 1015          MMT (Manual Muscle Testing)    General MMT Comments  some generalized weakness noted, B LE grossly 3+/5  -     Row Name 09/03/20 1015          Static Standing Balance    Level of [place] (Supported Standing, Static Balance)  contact guard assist  -     Assistive Device Utilized (Supported Standing, Static Balance)  walker, rolling  -     Row Name 09/03/20 1015          Dynamic Standing Balance    Level of [place], Reaches Outside Midline (Standing, Dynamic Balance)  contact guard assist;minimal assist, 75% patient effort  -     Assistive Device Utilized (Supported Standing, Dynamic Balance)  walker, rolling  -       User Key  (r) = Recorded By, (t) = Taken By, (c) = Cosigned By    Initials Name Provider Type     Bia Mesa, PT Physical Therapist        Goals/Plan     Row Name 09/03/20 1018          Bed Mobility Goal 1 (PT)    Activity/Assistive Device (Bed Mobility Goal 1, PT)  bed mobility activities, all  -     [place] Level/Cues Needed (Bed Mobility Goal 1, PT)  supervision required  -     Time Frame (Bed Mobility Goal 1, PT)  1 week  -     Row Name 09/03/20 1018          Transfer Goal 1 (PT)    Activity/Assistive Device (Transfer Goal 1, PT)  transfers,  all;walker, rolling  -CH     [place] Level/Cues Needed (Transfer Goal 1, PT)  supervision required  -CH     Time Frame (Transfer Goal 1, PT)  1 week  -     Row Name 09/03/20 1018          Gait Training Goal 1 (PT)    Activity/Assistive Device (Gait Training Goal 1, PT)  gait (walking locomotion);walker, rolling  -CH     [place] Level (Gait Training Goal 1, PT)  supervision required  -CH     Distance (Gait Goal 1, PT)  150  -CH     Time Frame (Gait Training Goal 1, PT)  1 week  -       User Key  (r) = Recorded By, (t) = Taken By, (c) = Cosigned By    Initials Name Provider Type     Bia Mesa, PT Physical Therapist        Clinical Impression     Row Name 09/03/20 1016          Pain Assessment    Additional Documentation  Pain Scale: FACES Pre/Post-Treatment (Group)  -     Row Name 09/03/20 1016          Pain Scale: FACES Pre/Post-Treatment    Pain: FACES Scale, Pretreatment  0-->no hurt  -     Pain: FACES Scale, Post-Treatment  0-->no hurt  -     Row Name 09/03/20 1016          Plan of Care Review    Plan of Care Reviewed With  patient  -CH     Outcome Summary  Pt is a 80 yo F who was asmitted with PNA. Pt presents to PT with impaired functional mobility and gait secondary to generalized weakness, some impaired balance, and decreased activity tolerance. Pt may benefit from skilled PT to address strength, mobility, and gait.  -     Row Name 09/03/20 1016          Physical Therapy Clinical Impression    Patient/Family Goals Statement (PT Clinical Impression)  to return to [facility]  -     Criteria for Skilled Interventions Met (PT Clinical Impression)  treatment indicated  -     Rehab Potential (PT Clinical Summary)  good, to achieve stated therapy goals  -     Row Name 09/03/20 1016          Positioning and Restraints    Pre-Treatment Position  in bed  -CH     Post Treatment Position  bed  -CH     In Bed  supine;call light within reach;encouraged to call for assist;exit alarm  on;notified nsg Aide notified that IV had been accidently removed and aide reports she has notified the RN.  -       User Key  (r) = Recorded By, (t) = Taken By, (c) = Cosigned By    Initials Name Provider Type    Bia Perez PT Physical Therapist        Outcome Measures     Row Name 09/03/20 1019          How much help from another person do you currently need...    Turning from your back to your side while in flat bed without using bedrails?  3  -CH     Moving from lying on back to sitting on the side of a flat bed without bedrails?  3  -CH     Moving to and from a bed to a chair (including a wheelchair)?  3  -CH     Standing up from a chair using your arms (e.g., wheelchair, bedside chair)?  3  -CH     Climbing 3-5 steps with a railing?  2  -CH     To walk in hospital room?  3  -CH     AM-PAC 6 Clicks Score (PT)  17  -     Row Name 09/03/20 1019          Functional Assessment    Outcome Measure Options  AM-PAC 6 Clicks Basic Mobility (PT)  -       User Key  (r) = Recorded By, (t) = Taken By, (c) = Cosigned By    Initials Name Provider Type    Bia Perez, PT Physical Therapist        Physical Therapy Education                 Title: PT OT SLP Therapies (In Progress)     Topic: Physical Therapy (In Progress)     Point: Mobility training (Done)     Description:   Instruct learner(s) on safety and technique for assisting patient out of bed, chair or wheelchair.  Instruct in the proper use of assistive devices, such as walker, crutches, cane or brace.              Patient Friendly Description:   It's important to get you on your feet again, but we need to do so in a way that is safe for you. Falling has serious consequences, and your personal safety is the most important thing of all.        When it's time to get out of bed, one of us or a family member will sit next to you on the bed to give you support.     If your doctor or nurse tells you to use a walker, crutches, a cane, or a brace,  be sure you use it every time you get out of bed, even if you think you don't need it.    Learning Progress Summary           Patient Acceptance, E,TB,D, VU,NR by  at 9/3/2020 1020                   Point: Home exercise program (Not Started)     Description:   Instruct learner(s) on appropriate technique for monitoring, assisting and/or progressing patient with therapeutic exercises and activities.              Learner Progress:   Not documented in this visit.          Point: Body mechanics (Done)     Description:   Instruct learner(s) on proper positioning and spine alignment for patient and/or caregiver during mobility tasks and/or exercises.              Learning Progress Summary           Patient Acceptance, E,TB,D, VU,NR by  at 9/3/2020 1020                   Point: Precautions (Done)     Description:   Instruct learner(s) on prescribed precautions during mobility and gait tasks              Learning Progress Summary           Patient Acceptance, E,TB,D, VU,NR by  at 9/3/2020 1020                               User Key     Initials Effective Dates Name Provider Type Discipline     04/03/18 -  Bia Mesa, PT Physical Therapist PT              PT Recommendation and Plan  Planned Therapy Interventions (PT Eval): balance training, bed mobility training, gait training, home exercise program, patient/family education, strengthening, transfer training  Outcome Summary/Treatment Plan (PT)  Anticipated Discharge Disposition (PT): home with home health, skilled nursing facility(pending progress and home resources)  Plan of Care Reviewed With: patient  Outcome Summary: Pt is a 78 yo F who was asmitted with PNA. Pt presents to PT with impaired functional mobility and gait secondary to generalized weakness, some impaired balance, and decreased activity tolerance. Pt may benefit from skilled PT to address strength, mobility, and gait.     Time Calculation:   PT Charges     Row Name 09/03/20 1020              Time Calculation    Start Time  0947  -      Stop Time  1005  -      Time Calculation (min)  18 min  -      PT Received On  09/03/20  -      PT - Next Appointment  09/04/20  -      PT Goal Re-Cert Due Date  09/10/20  -         Time Calculation- PT    Total Timed Code Minutes- PT  10 minute(s)  -        User Key  (r) = Recorded By, (t) = Taken By, (c) = Cosigned By    Initials Name Provider Type     Bia Mesa, PT Physical Therapist        Therapy Charges for Today     Code Description Service Date Service Provider Modifiers Qty    [id number]  PT EVAL MOD COMPLEXITY 2 9/3/2020 Bia Mesa, PT GP 1    [id number]  PT THER PROC EA 15 MIN 9/3/2020 Bia Mesa, PT GP 1          PT G-Codes  Outcome Measure Options: AM-PAC 6 Clicks Basic Mobility (PT)  AM-PAC 6 Clicks Score (PT): 17  AM-PAC 6 Clicks Score (OT): 16    Bia Mesa PT  9/3/2020

## 2020-09-03 NOTE — THERAPY EVALUATION
Acute Care - Occupational Therapy Initial Evaluation  [facility]     Patient Name: Gabbi Brandon  : 1940  MRN: [id number]  Today's Date: 9/3/2020             Admit Date: 2020       ICD-10-CM ICD-9-CM   1. Aspiration pneumonia of right lower lobe, unspecified aspiration pneumonia type (CMS/HCC) J69.0 507.0   2. Toxic metabolic encephalopathy G92 349.82   3. Hiatal hernia K44.9 553.3     Patient Active Problem List   Diagnosis   • Irregular bleeding   • Urinary tract infection   • Dyslipidemia   • Essential hypertension   • Stage 2 chronic kidney disease   • Gastroesophageal reflux disease without esophagitis   • Seasonal allergies   • Familial hypercholesterolemia   • Bladder spasm   • Bilateral lower extremity edema   • Pneumonia of right lower lobe due to infectious organism   • Failure to thrive in adult   • Hyponatremia   • DNR (do not resuscitate)   • Hypoxia   • Oropharyngeal dysphagia   • Anemia, chronic disease   • CKD (chronic kidney disease) stage 2, GFR 60-89 ml/min   • Hypotension   • Diarrhea   • Hypocalcemia   • Non-intractable vomiting   • Hiatal hernia   • Aspiration pneumonia of right lower lobe (CMS/HCC)   • Metabolic encephalopathy     Past Medical History:   Diagnosis Date   • Arthritis    • GERD (gastroesophageal reflux disease)    • Hyperlipidemia    • Hypertension    • Incontinent of urine    • PNA (pneumonia)    • Seasonal allergies    • UTI (urinary tract infection)      Past Surgical History:   Procedure Laterality Date   • BREAST BIOPSY     • COLONOSCOPY N/A 2016    Procedure: COLONOSCOPY WITH POLYPECTOMY (HOT SNARE);  Surgeon: Paulino Narvaez MD;  Location: [facility] ENDOSCOPY;  Service:    • ENDOSCOPY N/A 2020    Procedure: ESOPHAGOGASTRODUODENOSCOPY with biopsies;  Surgeon: Eugene George MD;  Location: [facility] ENDOSCOPY;  Service: Gastroenterology;  Laterality: N/A;  pre-- melena and abdominal pain  post-- hiatel hernia   • VAGINAL HYSTERECTOMY    "         OT ASSESSMENT FLOWSHEET (last 12 hours)      Occupational Therapy Evaluation     Row Name 09/03/20 0842                   OT Evaluation Time/Intention    Subjective Information  weakness;complains of;fatigue;dyspnea  -LE        Document Type  evaluation;therapy note (daily note)  -LE        Mode of Treatment  individual therapy;occupational therapy  -LE        Patient Effort  good  -LE           General Information    Patient Profile Reviewed?  yes  -LE        Patient Observations  alert;cooperative  -LE        Patient/Family Observations  fowlers. on room air  -LE        Prior Level of Function  gait;transfer;ADL's;min assist: \"I had gotten back to be able to do most of it myself\"  -LE        Equipment Currently Used at Home  commode, 3-in-1;walker, rolling  -LE        Pertinent History of Current Functional Problem  from home with SOA, recent PNA  -LE        Existing Precautions/Restrictions  fall  -LE        Equipment Issued to Patient  gait belt  -LE        Equipment Ordered for Patient  -- ed on benefit of tub bench. pt has been considering to get  -LE           Relationship/Environment    Lives With  child(luca), adult;spouse  -LE           Cognitive Assessment/Intervention- PT/OT    Orientation Status (Cognition)  oriented to;person;place;situation;time  -LE        Follows Commands (Cognition)  follows one step commands;50-74% accuracy;delayed response/completion;increased processing time needed  -LE        Safety Deficit (Cognitive)  mild deficit;judgment;problem solving;insight into deficits/self awareness  -LE        Cognitive Assessment/Intervention Comment  mild confusion.  exit alarm placed on bed and RN aware  -LE           Safety Issues, Functional Mobility    Safety Issues Affecting Function (Mobility)  sequencing abilities;safety precautions follow-through/compliance;problem solving;positioning of assistive device;judgment;insight into deficits/self awareness;impulsivity  -LE        Comment, " Safety Issues/Impairments (Mobility)  pushes walker to side.  OT cues and assist with proper walker placement  -LE           Bed Mobility Assessment/Treatment    Bed Mobility Assessment/Treatment  supine-sit;sit-supine  -LE        Supine-Sit [place] (Bed Mobility)  set up;supervision;verbal cues;contact guard  -LE        Sit-Supine [place] (Bed Mobility)  set up;supervision;verbal cues;minimum assist (75% patient effort)  -LE        Bed Mobility, Safety Issues  decreased use of arms for pushing/pulling;decreased use of legs for bridging/pushing;impaired trunk control for bed mobility  -LE        Assistive Device (Bed Mobility)  bed rails;head of bed elevated  -LE           Functional Mobility    Functional Mobility- Ind. Level  minimum assist (75% patient effort);set up required;supervision required;verbal cues required  -LE        Functional Mobility- Device  rolling walker  -LE        Functional Mobility-Distance (Feet)  -- 20 to/from bathroom  -LE        Functional Mobility- Safety Issues  balance decreased during turns;sequencing ability decreased;step length decreased;weight-shifting ability decreased  -LE           Transfer Assessment/Treatment    Transfer Assessment/Treatment  bed-chair transfer;sit-stand transfer;stand-sit transfer;toilet transfer  -LE           Bed-Chair Transfer    Bed-Chair [place] (Transfers)  -- decline to get up to chair  -LE           Sit-Stand Transfer    Sit-Stand [place] (Transfers)  contact guard;verbal cues;supervision;set up  -LE        Assistive Device (Sit-Stand Transfers)  walker, front-wheeled  -LE           Stand-Sit Transfer    Stand-Sit [place] (Transfers)  set up;supervision;verbal cues;contact guard;minimum assist (75% patient effort)  -LE        Assistive Device (Stand-Sit Transfers)  walker, front-wheeled  -LE           Toilet Transfer    Type (Toilet Transfer)  stand pivot/stand step  -LE        [place] Level (Toilet Transfer)  minimum  assist (75% patient effort);set up;supervision;verbal cues  -LE        Assistive Device (Toilet Transfer)  grab bars/safety frame;walker, front-wheeled heavy use of grab bar.  cues body mech and hand placement  -LE           ADL Assessment/Intervention    BADL Assessment/Intervention  bathing;upper body dressing;lower body dressing;grooming;feeding;toileting  -LE           Lower Body Dressing Assessment/Training    Comment (Lower Body Dressing)  too SOA to reach feet to attempt  -LE           Grooming Assessment/Training    [place] Level (Grooming)  set up;wash face, hands set up hand gel at EOB. too SOA to stop at sink to wash hand  -LE           Self-Feeding Assessment/Training    [place] Level (Feeding)  set up;supervision  -LE        Position (Self-Feeding)  sitting up in bed  -LE           Toileting Assessment/Training    [place] Level (Toileting)  perform perineal hygiene;adjust/manage clothing;moderate assist (50% patient effort)  -LE        Assistive Devices (Toileting)  grab bar/safety frame  -LE        Toileting Position  unsupported sitting;supported standing  -LE        Comment (Toileting)  hygiene with SBA, assist manage brief hike/lower.   -LE           General ROM    GENERAL ROM COMMENTS  funtional B UE for ADL tasks  -LE           Positioning and Restraints    Pre-Treatment Position  in bed  -LE        Post Treatment Position  bed  -LE        In Bed  notified nsg;fowlers;call light within reach;encouraged to call for assist;exit alarm on  -LE           Pain Assessment    Additional Documentation  Pain Scale: Word Pre/Post-Treatment (Group)  -LE           Pain Scale: Word Pre/Post-Treatment    Pain: Word Scale, Pretreatment  4 - moderate pain  -LE        Pain: Word Scale, Post-Treatment  4 - moderate pain  -LE        Pre/Post Treatment Pain Comment  generalized arthritic pain  -LE           Coping    Observed Emotional State  accepting  -LE           Plan of Care Review    Plan of Care  Reviewed With  patient  -LE        Outcome Summary  79 y.o. female admit from Home with SOA and work up for PNA .  Pt presents to OT with impaired balance, cognition, endurance and flexibility and is assist of one to get OOB, ambulate to bathroom.  Pt with drop in O2 sats during walk to BR on room and air and required O2 to return >90%.  Pt SOA and fatigue limit ability to complete ADL.  Pt ed on xfer tech and walker placement and discuss benefit of bathroom DME .   Pt may benefit from skilled OT in acute care setting to increase safety and independence.  Anticipate return home with continued family assist.   -LE           Clinical Impression (OT)    OT Diagnosis  Need for assist with personal care.  Balance, UE function, Cognition, Mobility, education  -LE        Patient/Family Goals Statement (OT Eval)  reduce SOA with activity  -LE        Criteria for Skilled Therapeutic Interventions Met (OT Eval)  treatment indicated;yes  -LE        Rehab Potential (OT Eval)  fair, will monitor progress closely  -LE        Therapy Frequency (OT Eval)  3 times/wk  -LE        Care Plan Review (OT)  evaluation/treatment results reviewed;care plan/treatment goals reviewed  -LE        Anticipated Discharge Disposition (OT)  home with assist;home with home health  -LE           Vital Signs    Intratreatment Heart Rate (beats/min)  110  -LE        Pre SpO2 (%)  91  -LE        O2 Delivery Pre Treatment  room air  -LE        Intra SpO2 (%)  86  -LE        O2 Delivery Intra Treatment  room air after walk to BR on room air  -LE        Post SpO2 (%)  97  -LE        O2 Delivery Post Treatment  supplemental O2 put on 2L for 5 min and increase to 97%, left on room air  -LE        Activity Duration  -- SOA, fatigue and moaning with effort and requires rest.   -LE           Planned OT Interventions    Planned Therapy Interventions (OT Eval)  activity tolerance training;adaptive equipment training;BADL retraining;transfer/mobility  retraining;patient/caregiver education/training;functional balance retraining  -LE           OT Goals    Transfer Goal Selection (OT)  transfer, OT goal 1  -LE        Dressing Goal Selection (OT)  dressing, OT goal 1  -LE        Toileting Goal Selection (OT)  toileting, OT goal 1  -LE        Functional Mobility Goal Selection (OT)  functional mobility, OT goal 1  -LE        Additional Documentation  Functional Mobility Selection (OT) (Row)  -LE           Transfer Goal 1 (OT)    Activity/Assistive Device (Transfer Goal 1, OT)  sit-to-stand/stand-to-sit;bed-to-chair/chair-to-bed;toilet;commode, 3-in-1;walker, rolling  -LE        [place] Level/Cues Needed (Transfer Goal 1, OT)  standby assist;verbal cues required  -LE        Time Frame (Transfer Goal 1, OT)  2 weeks  -LE        Progress/Outcome (Transfer Goal 1, OT)  goal ongoing  -LE           Dressing Goal 1 (OT)    Activity/Assistive Device (Dressing Goal 1, OT)  upper body dressing;lower body dressing;reacher;sock-aid  -LE        [place]/Cues Needed (Dressing Goal 1, OT)  minimum assist (75% or more patient effort);set-up required;supervision required  -LE        Time Frame (Dressing Goal 1, OT)  2 weeks  -LE        Progress/Outcome (Dressing Goal 1, OT)  goal ongoing  -LE           Toileting Goal 1 (OT)    Activity/Device (Toileting Goal 1, OT)  adjust/manage clothing;perform perineal hygiene;commode, 3-in-1;grab bar/safety frame  -LE        [place] Level/Cues Needed (Toileting Goal 1, OT)  standby assist;verbal cues required  -LE        Time Frame (Toileting Goal 1, OT)  2 weeks  -LE        Progress/Outcome (Toileting Goal 1, OT)  goal ongoing  -LE           Functional Mobility Goal 1 (OT)    Activity/Assistive Device (Functional Mobility Goal 1, OT)  walker, rolling  -LE        [place] Level/Cues Needed (Functional Mobility Goal 1, OT)  supervision required;standby assist  -LE        Distance Goal 1 (Functional Mobility, OT)  to/from bathroom   -LE        Time Frame (Functional Mobility Goal 1, OT)  2 weeks  -LE        Progress/Outcome (Functional Mobility Goal 1, OT)  goal ongoing  -LE          User Key  (r) = Recorded By, (t) = Taken By, (c) = Cosigned By    Initials Name Effective Dates    Anabella Friend OTR 06/08/18 -          Occupational Therapy Education                 Title: PT OT SLP Therapies (Done)     Topic: Occupational Therapy (Done)     Point: ADL training (Done)     Description:   Instruct learner(s) on proper safety adaptation and remediation techniques during self care or transfers.   Instruct in proper use of assistive devices.              Learning Progress Summary           Patient Acceptance, E,D, DU,VU by LIZ at 9/3/2020 0856    Comment:  role of OT, plan of care, ed transfer tub bench benefit.                   Point: Precautions (Done)     Description:   Instruct learner(s) on prescribed precautions during self-care and functional transfers.              Learning Progress Summary           Patient Acceptance, E,D, DU,VU by LIZ at 9/3/2020 0856    Comment:  role of OT, plan of care, ed transfer tub bench benefit.                   Point: Body mechanics (Done)     Description:   Instruct learner(s) on proper positioning and spine alignment during self-care, functional mobility activities and/or exercises.              Learning Progress Summary           Patient Acceptance, E,D, DU,VU by LIZ at 9/3/2020 0856    Comment:  role of OT, plan of care, ed transfer tub bench benefit.                               User Key     Initials Effective Dates Name Provider Type Discipline     06/08/18 -  Anabella Arreaga, OTPHILLIP Occupational Therapist OT                  OT Recommendation and Plan  Outcome Summary/Treatment Plan (OT)  Anticipated Discharge Disposition (OT): home with assist, home with home health  Planned Therapy Interventions (OT Eval): activity tolerance training, adaptive equipment training, BADL retraining, transfer/mobility  retraining, patient/caregiver education/training, functional balance retraining  Therapy Frequency (OT Eval): 3 times/wk  Plan of Care Review  Plan of Care Reviewed With: patient  Plan of Care Reviewed With: patient  Outcome Summary: 79 y.o. female admit from Home with SOA and work up for PNA .  Pt presents to OT with impaired balance, cognition, endurance and flexibility and is assist of one to get OOB, ambulate to bathroom.  Pt with drop in O2 sats during walk to BR on room and air and required O2 to return >90%.  Pt SOA and fatigue limit ability to complete ADL.  Pt ed on xfer tech and walker placement and discuss benefit of bathroom DME .   Pt may benefit from skilled OT in acute care setting to increase safety and independence.  Anticipate return home with continued family assist.     Outcome Measures     Row Name 09/03/20 0800             How much help from another is currently needed...    Putting on and taking off regular lower body clothing?  2  -LE      Bathing (including washing, rinsing, and drying)  2  -LE      Toileting (which includes using toilet bed pan or urinal)  2  -LE      Putting on and taking off regular upper body clothing  3  -LE      Taking care of personal grooming (such as brushing teeth)  3  -LE      Eating meals  4  -LE      AM-PAC 6 Clicks Score (OT)  16  -LE         Functional Assessment    Outcome Measure Options  AM-PAC 6 Clicks Daily Activity (OT)  -LE        User Key  (r) = Recorded By, (t) = Taken By, (c) = Cosigned By    Initials Name Provider Type    Anabella Friend OTR Occupational Therapist          Time Calculation:   Time Calculation- OT     Row Name 09/03/20 0858             Time Calculation- OT    OT Start Time  0820  -LE      OT Stop Time  0842  -LE      OT Time Calculation (min)  22 min  -LE      Total Timed Code Minutes- OT  16 minute(s)  -LE      OT Received On  09/03/20  -LE      OT - Next Appointment  09/07/20  -LE      OT Goal Re-Cert Due Date  09/17/20  -LE         User Key  (r) = Recorded By, (t) = Taken By, (c) = Cosigned By    Initials Name Provider Type    Anabella Friend OTR Occupational Therapist        Therapy Charges for Today     Code Description Service Date Service Provider Modifiers Qty    [id number]  OT EVAL MOD COMPLEXITY 2 9/3/2020 Anabella Arreaga OTR GO 1    [id number]  OT SELF CARE/MGMT/TRAIN EA 15 MIN 9/3/2020 Anabella Arreaga OTR GO 1               WALLACE Morocho  9/3/2020

## 2020-09-03 NOTE — PLAN OF CARE
Problem: Patient Care Overview  Goal: Plan of Care Review  Outcome: Ongoing (interventions implemented as appropriate)  Flowsheets  Taken 9/3/2020 0858  Plan of Care Reviewed With: patient  Taken 9/3/2020 0842  Outcome Summary: 79 y.o. female admit from Home with SOA and work up for PNA .  Pt presents to OT with impaired balance, cognition, endurance and flexibility and is assist of one to get OOB, ambulate to bathroom.  Pt with drop in O2 sats during walk to  on room and air and required O2 to return >90%.  Pt SOA and fatigue limit ability to complete ADL.  Pt ed on xfer tech and walker placement and discuss benefit of bathroom DME .   Pt may benefit from skilled OT in acute care setting to increase safety and independence.  Anticipate return home with continued family assist.        Patient was wearing a face mask during this therapy encounter. Therapist used appropriate personal protective equipment including surgical mask, eye shield and gloves during the entire therapy session. Hand hygiene was completed before and after therapy session. Patient is not in enhanced droplet precautions.

## 2020-09-03 NOTE — PLAN OF CARE
Problem: Patient Care Overview  Goal: Plan of Care Review  Flowsheets (Taken 9/3/2020 1016)  Plan of Care Reviewed With: patient  Outcome Summary: Pt is a 78 yo F who was asmitted with PNA. Pt presents to PT with impaired functional mobility and gait secondary to generalized weakness, some impaired balance, and decreased activity tolerance. Pt may benefit from skilled PT to address strength, mobility, and gait.  Note:   Patient was not wearing a face mask during this therapy encounter. Therapist used appropriate personal protective equipment including eye protection, mask, and gloves.  Mask used was standard procedure mask. Appropriate PPE was worn during the entire therapy session. Hand hygiene was completed before and after therapy session. Patient is not in enhanced droplet precautions.

## 2020-09-03 NOTE — PROGRESS NOTES
Discharge Planning Assessment  [facility]     Patient Name: Gabbi Brandon  MRN: [id number]  Today's Date: 9/3/2020    Admit Date: 9/2/2020    Discharge Needs Assessment     Row Name 09/03/20 112       Living Environment    Lives With  child(luca), adult;spouse    Current Living Arrangements  home/apartment/condo    Primary Care Provided by  self    Provides Primary Care For  no one, unable/limited ability to care for self    Family Caregiver Names   Foster [phone], Sara Hernandez [phone]    Quality of Family Relationships  supportive    Able to Return to Prior Arrangements  yes       Resource/Environmental Concerns    Transportation Concerns  car, none       Transition Planning    Patient/Family Anticipates Transition to  home with family    Transportation Anticipated  family or friend will provide       Discharge Needs Assessment    Concerns to be Addressed  no discharge needs identified    Equipment Currently Used at Home  walker, rolling    Provided Post Acute Provider List?  N/A    N/A Provider List Comment  Plans to return home with family and denies the need for Home Health         Discharge Plan     Row Name 09/03/20 0155       Plan    Plan  Home with family lives with her daughter Sara Hernandez [phone]    Plan Comments  Spoke with patient at bedside, face sheet verified. Patient stated she and her spouse live with her daughter Sara Hernandez [phone] and have a room on the first floor. Patient stated stated she has a hospital bed and rolling walker and bedside commode. Patient stated she has used [facility] in the past and denies the need for home health with this admission. Patient plans to return home with family at discharge. Veronica Dao RN        Destination      Coordination has not been started for this encounter.      Durable Medical Equipment      Coordination has not been started for this encounter.      Dialysis/Infusion      Coordination has  not been started for this encounter.      Home Medical Care      Coordination has not been started for this encounter.      Therapy      Coordination has not been started for this encounter.      Community Resources      Coordination has not been started for this encounter.          Demographic Summary     Row Name 09/03/20 1124       General Information    Admission Type  inpatient    Arrived From  home    Referral Source  admission list    Reason for Consult  discharge planning    Preferred Language  English     Used During This Interaction  no        Functional Status     Row Name 09/03/20 1125       Functional Status    Usual Activity Tolerance  moderate    Current Activity Tolerance  moderate       Functional Status, IADL    Medications  independent    Meal Preparation  independent    Housekeeping  independent    Laundry  independent    Shopping  independent        Psychosocial    No documentation.       Abuse/Neglect    No documentation.       Legal    No documentation.       Substance Abuse    No documentation.       Patient Forms    No documentation.           Veronica Dao RN

## 2020-09-03 NOTE — THERAPY EVALUATION
Acute Care - Speech Language Pathology   Swallow Initial Evaluation [facility]     Patient Name: Gabbi Brandon  : 1940  MRN: [id number]  Today's Date: 9/3/2020               Admit Date: 2020    Visit Dx:     ICD-10-CM ICD-9-CM   1. Aspiration pneumonia of right lower lobe, unspecified aspiration pneumonia type (CMS/HCC) J69.0 507.0   2. Toxic metabolic encephalopathy G92 349.82   3. Hiatal hernia K44.9 553.3     Patient Active Problem List   Diagnosis   • Irregular bleeding   • Urinary tract infection   • Dyslipidemia   • Essential hypertension   • Stage 2 chronic kidney disease   • Gastroesophageal reflux disease without esophagitis   • Seasonal allergies   • Familial hypercholesterolemia   • Bladder spasm   • Bilateral lower extremity edema   • Pneumonia of right lower lobe due to infectious organism   • Failure to thrive in adult   • Hyponatremia   • DNR (do not resuscitate)   • Hypoxia   • Oropharyngeal dysphagia   • Anemia, chronic disease   • CKD (chronic kidney disease) stage 2, GFR 60-89 ml/min   • Hypotension   • Diarrhea   • Hypocalcemia   • Non-intractable vomiting   • Hiatal hernia   • Aspiration pneumonia of right lower lobe (CMS/HCC)   • Metabolic encephalopathy     Past Medical History:   Diagnosis Date   • Arthritis    • GERD (gastroesophageal reflux disease)    • Hyperlipidemia    • Hypertension    • Incontinent of urine    • PNA (pneumonia)    • Seasonal allergies    • UTI (urinary tract infection)      Past Surgical History:   Procedure Laterality Date   • BREAST BIOPSY     • COLONOSCOPY N/A 2016    Procedure: COLONOSCOPY WITH POLYPECTOMY (HOT SNARE);  Surgeon: Paulino Narvaez MD;  Location: [facility] ENDOSCOPY;  Service:    • ENDOSCOPY N/A 2020    Procedure: ESOPHAGOGASTRODUODENOSCOPY with biopsies;  Surgeon: Eugene George MD;  Location: [facility] ENDOSCOPY;  Service: Gastroenterology;  Laterality: N/A;  pre-- melena and abdominal pain  post-- hiatel hernia  "  • VAGINAL HYSTERECTOMY          SWALLOW EVALUATION (last 72 hours)      SLP Adult Swallow Evaluation     Row Name 09/03/20 0800          Document Type  evaluation  -    Subjective Information  no complaints  -    Patient Effort  adequate  -          Patient Profile Reviewed  yes  -SH    Pertinent History Of Current Problem  Recurrent pna  -SH    Current Method of Nutrition  mechanical soft, no mixed consistencies;honey-thick liquids  -    Precautions/Limitations, Vision  WFL;for purposes of eval  -SH    Precautions/Limitations, Hearing  WFL;for purposes of eval  -SH    Prior Level of Function-Communication  WFL  -    Prior Level of Function-Swallowing  no diet consistency restrictions  -    Plans/Goals Discussed with  patient;agreed upon  -    Barriers to Rehab  previous functional deficit;medically complex  -    Patient's Goals for Discharge  patient did not state  -SH          Pain: Word Scale, Pretreatment  2 - mild pain  -    Pain: Word Scale, Post-Treatment  2 - mild pain  -          Dentition Assessment  upper dentures/partial in place;lower dentures/partial in place  -    Volitional Swallow  delayed  -    Volitional Cough  WFL  -          Oral Motor General Assessment  WFL  -          Clinical Swallow Evaluation Summary  Patient seen for clinical swallow. Pt continues to develop recurrent pna. [facility] MD. VFSS completed 7/3/20 and 8/10/20 with recs for regular and thins, revealing mild dysphagia only. VFSS images reviewed. Aspiration risk appeared low. \"Slight backflow through the UES into the pyriforms noted with soft solids and mixed consistencies. No penetration was noted, however, pt is at risk.\". APRN indicated the following, \"Suspect her aspiration related to hernia with possible paraesophageal component seen on EGD last admission.  Discussed with the daughter and she would like to proceed with thoracic surgery consult to determine if " "paraesophageal hernia contributing to aspiration.\". SLP assessed patient this date. No overt s/s of aspiration observed with thins via cup/straw, mixed mech soft, or regular. Voice clear post swallow. No oral residue post swallow. SLP recs FEES to assess for micro aspiration d/t recurrent pna and previous VFSS results. Patient refused assessment this date, despite education indicating, \"I'm too old to do anything about it\". SLP encouraged patient to consider testing as implementing dysphagia exercises is a non invasive method to reduce aspiration risks if indicated. Pt continues to refuse. SLP recs regular and thins, will defer diet upgrade to MD. SLP to sign off. Please order FEES if patient in agreement.  -          SLP Swallowing Diagnosis  functional oral phase;functional pharyngeal phase  -    Functional Impact  risk of aspiration/pneumonia  -    Rehab Potential/Prognosis, Swallowing  good, to achieve stated therapy goals  -    Swallow Criteria for Skilled Therapeutic Interventions Met  demonstrates skilled criteria  -          Therapy Frequency (Swallow)  PRN  -    Predicted Duration Therapy Intervention (Days)  until discharge  -    SLP Diet Recommendation  regular textures;thin liquids  -    Recommended Diagnostics  reassess via FEES;other (see comments) pt refused  -    Recommended Precautions and Strategies  upright posture during/after eating;small bites of food and sips of liquid  -    SLP Rec. for Method of Medication Administration  meds whole;with thin liquids;as tolerated  -    Monitor for Signs of Aspiration  yes;notify SLP if any concerns  -    Anticipated Dischage Disposition (SLP)  unknown  -          Oral Nutrition/Hydration Goal Selection (SLP)  oral nutrition/hydration, SLP goal 1  -          Oral Nutrition/Hydration Goal 1, SLP  Pt will lazarus PO without overt s/s of aspiration.  -    Time Frame (Oral Nutrition/Hydration Goal 1, SLP)  by discharge  -      User Key " " (r) = Recorded By, (t) = Taken By, (c) = Cosigned By    Initials Name Effective Dates    STEPHANIA Boyer Maryam DENISSE, MS CCC-SLP 03/07/18 -           EDUCATION  The patient has been educated in the following areas:   Dysphagia (Swallowing Impairment).    SLP Recommendation and Plan  SLP Swallowing Diagnosis: functional oral phase, functional pharyngeal phase  SLP Diet Recommendation: regular textures, thin liquids  Recommended Precautions and Strategies: upright posture during/after eating, small bites of food and sips of liquid  SLP Rec. for Method of Medication Administration: meds whole, with thin liquids, as tolerated     Monitor for Signs of Aspiration: yes, notify SLP if any concerns  Recommended Diagnostics: reassess via FEES, other (see comments)(pt refused)  Swallow Criteria for Skilled Therapeutic Interventions Met: demonstrates skilled criteria  Anticipated Dischage Disposition (SLP): unknown  Rehab Potential/Prognosis, Swallowing: good, to achieve stated therapy goals  Therapy Frequency (Swallow): PRN  Predicted Duration Therapy Intervention (Days): until discharge       Plan of Care Reviewed With: patient  Progress: improving  Outcome Summary: Patient seen for clinical swallow. Pt continues to develop recurrent pna. Currently on mech soft and honey per MD. VFSS completed 7/3/20 and 8/10/20 with recs for regular and thins, revealing mild dysphagia only. VFSS images reviewed. Aspiration risk appeared low. \"Slight backflow through the UES into the pyriforms noted with soft solids and mixed consistencies. No penetration was noted, however, pt is at risk.\". APRN indicated the following, \"Suspect her aspiration related to hernia with possible paraesophageal component seen on EGD last admission.  Discussed with the daughter and she would like to proceed with thoracic surgery consult to determine if paraesophageal hernia contributing to aspiration.\". SLP assessed patient this date. No overt s/s of aspiration observed " "with thins via cup/straw, mixed mech soft, or regular. Voice clear post swallow. No oral residue post swallow. SLP recs FEES to assess for micro aspiration d/t recurrent pna and previous VFSS results. Patient refused assessment this date, despite education indicating, \"I'm too old to do anything about it\". SLP encouraged patient to consider testing as implementing dysphagia exercises is a non invasive method to reduce aspiration risks if indicated. Pt continues to refuse. SLP recs regular and thins, will defer diet upgrade to MD. SLP to sign off. Please order FEES if patient in agreement.    SLP GOALS     Row Name 09/03/20 0800             Oral Nutrition/Hydration Goal 1 (SLP)    Oral Nutrition/Hydration Goal 1, SLP  Pt will lazarus PO without overt s/s of aspiration.  -      Time Frame (Oral Nutrition/Hydration Goal 1, SLP)  by discharge  -        User Key  (r) = Recorded By, (t) = Taken By, (c) = Cosigned By    Initials Name Provider Type     Maryam Boyer MS CCC-SLP Speech and Language Pathologist           SLP Outcome Measures (last 72 hours)      SLP Outcome Measures     Row Name 09/03/20 0900             SLP Outcome Measures    Outcome Measure Used?  Adult NOMS  -         Adult FCM Scores    FCM Chosen  Swallowing  -      Swallowing FCM Score  7  -        User Key  (r) = Recorded By, (t) = Taken By, (c) = Cosigned By    Initials Name Effective Dates     Maryam Boyer MS CCC-SLP 03/07/18 -            Time Calculation:   Time Calculation- SLP     Row Name 09/03/20 0925             Time Calculation- [facility]    SLP Start Time  0745  -      SLP Received On  09/03/20  -        User Key  (r) = Recorded By, (t) = Taken By, (c) = Cosigned By    Initials Name Provider Type     Maryam Boyer MS CCC-SLP Speech and Language Pathologist          Therapy Charges for Today     Code Description Service Date Service Provider Modifiers Qty    [id number]  ST EVAL ORAL PHARYNG SWALLOW 4 9/3/2020 Maryam Boyer MS " CCC-SLP GN 1               Maryam Boyer, MS CCC-SLP  9/3/2020

## 2020-09-03 NOTE — PLAN OF CARE
Problem: Patient Care Overview  Goal: Plan of Care Review  Outcome: Ongoing (interventions implemented as appropriate)  Flowsheets (Taken 9/3/2020 8697)  Progress: no change  Plan of Care Reviewed With: patient   Pt disoriented to place and situation. On 1L NC. IVF and IV abx continue. Pt on mechanical soft, no mixed consistencies, honey thick liquids, speech eval today. Afebrile this shift. Sputum need to be collected. VSS. Will continue to monitor.

## 2020-09-03 NOTE — OUTREACH NOTE
COPD/PN Week 4 Survey      Responses   [facility] patient discharged from?  [place]   COVID-19 Test Status  Negative   Does the patient have one of the following disease processes/diagnoses(primary or secondary)?  COPD/Pneumonia   Was the primary reason for admission:  Pneumonia   Week 4 attempt successful?  No   Revoke  Readmitted          Kayce Beltran RN

## 2020-09-04 ENCOUNTER — APPOINTMENT (OUTPATIENT)
Dept: ULTRASOUND IMAGING | Facility: HOSPITAL | Age: 80
End: 2020-09-04

## 2020-09-04 ENCOUNTER — TELEPHONE (OUTPATIENT)
Dept: INTERNAL MEDICINE | Facility: CLINIC | Age: 80
End: 2020-09-04

## 2020-09-04 PROBLEM — J90 PLEURAL EFFUSION ON RIGHT: Status: ACTIVE | Noted: 2020-09-04

## 2020-09-04 PROBLEM — G93.41 METABOLIC ENCEPHALOPATHY: Status: RESOLVED | Noted: 2020-09-02 | Resolved: 2020-09-04

## 2020-09-04 LAB
ALBUMIN FLD-MCNC: 1.7 G/DL
ANION GAP SERPL CALCULATED.3IONS-SCNC: 11.2 MMOL/L (ref 5–15)
APPEARANCE FLD: ABNORMAL
APTT PPP: 29.4 SECONDS (ref 22.7–35.4)
BUN SERPL-MCNC: 11 MG/DL (ref 8–23)
BUN/CREAT SERPL: 17.2 (ref 7–25)
CALCIUM SPEC-SCNC: 8.2 MG/DL (ref 8.6–10.5)
CHLORIDE SERPL-SCNC: 102 MMOL/L (ref 98–107)
CO2 SERPL-SCNC: 26.8 MMOL/L (ref 22–29)
COLOR FLD: YELLOW
CREAT SERPL-MCNC: 0.64 MG/DL (ref 0.57–1)
DEPRECATED RDW RBC AUTO: 48.3 FL (ref 37–54)
EOSINOPHIL NFR FLD MANUAL: 3 %
ERYTHROCYTE [DISTWIDTH] IN BLOOD BY AUTOMATED COUNT: 14.3 % (ref 12.3–15.4)
GFR SERPL CREATININE-BSD FRML MDRD: 89 ML/MIN/1.73
GLUCOSE FLD-MCNC: 72 MG/DL
GLUCOSE SERPL-MCNC: 97 MG/DL (ref 65–99)
HCT VFR BLD AUTO: 32.3 % (ref 34–46.6)
HGB BLD-MCNC: 10.2 G/DL (ref 12–15.9)
INR PPP: 1.08 (ref 0.9–1.1)
LDH FLD-CCNC: 332 U/L
LYMPHOCYTES NFR FLD MANUAL: 56 %
MCH RBC QN AUTO: 29.2 PG (ref 26.6–33)
MCHC RBC AUTO-ENTMCNC: 31.6 G/DL (ref 31.5–35.7)
MCV RBC AUTO: 92.6 FL (ref 79–97)
METHOD: ABNORMAL
MONOS+MACROS NFR FLD: 8 %
NEUTROPHILS NFR FLD MANUAL: 33 %
NUC CELL # FLD: 1255 /MM3
PH FLD: 8.12 [PH]
PLATELET # BLD AUTO: 295 10*3/MM3 (ref 140–450)
PMV BLD AUTO: 9.5 FL (ref 6–12)
POTASSIUM SERPL-SCNC: 3.8 MMOL/L (ref 3.5–5.2)
PROT FLD-MCNC: 3.2 G/DL
PROTHROMBIN TIME: 13.9 SECONDS (ref 11.7–14.2)
RBC # BLD AUTO: 3.49 10*6/MM3 (ref 3.77–5.28)
RBC # FLD AUTO: 75 /MM3
SODIUM SERPL-SCNC: 140 MMOL/L (ref 136–145)
WBC # BLD AUTO: 7.55 10*3/MM3 (ref 3.4–10.8)

## 2020-09-04 PROCEDURE — 85027 COMPLETE CBC AUTOMATED: CPT | Performed by: INTERNAL MEDICINE

## 2020-09-04 PROCEDURE — 85730 THROMBOPLASTIN TIME PARTIAL: CPT | Performed by: INTERNAL MEDICINE

## 2020-09-04 PROCEDURE — 83986 ASSAY PH BODY FLUID NOS: CPT | Performed by: INTERNAL MEDICINE

## 2020-09-04 PROCEDURE — 87015 SPECIMEN INFECT AGNT CONCNTJ: CPT | Performed by: INTERNAL MEDICINE

## 2020-09-04 PROCEDURE — 87075 CULTR BACTERIA EXCEPT BLOOD: CPT | Performed by: INTERNAL MEDICINE

## 2020-09-04 PROCEDURE — 94618 PULMONARY STRESS TESTING: CPT

## 2020-09-04 PROCEDURE — 25010000002 CEFEPIME PER 500 MG: Performed by: NURSE PRACTITIONER

## 2020-09-04 PROCEDURE — 85610 PROTHROMBIN TIME: CPT | Performed by: INTERNAL MEDICINE

## 2020-09-04 PROCEDURE — 99232 SBSQ HOSP IP/OBS MODERATE 35: CPT | Performed by: THORACIC SURGERY (CARDIOTHORACIC VASCULAR SURGERY)

## 2020-09-04 PROCEDURE — 82945 GLUCOSE OTHER FLUID: CPT | Performed by: INTERNAL MEDICINE

## 2020-09-04 PROCEDURE — 76942 ECHO GUIDE FOR BIOPSY: CPT

## 2020-09-04 PROCEDURE — 82042 OTHER SOURCE ALBUMIN QUAN EA: CPT | Performed by: INTERNAL MEDICINE

## 2020-09-04 PROCEDURE — 80048 BASIC METABOLIC PNL TOTAL CA: CPT | Performed by: INTERNAL MEDICINE

## 2020-09-04 PROCEDURE — 83615 LACTATE (LD) (LDH) ENZYME: CPT | Performed by: INTERNAL MEDICINE

## 2020-09-04 PROCEDURE — 25010000003 LIDOCAINE 1 % SOLUTION: Performed by: RADIOLOGY

## 2020-09-04 PROCEDURE — 89051 BODY FLUID CELL COUNT: CPT | Performed by: INTERNAL MEDICINE

## 2020-09-04 PROCEDURE — 0W993ZZ DRAINAGE OF RIGHT PLEURAL CAVITY, PERCUTANEOUS APPROACH: ICD-10-PCS | Performed by: RADIOLOGY

## 2020-09-04 PROCEDURE — 87070 CULTURE OTHR SPECIMN AEROBIC: CPT | Performed by: INTERNAL MEDICINE

## 2020-09-04 PROCEDURE — 84157 ASSAY OF PROTEIN OTHER: CPT | Performed by: INTERNAL MEDICINE

## 2020-09-04 PROCEDURE — 87205 SMEAR GRAM STAIN: CPT | Performed by: INTERNAL MEDICINE

## 2020-09-04 RX ORDER — LIDOCAINE HYDROCHLORIDE 10 MG/ML
10 INJECTION, SOLUTION INFILTRATION; PERINEURAL ONCE
Status: COMPLETED | OUTPATIENT
Start: 2020-09-04 | End: 2020-09-04

## 2020-09-04 RX ADMIN — LIDOCAINE HYDROCHLORIDE 10 ML: 10 INJECTION, SOLUTION INFILTRATION; PERINEURAL at 13:06

## 2020-09-04 RX ADMIN — ACETAMINOPHEN 650 MG: 325 TABLET, FILM COATED ORAL at 14:18

## 2020-09-04 RX ADMIN — SERTRALINE 50 MG: 50 TABLET, FILM COATED ORAL at 09:25

## 2020-09-04 RX ADMIN — CEFEPIME 2 G: 2 INJECTION, POWDER, FOR SOLUTION INTRAVENOUS at 20:02

## 2020-09-04 RX ADMIN — CELECOXIB 200 MG: 200 CAPSULE ORAL at 09:25

## 2020-09-04 RX ADMIN — FLUTICASONE PROPIONATE 2 SPRAY: 50 SPRAY, METERED NASAL at 09:28

## 2020-09-04 RX ADMIN — ASPIRIN 81 MG: 81 TABLET, COATED ORAL at 09:25

## 2020-09-04 RX ADMIN — ATENOLOL 50 MG: 50 TABLET ORAL at 09:25

## 2020-09-04 RX ADMIN — ACETAMINOPHEN AND CODEINE PHOSPHATE 2 TABLET: 300; 30 TABLET ORAL at 20:02

## 2020-09-04 RX ADMIN — PANTOPRAZOLE SODIUM 40 MG: 40 INJECTION, POWDER, FOR SOLUTION INTRAVENOUS at 06:37

## 2020-09-04 RX ADMIN — CEFEPIME 2 G: 2 INJECTION, POWDER, FOR SOLUTION INTRAVENOUS at 09:25

## 2020-09-04 RX ADMIN — Medication 1 CAPSULE: at 09:25

## 2020-09-04 RX ADMIN — AMLODIPINE BESYLATE 5 MG: 5 TABLET ORAL at 09:25

## 2020-09-04 NOTE — SIGNIFICANT NOTE
09/04/20 1308   Rehab Treatment   Discipline physical therapy assistant   Reason Treatment Not Performed unavailable for treatment  (Pt CHRIS early PM. PT will follow up later PM as time allows.)

## 2020-09-04 NOTE — TELEPHONE ENCOUNTER
Shyla Cherry with [facility] phone is [phone] she would like to know since patient is discharging would like to resume Home Health

## 2020-09-04 NOTE — PROGRESS NOTES
Continued Stay Note  [facility]     Patient Name: Gabbi Brandon  MRN: [id number]  Today's Date: 9/4/2020    Admit Date: 9/2/2020    Discharge Plan     Row Name 09/04/20 1240       Plan    Plan  home with daughter and [facility]    Provided Post Acute Provider List?  Refused    Provided Post Acute Provider Quality & Resource List?  Refused    Refused Quality and Resource List Comment  has used [facility] in the past and would like to use again    Patient/Family in Agreement with Plan  yes    Plan Comments  Spoke with patient at bedside.  Discussed PT latasha and she is interested in having [facility] follow at [place].  Referral sent to via [employer] and called to Shyla.  CCP will follow. Maricel Rain RN        Discharge Codes    No documentation.             Maricel Rain RN

## 2020-09-04 NOTE — DISCHARGE PLACEMENT REQUEST
"Gabbi Henning (79 y.o. Female)     Date of Birth Social Security Number Address Home Phone MRN    1940  [address] [place] [address] [phone] [id number]    Restorationist Marital Status          Islam        Admission Date Admission Type Admitting Provider Attending Provider Department, Room/Bed    9/2/20 Emergency Chaparrita Machado MD Hayden, Juliana, MD [address], [address]    Discharge Date Discharge Disposition Discharge Destination                       Attending Provider:  Chaparrita Machado MD    Allergies:  Penicillins    Isolation:  None   Infection:  None   Code Status:  No CPR    Ht:  160 cm (63\")   Wt:  87.6 kg (193 lb 1.6 oz)    Admission Cmt:  None   Principal Problem:  Pneumonia of right lower lobe due to infectious organism [J18.9]                 Active Insurance as of 9/2/2020     Primary Coverage     Payor Plan Insurance Group Employer/Plan Group    MEDICARE MEDICARE A & B      Payor Plan Address Payor Plan Phone Number Payor Plan Fax Number Effective Dates    [address] [phone]  9/1/2003 - None Entered    [facility] [id number]       Subscriber Name Subscriber Birth Date Member ID       GABBI HENNING 1940 [id number]           Secondary Coverage     Payor Plan Insurance Group Employer/Plan Group    [id number]     Payor Plan Address Payor Plan Phone Number Payor Plan Fax Number Effective Dates    [address]   12/1/2016 - None Entered    [facility] [id number]       Subscriber Name Subscriber Birth Date Member ID       GABBI HENNING 1940 [id number]                 Emergency Contacts      (Rel.) Home Phone Work Phone Mobile Phone    Sara Hernandez (Daughter) [phone] -- [phone]    SinghRosa (Daughter) [phone] -- --              "

## 2020-09-04 NOTE — PROGRESS NOTES
Patient is current with [facility].  We will continue to follow.  Shyla Cherry RN

## 2020-09-04 NOTE — PROGRESS NOTES
Exercise Oximetry    Patient Name:Gabbi Brandon   MRN: [id number]   Date: 09/04/20             ROOM AIR BASELINE   SpO2% 94   Heart Rate 66   Blood Pressure     EXERCISE ON ROOM AIR SpO2% EXERCISE ON O2 @ LPM SpO2%   1 MINUTE 94 1 MINUTE    2 MINUTES 91 2 MINUTES    3 MINUTES 90 3 MINUTES    4 MINUTES 89 4 MINUTES    5 MINUTES 90 5 MINUTES    6 MINUTES 90 6 MINUTES               Distance Walked  Distance Walked   Dyspnea (Nani Scale)  Dyspnea (Nani Scale)   Fatigue (Nani Scale)  Fatigue (Nani Scale)   SpO2% Post Exercise  SpO2% Post Exercise   HR Post Exercise   HR Post Exercise  72   Time to Recovery   Time to Recovery     Comments: patient walked slowly with walker, gait belt,

## 2020-09-04 NOTE — PROGRESS NOTES
Name: Gabbi Brandon ADMIT: 2020   : 1940  PCP: Gordo Mg MD    MRN: [id number] LOS: 2 days   AGE/SEX: 79 y.o. female  ROOM[facility]     Subjective   Subjective   Better again today.  Appetite improved.  On room air during the day.  Was on oxygen some during the night.  Minimal cough    Review of Systems     Objective   Objective   Vital Signs  Temp:  [98 °F (36.7 °C)-98.3 °F (36.8 °C)] 98.3 °F (36.8 °C)  Heart Rate:  [62-71] 62  Resp:  [16-18] 16  BP: (117-153)/(63-81) 134/66  SpO2:  [90 %-94 %] 92 %  on  Flow (L/min):  [2] 2;   Device (Oxygen Therapy): room air  Body mass index is 34.21 kg/m².  Physical Exam   Constitutional: She appears well-developed and well-nourished.   HENT:   Head: Normocephalic.   Neck: Neck supple. No JVD present.   Cardiovascular: Normal rate and regular rhythm.   No murmur heard.  Pulmonary/Chest: No stridor. No respiratory distress. She has no wheezes. She has no rales.   Diminished right base.  Left clear   Abdominal: Soft. Bowel sounds are normal. She exhibits no distension. There is no tenderness.   Musculoskeletal: She exhibits edema.   Trace   Neurological: She is alert.   Skin: Skin is warm.   Nursing note and vitals reviewed.      Results Review:       I reviewed the patient's new clinical results.  Results from last 7 days   Lab Units 20  0702 20  0710 20  0936   WBC 10*3/mm3 7.55 9.72 13.37*   HEMOGLOBIN g/dL 10.2* 10.2* 11.0*   PLATELETS 10*3/mm3 295 275 280     Results from last 7 days   Lab Units 20  0702 20  0710 20  0936   SODIUM mmol/L 140 137 138   POTASSIUM mmol/L 3.8 3.9 4.2   CHLORIDE mmol/L 102 100 99   CO2 mmol/L 26.8 29.3* 26.0   BUN mg/dL 11 12 12   CREATININE mg/dL 0.64 0.71 0.82   GLUCOSE mg/dL 97 99 131*   Estimated Creatinine Clearance: 57.9 mL/min (by C-G formula based on SCr of 0.64 mg/dL).  Results from last 7 days   Lab Units 20  0936   ALBUMIN g/dL 2.90*   BILIRUBIN mg/dL 0.4   ALK  PHOS U/L 70   AST (SGOT) U/L 15   ALT (SGPT) U/L 9     Results from last 7 days   Lab Units 09/04/20  0702 09/03/20  0710 09/02/20  0936   CALCIUM mg/dL 8.2* 8.8 8.8   ALBUMIN g/dL  --   --  2.90*   MAGNESIUM mg/dL  --   --  1.6     Results from last 7 days   Lab Units 09/03/20  0710 09/02/20  1550 09/02/20  0936   PROCALCITONIN ng/mL 0.15  --  0.06   LACTATE mmol/L 1.0 2.4* 2.1*     No results found for: HGBA1C, POCGLU      acetaminophen 650 mg Oral Daily Before Lunch   acetaminophen-codeine 2 tablet Oral Nightly   amLODIPine 5 mg Oral Daily   aspirin 81 mg Oral Daily   atenolol 50 mg Oral Daily   cefepime 2 g Intravenous Q12H   celecoxib 200 mg Oral Daily   fluticasone 2 spray Each Nare Daily   lactobacillus acidophilus 1 capsule Oral Daily   pantoprazole 40 mg Intravenous Q AM   sertraline 50 mg Oral Daily       hold 1 each   Diet Dysphagia; IV - Mechanical Soft No Mixed Consistencies; Thin; Small Feedings       Assessment/Plan     Active Hospital Problems    Diagnosis  POA   • **Pneumonia of right lower lobe due to infectious organism [J18.9]  Yes   • Pleural effusion on right [J90]  Unknown   • Hiatal hernia [K44.9]  Yes   • Aspiration pneumonia of right lower lobe (CMS/HCC) [J69.0]  Yes   • CKD (chronic kidney disease) stage 2, GFR 60-89 ml/min [N18.2]  Yes   • Anemia, chronic disease [D63.8]  Yes   • Oropharyngeal dysphagia [R13.12]  Yes   • Gastroesophageal reflux disease without esophagitis [K21.9]  Yes   • Essential hypertension [I10]  Yes      Resolved Hospital Problems    Diagnosis Date Resolved POA   • Metabolic encephalopathy [G93.41] 09/04/2020 Unknown       · Suspected pneumonia and pleural effusion.  600 cc fluid removed from right lung.  It looks transudative.  Continue with cefepime today.  Change diet to allow thin liquids.  If any worsening of cough, will change back.  Discussed with patient.  Patient declined FEES which was recommended by speech therapy.  Will check exercise oximetry tomorrow.   Possible discharge home.  I left a message for patient's daughter on her cell phone  · Arthritis.  On home med regimen.  Improved   · Metabolic encephalopathy, resolved   · Weakness.  Patient planning to return home.  We will see how she does later today and again tomorrow.    · Anemia.  Stable from yesterday.  Was 11+ last month.  Recheck a.m. Outpatient follow-up  · Renal function normal  · Hypertension controlled    Chaparrita Machado MD  [facility]  09/04/20  16:45

## 2020-09-05 ENCOUNTER — APPOINTMENT (OUTPATIENT)
Dept: GENERAL RADIOLOGY | Facility: HOSPITAL | Age: 80
End: 2020-09-05

## 2020-09-05 LAB
DEPRECATED RDW RBC AUTO: 45.3 FL (ref 37–54)
ERYTHROCYTE [DISTWIDTH] IN BLOOD BY AUTOMATED COUNT: 14.3 % (ref 12.3–15.4)
HCT VFR BLD AUTO: 32 % (ref 34–46.6)
HGB BLD-MCNC: 10.5 G/DL (ref 12–15.9)
MCH RBC QN AUTO: 29.1 PG (ref 26.6–33)
MCHC RBC AUTO-ENTMCNC: 32.8 G/DL (ref 31.5–35.7)
MCV RBC AUTO: 88.6 FL (ref 79–97)
PLATELET # BLD AUTO: 326 10*3/MM3 (ref 140–450)
PMV BLD AUTO: 10.3 FL (ref 6–12)
RBC # BLD AUTO: 3.61 10*6/MM3 (ref 3.77–5.28)
WBC # BLD AUTO: 8.7 10*3/MM3 (ref 3.4–10.8)

## 2020-09-05 PROCEDURE — 25010000002 CEFEPIME PER 500 MG: Performed by: NURSE PRACTITIONER

## 2020-09-05 PROCEDURE — 97116 GAIT TRAINING THERAPY: CPT

## 2020-09-05 PROCEDURE — 85027 COMPLETE CBC AUTOMATED: CPT | Performed by: INTERNAL MEDICINE

## 2020-09-05 PROCEDURE — 94618 PULMONARY STRESS TESTING: CPT

## 2020-09-05 PROCEDURE — 71045 X-RAY EXAM CHEST 1 VIEW: CPT

## 2020-09-05 RX ORDER — PANTOPRAZOLE SODIUM 40 MG/1
40 TABLET, DELAYED RELEASE ORAL
Status: DISCONTINUED | OUTPATIENT
Start: 2020-09-06 | End: 2020-09-06 | Stop reason: HOSPADM

## 2020-09-05 RX ADMIN — AMLODIPINE BESYLATE 5 MG: 5 TABLET ORAL at 10:21

## 2020-09-05 RX ADMIN — ACETAMINOPHEN AND CODEINE PHOSPHATE 2 TABLET: 300; 30 TABLET ORAL at 20:09

## 2020-09-05 RX ADMIN — ASPIRIN 81 MG: 81 TABLET, COATED ORAL at 10:21

## 2020-09-05 RX ADMIN — ATENOLOL 50 MG: 50 TABLET ORAL at 10:20

## 2020-09-05 RX ADMIN — CEFEPIME 2 G: 2 INJECTION, POWDER, FOR SOLUTION INTRAVENOUS at 20:09

## 2020-09-05 RX ADMIN — CELECOXIB 200 MG: 200 CAPSULE ORAL at 10:20

## 2020-09-05 RX ADMIN — PANTOPRAZOLE SODIUM 40 MG: 40 INJECTION, POWDER, FOR SOLUTION INTRAVENOUS at 06:19

## 2020-09-05 RX ADMIN — SERTRALINE 50 MG: 50 TABLET, FILM COATED ORAL at 10:20

## 2020-09-05 RX ADMIN — CEFEPIME 2 G: 2 INJECTION, POWDER, FOR SOLUTION INTRAVENOUS at 10:21

## 2020-09-05 RX ADMIN — FLUTICASONE PROPIONATE 2 SPRAY: 50 SPRAY, METERED NASAL at 10:21

## 2020-09-05 RX ADMIN — ACETAMINOPHEN 650 MG: 325 TABLET, FILM COATED ORAL at 12:49

## 2020-09-05 RX ADMIN — Medication 1 CAPSULE: at 10:20

## 2020-09-05 NOTE — PROGRESS NOTES
"    Chief Complaint: Aspiration pneumonia, hiatal hernia      Subjective  No new complaints.  Awaiting thoracentesis.   Vital Signs:  Temp:  [97.2 °F (36.2 °C)-98.3 °F (36.8 °C)] 97.2 °F (36.2 °C)  Heart Rate:  [62-71] 64  Resp:  [16-18] 16  BP: (117-153)/(63-81) 138/72    Intake & Output (last day)       09/04 0701 - 09/05 0700    P.O. 360    I.V. (mL/kg)     Total Intake(mL/kg) 360 (4.1)    Urine (mL/kg/hr) 0 (0)    Other 600    Stool 0    Total Output 600    Net -240         Urine Unmeasured Occurrence 3 x    Stool Unmeasured Occurrence 1 x          Objective:  General Appearance:  Comfortable and in no acute distress.    Vital signs: (most recent): Blood pressure 138/72, pulse 64, temperature 97.2 °F (36.2 °C), temperature source Oral, resp. rate 16, height 160 cm (63\"), weight 87.6 kg (193 lb 1.6 oz), SpO2 91 %, not currently breastfeeding.  Vital signs are normal.    HEENT: Normal HEENT exam.    Lungs:  Normal effort.  There are decreased breath sounds.    Heart: Normal rate.  Regular rhythm.    Abdomen: Abdomen is soft.  Bowel sounds are normal.     Neurological: Patient is alert and oriented to person, place and time.    Skin:  Warm and dry.                    Results Review:     I reviewed the patient's new clinical results.  I reviewed the patient's new imaging results and agree with the interpretation.  I reviewed the patient's other test results and agree with the interpretation    Imaging Results (Last 24 Hours)     Procedure Component Value Units Date/Time    US Thoracentesis [[id number]] Collected:  09/04/20 1525    Specimen:  Body Fluid Updated:  09/04/20 1528    Narrative:       ULTRASOUND-GUIDED RIGHT THORACENTESIS.     HISTORY: Pleural effusion.     After signed informed consent was obtained the patient was prepped and  draped in the usual sterile fashion. Lidocaine was used for local  anesthesia.     Ultrasound guidance was used to place the thoracentesis catheter into  the right pleural fluid " collection. 600 mL of yellow-colored fluid was  removed. Sample sent to the lab.     Confirmatory images were obtained.     Patient tolerated the procedure well with no complications.       Impression:       Ultrasound-guided right thoracentesis as described.        This report was finalized on 9/4/2020 3:25 PM by Dr. Yong Damon M.D.             Lab Results:     Lab Results (last 24 hours)     Procedure Component Value Units Date/Time    Body Fluid Culture - Body Fluid, Pleural Cavity [[id number]] Collected:  09/04/20 1307    Specimen:  Body Fluid from Pleural Cavity Updated:  09/04/20 1730     Gram Stain No organisms seen      Moderate (3+) WBCs per low power field    pH, Body Fluid - Pleural Fluid, Pleural Cavity [[id number]] Collected:  09/04/20 1307    Specimen:  Pleural Fluid from Pleural Cavity Updated:  09/04/20 1439     pH, Fluid 8.12    Narrative:       Reference Range:  Serous fluids 6.8-7.6     Pleural transudates: 7.4-7.5     Pleural exudates: 7.35-7.45     All other fluids: No defined reference ranges    This test was developed, its performance characteristics determined and judged suitable for clinical purposes by [facility]. It has not been cleared or approved by the FDA. The laboratory is regulated under CLIA as qualified to perform high-complexity testing.    Body Fluid Cell Count With Differential - Pleural Fluid, Pleural Cavity [[id number]] Collected:  09/04/20 1307    Specimen:  Pleural Fluid from Pleural Cavity Updated:  09/04/20 1432    Narrative:       The following orders were created for panel order Body Fluid Cell Count With Differential - Pleural Fluid, Pleural Cavity.  Procedure                               Abnormality         Status                     ---------                               -----------         ------                     Body fluid cell count - ...[[id number]]  Abnormal            Final result               Body fluid differential  ...[[id number]]                      Final result                 Please view results for these tests on the individual orders.    Body fluid cell count - Pleural Fluid, Pleural Cavity [[id number]]  (Abnormal) Collected:  09/04/20 1307    Specimen:  Pleural Fluid from Pleural Cavity Updated:  09/04/20 1432     Color, Fluid Yellow     Appearance, Fluid Hazy     RBC, Fluid 75 /mm3      Nucleated Cells, Fluid 1,255 /mm3      Method: Hemacytometer Method    Body fluid differential - Pleural Fluid, Pleural Cavity [[id number]] Collected:  09/04/20 1307    Specimen:  Pleural Fluid from Pleural Cavity Updated:  09/04/20 1432     Neutrophils, Fluid 33 %      Lymphocytes, Fluid 56 %      Eosinophils, Fluid 3 %      Mononuclear, Fluid 8 %     Albumin, Fluid - Pleural Fluid, Pleural Cavity [[id number]] Collected:  09/04/20 1307    Specimen:  Pleural Fluid from Pleural Cavity Updated:  09/04/20 1417     Albumin, Fluid 1.70 g/dL     Narrative:       No Reference Ranges Established.    A Serous fluid albumin gradient (serum albumin-fluid) <1.1 g/dL suggests the fluid is an exudate.  Cirrhosis usually results in an ascites fluid albumin gradient >1.1 g/dL.    This test was developed, its performance characteristics determined and judged suitable for clinical purposes by [facility].  It has not been cleared or approved by the FDA.  The laboratory is regulated under CLIA as qualified to perfom high-complexity testing.      Protein, Body Fluid - Pleural Fluid, Pleural Cavity [[id number]] Collected:  09/04/20 1307    Specimen:  Pleural Fluid from Pleural Cavity Updated:  09/04/20 1417     Protein, Total, Fluid 3.2 g/dL     Narrative:       No Reference Ranges Established.    A serous fluid total fluid (TP) greater than 50 percent of the serum TP suggests the fluid is an exudate.      1. Pleural TP/Serum TP >0.5  2. Pleural LD/Serum LD >0.6  3. Pleural LD >2/3 of the upper limit of normal for serum LDH    This test  was developed, it performance characteristics determined and judged suitable for clinical purposes by [facility].  It has not been cleared or approved by the FDA.  The laboratory is regulated under CLIA as qualified to perform high-complexity testing.     Lactate Dehydrogenase, Body Fluid - Pleural Fluid, Pleural Cavity [[id number]] Collected:  09/04/20 1307    Specimen:  Pleural Fluid from Pleural Cavity Updated:  09/04/20 1417     Lactate Dehydrogenase (LD), Fluid 332 U/L     Narrative:       No Reference Ranges Established.    Serous fluid LDH greater than 60 percent of the serum LDH or serous fluid LDH two-thirds of the upper limit of normal for serum LDH suggests the fluid is an exudate.     1. Pleural TP/Serum TP >0.5  2. Pleural LD/Serum LD >0.6  3. Pleural LD >2/3 of the upper limit of normal for serum LDH    This test was developed, it performance characteristics determined and judged suitable for clinical purposes by [facility].  It has not been cleared or approved by the FDA.  The laboratory is regulated under CLIA as qualified to perform high-complexity testing.     Glucose, Body Fluid - Pleural Fluid, Pleural Cavity [[id number]] Collected:  09/04/20 1307    Specimen:  Pleural Fluid from Pleural Cavity Updated:  09/04/20 1417     Glucose, Fluid 72 mg/dL     Narrative:       No Reference Ranges Established.    Serous fluid glucose less than 60 mg/dL or less than 30 mg/dL below serum glucose suggests an infectious or malignant exudate.     This test was developed, it performance characteristics determined and judged suitable for clinical purposes by [facility].  It has not been cleared or approved by the FDA.  The laboratory is regulated under CLIA as qualified to perform high-complexity testing.     Anaerobic Culture - Pleural Fluid, Pleural Cavity [[id number]] Collected:  09/04/20 1307    Specimen:  Pleural Fluid from Pleural  Cavity Updated:  09/04/20 1326    Blood Culture - Blood, Arm, Left [[id number]] Collected:  09/02/20 0943    Specimen:  Blood from Arm, Left Updated:  09/04/20 1000     Blood Culture No growth at 2 days    Blood Culture - Blood, Arm, Left [[id number]] Collected:  09/02/20 0935    Specimen:  Blood from Arm, Left Updated:  09/04/20 1000     Blood Culture No growth at 2 days    Basic Metabolic Panel [[id number]]  (Abnormal) Collected:  09/04/20 0702    Specimen:  Blood Updated:  09/04/20 0810     Glucose 97 mg/dL      BUN 11 mg/dL      Creatinine 0.64 mg/dL      Sodium 140 mmol/L      Potassium 3.8 mmol/L      Chloride 102 mmol/L      CO2 26.8 mmol/L      Calcium 8.2 mg/dL      eGFR Non African Amer 89 mL/min/1.73      BUN/Creatinine Ratio 17.2     Anion Gap 11.2 mmol/L     Narrative:       GFR Normal >60  Chronic Kidney Disease <60  Kidney Failure <15      aPTT [[id number]]  (Normal) Collected:  09/04/20 0702    Specimen:  Blood from Arm, Right Updated:  09/04/20 0755     PTT 29.4 seconds     Protime-INR [[id number]]  (Normal) Collected:  09/04/20 0702    Specimen:  Blood from Arm, Right Updated:  09/04/20 0755     Protime 13.9 Seconds      INR 1.08    CBC (No Diff) [[id number]]  (Abnormal) Collected:  09/04/20 0702    Specimen:  Blood Updated:  09/04/20 0744     WBC 7.55 10*3/mm3      RBC 3.49 10*6/mm3      Hemoglobin 10.2 g/dL      Hematocrit 32.3 %      MCV 92.6 fL      MCH 29.2 pg      MCHC 31.6 g/dL      RDW 14.3 %      RDW-SD 48.3 fl      MPV 9.5 fL      Platelets 295 10*3/mm3            Assessment/Plan       Pneumonia of right lower lobe due to infectious organism    Essential hypertension    Gastroesophageal reflux disease without esophagitis    Oropharyngeal dysphagia    Anemia, chronic disease    Hiatal hernia    Aspiration pneumonia of right lower lobe (CMS/HCC)    Pleural effusion on right       Assessment & Plan  Ms. Brandon is a pleasant 78 yo with recurrent pneumonias who presents with a pna.  She also has a  moderately large hiatal hernia.  I would not recommend fixing her hernia as I do not think that she would tolerate a surgical procedure and given her esophageal dysmotility, it would be unlikely to help with her aspiration.  This was discussed with the patient today.  She also has a moderate pleural effusion likely secondary to pna.  Agree with thoracentesis.   Zayra Dc MD  [facility]  09/04/20  22:29

## 2020-09-05 NOTE — PROGRESS NOTES
Name: Gabbi Brandon ADMIT: 2020   : 1940  PCP: Gordo Mg MD    MRN: [id number] LOS: 3 days   AGE/SEX: 79 y.o. female  ROOM: [facility]     Subjective   Subjective   I saw the patient this afternoon.  Did not feel good.  Said her legs were weak.  Did not want to go home today.  O2 sat dropped to 87% fairly quickly during oximetry    Review of Systems     Objective   Objective   Vital Signs  Temp:  [97.2 °F (36.2 °C)-98.2 °F (36.8 °C)] 97.4 °F (36.3 °C)  Heart Rate:  [60-91] 91  Resp:  [14-18] 16  BP: (116-147)/(64-78) 116/64  SpO2:  [90 %-96 %] 95 %  on  Flow (L/min):  [2] 2;   Device (Oxygen Therapy): room air  Body mass index is 36.28 kg/m².  Physical Exam   Constitutional: She appears well-developed and well-nourished.   HENT:   Head: Normocephalic.   Neck: Neck supple. No JVD present.   Cardiovascular: Normal rate and regular rhythm.   No murmur heard.  Pulmonary/Chest: No stridor. No respiratory distress. She has no wheezes. She has no rales.   Diminished right base.  Left clear   Abdominal: Soft. Bowel sounds are normal. She exhibits no distension. There is no tenderness.   Musculoskeletal: She exhibits edema.   Trace   Neurological: She is alert.   Skin: Skin is warm.   Nursing note and vitals reviewed.      Results Review:       I reviewed the patient's new clinical results.  Results from last 7 days   Lab Units 20  0720 20  0702 20  0710 20  0936   WBC 10*3/mm3 8.70 7.55 9.72 13.37*   HEMOGLOBIN g/dL 10.5* 10.2* 10.2* 11.0*   PLATELETS 10*3/mm3 326 295 275 280     Results from last 7 days   Lab Units 20  0702 20  0710 20  0936   SODIUM mmol/L 140 137 138   POTASSIUM mmol/L 3.8 3.9 4.2   CHLORIDE mmol/L 102 100 99   CO2 mmol/L 26.8 29.3* 26.0   BUN mg/dL 11 12 12   CREATININE mg/dL 0.64 0.71 0.82   GLUCOSE mg/dL 97 99 131*   Estimated Creatinine Clearance: 59.7 mL/min (by C-G formula based on SCr of 0.64 mg/dL).  Results from last 7 days      Lab Units 09/02/20  0936   ALBUMIN g/dL 2.90*   BILIRUBIN mg/dL 0.4   ALK PHOS U/L 70   AST (SGOT) U/L 15   ALT (SGPT) U/L 9     Results from last 7 days   Lab Units 09/04/20  0702 09/03/20  0710 09/02/20  0936   CALCIUM mg/dL 8.2* 8.8 8.8   ALBUMIN g/dL  --   --  2.90*   MAGNESIUM mg/dL  --   --  1.6     Results from last 7 days   Lab Units 09/03/20  0710 09/02/20  1550 09/02/20  0936   PROCALCITONIN ng/mL 0.15  --  0.06   LACTATE mmol/L 1.0 2.4* 2.1*     No results found for: HGBA1C, POCGLU      acetaminophen 650 mg Oral Daily Before Lunch   acetaminophen-codeine 2 tablet Oral Nightly   amLODIPine 5 mg Oral Daily   aspirin 81 mg Oral Daily   atenolol 50 mg Oral Daily   cefepime 2 g Intravenous Q12H   celecoxib 200 mg Oral Daily   fluticasone 2 spray Each Nare Daily   lactobacillus acidophilus 1 capsule Oral Daily   [START ON 9/6/2020] pantoprazole 40 mg Oral Q AM   sertraline 50 mg Oral Daily       hold 1 each   Diet Dysphagia; IV - Mechanical Soft No Mixed Consistencies; Thin; Small Feedings       Assessment/Plan     Active Hospital Problems    Diagnosis  POA   • **Pneumonia of right lower lobe due to infectious organism [J18.9]  Yes   • Pleural effusion on right [J90]  Unknown   • Hiatal hernia [K44.9]  Yes   • Aspiration pneumonia of right lower lobe (CMS/HCC) [J69.0]  Yes   • Anemia, chronic disease [D63.8]  Yes   • Oropharyngeal dysphagia [R13.12]  Yes   • Gastroesophageal reflux disease without esophagitis [K21.9]  Yes   • Essential hypertension [I10]  Yes      Resolved Hospital Problems    Diagnosis Date Resolved POA   • Metabolic encephalopathy [G93.41] 09/04/2020 Unknown       · Suspected pneumonia and pleural effusion.  600 cc fluid removed from right lung.  It looks transudative.  Continue with cefepime today.  Tolerating diet with thin liquids.  Plan discharge tomorrow on O2.  I discussed with daughter by phone   · Arthritis.  On home med regimen.  Improved   · Weakness, better.  Home with home  health    · Anemia.  Stable. Oupatient follow-up  · Renal function normal  · Hypertension controlled    Chaparrita Machado MD  [facility]  09/05/20  19:41

## 2020-09-05 NOTE — PLAN OF CARE
Problem: Patient Care Overview  Goal: Plan of Care Review  Flowsheets (Taken 9/5/2020 1625)  Progress: improving  Plan of Care Reviewed With: patient  Outcome Summary: Pt with good improvement in tolerance to activity today, able to ambulate 150ft w/ fww and CGA. She does need cues to slow down and not talk as she easily becomes SOA. Pursed lip breathing on standing rest breaks. O2sat at 84% after ambulation, and returns to >90% in about 1-2 minutes.

## 2020-09-05 NOTE — PROGRESS NOTES
Continued Stay Note  [facility]     Patient Name: Gabbi Brandon  MRN: [id number]  Today's Date: 9/5/2020    Admit Date: 9/2/2020    Discharge Plan     Row Name 09/05/20 1557       Plan    Plan Comments  Order noted for O2 at DC. Called and spoke with staff RN who states pt has intermittent confusion. Called pt's daughter and had to leave a [facility] for a return call to discuss who they would like to use as DME provider for home O2. CCP to follow...............JW        Discharge Codes    No documentation.       Expected Discharge Date and Time     Expected Discharge Date Expected Discharge Time    Sep 5, 2020             Kayce Cruz, RN

## 2020-09-05 NOTE — THERAPY TREATMENT NOTE
Patient Name: Gabbi Brandon  : 1940    MRN: [id number]                              Today's Date: 2020       Admit Date: 2020    Visit Dx:     ICD-10-CM ICD-9-CM   1. Aspiration pneumonia of right lower lobe, unspecified aspiration pneumonia type (CMS/HCC) J69.0 507.0   2. Toxic metabolic encephalopathy G92 349.82   3. Hiatal hernia K44.9 553.3     Patient Active Problem List   Diagnosis   • Irregular bleeding   • Urinary tract infection   • Dyslipidemia   • Essential hypertension   • Stage 2 chronic kidney disease   • Gastroesophageal reflux disease without esophagitis   • Seasonal allergies   • Familial hypercholesterolemia   • Bladder spasm   • Bilateral lower extremity edema   • Pneumonia of right lower lobe due to infectious organism   • Failure to thrive in adult   • Hyponatremia   • DNR (do not resuscitate)   • Hypoxia   • Oropharyngeal dysphagia   • Anemia, chronic disease   • Hypotension   • Diarrhea   • Hypocalcemia   • Non-intractable vomiting   • Hiatal hernia   • Aspiration pneumonia of right lower lobe (CMS/HCC)   • Pleural effusion on right     Past Medical History:   Diagnosis Date   • Arthritis    • GERD (gastroesophageal reflux disease)    • Hyperlipidemia    • Hypertension    • Incontinent of urine    • PNA (pneumonia)    • Seasonal allergies    • UTI (urinary tract infection)      Past Surgical History:   Procedure Laterality Date   • BREAST BIOPSY     • COLONOSCOPY N/A 2016    Procedure: COLONOSCOPY WITH POLYPECTOMY (HOT SNARE);  Surgeon: Paulino aNrvaez MD;  Location: [facility] ENDOSCOPY;  Service:    • ENDOSCOPY N/A 2020    Procedure: ESOPHAGOGASTRODUODENOSCOPY with biopsies;  Surgeon: Eugene George MD;  Location: [facility] ENDOSCOPY;  Service: Gastroenterology;  Laterality: N/A;  pre-- melena and abdominal pain  post-- hiatel hernia   • VAGINAL HYSTERECTOMY       General Information     Row Name 20 1135          PT Evaluation Time/Intention     Document Type  therapy note (daily note)  -     Mode of Treatment  individual therapy;physical therapy  -     Row Name 09/05/20 1135          General Information    Patient Profile Reviewed?  yes  -     Existing Precautions/Restrictions  fall  -     Row Name 09/05/20 1135          Cognitive Assessment/Intervention- PT/OT    Orientation Status (Cognition)  oriented x 3  -     Row Name 09/05/20 1135          Safety Issues, Functional Mobility    Impairments Affecting Function (Mobility)  balance;endurance/activity tolerance;shortness of breath;strength  -       User Key  (r) = Recorded By, (t) = Taken By, (c) = Cosigned By    Initials Name Provider Type     Teagan Cyr, PT Physical Therapist        Mobility     Row Name 09/05/20 1136          Bed Mobility Assessment/Treatment    Bed Mobility Assessment/Treatment  supine-sit;sit-supine  -     Supine-Sit Union (Bed Mobility)  verbal cues;nonverbal cues (demo/gesture);contact guard  -     Sit-Supine Union (Bed Mobility)  verbal cues;nonverbal cues (demo/gesture);contact guard  -     Assistive Device (Bed Mobility)  bed rails;head of bed elevated  -     Row Name 09/05/20 1136          Sit-Stand Transfer    Sit-Stand Union (Transfers)  contact guard;verbal cues;nonverbal cues (demo/gesture)  -     Assistive Device (Sit-Stand Transfers)  walker, front-wheeled  -     Row Name 09/05/20 1136          Gait/Stairs Assessment/Training    Gait/Stairs Assessment/Training  gait/ambulation independence;gait/ambulation assistive device  [facility]     Union Level (Gait)  verbal cues;nonverbal cues (demo/gesture);contact guard;minimum assist (75% patient effort)  -     Assistive Device (Gait)  walker, front-wheeled  -     Distance in Feet (Gait)  150ft with 2 standing breaks for breathing  -     Deviations/Abnormal Patterns (Gait)  stride length decreased;base of support, wide  -     Bilateral Gait Deviations  forward flexed  posture  -     Comment (Gait/Stairs)  pt needs cues to slow down and to not talk as she gets SOA easily. Pursed lip breathing on standing rest breaks  -       User Key  (r) = Recorded By, (t) = Taken By, (c) = Cosigned By    Initials Name Provider Type    Teagan Ngo, PT Physical Therapist        Obj/Interventions    No documentation.       Goals/Plan    No documentation.       Clinical Impression     Row Name 09/05/20 1137          Pain Scale: FACES Pre/Post-Treatment    Pain: FACES Scale, Pretreatment  0-->no hurt  -     Pain: FACES Scale, Post-Treatment  0-->no hurt  -     Row Name 09/05/20 1137          Plan of Care Review    Plan of Care Reviewed With  patient  -     Progress  improving  -     Outcome Summary  Pt with good improvement in tolerance to activity today, able to ambulate 150ft w/ fww and CGA. She does need cues to slow down and not talk as she easily becomes SOA. Pursed lip breathing on standing rest breaks. O2sat at 84% after ambulation, and returns to >90% in about 1-2 minutes.   -     Row Name 09/05/20 1137          Vital Signs    Pre SpO2 (%)  96  -     O2 Delivery Pre Treatment  room air  -     Intra SpO2 (%)  84  -     O2 Delivery Intra Treatment  room air  -     Post SpO2 (%)  94  -     O2 Delivery Post Treatment  room air  -     Row Name 09/05/20 1137          Positioning and Restraints    Pre-Treatment Position  in bed  -     Post Treatment Position  bed  -     In Bed  call light within reach;encouraged to call for assist;exit alarm on;supine  -       User Key  (r) = Recorded By, (t) = Taken By, (c) = Cosigned By    Initials Name Provider Type    Teagan Ngo, PT Physical Therapist        Outcome Measures     Row Name 09/05/20 1139          How much help from another person do you currently need...    Turning from your back to your side while in flat bed without using bedrails?  4  -     Moving from lying on back to sitting on the side of a  flat bed without bedrails?  4  -JH     Moving to and from a bed to a chair (including a wheelchair)?  3  -JH     Standing up from a chair using your arms (e.g., wheelchair, bedside chair)?  4  -JH     Climbing 3-5 steps with a railing?  2  -JH     To walk in hospital room?  3  -     AM-PAC 6 Clicks Score (PT)  20  -     Row Name 09/05/20 1139          Functional Assessment    Outcome Measure Options  AM-PAC 6 Clicks Basic Mobility (PT)  -       User Key  (r) = Recorded By, (t) = Taken By, (c) = Cosigned By    Initials Name Provider Type    Teagan Ngo PT Physical Therapist        Physical Therapy Education                 Title: PT OT SLP Therapies (Done)     Topic: Physical Therapy (Done)     Point: Mobility training (Done)     Description:   Instruct learner(s) on safety and technique for assisting patient out of bed, chair or wheelchair.  Instruct in the proper use of assistive devices, such as walker, crutches, cane or brace.              Patient Friendly Description:   It's important to get you on your feet again, but we need to do so in a way that is safe for you. Falling has serious consequences, and your personal safety is the most important thing of all.        When it's time to get out of bed, one of us or a family member will sit next to you on the bed to give you support.     If your doctor or nurse tells you to use a walker, crutches, a cane, or a brace, be sure you use it every time you get out of bed, even if you think you don't need it.    Learning Progress Summary           Patient Acceptance, E,TB, VU by  at 9/5/2020 1139    Acceptance, E,TB,D, VU,NR by  at 9/3/2020 1020                   Point: Home exercise program (Done)     Description:   Instruct learner(s) on appropriate technique for monitoring, assisting and/or progressing patient with therapeutic exercises and activities.              Learning Progress Summary           Patient Acceptance, E,TB, VU by  at 9/5/2020 1139                    Point: Body mechanics (Done)     Description:   Instruct learner(s) on proper positioning and spine alignment for patient and/or caregiver during mobility tasks and/or exercises.              Learning Progress Summary           Patient Acceptance, E,TB, VU by  at 9/5/2020 1139    Acceptance, E,TB,D, VU,NR by  at 9/3/2020 1020                   Point: Precautions (Done)     Description:   Instruct learner(s) on prescribed precautions during mobility and gait tasks              Learning Progress Summary           Patient Acceptance, E,TB, VU by  at 9/5/2020 1139    Acceptance, E,TB,D, VU,NR by  at 9/3/2020 1020                               User Key     Initials Effective Dates Name Provider Type [facility] 04/03/18 -  Bia Mesa PT Physical Therapist PT     03/13/19 -  Teagan Cyr PT Physical Therapist PT              PT Recommendation and Plan     Plan of Care Reviewed With: patient  Progress: improving  Outcome Summary: Pt with good improvement in tolerance to activity today, able to ambulate 150ft w/ fww and CGA. She does need cues to slow down and not talk as she easily becomes SOA. Pursed lip breathing on standing rest breaks. O2sat at 84% after ambulation, and returns to >90% in about 1-2 minutes.      Time Calculation:   PT Charges     Row Name 09/05/20 1140             Time Calculation    Start Time  1055  -      Stop Time  1107  -      Time Calculation (min)  12 min  -      PT Received On  09/05/20  -      PT - Next Appointment  09/06/20  -        User Key  (r) = Recorded By, (t) = Taken By, (c) = Cosigned By    Initials Name Provider Type     Teagan Cyr, DEVYN Physical Therapist        Therapy Charges for Today     Code Description Service Date Service Provider Modifiers Qty    [id number] HC GAIT TRAINING EA 15 MIN 9/5/2020 Teagan Cyr, PT GP 1          PT G-Codes  Outcome Measure Options: AM-PAC 6 Clicks Basic Mobility (PT)  AM-PAC 6  Clicks Score (PT): 20  AM-PAC 6 Clicks Score (OT): 16    Teagan Cyr, PT  9/5/2020

## 2020-09-06 ENCOUNTER — READMISSION MANAGEMENT (OUTPATIENT)
Dept: CALL CENTER | Facility: HOSPITAL | Age: 80
End: 2020-09-06

## 2020-09-06 ENCOUNTER — NURSE TRIAGE (OUTPATIENT)
Dept: CALL CENTER | Facility: HOSPITAL | Age: 80
End: 2020-09-06

## 2020-09-06 VITALS
SYSTOLIC BLOOD PRESSURE: 103 MMHG | WEIGHT: 200.62 LBS | HEIGHT: 63 IN | DIASTOLIC BLOOD PRESSURE: 62 MMHG | HEART RATE: 67 BPM | BODY MASS INDEX: 35.55 KG/M2 | TEMPERATURE: 98.3 F | OXYGEN SATURATION: 94 % | RESPIRATION RATE: 14 BRPM

## 2020-09-06 RX ORDER — FUROSEMIDE 40 MG/1
20 TABLET ORAL DAILY
Qty: 30 TABLET | Refills: 0 | Status: SHIPPED | OUTPATIENT
Start: 2020-09-06 | End: 2020-09-29 | Stop reason: SDUPTHER

## 2020-09-06 RX ADMIN — ASPIRIN 81 MG: 81 TABLET, COATED ORAL at 10:39

## 2020-09-06 RX ADMIN — ACETAMINOPHEN 650 MG: 325 TABLET, FILM COATED ORAL at 12:42

## 2020-09-06 RX ADMIN — ATENOLOL 50 MG: 50 TABLET ORAL at 10:39

## 2020-09-06 RX ADMIN — Medication 1 CAPSULE: at 10:39

## 2020-09-06 RX ADMIN — SERTRALINE 50 MG: 50 TABLET, FILM COATED ORAL at 10:39

## 2020-09-06 RX ADMIN — PANTOPRAZOLE SODIUM 40 MG: 40 TABLET, DELAYED RELEASE ORAL at 06:06

## 2020-09-06 RX ADMIN — FLUTICASONE PROPIONATE 2 SPRAY: 50 SPRAY, METERED NASAL at 10:40

## 2020-09-06 RX ADMIN — AMLODIPINE BESYLATE 5 MG: 5 TABLET ORAL at 10:39

## 2020-09-06 RX ADMIN — CELECOXIB 200 MG: 200 CAPSULE ORAL at 10:39

## 2020-09-06 NOTE — DISCHARGE SUMMARY
Patient Name: Gabbi Brandon  : 1940  MRN: [id number]    Date of Admission: 2020  Date of Discharge:  2020  Primary Care Physician: Gordo Mg MD      Chief Complaint:   Abdominal Pain and Shortness of Breath      Discharge Diagnoses     Active Hospital Problems    Diagnosis  POA   • **Pneumonia of right lower lobe due to infectious organism [J18.9]  Yes   • Pleural effusion on right [J90]  Unknown   • Hiatal hernia [K44.9]  Yes   • Aspiration pneumonia of right lower lobe (CMS/HCC) [J69.0]  Yes   • Anemia, chronic disease [D63.8]  Yes   • Oropharyngeal dysphagia [R13.12]  Yes   • Gastroesophageal reflux disease without esophagitis [K21.9]  Yes   • Essential hypertension [I10]  Yes      Resolved Hospital Problems    Diagnosis Date Resolved POA   • Metabolic encephalopathy [G93.41] 2020 Unknown        Hospital Course     Ms. Brandon is a 79 y.o. woman who was admitted for fever, cough, confusion and weakness.  She was started on empiric antibiotics for suspected aspiration pneumonia.  She has a history of a large hiatal hernia.  She had a pleural effusion and underwent right-sided thoracentesis with removal of 600 cc of fluid.  It was consistent with a parapneumonic effusion.  She was initially placed on a dysphagia diet.  She was seen by speech therapy.  She declined FEES.  Speech therapy later approved thin liquids which she tolerated.  She has completed a 5-day course of cefepime.  Her lungs are now clear.  She is ready for discharge home.  Exercise oximetry showed hypoxia.  Home O2 has been ordered for her.    She has lower extremity edema and was started on low-dose Lasix.  Outpatient follow-up recommended     She was seen by thoracic surgery.  Repair of the hiatal hernia was not recommended.    Stable condition; fair prognosis    Day of Discharge     Feels okay.  No new issues    Physical Exam:  Temp:  [97.4 °F (36.3 °C)-98.3 °F (36.8 °C)] 98.3 °F (36.8 °C)  Heart Rate:   [63-91] 67  Resp:  [14-16] 14  BP: (103-138)/(62-84) 103/62  Body mass index is 35.54 kg/m².  Physical Exam   Constitutional: She appears well-developed and well-nourished. No distress.   Cardiovascular: Normal rate and regular rhythm.   No murmur heard.  Pulmonary/Chest: No stridor. No respiratory distress. She has no wheezes. She has no rales.   Diminished but clear   Abdominal: Soft. Bowel sounds are normal. She exhibits no distension. There is no tenderness.   Musculoskeletal: She exhibits edema.   1+   Neurological: She is alert.   Skin: Skin is warm. She is not diaphoretic.   Nursing note and vitals reviewed.      Consultants     Consult Orders (all) (From admission, onward)     Start     Ordered    09/02/20 1952  Inpatient Thoracic Surgery Consult  Once     Specialty:  Thoracic Surgery  Provider:  Srinivas Pineda III, MD    09/02/20 1952 09/02/20 1235  [facility] (on-call MD unless specified) Details  Once     Specialty:  Hospitalist  Provider:  (Not yet assigned)    09/02/20 1234              Procedures     * Surgery not found *      Imaging Results (All)     Procedure Component Value Units Date/Time    XR Chest 1 View [[id number]] Collected:  09/05/20 0718     Updated:  09/05/20 0742    Narrative:       ONE VIEW PORTABLE CHEST AT 5:10 AM     HISTORY: Recent right thoracentesis for pleural effusion. Shortness of  breath.     FINDINGS: There is cardiomegaly with mild vascular congestion associated  with a small to moderate right pleural effusion and atelectasis at the  right base. Despite recent thoracentesis the appearance is quite similar  to the chest x-ray of 2 days ago. A moderately large hiatus hernia  extends into the right chest without change. There is no pneumothorax.     This report was finalized on 9/5/2020 7:39 AM by Dr. Sammy Richards M.D.        Thoracentesis [[id number]] Collected:  09/04/20 1525    Specimen:  Body Fluid Updated:  09/04/20 1528    Narrative:       ULTRASOUND-GUIDED RIGHT  THORACENTESIS.     HISTORY: Pleural effusion.     After signed informed consent was obtained the patient was prepped and  draped in the usual sterile fashion. Lidocaine was used for local  anesthesia.     Ultrasound guidance was used to place the thoracentesis catheter into  the right pleural fluid collection. 600 mL of yellow-colored fluid was  removed. Sample sent to the lab.     Confirmatory images were obtained.     Patient tolerated the procedure well with no complications.       Impression:       Ultrasound-guided right thoracentesis as described.        This report was finalized on 9/4/2020 3:25 PM by Dr. Yong Damon M.D.       CT Chest Without Contrast [[id number]] Collected:  09/03/20 1601     Updated:  09/03/20 1635    Narrative:       CT CHEST WITHOUT CONTRAST-     Radiation dose reduction techniques were utilized, including automated  exposure control and exposure modulation based on body size.     CLINICAL: Pneumonia, effusion.     FINDINGS: Moderate to large right-sided pleural effusion, a small amount  is tracking into the major fissure. Attenuation compatible with slightly  complex fluid. Moderate to large hiatal hernia is demonstrated which  projects towards the right. Immediately adjacent to the pleural fluid  collection is a curvilinear area of likely compressive atelectasis at  the right lung base. There is some thickening of the septae demonstrated  throughout both lungs with cardiac enlargement suggestive of vascular  congestion. Along the periphery of the left upper and lower lobes there  are small nodular foci of consolidation seen. This could represent  atypical pneumonia. None of these demonstrated along the periphery of  the right lung.     No pericardial effusion. No mediastinal adenopathy seen. 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 Minimal high density material within  the dependent portion of the  gallbladder, probable sludge.     CONCLUSION: There is a right-sided pleural effusion, what appears to be  a likely compressive atelectasis involving the base of the right lower  lobe. There are multifocal nodular areas of infiltrate along the  periphery of the left lung suggesting atypical pneumonia. There is  cardiac enlargement with thickening of the septal lines throughout both  lungs, question underlying vascular congestion. 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 Trace amount of sludge likely within the dependent  portion of the gallbladder.        This report was finalized on 9/3/2020 4:32 PM by Dr. Preston Jackson M.D.       XR Chest PA & Lateral [[id number]] Collected:  09/03/20 1343     Updated:  09/03/20 1419    Narrative:       TWO-VIEW CHEST     HISTORY: Follow-up of right pleural effusion.     FINDINGS: There is cardiomegaly with mild vascular congestion associated  with a small-to-moderate right pleural effusion and atelectasis at the  right base and this is similar to yesterday's exam. There is a  moderately large hiatus hernia extending into the right chest that is  also unchanged.     This report was finalized on 9/3/2020 2:16 PM by Dr. Sammy Richards M.D.       CT Head Without Contrast [[id number]] Collected:  09/02/20 1402     Updated:  09/03/20 0905    Narrative:       CT SCAN OF THE HEAD WITHOUT CONTRAST     CLINICAL HISTORY: Altered mental status.     CT scan of the head was obtained with 3 mm axial images. No intravenous  contrast was administered.     COMPARISON: Comparison is made to previous CT scan of the head dated  11/03/2014.     FINDINGS:     The ventricles, sulci, and cisterns are age appropriate. The gray-white  matter differentiation is within normal limits. The basal ganglia and  thalami are unremarkable. The posterior fossa structures are remarkable  for cerebellar atrophy. Similar findings are seen on the previous  examination of 11/03/2014.     Partial opacification of the left  mastoid air cells is seen.       Impression:          No evidence for acute intracranial pathology.     Atrophic changes are identified within the cerebellum. Similar findings  were noted on the prior exam of 11/03/2014.     Radiation dose reduction techniques were utilized, including automated  exposure control and exposure modulation based on body size.     This report was finalized on 9/3/2020 9:02 AM by Dr. Hernandez Fernandez M.D.       CT Abdomen Pelvis With Contrast [[id number]] Collected:  09/02/20 1152     Updated:  09/02/20 1213    Narrative:       CT SCAN OF THE ABDOMEN AND PELVIS WITH INTRAVENOUS CONTRAST     HISTORY: Abdominal pain and fever.     FINDINGS:  The CT scan was performed as an emergency procedure through  the abdomen and pelvis with intravenous contrast and compared to the  previous CT scans of the abdomen and pelvis dated 07/01/2020. The  following findings are present:  1. There is a large hiatus hernia extending slightly into the right  chest and measuring up to 9.1 cm that is unchanged. There is now a new  small to moderate right pleural effusion with some adjacent dense  atelectasis and probable component of pneumonia.  2. The liver, spleen, pancreas, both adrenal glands, and both kidneys  are unremarkable and unchanged. The gallbladder may contain a few very  tiny gallstones and there is no wall thickening.  3. There is no aortic aneurysm or retroperitoneal lymphadenopathy. The  large and small bowel loops are normal in caliber and show no  inflammatory change. No abnormality is seen in the pelvis.        Radiation dose reduction techniques were utilized, including automated  exposure control and exposure modulation based on body size.     This report was finalized on 9/2/2020 12:10 PM by Dr. Sammy Richards M.D.       XR Chest 1 View [[id number]] Collected:  09/02/20 1045     Updated:  09/02/20 1103    Narrative:       XR CHEST 1 VW-     Clinical: Shortness of breath     COMPARISON  8/12/2020     FINDINGS: Cardiomegaly similar to the previous examination. The left  lung is clear. Sizable hiatal hernia is again demonstrated. There is  some atelectasis demonstrated along its right lateral border. There is a  right-sided pleural effusion with patchy infiltrate/atelectasis right  mid and lower lung zone. No pulmonary edema is demonstrated. The  remainder is unremarkable.     CONCLUSION: There is cardiac enlargement with sizable hiatal hernia  causing atelectasis at the right lung base. There is a right-sided  pleural effusion and infiltrate/atelectasis right mid and lower lung  zone.     This report was finalized on 9/2/2020 11:00 AM by Dr. Preston Jackson M.D.                Pertinent Labs     Results from last 7 days   Lab Units 09/05/20  0720 09/04/20 0702 09/03/20  0710 09/02/20  0936   WBC 10*3/mm3 8.70 7.55 9.72 13.37*   HEMOGLOBIN g/dL 10.5* 10.2* 10.2* 11.0*   PLATELETS 10*3/mm3 326 295 275 280     Results from last 7 days   Lab Units 09/04/20  0702 09/03/20  0710 09/02/20  0936   SODIUM mmol/L 140 137 138   POTASSIUM mmol/L 3.8 3.9 4.2   CHLORIDE mmol/L 102 100 99   CO2 mmol/L 26.8 29.3* 26.0   BUN mg/dL 11 12 12   CREATININE mg/dL 0.64 0.71 0.82   GLUCOSE mg/dL 97 99 131*   Estimated Creatinine Clearance: 59 mL/min (by C-G formula based on SCr of 0.64 mg/dL).  Results from last 7 days   Lab Units 09/02/20  0936   ALBUMIN g/dL 2.90*   BILIRUBIN mg/dL 0.4   ALK PHOS U/L 70   AST (SGOT) U/L 15   ALT (SGPT) U/L 9     Results from last 7 days   Lab Units 09/04/20  0702 09/03/20  0710 09/02/20  0936   CALCIUM mg/dL 8.2* 8.8 8.8   ALBUMIN g/dL  --   --  2.90*   MAGNESIUM mg/dL  --   --  1.6     Results from last 7 days   Lab Units 09/02/20  0936   LIPASE U/L 8*     Results from last 7 days   Lab Units 09/02/20  0936   TROPONIN T ng/mL <0.010   PROBNP pg/mL 1,028.0           Invalid input(s): LDLCALC     Pleural fluid analysis: pH 8.1, albumin 1.7, total protein 3.2, , glucose 72.  33  segs, 56 lymphs, 3 eosinophils.  WBC is 1255, RBC 75.  Gram stain negative.  No growth on culture.    Legionella and strep urinary antigens negative  ABG on admission pH 7.48 PCO2 39 PO2 72 on 2 L    Respiratory viral panel including COVID-19 negative on 9/2/2020    Results from last 7 days   Lab Units 09/04/20  1307 09/02/20  0943 09/02/20  0935   BLOODCX   --  No growth at 4 days No growth at 4 days   BODYFLDCX  No growth at 2 days  --   --        Test Results Pending at Discharge      Order Current Status    Anaerobic Culture - Pleural Fluid, Pleural Cavity In process    Blood Culture - Blood, Arm, Left Preliminary result    Blood Culture - Blood, Arm, Left Preliminary result    Body Fluid Culture - Body Fluid, Pleural Cavity Preliminary result          Discharge Details        Discharge Medications      New Medications      Instructions Start Date   furosemide 40 MG tablet  Commonly known as:  Lasix   20 mg, Oral, Daily         Continue These Medications      Instructions Start Date   acetaminophen-codeine 300-30 MG per tablet  Commonly known as:  TYLENOL #3   2 tablets, Oral, Nightly PRN      amLODIPine 5 MG tablet  Commonly known as:  NORVASC   TAKE 1 TABLET EVERY DAY      aspirin 81 MG EC tablet   81 mg, Oral, Daily      atenolol 50 MG tablet  Commonly known as:  TENORMIN   TAKE 1 TABLET EVERY DAY      celecoxib 200 MG capsule  Commonly known as:  CeleBREX   200 mg, Oral, Daily      fluticasone 50 MCG/ACT nasal spray  Commonly known as:  FLONASE   USE 2 SPRAYS IN EACH NOSTRIL EVERY DAY      lactobacillus acidophilus capsule capsule   1 capsule, Oral, Daily      omeprazole 20 MG capsule  Commonly known as:  priLOSEC   TAKE 1 CAPSULE EVERY DAY      sertraline 50 MG tablet  Commonly known as:  Zoloft   50 mg, Oral, Daily      simvastatin 20 MG tablet  Commonly known as:  ZOCOR   20 mg, Oral, Nightly         Stop These Medications    cetirizine 10 MG tablet  Commonly known as:  zyrTEC     oxybutynin XL 5 MG 24 hr  tablet  Commonly known as:  DITROPAN-XL            Allergies   Allergen Reactions   • Penicillins Unknown (See Comments)     Caused a red streak down her leg.         Discharge Disposition:  Home or Self Care    Discharge Diet:  Diet Order   Procedures   • Diet Dysphagia; IV - Mechanical Soft No Mixed Consistencies; Thin; Small Feedings       Discharge Activity:   Activity Instructions     Activity as Tolerated            CODE STATUS:    Code Status and Medical Interventions:   Ordered at: 09/02/20 1322     Limited Support to NOT Include:    Cardioversion/Defibrillation    Dialysis    Intubation    Artificial Nutrition     Level Of Support Discussed With:    Patient     Code Status:    No CPR     Medical Interventions (Level of Support Prior to Arrest):    Limited       Future Appointments   Date Time Provider Department Center   9/29/2020 10:45 AM Gordo Rajan MD [facility] None     Additional Instructions for the Follow-ups that You Need to Schedule     Referral to Home Health   As directed      [facility] 9/9/20- call to pcp    Order Comments:  [facility] 9/9/20- call to pcp     Face to Face Visit Date:  9/6/2020    Follow-up provider for Plan of Care?:  I treated the patient in an acute care facility and will not continue treatment after discharge.    Follow-up provider:  GORDO RAJAN [[id number]]    Reason/Clinical Findings:  pneumonia; hypoxia; hiatal hernia; leg edema    Describe mobility limitations that make leaving home difficult:  elderly pt with pneumonia; hypoxia; edema    Nursing/Therapeutic Services Requested:  Skilled Nursing Physical Therapy    Skilled nursing orders:  Cardiopulmonary assessments O2 instruction    PT orders:  Strengthening    Frequency:  1 Week 1            Contact information for follow-up providers     Gordo Rajan MD Follow up in 1 week(s).    Specialty:  Internal Medicine  Why:  hypoxia; pleural effusion; pneumonia; anemia  Contact information:  [address] [facility]  [address]  [place] [address]  [phone]                   Contact information for after-discharge care     Durable Medical Equipment     [facility] .    Service:  Durable Medical Equipment  Contact information:  [address]  [place] [address]  [phone]                             Additional Instructions for the Follow-ups that You Need to Schedule     Referral to Home Health   As directed      [facility] 9/9/20- call to pcp    Order Comments:  [facility] 9/9/20- call to pcp     Face to Face Visit Date:  9/6/2020    Follow-up provider for Plan of Care?:  I treated the patient in an acute care facility and will not continue treatment after discharge.    Follow-up provider:  PADMAJA RAJAN [[id number]]    Reason/Clinical Findings:  pneumonia; hypoxia; hiatal hernia; leg edema    Describe mobility limitations that make leaving home difficult:  elderly pt with pneumonia; hypoxia; edema    Nursing/Therapeutic Services Requested:  Skilled Nursing Physical Therapy    Skilled nursing orders:  Cardiopulmonary assessments O2 instruction    PT orders:  Strengthening    Frequency:  1 Week 1           Time Spent on Discharge:  Greater than 35 minutes      Chaparrita Machado MD  [facility]  09/06/20  7:04 PM

## 2020-09-06 NOTE — PROGRESS NOTES
Continued Stay Note  [facility]     Patient Name: Gabbi Brandon  MRN: [id number]  Today's Date: 9/6/2020    Admit Date: 9/2/2020    Discharge Plan     Row Name 09/06/20 1332       Plan    Plan  Home via private auto, [facility] to follow and O2 from [facility]     Patient/Family in Agreement with Plan  yes    Plan Comments  Inbound call from [facility] RN who states pt has order to DC today and needs home O2. Order noted for home O2. Called and spoke with pt's daughter Sara and they use [facility] for DME. In basket referral in [facility] for [facility]. Called Kiara and reviewed clinicals. [facility] will be able to supply pt's O2; tank to be delivered to bedside later today. Updated staff RN. CCP to follow..................JW        Discharge Codes    No documentation.       Expected Discharge Date and Time     Expected Discharge Date Expected Discharge Time    Sep 6, 2020             Kayce Cruz, RN

## 2020-09-06 NOTE — DISCHARGE PLACEMENT REQUEST
"Gabbi Henning (79 y.o. Female)     Date of Birth Social Security Number Address Home Phone MRN    1940  [id number] [address] [phone] [id number]    [employer] Marital Status          [employer]        Admission Date Admission Type Admitting Provider Attending Provider Department, Room/Bed    9/2/20 Emergency Chaparrita Machado MD Hayden, Juliana, MD [address], [address]    Discharge Date Discharge Disposition Discharge Destination         Home or Self Care              Attending Provider:  Chaparrita Machado MD    Allergies:  Penicillins    Isolation:  None   Infection:  None   Code Status:  No CPR    Ht:  160 cm (63\")   Wt:  91 kg (200 lb 9.9 oz)    Admission Cmt:  None   Principal Problem:  Pneumonia of right lower lobe due to infectious organism [J18.9]                 Active Insurance as of 9/2/2020     Primary Coverage     Payor Plan Insurance Group Employer/Plan Group    MEDICARE MEDICARE A & B      Payor Plan Address Payor Plan Phone Number Payor Plan Fax Number Effective Dates    [address] [phone]  9/1/2003 - None Entered    [facility] [id number]       Subscriber Name Subscriber Birth Date Member ID       GABBI HENNING 1940 [id number]           Secondary Coverage     Payor Plan Insurance Group Employer/Plan Group    [id number]     Payor Plan Address Payor Plan Phone Number Payor Plan Fax Number Effective Dates    [address]   12/1/2016 - None Entered    [facility] [id number]       Subscriber Name Subscriber Birth Date Member ID       GABBI HENNING 1940 [id number]                 Emergency Contacts      (Rel.) Home Phone Work Phone Mobile Phone    Sara Hernandez (Daughter) [phone] -- [phone]    Rosa Singh (Daughter) [phone] -- --            {Outbreak/Travel/Exposure Documentation......;  Question Available Choices Patient Response   Outbreak " Screen: Do you currently have a new onset of the following symptoms?        Fever/Chils, Cough, Shortness of air, Loss of taste or smell, No, Unknown  (!) Shortness of Air (09/02/20 0912)   Outbreak Screen: In the last 14 days, have you had contact with anyone who is ill, has show any of the symptoms listed above and/or has been diagnosis with the 2019 Novel Coronavirus? This includes any immediate household members but excludes any patients with whom you have been in contact within your normal work duties wearing proper PPE, if you are a healthcare worker.  Yes, No, Unknown              No;Unknown (09/02/20 0912)   Outbreak Screen: Who was notified?    Free text  (not recorded)   Travel Screen: Have you traveled in the last month? If so, to what country have you traveled? If [place] what state? Yes, No, Unknown  List of all countries  List of all States No (09/02/20 0913)  (not recorded)  (not recorded)   Infection Risk: Do you currently have the following symptoms?  (If cough is selected, the Tuberculosis Screen is performed.) Cough, Fever, Rash, No No (09/02/20 0913)   Tuberculosis Screen: Do you have any of the following Tuberculosis Risks?  · Have you lived or spent time with anyone who had or may have TB?  · Have you lived in or visited any of the following areas for more than one month: [place], [place], [place], [place] or [place], the [place] or [place]?  · Do you have HIV/AIDS?  · Have you lived in or worked in a nursing home, homeless shelter, correctional facility, or substance abuse treatment facility?   · No    If Yes do you have any of the following symptoms? Yes responses display to the right    If Yes, symptoms listed are:  Cough greater than or equal to 3 weeks, Loss of appetite, Unexplained weight loss, Night sweats, Bloody sputum or hemoptysis, Hoarseness, Fever, Fatigue, Chest pain, No (not recorded)  (not recorded)   Exposure Screen: Have you been exposed to any of these contagious  diseases in the last month? Measles, Chickenpox, Meningitis, Pertussis, Whooping Cough, No No (09/02/20 1203)

## 2020-09-06 NOTE — PLAN OF CARE
Problem: Patient Care Overview  Goal: Plan of Care Review  Outcome: Ongoing (interventions implemented as appropriate)  Flowsheets (Taken 9/6/2020 1820)  Plan of Care Reviewed With: patient  Outcome Summary: Pt rested well with no complaints of pain overnight. On RA, up to the bathroom with assist x1. No acute changes this shift. Hopefully D/C today. VSS, will continue to monitor.

## 2020-09-06 NOTE — PLAN OF CARE
Pt with episodes of confusion at times, worse in evening. Oxygen provided due to increased needs with activity. Explained use of oxygen to pt and . Provided discharge instruction and education sheets. Skin intact. Appetite fair, complains of discomfort with eating due to hiatel hernia. Pt discharged to home with home health.

## 2020-09-06 NOTE — TELEPHONE ENCOUNTER
"    Reason for Disposition  • [1] Follow-up call to recent contact AND [2] information only call, no triage required    Additional Information  • Negative: [1] Caller is not with the adult (patient) AND [2] reporting urgent symptoms  • Negative: Lab result questions  • Negative: Medication questions  • Negative: Caller can't be reached by phone  • Negative: Caller has already spoken to PCP or another triager  • Negative: RN needs further essential information from caller in order to complete triage  • Negative: Requesting regular office appointment  • Negative: [1] Caller requesting NON-URGENT health information AND [2] PCP's office is the best resource  • Negative: Health Information question, no triage required and triager able to answer question  • Negative: General information question, no triage required and triager able to answer question  • Negative: Question about upcoming scheduled test, no triage required and triager able to answer question  • Negative: [1] Caller is not with the adult (patient) AND [2] probable NON-URGENT symptoms    Answer Assessment - Initial Assessment Questions  1. REASON FOR CALL or QUESTION: \"What is your reason for calling today?\" or \"How can I best help you?\" or \"What question do you have that I can help answer?\"      Reviewed medications and AVS instruction on  Oxygen at night and as needed.    Protocols used: INFORMATION ONLY CALL - NO TRIAGE-ADULT-      "

## 2020-09-06 NOTE — OUTREACH NOTE
Prep Survey      Responses   [facility] facility patient discharged from?  [facility]   Is LACE score < 7 ?  No   Eligibility  [facility]   Date of Admission  09/02/20   Date of Discharge  09/06/20   Discharge Disposition  Home or Self Care   Discharge diagnosis  Pneumonia   COVID-19 Test Status  Negative   Does the patient have one of the following disease processes/diagnoses(primary or secondary)?  COPD/Pneumonia   Does the patient have Home health ordered?  Yes   What is the Home health agency?   , [facility] to follow    Is there a DME ordered?  Yes   What DME was ordered?  [employer]   Prep survey completed?  Yes          Janina Escalante RN

## 2020-09-07 ENCOUNTER — NURSE TRIAGE (OUTPATIENT)
Dept: CALL CENTER | Facility: HOSPITAL | Age: 80
End: 2020-09-07

## 2020-09-07 ENCOUNTER — TRANSITIONAL CARE MANAGEMENT TELEPHONE ENCOUNTER (OUTPATIENT)
Dept: CALL CENTER | Facility: HOSPITAL | Age: 80
End: 2020-09-07

## 2020-09-07 LAB
BACTERIA SPEC AEROBE CULT: NORMAL
BACTERIA SPEC AEROBE CULT: NORMAL

## 2020-09-07 NOTE — PROGRESS NOTES
Case Management Discharge Note      Final Note: Patient DC'd home with [facility] and O2 from [facility]    Provided Post Acute Provider List?: Refused  N/A Provider List Comment: Plans to return home with family and denies the need for Home Health   Provided Post Acute Provider Quality & Resource List?: Refused  Refused Quality and Resource List Comment: has used [facility]  in the past and would like to use again    Destination      No service has been selected for the patient.      Durable Medical Equipment - Selection Complete      Service Provider Request Status Selected Services Address Phone Number Fax Number    [facility] Selected Durable Medical Equipment [address], [facility] [address] [phone]      Dialysis/Infusion      No service has been selected for the patient.      Home Medical Care      Service Provider Request Status Selected Services Address Phone Number Fax Number    [facility] Selected Home Health Services [address] [place] [address] [address] [phone]       Kayce Cruz RN 9/6/2020 1332    Referral called to [facility] will be able to provide O2--she is aware that lowest documented O2 sat is 89% while ambulating                 Therapy      No service has been selected for the patient.      Community Resources      No service has been selected for the patient.        Transportation Services  Private: Car    Final Discharge Disposition Code: 06 - home with home health care

## 2020-09-07 NOTE — OUTREACH NOTE
Call Center TCM Note      Responses   [facility]   COVID-19 Test Status  Negative   Does the patient have one of the following disease processes/diagnoses(primary or secondary)?  COPD/Pneumonia   Was the primary reason for admission:  Pneumonia   TCM attempt successful?  Yes   Call start time  1725   Call end time  1730   Discharge diagnosis  Pneumonia   Is patient permission given to speak with other caregiver?  Yes   List who call center can speak with  daughter- Sara Ramosfabio   Person spoke with today (if not patient) and relationship  daughter Sara Figueroa reviewed with patient/caregiver?  Yes   Is the patient having any side effects they believe may be caused by any medication additions or changes?  No   Does the patient have all medications ordered at discharge?  Yes   Is the patient taking all medications as directed (includes completed medication regime)?  Yes   Does the patient have a primary care provider?   Yes   Does the patient have an appointment with their PCP or pulmonologist within 7 days of discharge?  Greater than 7 days   Comments regarding PCP  appt with Dr. Mg on 9/29   Nursing Interventions  -- [Advised for patient to be seen one week post discharge. Message routed to PCP office with appt need. ]   Has the patient kept scheduled appointments due by today?  N/A   What is the Home health agency?    [facility]    Has home health visited the patient within 72 hours of discharge?  Yes   What DME was ordered?  [employer]   Has all DME been delivered?  Yes   Pulse Ox monitoring  Intermittent   Pulse Ox device source  Patient   O2 Sat comments  Daughter reports sats 93%.    O2 Sat: education provided  Sat levels, Monitoring frequency, When to seek care   Psychosocial issues?  No   Did the patient receive a copy of their discharge instructions?  Yes   Nursing interventions  Reviewed instructions with patient [daughter]   What is the patient's  perception of their health status since discharge?  Improving   Is the patient/caregiver able to teach back the hierarchy of who to call/visit for symptoms/problems? PCP, Specialist, Home health nurse, Urgent Care, ED, 911  Yes   Is the patient/caregiver able to teach back signs and symptoms of worsening condition:  Shortness of breath, Chest pain   Is the patient/caregiver able to teach back importance of completing antibiotic course of treatment?  -- [Patient did not go home on antibiotics. ]   TCM call completed?  Yes          Janina Ramirez RN    9/7/2020, 17:30

## 2020-09-09 ENCOUNTER — OFFICE VISIT (OUTPATIENT)
Dept: INTERNAL MEDICINE | Facility: CLINIC | Age: 80
End: 2020-09-09

## 2020-09-09 VITALS
SYSTOLIC BLOOD PRESSURE: 98 MMHG | WEIGHT: 178 LBS | BODY MASS INDEX: 31.54 KG/M2 | DIASTOLIC BLOOD PRESSURE: 58 MMHG | TEMPERATURE: 98.2 F | OXYGEN SATURATION: 96 % | HEART RATE: 64 BPM | HEIGHT: 63 IN

## 2020-09-09 DIAGNOSIS — J18.9 PNEUMONIA OF RIGHT LOWER LOBE DUE TO INFECTIOUS ORGANISM: Primary | ICD-10-CM

## 2020-09-09 DIAGNOSIS — K44.9 HIATAL HERNIA: ICD-10-CM

## 2020-09-09 DIAGNOSIS — J90 PLEURAL EFFUSION ON RIGHT: ICD-10-CM

## 2020-09-09 DIAGNOSIS — K21.9 GASTROESOPHAGEAL REFLUX DISEASE WITHOUT ESOPHAGITIS: ICD-10-CM

## 2020-09-09 LAB
BACTERIA FLD CULT: NORMAL
BACTERIA SPEC ANAEROBE CULT: NORMAL
GRAM STN SPEC: NORMAL
GRAM STN SPEC: NORMAL

## 2020-09-09 PROCEDURE — 99496 TRANSJ CARE MGMT HIGH F2F 7D: CPT | Performed by: NURSE PRACTITIONER

## 2020-09-09 RX ORDER — MELOXICAM 15 MG/1
15 TABLET ORAL DAILY
COMMUNITY
Start: 2020-09-03 | End: 2020-12-21 | Stop reason: SDUPTHER

## 2020-09-09 NOTE — PROGRESS NOTES
Transitional Care Follow Up Visit  Subjective     Gabbi Brandon is a 79 y.o. female who presents for a transitional care management visit.    Within 48 business hours after discharge our office contacted her via telephone to coordinate her care and needs.      I reviewed and discussed the details of that call along with the discharge summary, hospital problems, inpatient lab results, inpatient diagnostic studies, and consultation reports with Gabbi.     Current outpatient and discharge medications have been reconciled for the patient.  Reviewed by: ANA LUISA Estes      Date of TCM Phone Call 9/6/2020   [facility]   Date of Admission 9/2/2020   Date of Discharge 9/6/2020   Discharge Disposition Home or Self Care     Risk for Readmission (LACE) Score: 13 (9/6/2020  6:00 AM)      History of Present Illness   Course During Hospital Stay:  She was admitted to [facility] on 9/2/2020 secondary to fever, cough, confusion, and weakness. She was suspected to have aspiration pneumonia. She completed five day course of cefepime. She was also noted to have right pleural effusion and had thoracentesis, which 600 cc of fluid was removed. She had negative viral panel. She was evaluated by ST (declines VFSS) and was placed on regular diet and thin liquids. She was also noted to hypoxia with exercise oximetry and discharged home with  O2, which she is only using a night time. CT abd/pelvis: gallstones noted; large hiatal hernia; CT head with no acute findings, stable chronic changes. CBC with stable anemia. Respiratory panel normal. She is current with home health  (PT and RN).      The following portions of the patient's history were reviewed and updated as appropriate: allergies, current medications, past family history, past medical history, past social history, past surgical history and problem list.    Review of Systems   Constitutional: Positive for fatigue (slowly improving). Negative for activity change,  appetite change and fever.   Respiratory: Positive for cough (dry). Negative for shortness of breath and wheezing.    Cardiovascular: Negative for chest pain, palpitations and leg swelling.   Gastrointestinal:        Reflux        Objective   Physical Exam   Constitutional: She is oriented to person, place, and time. She appears well-developed and well-nourished.   HENT:   Head: Normocephalic.   Nose: Nose normal.   Neck: Carotid bruit is not present. No thyroid mass and no thyromegaly present.   Cardiovascular: Regular rhythm and normal heart sounds. Exam reveals no S3 and no S4.   No murmur heard.  Repeat bp left arm 108/68  Trace edema in right lower leg    Pulmonary/Chest: Effort normal and breath sounds normal. She has no decreased breath sounds. She has no wheezes. She has no rhonchi. She has no rales.   Right thoracentesis site with no erythema   Musculoskeletal: She exhibits no edema.   Neurological: She is alert and oriented to person, place, and time. Gait (she is wheelchair) abnormal.   Skin: Skin is warm and dry.   Psychiatric: She has a normal mood and affect. Her speech is normal.       Assessment/Plan   Gabbi was seen today for transitional care management.    Diagnoses and all orders for this visit:    Pneumonia of right lower lobe due to infectious organism  Comments:  will need repeat imaging in upcoming appointment     Pleural effusion on right  Comments:  improved     Gastroesophageal reflux disease without esophagitis  Comments:  taking prilosec     Hiatal hernia  Comments:  surgery not recommend per thoracic surgeon    Current outpatient and discharge medications have been reconciled for the patient. She will need repeat imaging and CBC at upcoming appointment.   Reviewed by: ANA LUISA Estes  She is accompanied by her daughter, Veronica.   I reviewed hospital discharge summary. Also reviewed labs and imaging with patient and daughter.

## 2020-09-15 ENCOUNTER — READMISSION MANAGEMENT (OUTPATIENT)
Dept: CALL CENTER | Facility: HOSPITAL | Age: 80
End: 2020-09-15

## 2020-09-15 NOTE — OUTREACH NOTE
COPD/PN Week 2 Survey      Responses   [facility] patient discharged from?  [place]   COVID-19 Test Status  Negative   Does the patient have one of the following disease processes/diagnoses(primary or secondary)?  COPD/Pneumonia   Was the primary reason for admission:  Pneumonia   Week 2 attempt successful?  Yes   Call start time  1435   Call end time  1438   Discharge diagnosis  Pneumonia   Person spoke with today (if not patient) and relationship  daughter Sara Figueroa reviewed with patient/caregiver?  Yes   Is the patient taking all medications as directed (includes completed medication regime)?  Yes   Comments regarding PCP  2nd f/u appt with Dr. Mg on 9/29   Has the patient kept scheduled appointments due by today?  Yes   What is the Home health agency?   [facility]    What is the patient's perception of their health status since discharge?  Improving   Nursing Interventions  Nurse provided patient education   Are the patient's immunizations up to date?   Yes   If the patient is a current smoker, are they able to teach back resources for cessation?  -- [Nonsmoker]   Week 2 call completed?  Yes          Mallorie Logan RN

## 2020-09-22 DIAGNOSIS — G89.4 CHRONIC PAIN SYNDROME: Primary | ICD-10-CM

## 2020-09-22 RX ORDER — ACETAMINOPHEN AND CODEINE PHOSPHATE 300; 30 MG/1; MG/1
2 TABLET ORAL NIGHTLY PRN
Qty: 90 TABLET | Refills: 2 | Status: SHIPPED | OUTPATIENT
Start: 2020-09-22 | End: 2020-10-12 | Stop reason: SDUPTHER

## 2020-09-22 NOTE — TELEPHONE ENCOUNTER
Caller: Sara Hernandez    Relationship: Emergency Contact    Best call back number: [phone]     Medication needed:   Requested Prescriptions     Pending Prescriptions Disp Refills   • acetaminophen-codeine (TYLENOL #3) 300-30 MG per tablet        Sig: Take 2 tablets by mouth At Night As Needed for Moderate Pain .         Does the patient have less than a 3 day supply:  [x] Yes  [] No    What is the patient's preferred pharmacy: [address] [phone] [facility] [phone] FX           PLEASE ADVISE ONCE THIS HAS BEEN COMPLETED

## 2020-09-23 ENCOUNTER — TELEPHONE (OUTPATIENT)
Dept: INTERNAL MEDICINE | Facility: CLINIC | Age: 80
End: 2020-09-23

## 2020-09-23 NOTE — TELEPHONE ENCOUNTER
Lisa with [facility] called in and stated patient has  increased pain and edema in bilateral leg .         Please call lisa at [phone]

## 2020-09-24 ENCOUNTER — READMISSION MANAGEMENT (OUTPATIENT)
Dept: CALL CENTER | Facility: HOSPITAL | Age: 80
End: 2020-09-24

## 2020-09-24 ENCOUNTER — OFFICE VISIT (OUTPATIENT)
Dept: INTERNAL MEDICINE | Facility: CLINIC | Age: 80
End: 2020-09-24

## 2020-09-24 ENCOUNTER — APPOINTMENT (OUTPATIENT)
Dept: WOMENS IMAGING | Facility: HOSPITAL | Age: 80
End: 2020-09-24

## 2020-09-24 VITALS
SYSTOLIC BLOOD PRESSURE: 120 MMHG | OXYGEN SATURATION: 96 % | BODY MASS INDEX: 31.71 KG/M2 | TEMPERATURE: 99.4 F | HEIGHT: 63 IN | DIASTOLIC BLOOD PRESSURE: 58 MMHG | WEIGHT: 179 LBS | HEART RATE: 66 BPM

## 2020-09-24 DIAGNOSIS — R06.89 DECREASED BREATH SOUNDS: ICD-10-CM

## 2020-09-24 DIAGNOSIS — R60.0 PEDAL EDEMA: ICD-10-CM

## 2020-09-24 DIAGNOSIS — Z87.01 HISTORY OF PNEUMONIA: Primary | ICD-10-CM

## 2020-09-24 PROCEDURE — 71046 X-RAY EXAM CHEST 2 VIEWS: CPT | Performed by: RADIOLOGY

## 2020-09-24 PROCEDURE — 99214 OFFICE O/P EST MOD 30 MIN: CPT | Performed by: NURSE PRACTITIONER

## 2020-09-24 PROCEDURE — 71046 X-RAY EXAM CHEST 2 VIEWS: CPT | Performed by: NURSE PRACTITIONER

## 2020-09-24 NOTE — PROGRESS NOTES
Subjective     Gabbi Brandon is a 80 y.o. female.         She presents for bilateral lower extremity edema that was noticed by home health nurse yesterday who recommended she call her doctor's office for further instruction.  Her PCP, Dr. Mg, asked for her to be seen.  Pt is new to me. Patient reports that she has a chronic history of BLE edema that comes and goes.  She manages it well with compression stockings which she wears into the office today, elevation, low-salt diet.  Patient also takesLasix 40 mg daily.  Patient is new to me.  She does have an extensive PMH including HTN, HLD, and pneumonia 3 times this year in which she had to be admitted each time.  Patient reports she is doing very well today and is not really bothered by the lower extremity edema as it has been much worse in the past.  She does report chronic intermittent pain to bilateral lower extremities.  Pain is not prominent today and there is no redness noted.  She is accompanied by her daughter.  She does deny shortness of breath, chest pain, palpitations, fatigue, increased weakness.  Patient does have temperature of 99.4 in the office today.       The following portions of the patient's history were reviewed and updated as appropriate: allergies, current medications, past social history and problem list.    Past Medical History:   Diagnosis Date   • Arthritis    • GERD (gastroesophageal reflux disease)    • Hyperlipidemia    • Hypertension    • Incontinent of urine    • PNA (pneumonia)    • Seasonal allergies    • UTI (urinary tract infection)          Current Outpatient Medications:   •  acetaminophen-codeine (TYLENOL #3) 300-30 MG per tablet, Take 2 tablets by mouth At Night As Needed for Moderate Pain ., Disp: 90 tablet, Rfl: 2  •  amLODIPine (NORVASC) 5 MG tablet, TAKE 1 TABLET EVERY DAY, Disp: 90 tablet, Rfl: 3  •  aspirin 81 MG EC tablet, Take 81 mg by mouth Daily., Disp: , Rfl:   •  atenolol (TENORMIN) 50 MG tablet, TAKE  "1 TABLET EVERY DAY (Patient taking differently: Take 25 mg by mouth Daily.), Disp: 90 tablet, Rfl: 3  •  celecoxib (CeleBREX) 200 MG capsule, Take 200 mg by mouth Daily., Disp: , Rfl:   •  fluticasone (FLONASE) 50 MCG/ACT nasal spray, USE 2 SPRAYS IN EACH NOSTRIL EVERY DAY, Disp: 48 g, Rfl: 2  •  furosemide (Lasix) 40 MG tablet, Take 0.5 tablets by mouth Daily., Disp: 30 tablet, Rfl: 0  •  lactobacillus acidophilus (RISAQUAD) capsule capsule, Take 1 capsule by mouth Daily., Disp: 14 each, Rfl: 0  •  meloxicam (MOBIC) 15 MG tablet, Take 15 mg by mouth Daily., Disp: , Rfl:   •  omeprazole (priLOSEC) 20 MG capsule, TAKE 1 CAPSULE EVERY DAY, Disp: 90 capsule, Rfl: 3  •  sertraline (Zoloft) 50 MG tablet, Take 1 tablet by mouth Daily., Disp: 90 tablet, Rfl: 3  •  simvastatin (ZOCOR) 20 MG tablet, TAKE 1 TABLET BY MOUTH EVERY NIGHT., Disp: 90 tablet, Rfl: 3    Allergies   Allergen Reactions   • Penicillins Unknown (See Comments)     Caused a red streak down her leg.       Review of Systems   Constitutional: Negative for fatigue and fever.   Respiratory: Negative for cough and shortness of breath.    Cardiovascular: Positive for leg swelling. Negative for chest pain and palpitations.   Musculoskeletal: Positive for arthralgias.   Skin: Negative for color change, rash and wound.       Objective     /58   Pulse 66   Temp 99.4 °F (37.4 °C)   Ht 160 cm (63\")   Wt 81.2 kg (179 lb)   SpO2 96%   BMI 31.71 kg/m²   Wt Readings from Last 3 Encounters:   09/24/20 81.2 kg (179 lb)   09/09/20 80.7 kg (178 lb)   09/06/20 91 kg (200 lb 9.9 oz)     Temp Readings from Last 3 Encounters:   09/24/20 99.4 °F (37.4 °C)   09/09/20 98.2 °F (36.8 °C)   09/06/20 98.3 °F (36.8 °C) (Oral)     BP Readings from Last 3 Encounters:   09/24/20 120/58   09/09/20 98/58   09/06/20 103/62     Pulse Readings from Last 3 Encounters:   09/24/20 66   09/09/20 64   09/06/20 67       Physical Exam  Vitals signs reviewed.   Constitutional:       " Appearance: She is well-developed.   HENT:      Head: Normocephalic and atraumatic.   Cardiovascular:      Rate and Rhythm: Normal rate and regular rhythm.      Heart sounds: Normal heart sounds. No murmur.      Comments: Minimal swelling to BLE  Pulmonary:      Effort: Pulmonary effort is normal. No respiratory distress.      Breath sounds: Normal breath sounds.   Musculoskeletal: Normal range of motion.   Skin:     General: Skin is warm and dry.   Neurological:      Mental Status: She is alert.   Psychiatric:         Behavior: Behavior normal.         Thought Content: Thought content normal.         Judgment: Judgment normal.         Assessment/Plan     Gabbi was seen today for edema, pneumonia and generalized body aches.    Diagnoses and all orders for this visit:    History of pneumonia  -     XR Chest 2 View    Decreased breath sounds  -     XR Chest 2 View    Pedal edema  Comments:  minimal, continue compression, elevation, low salt diet, continue to monitor    No changes to medications, continue lasix.    She has f/u in 5 days with PCP.    A Chest X-Ray was ordered. My reading of this film is no acute findings. (No comparison films available: pending review by Radiologist.)

## 2020-09-24 NOTE — OUTREACH NOTE
COPD/PN Week 3 Survey      Responses   [facility]   COVID-19 Test Status  Negative   Does the patient have one of the following disease processes/diagnoses(primary or secondary)?  COPD/Pneumonia   Was the primary reason for admission:  Pneumonia   Week 3 attempt successful?  Yes   Call start time  1651   Call end time  1653   Discharge diagnosis  Pneumonia   Person spoke with today (if not patient) and relationship  daughter Sara Figueroa reviewed with patient/caregiver?  Yes   Is the patient having any side effects they believe may be caused by any medication additions or changes?  No   Does the patient have all medications ordered at discharge?  Yes   Is the patient taking all medications as directed (includes completed medication regime)?  Yes   Does the patient have a primary care provider?   Yes   Has the patient kept scheduled appointments due by today?  Yes   What is the Home health agency?   [facility]    Has home health visited the patient within 72 hours of discharge?  Yes   Pulse Ox monitoring  Intermittent   Pulse Ox device source  Patient   Psychosocial issues?  No   Did the patient receive a copy of their discharge instructions?  Yes   Nursing interventions  Reviewed instructions with patient   What is the patient's perception of their health status since discharge?  Improving   Nursing Interventions  Nurse provided patient education   Is the patient/caregiver able to teach back the hierarchy of who to call/visit for symptoms/problems? PCP, Specialist, Home health nurse, Urgent Care, ED, 911  Yes   Additional teach back comments  daughter states pt feeling better every day,    Is the patient/caregiver able to teach back signs and symptoms of worsening condition:  Fever/chills, Shortness of breath, Chest pain   Is the patient/caregiver able to teach back importance of completing antibiotic course of treatment?  Yes   Week 3 call completed?  Yes          Jeimy MORALES  Pawel, RN

## 2020-09-27 ENCOUNTER — NURSE TRIAGE (OUTPATIENT)
Dept: CALL CENTER | Facility: HOSPITAL | Age: 80
End: 2020-09-27

## 2020-09-27 NOTE — TELEPHONE ENCOUNTER
Advised caller is always best to start with lowest dose with dealing with elderly patients.  But is she is used to taking two then that is fine.  I understand she takes it at night and she gave during the day due to pain and her OTC arthritis medication was not helping. She will use this advice in the future since the patient has already received two pills before nurse called her back.     Reason for Disposition  • Caller has medication question, adult has minor symptoms, caller declines triage, AND triager answers question    Additional Information  • Negative: Drug overdose and triager unable to answer question  • Negative: Caller requesting information unrelated to medicine  • Negative: Caller requesting a prescription for Strep throat and has a positive culture result  • Negative: Rash while taking a medication or within 3 days of stopping it  • Negative: Immunization reaction suspected  • Negative: [1] Asthma and [2] having symptoms of asthma (cough, wheezing, etc.)  • Negative: [1] Influenza symptoms AND [2] anti-viral med prescription request, such as Tamiflu  • Negative: [1] Symptom of illness (e.g., headache, abdominal pain, earache, vomiting) AND [2] more than mild  • Negative: MORE THAN A DOUBLE DOSE of a prescription or over-the-counter (OTC) drug  • Negative: [1] DOUBLE DOSE (an extra dose or lesser amount) of over-the-counter (OTC) drug AND [2] any symptoms (e.g., dizziness, nausea, pain, sleepiness)  • Negative: [1] DOUBLE DOSE (an extra dose or lesser amount) of prescription drug AND [2] any symptoms (e.g., dizziness, nausea, pain, sleepiness)  • Negative: Took another person's prescription drug  • Negative: [1] DOUBLE DOSE (an extra dose or lesser amount) of prescription drug AND [2] NO symptoms (Exception: a double dose of antibiotics)  • Negative: Diabetes drug error or overdose (e.g., took wrong type of insulin or took extra dose)  • Negative: [1] Request for URGENT new prescription or refill of  "\"essential\" medication (i.e., likelihood of harm to patient if not taken) AND [2] triager unable to fill per unit policy  • Negative: [1] Prescription not at pharmacy AND [2] was prescribed by PCP recently  • Negative: [1] Pharmacy calling with prescription questions AND [2] triager unable to answer question  • Negative: [1] Caller has URGENT medication question about med that PCP or specialist prescribed AND [2] triager unable to answer question  • Negative: [1] Caller has NON-URGENT medication question about med that PCP prescribed AND [2] triager unable to answer question  • Negative: [1] Caller requesting a NON-URGENT new prescription or refill AND [2] triager unable to refill per unit policy  • Negative: [1] Caller has medication question about med not prescribed by PCP AND [2] triager unable to answer question (e.g., compatibility with other med, storage)  • Negative: Caller requesting a CONTROLLED substance prescription refill (e.g., narcotics, ADHD medicines)  • Negative: Caller wants to use a complementary or alternative medicine  • Negative: [1] Prescription prescribed recently is not at pharmacy AND [2] triager has access to patient's EMR AND [3] prescription is recorded in the EMR  • Negative: [1] DOUBLE DOSE (an extra dose or lesser amount) of over-the-counter (OTC) drug AND [2] NO symptoms  • Negative: [1] DOUBLE DOSE (an extra dose or lesser amount) of antibiotic drug AND [2] NO symptoms  • Negative: Caller has medication question only, adult not sick, and triager answers question    Answer Assessment - Initial Assessment Questions  1.   NAME of MEDICATION: \"What medicine are you calling about?\"      Tylenol and codeine  2.   QUESTION: \"What is your question?\"      How often can she have them and how many, advised to follow advice on bottle, it reads to take 1 or 2 at night,  But she was hurting today with arthritis so gave her two just now, then thought that might be too much.    3.   PRESCRIBING " "HCP: \"Who prescribed it?\" Reason: if prescribed by specialist, call should be referred to that group.      [facility]  4. SYMPTOMS: \"Do you have any symptoms?\"      Arthritis  5. SEVERITY: If symptoms are present, ask \"Are they mild, moderate or severe?\"      7  6.  PREGNANCY:  \"Is there any chance that you are pregnant?\" \"When was your last menstrual period?\"      NA    Protocols used: MEDICATION QUESTION CALL-ADULT-      "

## 2020-09-29 ENCOUNTER — TELEPHONE (OUTPATIENT)
Dept: INTERNAL MEDICINE | Facility: CLINIC | Age: 80
End: 2020-09-29

## 2020-09-29 ENCOUNTER — OFFICE VISIT (OUTPATIENT)
Dept: INTERNAL MEDICINE | Facility: CLINIC | Age: 80
End: 2020-09-29

## 2020-09-29 VITALS
DIASTOLIC BLOOD PRESSURE: 68 MMHG | WEIGHT: 179 LBS | HEIGHT: 63 IN | BODY MASS INDEX: 31.71 KG/M2 | SYSTOLIC BLOOD PRESSURE: 104 MMHG | TEMPERATURE: 97.3 F

## 2020-09-29 DIAGNOSIS — J90 PLEURAL EFFUSION ON RIGHT: ICD-10-CM

## 2020-09-29 DIAGNOSIS — J18.9 PNEUMONIA OF RIGHT LOWER LOBE DUE TO INFECTIOUS ORGANISM: ICD-10-CM

## 2020-09-29 DIAGNOSIS — N18.2 STAGE 2 CHRONIC KIDNEY DISEASE: ICD-10-CM

## 2020-09-29 DIAGNOSIS — R60.0 BILATERAL LOWER EXTREMITY EDEMA: ICD-10-CM

## 2020-09-29 DIAGNOSIS — E78.01 FAMILIAL HYPERCHOLESTEROLEMIA: ICD-10-CM

## 2020-09-29 DIAGNOSIS — I10 ESSENTIAL HYPERTENSION: Primary | ICD-10-CM

## 2020-09-29 PROCEDURE — 99214 OFFICE O/P EST MOD 30 MIN: CPT | Performed by: INTERNAL MEDICINE

## 2020-09-29 RX ORDER — AZITHROMYCIN 250 MG/1
250 TABLET, FILM COATED ORAL DAILY
Qty: 7 TABLET | Refills: 1 | Status: SHIPPED | OUTPATIENT
Start: 2020-09-29 | End: 2020-11-17

## 2020-09-29 RX ORDER — FUROSEMIDE 40 MG/1
20 TABLET ORAL DAILY
Qty: 90 TABLET | Refills: 3 | Status: SHIPPED | OUTPATIENT
Start: 2020-09-29 | End: 2021-04-20 | Stop reason: SDUPTHER

## 2020-09-29 NOTE — PROGRESS NOTES
"Subjective   Gabbi Brandon is a 80 y.o. female.  Patient presents with chief complaint of recent pneumonia with parapneumonic effusion on the right side, intermittent fevers, essential hypertension, hyperlipidemia, stage II chronic kidney disease, bilateral lower extremity edema, here for follow-up evaluation and treatment.  She is actually doing somewhat better has been afebrile for 2 days but as a precaution I am going to continue her on azithromycin for an additional week.  I am also going to continue her Lasix 20 mg/day which appears to work substantially better than the hydrochlorothiazide that she had previously.  She had her parapneumonic effusion drained in the hospital and according to most recent chest x-rays that has not reaccumulated.      /68   Temp 97.3 °F (36.3 °C)   Ht 160 cm (62.99\")   Wt 81.2 kg (179 lb)   BMI 31.72 kg/m²     Body mass index is 31.72 kg/m².    History of Present Illness status post right pneumonia with parapneumonic effusion doing better at this time    The following portions of the patient's history were reviewed and updated as appropriate: allergies, current medications, past family history, past medical history, past social history, past surgical history and problem list.    Review of Systems   Constitutional: Negative.    HENT: Negative.    Respiratory: Negative.    Cardiovascular: Positive for palpitations and leg swelling.   Gastrointestinal: Negative.    Musculoskeletal: Positive for arthralgias and gait problem.       Objective   Physical Exam  Vitals signs and nursing note reviewed. Exam conducted with a chaperone present.   Constitutional:       Appearance: Normal appearance.   HENT:      Head: Normocephalic and atraumatic.   Neck:      Musculoskeletal: Normal range of motion and neck supple.   Cardiovascular:      Rate and Rhythm: Normal rate and regular rhythm.      Heart sounds: Murmur present.   Pulmonary:      Effort: Pulmonary effort is normal.      " Breath sounds: Normal breath sounds.   Abdominal:      General: Abdomen is flat.      Palpations: Abdomen is soft.   Musculoskeletal:      Comments: Patient is in a wheelchair today due to lower extremity swelling and weakness   Neurological:      General: No focal deficit present.      Mental Status: She is alert and oriented to person, place, and time.   Psychiatric:         Mood and Affect: Mood normal.         Behavior: Behavior normal.           Assessment/Plan   Gabbi was seen today for edema and pneumonia.    Diagnoses and all orders for this visit:    Essential hypertension  Comments:  Blood pressure well controlled no changes needed    Familial hypercholesterolemia  Comments:  No change in therapy at this time    Pneumonia of right lower lobe due to infectious organism  Comments:  Doing better but I am going to add a Z-Butch for the next 7 days    Pleural effusion on right  Comments:  Recently drained in the hospital and apparently has not reaccumulated    Stage 2 chronic kidney disease  Comments:  Continue to monitor his renal function    Bilateral lower extremity edema  Comments:  Lasix 20 mg/day    Other orders  -     azithromycin (Zithromax Z-Butch) 250 MG tablet; Take 1 tablet by mouth Daily. Take 2 tablets by mouth the first day, then 1 tablet daily for 4 days.  -     furosemide (Lasix) 40 MG tablet; Take 0.5 tablets by mouth Daily.

## 2020-09-29 NOTE — TELEPHONE ENCOUNTER
[facility] CALLED, [facility]    [phone]

## 2020-10-05 ENCOUNTER — READMISSION MANAGEMENT (OUTPATIENT)
Dept: CALL CENTER | Facility: HOSPITAL | Age: 80
End: 2020-10-05

## 2020-10-05 NOTE — OUTREACH NOTE
COPD/PN Week 4 Survey      Responses   [facility] patient discharged from?  [place]   Does the patient have one of the following disease processes/diagnoses(primary or secondary)?  COPD/Pneumonia   Was the primary reason for admission:  Pneumonia   Week 4 attempt successful?  No          Azalia Bourne RN

## 2020-10-12 ENCOUNTER — TELEPHONE (OUTPATIENT)
Dept: INTERNAL MEDICINE | Facility: CLINIC | Age: 80
End: 2020-10-12

## 2020-10-12 DIAGNOSIS — G89.4 CHRONIC PAIN SYNDROME: ICD-10-CM

## 2020-10-12 NOTE — TELEPHONE ENCOUNTER
PATIENT'S DAUGHTER CALLED REQUESTING A NEW PRESCRIPTION FOR   acetaminophen-codeine (TYLENOL #3) 300-30 MG per tablet    TAKE 3 TIMES A DAY.     SHE IS OUT OF MEDICATION    SHE USUALLY TAKES 2  A DAY. SHE HAS BEEN TAKING 3 TIMES A DAY SHE FEELS BETTER WHEN SHE TAKES 3 TABLETS A DAY TO HELP WITH HER ARTHRITIS.    CALL BACK NUMBER [phone]    [place] [address] [facility] [phone] [facility] [phone]   [phone]

## 2020-10-13 RX ORDER — ACETAMINOPHEN AND CODEINE PHOSPHATE 300; 30 MG/1; MG/1
1 TABLET ORAL 3 TIMES DAILY
Qty: 90 TABLET | Refills: 1 | Status: SHIPPED | OUTPATIENT
Start: 2020-10-13 | End: 2020-10-13 | Stop reason: SDUPTHER

## 2020-10-13 RX ORDER — ACETAMINOPHEN AND CODEINE PHOSPHATE 300; 30 MG/1; MG/1
1 TABLET ORAL EVERY 6 HOURS PRN
Qty: 90 TABLET | Refills: 1 | Status: SHIPPED | OUTPATIENT
Start: 2020-10-13 | End: 2020-10-26 | Stop reason: SDUPTHER

## 2020-10-26 ENCOUNTER — TELEPHONE (OUTPATIENT)
Dept: INTERNAL MEDICINE | Facility: CLINIC | Age: 80
End: 2020-10-26

## 2020-10-26 ENCOUNTER — OFFICE VISIT (OUTPATIENT)
Dept: INTERNAL MEDICINE | Facility: CLINIC | Age: 80
End: 2020-10-26

## 2020-10-26 VITALS
OXYGEN SATURATION: 98 % | DIASTOLIC BLOOD PRESSURE: 68 MMHG | SYSTOLIC BLOOD PRESSURE: 122 MMHG | HEART RATE: 71 BPM | TEMPERATURE: 99.1 F | HEIGHT: 63 IN | BODY MASS INDEX: 31.81 KG/M2

## 2020-10-26 DIAGNOSIS — E78.01 FAMILIAL HYPERCHOLESTEROLEMIA: ICD-10-CM

## 2020-10-26 DIAGNOSIS — I10 ESSENTIAL HYPERTENSION: ICD-10-CM

## 2020-10-26 DIAGNOSIS — I95.0 IDIOPATHIC HYPOTENSION: Primary | ICD-10-CM

## 2020-10-26 DIAGNOSIS — K21.9 GASTROESOPHAGEAL REFLUX DISEASE WITHOUT ESOPHAGITIS: ICD-10-CM

## 2020-10-26 DIAGNOSIS — G89.4 CHRONIC PAIN SYNDROME: ICD-10-CM

## 2020-10-26 DIAGNOSIS — M19.90 ARTHRITIS: ICD-10-CM

## 2020-10-26 PROCEDURE — 99214 OFFICE O/P EST MOD 30 MIN: CPT | Performed by: INTERNAL MEDICINE

## 2020-10-26 RX ORDER — ACETAMINOPHEN AND CODEINE PHOSPHATE 300; 30 MG/1; MG/1
1 TABLET ORAL EVERY 6 HOURS PRN
Qty: 120 TABLET | Refills: 3 | Status: ON HOLD | OUTPATIENT
Start: 2020-10-26 | End: 2021-01-01 | Stop reason: SDUPTHER

## 2020-10-26 NOTE — PROGRESS NOTES
"Subjective   Gabbi Brandon is a 80 y.o. female.  Patient presents with chief complaint of alternating hypo and hypertension, chronic low back pain knee pain and ankle pain that leaves her virtually immobile, gastroesophageal reflux disease is well controlled, hypertension controlled today, hyperlipidemia no change in therapy and depression that seems to be ruled out doing relatively well we discussed her options for pain medication and I recommended increasing her Tylenol 3 to 4 times per day.      /68 (BP Location: Left arm, Patient Position: Sitting, Cuff Size: Adult)   Pulse 71   Temp 99.1 °F (37.3 °C) (Oral)   Ht 159.8 cm (62.9\")   SpO2 98%   BMI 31.81 kg/m²     Body mass index is 31.81 kg/m².    History of Present Illness worsening joint pain  The following portions of the patient's history were reviewed and updated as appropriate: allergies, current medications, past family history, past medical history, past social history, past surgical history and problem list.    Review of Systems   Constitutional: Positive for fatigue.   HENT: Negative.    Respiratory: Negative.    Cardiovascular: Negative.    Gastrointestinal: Negative.    Genitourinary: Negative.    Musculoskeletal: Positive for arthralgias and gait problem.   Hematological: Negative.    Psychiatric/Behavioral: Negative.        Objective   Physical Exam  Vitals signs and nursing note reviewed.   Constitutional:       Appearance: Normal appearance.   Neck:      Musculoskeletal: Normal range of motion and neck supple.   Cardiovascular:      Rate and Rhythm: Normal rate and regular rhythm.      Heart sounds: Murmur present.   Pulmonary:      Effort: Pulmonary effort is normal.      Breath sounds: Normal breath sounds.   Abdominal:      General: Abdomen is flat.      Palpations: Abdomen is soft.   Musculoskeletal:      Comments: End-stage kidney and hip degeneration   Skin:     General: Skin is warm and dry.   Neurological:      General: No " focal deficit present.      Mental Status: She is alert and oriented to person, place, and time.   Psychiatric:         Mood and Affect: Mood normal.         Behavior: Behavior normal.           Assessment/Plan   Diagnoses and all orders for this visit:    1. Idiopathic hypotension (Primary)  Comments:  Doing relatively well today    2. Familial hypercholesterolemia  Comments:  No change in therapy at this time    3. Essential hypertension  Comments:  Well-controlled today    4. Gastroesophageal reflux disease without esophagitis  Comments:  Well-controlled no changes needed    5. Arthritis  Comments:  Change pain medication regimen    6. Chronic pain syndrome  Comments:  Tylenol 3 1 every 4-6 hours dispense 120  Orders:  -     acetaminophen-codeine (TYLENOL #3) 300-30 MG per tablet; Take 1 tablet by mouth Every 6 (Six) Hours As Needed for Moderate Pain .  Dispense: 120 tablet; Refill: 3

## 2020-10-26 NOTE — TELEPHONE ENCOUNTER
PATIENTS DAUGHTER CALLED IN TO SEE IF AN ORDER CAN BE FAXED OVER TO [facility] TO HAVE THE OXYGEN AND HOSPITAL BED PICKED UP FROM PATIENTS HOME.  THEY WILL NOT PICK IT UP WITH A DOCTOR SAYING PATIENT NO LONGER NEEDS.      PLEASE FAX OVER -[phone]    PLEASE CALL ONCE THIS HAS BEEN COMPLETED     [phone]

## 2020-10-27 NOTE — TELEPHONE ENCOUNTER
Wrote out orders to discontinue oxygen and hospital bed and for joey to retreive from her home. Given to Dr Mg for signature. Faxing to Joey

## 2020-11-17 ENCOUNTER — OFFICE VISIT (OUTPATIENT)
Dept: INTERNAL MEDICINE | Facility: CLINIC | Age: 80
End: 2020-11-17

## 2020-11-17 VITALS
OXYGEN SATURATION: 97 % | DIASTOLIC BLOOD PRESSURE: 64 MMHG | BODY MASS INDEX: 30.48 KG/M2 | HEIGHT: 63 IN | TEMPERATURE: 97.9 F | WEIGHT: 172 LBS | HEART RATE: 68 BPM | SYSTOLIC BLOOD PRESSURE: 124 MMHG

## 2020-11-17 DIAGNOSIS — L89.302 PRESSURE INJURY OF BUTTOCK, STAGE 2, UNSPECIFIED LATERALITY (HCC): ICD-10-CM

## 2020-11-17 DIAGNOSIS — R35.0 FREQUENCY OF URINATION: Primary | ICD-10-CM

## 2020-11-17 LAB
BILIRUB UR QL STRIP: NEGATIVE
CLARITY UR: CLEAR
COLOR UR: YELLOW
GLUCOSE UR STRIP-MCNC: NEGATIVE MG/DL
HGB UR QL STRIP.AUTO: NEGATIVE
KETONES UR QL STRIP: NEGATIVE
LEUKOCYTE ESTERASE UR QL STRIP.AUTO: NEGATIVE
NITRITE UR QL STRIP: NEGATIVE
PH UR STRIP.AUTO: 7 [PH] (ref 5–8)
PROT UR QL STRIP: NEGATIVE
SP GR UR STRIP: 1.02 (ref 1–1.03)
UROBILINOGEN UR QL STRIP: NORMAL

## 2020-11-17 PROCEDURE — 99213 OFFICE O/P EST LOW 20 MIN: CPT | Performed by: NURSE PRACTITIONER

## 2020-11-17 PROCEDURE — 81003 URINALYSIS AUTO W/O SCOPE: CPT | Performed by: NURSE PRACTITIONER

## 2020-11-17 RX ORDER — FOAM BANDAGE 2" X 2"
1 BANDAGE TOPICAL DAILY
Qty: 30 EACH | Refills: 0 | Status: SHIPPED | OUTPATIENT
Start: 2020-11-17 | End: 2021-01-01 | Stop reason: HOSPADM

## 2020-11-17 NOTE — PROGRESS NOTES
Subjective     Gabbi Brandon is a 80 y.o. female.         She presents for 2 pressure ulcers x 6 weeks to buttock, urinary frequency chronically, and chronic leg swelling. The leg swelling and frequency isn't any worse than usual. She does take Lasix 20 mg daily for pedal edema. She does have to wear depends due to her urinary frequency. She has had to spots to her bottom for the last 6 weeks or so. A nurse that comes to the house reports these sores are stage 2 pressure ulcers. Pt reports some minimal discomfort to buttock.  Patient is fairly sedentary and sits a lot during the day and sleeps in her recliner at night.       The following portions of the patient's history were reviewed and updated as appropriate: allergies, current medications, past social history and problem list.    Past Medical History:   Diagnosis Date   • Arthritis    • GERD (gastroesophageal reflux disease)    • Hyperlipidemia    • Hypertension    • Incontinent of urine    • PNA (pneumonia)    • Seasonal allergies    • UTI (urinary tract infection)          Current Outpatient Medications:   •  acetaminophen-codeine (TYLENOL #3) 300-30 MG per tablet, Take 1 tablet by mouth Every 6 (Six) Hours As Needed for Moderate Pain ., Disp: 120 tablet, Rfl: 3  •  amLODIPine (NORVASC) 5 MG tablet, TAKE 1 TABLET EVERY DAY, Disp: 90 tablet, Rfl: 3  •  aspirin 81 MG EC tablet, Take 81 mg by mouth Daily., Disp: , Rfl:   •  atenolol (TENORMIN) 50 MG tablet, TAKE 1 TABLET EVERY DAY (Patient taking differently: Take 25 mg by mouth Daily.), Disp: 90 tablet, Rfl: 3  •  celecoxib (CeleBREX) 200 MG capsule, Take 200 mg by mouth Daily., Disp: , Rfl:   •  fluticasone (FLONASE) 50 MCG/ACT nasal spray, USE 2 SPRAYS IN EACH NOSTRIL EVERY DAY, Disp: 48 g, Rfl: 2  •  furosemide (Lasix) 40 MG tablet, Take 0.5 tablets by mouth Daily., Disp: 90 tablet, Rfl: 3  •  lactobacillus acidophilus (RISAQUAD) capsule capsule, Take 1 capsule by mouth Daily., Disp: 14 each, Rfl: 0  •  " meloxicam (MOBIC) 15 MG tablet, Take 15 mg by mouth Daily., Disp: , Rfl:   •  omeprazole (priLOSEC) 20 MG capsule, TAKE 1 CAPSULE EVERY DAY, Disp: 90 capsule, Rfl: 3  •  sertraline (Zoloft) 50 MG tablet, Take 1 tablet by mouth Daily., Disp: 90 tablet, Rfl: 3  •  simvastatin (ZOCOR) 20 MG tablet, TAKE 1 TABLET BY MOUTH EVERY NIGHT., Disp: 90 tablet, Rfl: 3  •  azithromycin (Zithromax Z-Butch) 250 MG tablet, Take 1 tablet by mouth Daily. Take 2 tablets by mouth the first day, then 1 tablet daily for 4 days., Disp: 7 tablet, Rfl: 1    Allergies   Allergen Reactions   • Penicillins Unknown (See Comments)     Caused a red streak down her leg.       Review of Systems   Constitutional: Negative for fatigue and fever.   Respiratory: Negative for shortness of breath.    Cardiovascular: Positive for leg swelling (chronic). Negative for chest pain and palpitations.   Genitourinary: Positive for frequency. Negative for difficulty urinating and dysuria.   Skin: Positive for color change and wound.       Objective     /64   Pulse 68   Temp 97.9 °F (36.6 °C)   Ht 160 cm (63\")   Wt 78 kg (172 lb)   SpO2 97%   BMI 30.47 kg/m²   Wt Readings from Last 3 Encounters:   11/17/20 78 kg (172 lb)   09/29/20 81.2 kg (179 lb)   09/24/20 81.2 kg (179 lb)     Temp Readings from Last 3 Encounters:   11/17/20 97.9 °F (36.6 °C)   10/26/20 99.1 °F (37.3 °C) (Oral)   09/29/20 97.3 °F (36.3 °C)     BP Readings from Last 3 Encounters:   11/17/20 124/64   10/26/20 122/68   09/29/20 104/68     Pulse Readings from Last 3 Encounters:   11/17/20 68   10/26/20 71   09/24/20 66       Physical Exam  Vitals signs reviewed.   Constitutional:       Appearance: She is well-developed.   HENT:      Head: Normocephalic and atraumatic.   Cardiovascular:      Rate and Rhythm: Normal rate and regular rhythm.      Heart sounds: Normal heart sounds. No murmur.      Comments: Mild pedal edema, compression stockings intact  Pulmonary:      Effort: Pulmonary " effort is normal. No respiratory distress.      Breath sounds: Normal breath sounds.   Musculoskeletal: Normal range of motion.   Skin:     General: Skin is warm and dry.      Comments: 2 pressure ulcers to mid buttocks, one is about 1cm in diameter and the other is smaller at a 0.25 cm in diameter, stage 2 with minimal skin breakdown   Neurological:      Mental Status: She is alert.   Psychiatric:         Behavior: Behavior normal.         Thought Content: Thought content normal.         Judgment: Judgment normal.         Assessment/Plan     Diagnoses and all orders for this visit:    1. Frequency of urination (Primary)  -     Urinalysis With Culture If Indicated -; Future  -     Urinalysis With Culture If Indicated - Urine, Clean Catch    2. Pressure injury of buttock, stage 2, unspecified laterality (CMS/HCC)  -     Cancel: Dressing Border Mepilex KAMALJIT 6X6IN  -     silver sulfadiazine (Silvadene) 1 % cream; Apply  topically to the appropriate area as directed Daily.  Dispense: 25 g; Refill: 0  -     Wound Dressings (Foam Dressing Circular Border) pads; Apply 1 pad topically Daily.  Dispense: 30 each; Refill: 0    UA was normal, discussed with patient.  Urinary frequency is likely related to the Lasix that she has to take for her stable pedal edema.  She can continue to use compression stockings.    Patient will apply Silvadene once daily to her 2 pressure ulcers on her mid buttocks.  She will also use Mepilex is daily.  Patient was instructed to shift weight frequently when sitting and to increase activity overall.    Her daughter accompanies her today and she verbalizes understanding of our plan.     Continue to monitor pressure ulcer and return to office for worsening symptoms.

## 2020-12-21 DIAGNOSIS — L89.302 PRESSURE INJURY OF BUTTOCK, STAGE 2, UNSPECIFIED LATERALITY (HCC): ICD-10-CM

## 2020-12-21 RX ORDER — SILVER SULFADIAZINE 1 %
CREAM (GRAM) TOPICAL
Qty: 25 G | Refills: 0 | Status: SHIPPED | OUTPATIENT
Start: 2020-12-21 | End: 2021-03-22

## 2020-12-21 RX ORDER — CELECOXIB 200 MG/1
CAPSULE ORAL
Qty: 90 CAPSULE | Refills: 0 | Status: SHIPPED | OUTPATIENT
Start: 2020-12-21 | End: 2021-01-01 | Stop reason: HOSPADM

## 2020-12-22 RX ORDER — CELECOXIB 200 MG/1
CAPSULE ORAL
Qty: 90 CAPSULE | Refills: 0 | OUTPATIENT
Start: 2020-12-22

## 2020-12-28 RX ORDER — FLUTICASONE PROPIONATE 50 MCG
2 SPRAY, SUSPENSION (ML) NASAL DAILY
Qty: 48 G | Refills: 2 | Status: SHIPPED | OUTPATIENT
Start: 2020-12-28 | End: 2021-04-20 | Stop reason: SDUPTHER

## 2020-12-28 NOTE — TELEPHONE ENCOUNTER
Caller: Sara Hernandez    Relationship: Emergency Contact    Best call back number: [phone]    Medication needed:   Requested Prescriptions     Pending Prescriptions Disp Refills   • fluticasone (FLONASE) 50 MCG/ACT nasal spray 48 g 2     Si sprays by Each Nare route Daily.     When do you need the refill by:     What details did the patient provide when requesting the medication: PT IS OUT  OF THIS    Does the patient have less than a 3 day supply:  [x] Yes  [] No    What is the patient's preferred pharmacy:      [facility]  [address] [facility]

## 2020-12-29 ENCOUNTER — APPOINTMENT (OUTPATIENT)
Dept: GENERAL RADIOLOGY | Facility: HOSPITAL | Age: 80
End: 2020-12-29

## 2020-12-29 ENCOUNTER — HOSPITAL ENCOUNTER (INPATIENT)
Facility: HOSPITAL | Age: 80
LOS: 1 days | Discharge: NURSING, REHAB OR LONG-TERM CARE | End: 2021-01-01
Attending: EMERGENCY MEDICINE | Admitting: INTERNAL MEDICINE

## 2020-12-29 DIAGNOSIS — G89.4 CHRONIC PAIN SYNDROME: ICD-10-CM
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
## 2020-12-29 DIAGNOSIS — S72.001A CLOSED DISPLACED FRACTURE OF RIGHT FEMORAL NECK (HCC): Primary | ICD-10-CM

## 2020-12-29 DIAGNOSIS — S72.001A CLOSED FRACTURE OF NECK OF RIGHT FEMUR, INITIAL ENCOUNTER (HCC): ICD-10-CM

## 2020-12-29 LAB
ALBUMIN SERPL-MCNC: 2.9 G/DL (ref 3.5–5.2)
ALBUMIN/GLOB SERPL: 0.7 G/DL
ALP SERPL-CCNC: 69 U/L (ref 39–117)
ALT SERPL W P-5'-P-CCNC: 6 U/L (ref 1–33)
ANION GAP SERPL CALCULATED.3IONS-SCNC: 10.4 MMOL/L (ref 5–15)
AST SERPL-CCNC: 15 U/L (ref 1–32)
BASOPHILS # BLD AUTO: 0.03 10*3/MM3 (ref 0–0.2)
BASOPHILS NFR BLD AUTO: 0.3 % (ref 0–1.5)
BILIRUB SERPL-MCNC: 0.3 MG/DL (ref 0–1.2)
BUN SERPL-MCNC: 15 MG/DL (ref 8–23)
BUN/CREAT SERPL: 19 (ref 7–25)
CALCIUM SPEC-SCNC: 8.6 MG/DL (ref 8.6–10.5)
CHLORIDE SERPL-SCNC: 97 MMOL/L (ref 98–107)
CO2 SERPL-SCNC: 29.6 MMOL/L (ref 22–29)
CREAT SERPL-MCNC: 0.79 MG/DL (ref 0.57–1)
DEPRECATED RDW RBC AUTO: 48.9 FL (ref 37–54)
EOSINOPHIL # BLD AUTO: 0.05 10*3/MM3 (ref 0–0.4)
EOSINOPHIL NFR BLD AUTO: 0.5 % (ref 0.3–6.2)
ERYTHROCYTE [DISTWIDTH] IN BLOOD BY AUTOMATED COUNT: 15.9 % (ref 12.3–15.4)
GFR SERPL CREATININE-BSD FRML MDRD: 70 ML/MIN/1.73
GLOBULIN UR ELPH-MCNC: 4.3 GM/DL
GLUCOSE SERPL-MCNC: 122 MG/DL (ref 65–99)
HCT VFR BLD AUTO: 32.8 % (ref 34–46.6)
HGB BLD-MCNC: 9.9 G/DL (ref 12–15.9)
IMM GRANULOCYTES # BLD AUTO: 0.08 10*3/MM3 (ref 0–0.05)
IMM GRANULOCYTES NFR BLD AUTO: 0.8 % (ref 0–0.5)
INR PPP: 1.05 (ref 0.9–1.1)
LYMPHOCYTES # BLD AUTO: 1.25 10*3/MM3 (ref 0.7–3.1)
LYMPHOCYTES NFR BLD AUTO: 13.1 % (ref 19.6–45.3)
MCH RBC QN AUTO: 25.5 PG (ref 26.6–33)
MCHC RBC AUTO-ENTMCNC: 30.2 G/DL (ref 31.5–35.7)
MCV RBC AUTO: 84.5 FL (ref 79–97)
MONOCYTES # BLD AUTO: 0.81 10*3/MM3 (ref 0.1–0.9)
MONOCYTES NFR BLD AUTO: 8.5 % (ref 5–12)
NEUTROPHILS NFR BLD AUTO: 7.35 10*3/MM3 (ref 1.7–7)
NEUTROPHILS NFR BLD AUTO: 76.8 % (ref 42.7–76)
NRBC BLD AUTO-RTO: 0 /100 WBC (ref 0–0.2)
PLATELET # BLD AUTO: 301 10*3/MM3 (ref 140–450)
PMV BLD AUTO: 9.4 FL (ref 6–12)
POTASSIUM SERPL-SCNC: 3.6 MMOL/L (ref 3.5–5.2)
PROT SERPL-MCNC: 7.2 G/DL (ref 6–8.5)
PROTHROMBIN TIME: 13.5 SECONDS (ref 11.7–14.2)
RBC # BLD AUTO: 3.88 10*6/MM3 (ref 3.77–5.28)
SARS-COV-2 ORF1AB RESP QL NAA+PROBE: NOT DETECTED
SODIUM SERPL-SCNC: 137 MMOL/L (ref 136–145)
WBC # BLD AUTO: 9.57 10*3/MM3 (ref 3.4–10.8)

## 2020-12-29 PROCEDURE — 85025 COMPLETE CBC W/AUTO DIFF WBC: CPT | Performed by: NURSE PRACTITIONER

## 2020-12-29 PROCEDURE — U0004 COV-19 TEST NON-CDC HGH THRU: HCPCS | Performed by: NURSE PRACTITIONER

## 2020-12-29 PROCEDURE — 80053 COMPREHEN METABOLIC PANEL: CPT | Performed by: NURSE PRACTITIONER

## 2020-12-29 PROCEDURE — 25010000002 ONDANSETRON PER 1 MG: Performed by: NURSE PRACTITIONER

## 2020-12-29 PROCEDURE — 73502 X-RAY EXAM HIP UNI 2-3 VIEWS: CPT

## 2020-12-29 PROCEDURE — 25010000002 MORPHINE PER 10 MG: Performed by: NURSE PRACTITIONER

## 2020-12-29 PROCEDURE — G0378 HOSPITAL OBSERVATION PER HR: HCPCS

## 2020-12-29 PROCEDURE — 85610 PROTHROMBIN TIME: CPT | Performed by: NURSE PRACTITIONER

## 2020-12-29 PROCEDURE — 71045 X-RAY EXAM CHEST 1 VIEW: CPT

## 2020-12-29 PROCEDURE — 99284 EMERGENCY DEPT VISIT MOD MDM: CPT

## 2020-12-29 RX ORDER — SODIUM CHLORIDE 9 MG/ML
75 INJECTION, SOLUTION INTRAVENOUS CONTINUOUS
Status: DISCONTINUED | OUTPATIENT
Start: 2020-12-29 | End: 2021-01-01 | Stop reason: HOSPADM

## 2020-12-29 RX ORDER — L.ACID,PARA/B.BIFIDUM/S.THERM 8B CELL
1 CAPSULE ORAL DAILY
Status: DISCONTINUED | OUTPATIENT
Start: 2020-12-30 | End: 2021-01-01 | Stop reason: HOSPADM

## 2020-12-29 RX ORDER — ATORVASTATIN CALCIUM 20 MG/1
10 TABLET, FILM COATED ORAL DAILY
Status: DISCONTINUED | OUTPATIENT
Start: 2020-12-30 | End: 2021-01-01 | Stop reason: HOSPADM

## 2020-12-29 RX ORDER — SODIUM CHLORIDE 0.9 % (FLUSH) 0.9 %
10 SYRINGE (ML) INJECTION AS NEEDED
Status: DISCONTINUED | OUTPATIENT
Start: 2020-12-29 | End: 2021-01-01 | Stop reason: HOSPADM

## 2020-12-29 RX ORDER — HYDROCODONE BITARTRATE AND ACETAMINOPHEN 5; 325 MG/1; MG/1
1 TABLET ORAL EVERY 6 HOURS PRN
Status: DISCONTINUED | OUTPATIENT
Start: 2020-12-29 | End: 2021-01-01 | Stop reason: HOSPADM

## 2020-12-29 RX ORDER — SODIUM CHLORIDE 0.9 % (FLUSH) 0.9 %
10 SYRINGE (ML) INJECTION EVERY 12 HOURS SCHEDULED
Status: DISCONTINUED | OUTPATIENT
Start: 2020-12-29 | End: 2021-01-01 | Stop reason: HOSPADM

## 2020-12-29 RX ORDER — FLUTICASONE PROPIONATE 50 MCG
2 SPRAY, SUSPENSION (ML) NASAL DAILY
Status: DISCONTINUED | OUTPATIENT
Start: 2020-12-30 | End: 2021-01-01 | Stop reason: HOSPADM

## 2020-12-29 RX ORDER — ONDANSETRON 2 MG/ML
4 INJECTION INTRAMUSCULAR; INTRAVENOUS EVERY 6 HOURS PRN
Status: DISCONTINUED | OUTPATIENT
Start: 2020-12-29 | End: 2021-01-01 | Stop reason: HOSPADM

## 2020-12-29 RX ORDER — FUROSEMIDE 20 MG/1
20 TABLET ORAL DAILY
Status: DISCONTINUED | OUTPATIENT
Start: 2020-12-30 | End: 2021-01-01 | Stop reason: HOSPADM

## 2020-12-29 RX ORDER — MORPHINE SULFATE 2 MG/ML
4 INJECTION, SOLUTION INTRAMUSCULAR; INTRAVENOUS ONCE
Status: COMPLETED | OUTPATIENT
Start: 2020-12-29 | End: 2020-12-29

## 2020-12-29 RX ORDER — MORPHINE SULFATE 2 MG/ML
4 INJECTION, SOLUTION INTRAMUSCULAR; INTRAVENOUS EVERY 4 HOURS PRN
Status: DISCONTINUED | OUTPATIENT
Start: 2020-12-29 | End: 2021-01-01 | Stop reason: HOSPADM

## 2020-12-29 RX ORDER — ATENOLOL 25 MG/1
25 TABLET ORAL DAILY
Status: DISCONTINUED | OUTPATIENT
Start: 2020-12-30 | End: 2021-01-01 | Stop reason: HOSPADM

## 2020-12-29 RX ORDER — PANTOPRAZOLE SODIUM 40 MG/1
40 TABLET, DELAYED RELEASE ORAL EVERY MORNING
Status: DISCONTINUED | OUTPATIENT
Start: 2020-12-30 | End: 2021-01-01 | Stop reason: HOSPADM

## 2020-12-29 RX ORDER — ONDANSETRON 2 MG/ML
4 INJECTION INTRAMUSCULAR; INTRAVENOUS ONCE
Status: COMPLETED | OUTPATIENT
Start: 2020-12-29 | End: 2020-12-29

## 2020-12-29 RX ORDER — AMLODIPINE BESYLATE 5 MG/1
5 TABLET ORAL DAILY
Status: DISCONTINUED | OUTPATIENT
Start: 2020-12-30 | End: 2021-01-01 | Stop reason: HOSPADM

## 2020-12-29 RX ADMIN — SODIUM CHLORIDE 500 ML: 9 INJECTION, SOLUTION INTRAVENOUS at 16:32

## 2020-12-29 RX ADMIN — SODIUM CHLORIDE, PRESERVATIVE FREE 10 ML: 5 INJECTION INTRAVENOUS at 20:44

## 2020-12-29 RX ADMIN — SODIUM CHLORIDE 75 ML/HR: 9 INJECTION, SOLUTION INTRAVENOUS at 20:44

## 2020-12-29 RX ADMIN — ONDANSETRON 4 MG: 2 INJECTION INTRAMUSCULAR; INTRAVENOUS at 15:45

## 2020-12-29 RX ADMIN — MORPHINE SULFATE 4 MG: 2 INJECTION, SOLUTION INTRAMUSCULAR; INTRAVENOUS at 17:00

## 2020-12-29 RX ADMIN — MORPHINE SULFATE 4 MG: 2 INJECTION, SOLUTION INTRAMUSCULAR; INTRAVENOUS at 15:46

## 2020-12-30 ENCOUNTER — APPOINTMENT (OUTPATIENT)
Dept: GENERAL RADIOLOGY | Facility: HOSPITAL | Age: 80
End: 2020-12-30

## 2020-12-30 ENCOUNTER — ANESTHESIA (OUTPATIENT)
Dept: PERIOP | Facility: HOSPITAL | Age: 80
End: 2020-12-30

## 2020-12-30 ENCOUNTER — ANESTHESIA EVENT (OUTPATIENT)
Dept: PERIOP | Facility: HOSPITAL | Age: 80
End: 2020-12-30

## 2020-12-30 LAB
ALBUMIN SERPL-MCNC: 2.6 G/DL (ref 3.5–5.2)
ALBUMIN/GLOB SERPL: 0.6 G/DL
ALP SERPL-CCNC: 67 U/L (ref 39–117)
ALT SERPL W P-5'-P-CCNC: 8 U/L (ref 1–33)
ANION GAP SERPL CALCULATED.3IONS-SCNC: 9.7 MMOL/L (ref 5–15)
AST SERPL-CCNC: 14 U/L (ref 1–32)
BASOPHILS # BLD AUTO: 0.01 10*3/MM3 (ref 0–0.2)
BASOPHILS NFR BLD AUTO: 0.1 % (ref 0–1.5)
BILIRUB SERPL-MCNC: 0.4 MG/DL (ref 0–1.2)
BUN SERPL-MCNC: 12 MG/DL (ref 8–23)
BUN/CREAT SERPL: 19 (ref 7–25)
CALCIUM SPEC-SCNC: 8.8 MG/DL (ref 8.6–10.5)
CHLORIDE SERPL-SCNC: 98 MMOL/L (ref 98–107)
CO2 SERPL-SCNC: 30.3 MMOL/L (ref 22–29)
CREAT SERPL-MCNC: 0.63 MG/DL (ref 0.57–1)
DEPRECATED RDW RBC AUTO: 47.5 FL (ref 37–54)
EOSINOPHIL # BLD AUTO: 0.05 10*3/MM3 (ref 0–0.4)
EOSINOPHIL NFR BLD AUTO: 0.6 % (ref 0.3–6.2)
ERYTHROCYTE [DISTWIDTH] IN BLOOD BY AUTOMATED COUNT: 15.8 % (ref 12.3–15.4)
GFR SERPL CREATININE-BSD FRML MDRD: 90 ML/MIN/1.73
GLOBULIN UR ELPH-MCNC: 4.7 GM/DL
GLUCOSE SERPL-MCNC: 98 MG/DL (ref 65–99)
HCT VFR BLD AUTO: 30.5 % (ref 34–46.6)
HGB BLD-MCNC: 9.5 G/DL (ref 12–15.9)
IMM GRANULOCYTES # BLD AUTO: 0.04 10*3/MM3 (ref 0–0.05)
IMM GRANULOCYTES NFR BLD AUTO: 0.5 % (ref 0–0.5)
LYMPHOCYTES # BLD AUTO: 1.44 10*3/MM3 (ref 0.7–3.1)
LYMPHOCYTES NFR BLD AUTO: 16.6 % (ref 19.6–45.3)
MCH RBC QN AUTO: 26 PG (ref 26.6–33)
MCHC RBC AUTO-ENTMCNC: 31.1 G/DL (ref 31.5–35.7)
MCV RBC AUTO: 83.3 FL (ref 79–97)
MONOCYTES # BLD AUTO: 0.71 10*3/MM3 (ref 0.1–0.9)
MONOCYTES NFR BLD AUTO: 8.2 % (ref 5–12)
NEUTROPHILS NFR BLD AUTO: 6.45 10*3/MM3 (ref 1.7–7)
NEUTROPHILS NFR BLD AUTO: 74 % (ref 42.7–76)
NRBC BLD AUTO-RTO: 0.1 /100 WBC (ref 0–0.2)
PLATELET # BLD AUTO: 306 10*3/MM3 (ref 140–450)
PMV BLD AUTO: 9.6 FL (ref 6–12)
POTASSIUM SERPL-SCNC: 4 MMOL/L (ref 3.5–5.2)
PROT SERPL-MCNC: 7.3 G/DL (ref 6–8.5)
QT INTERVAL: 402 MS
RBC # BLD AUTO: 3.66 10*6/MM3 (ref 3.77–5.28)
SODIUM SERPL-SCNC: 138 MMOL/L (ref 136–145)
WBC # BLD AUTO: 8.7 10*3/MM3 (ref 3.4–10.8)

## 2020-12-30 PROCEDURE — 73501 X-RAY EXAM HIP UNI 1 VIEW: CPT

## 2020-12-30 PROCEDURE — 0SR906Z REPLACEMENT OF RIGHT HIP JOINT WITH OXIDIZED ZIRCONIUM ON POLYETHYLENE SYNTHETIC SUBSTITUTE, OPEN APPROACH: ICD-10-PCS | Performed by: ORTHOPAEDIC SURGERY

## 2020-12-30 PROCEDURE — 25010000002 SUCCINYLCHOLINE PER 20 MG: Performed by: NURSE ANESTHETIST, CERTIFIED REGISTERED

## 2020-12-30 PROCEDURE — 25010000002 FENTANYL CITRATE (PF) 100 MCG/2ML SOLUTION: Performed by: NURSE ANESTHETIST, CERTIFIED REGISTERED

## 2020-12-30 PROCEDURE — G0378 HOSPITAL OBSERVATION PER HR: HCPCS

## 2020-12-30 PROCEDURE — 25010000003 BUPIVACAINE LIPOSOME 1.3 % SUSPENSION 20 ML VIAL: Performed by: ORTHOPAEDIC SURGERY

## 2020-12-30 PROCEDURE — 25010000003 CEFAZOLIN IN DEXTROSE 2-4 GM/100ML-% SOLUTION: Performed by: NURSE ANESTHETIST, CERTIFIED REGISTERED

## 2020-12-30 PROCEDURE — 93005 ELECTROCARDIOGRAM TRACING: CPT | Performed by: INTERNAL MEDICINE

## 2020-12-30 PROCEDURE — 25010000002 DEXAMETHASONE PER 1 MG: Performed by: NURSE ANESTHETIST, CERTIFIED REGISTERED

## 2020-12-30 PROCEDURE — 25010000002 PROPOFOL 10 MG/ML EMULSION: Performed by: NURSE ANESTHETIST, CERTIFIED REGISTERED

## 2020-12-30 PROCEDURE — 25010000002 HYDROMORPHONE PER 4 MG: Performed by: NURSE ANESTHETIST, CERTIFIED REGISTERED

## 2020-12-30 PROCEDURE — 85025 COMPLETE CBC W/AUTO DIFF WBC: CPT | Performed by: INTERNAL MEDICINE

## 2020-12-30 PROCEDURE — 80053 COMPREHEN METABOLIC PANEL: CPT | Performed by: INTERNAL MEDICINE

## 2020-12-30 PROCEDURE — C9290 INJ, BUPIVACAINE LIPOSOME: HCPCS | Performed by: ORTHOPAEDIC SURGERY

## 2020-12-30 PROCEDURE — C1776 JOINT DEVICE (IMPLANTABLE): HCPCS | Performed by: ORTHOPAEDIC SURGERY

## 2020-12-30 PROCEDURE — 76000 FLUOROSCOPY <1 HR PHYS/QHP: CPT

## 2020-12-30 PROCEDURE — 93010 ELECTROCARDIOGRAM REPORT: CPT | Performed by: INTERNAL MEDICINE

## 2020-12-30 PROCEDURE — 25010000002 FENTANYL CITRATE (PF) 100 MCG/2ML SOLUTION: Performed by: ANESTHESIOLOGY

## 2020-12-30 DEVICE — OR3O DUAL MOBILITY XLPE INSERT 22/40
Type: IMPLANTABLE DEVICE | Site: HIP | Status: FUNCTIONAL
Brand: OR3O DUAL MOBILITY

## 2020-12-30 DEVICE — OR3O DUAL MOBILITY LINER 40/52
Type: IMPLANTABLE DEVICE | Site: HIP | Status: FUNCTIONAL
Brand: OR3O DUAL MOBILITY

## 2020-12-30 DEVICE — REFLECTION SPHERICAL HEAD SCREW 25MM
Type: IMPLANTABLE DEVICE | Site: HIP | Status: FUNCTIONAL
Brand: REFLECTION

## 2020-12-30 DEVICE — R3 3 HOLE ACETABULAR SHELL 52MM
Type: IMPLANTABLE DEVICE | Site: HIP | Status: FUNCTIONAL
Brand: R3 ACETABULAR

## 2020-12-30 DEVICE — POLARSTEM STANDARD NON-CEMENTED                                    WITH TI/HA 2
Type: IMPLANTABLE DEVICE | Site: HIP | Status: FUNCTIONAL
Brand: POLARSTEM

## 2020-12-30 DEVICE — REFLECTION SPHERICAL HEAD SCREW 35MM
Type: IMPLANTABLE DEVICE | Site: HIP | Status: FUNCTIONAL
Brand: REFLECTION

## 2020-12-30 DEVICE — OXINIUM FEMORAL HEAD 12/14 TAPER                                    22 MM +0
Type: IMPLANTABLE DEVICE | Site: HIP | Status: FUNCTIONAL
Brand: OXINIUM

## 2020-12-30 DEVICE — DEV CONTRL TISS STRATAFIX SPIRAL PDS PLS CT1 2-0 45CM: Type: IMPLANTABLE DEVICE | Site: HIP | Status: FUNCTIONAL

## 2020-12-30 DEVICE — IMPLANTABLE DEVICE: Type: IMPLANTABLE DEVICE | Site: HIP | Status: FUNCTIONAL

## 2020-12-30 RX ORDER — HYDROCODONE BITARTRATE AND ACETAMINOPHEN 7.5; 325 MG/1; MG/1
1 TABLET ORAL ONCE AS NEEDED
Status: DISCONTINUED | OUTPATIENT
Start: 2020-12-30 | End: 2020-12-30 | Stop reason: HOSPADM

## 2020-12-30 RX ORDER — FENTANYL CITRATE 50 UG/ML
50 INJECTION, SOLUTION INTRAMUSCULAR; INTRAVENOUS
Status: COMPLETED | OUTPATIENT
Start: 2020-12-30 | End: 2020-12-30

## 2020-12-30 RX ORDER — LABETALOL HYDROCHLORIDE 5 MG/ML
5 INJECTION, SOLUTION INTRAVENOUS
Status: DISCONTINUED | OUTPATIENT
Start: 2020-12-30 | End: 2020-12-30 | Stop reason: HOSPADM

## 2020-12-30 RX ORDER — SUCCINYLCHOLINE CHLORIDE 20 MG/ML
INJECTION INTRAMUSCULAR; INTRAVENOUS AS NEEDED
Status: DISCONTINUED | OUTPATIENT
Start: 2020-12-30 | End: 2020-12-30 | Stop reason: SURG

## 2020-12-30 RX ORDER — ROCURONIUM BROMIDE 10 MG/ML
INJECTION, SOLUTION INTRAVENOUS AS NEEDED
Status: DISCONTINUED | OUTPATIENT
Start: 2020-12-30 | End: 2020-12-30 | Stop reason: SURG

## 2020-12-30 RX ORDER — SODIUM CHLORIDE, SODIUM LACTATE, POTASSIUM CHLORIDE, CALCIUM CHLORIDE 600; 310; 30; 20 MG/100ML; MG/100ML; MG/100ML; MG/100ML
9 INJECTION, SOLUTION INTRAVENOUS CONTINUOUS
Status: DISCONTINUED | OUTPATIENT
Start: 2020-12-30 | End: 2021-01-01 | Stop reason: HOSPADM

## 2020-12-30 RX ORDER — DIPHENHYDRAMINE HYDROCHLORIDE 50 MG/ML
12.5 INJECTION INTRAMUSCULAR; INTRAVENOUS
Status: DISCONTINUED | OUTPATIENT
Start: 2020-12-30 | End: 2020-12-30 | Stop reason: HOSPADM

## 2020-12-30 RX ORDER — ONDANSETRON 2 MG/ML
4 INJECTION INTRAMUSCULAR; INTRAVENOUS ONCE AS NEEDED
Status: DISCONTINUED | OUTPATIENT
Start: 2020-12-30 | End: 2020-12-30 | Stop reason: HOSPADM

## 2020-12-30 RX ORDER — FLUMAZENIL 0.1 MG/ML
0.2 INJECTION INTRAVENOUS AS NEEDED
Status: DISCONTINUED | OUTPATIENT
Start: 2020-12-30 | End: 2020-12-30 | Stop reason: HOSPADM

## 2020-12-30 RX ORDER — LIDOCAINE HYDROCHLORIDE 20 MG/ML
INJECTION, SOLUTION INFILTRATION; PERINEURAL AS NEEDED
Status: DISCONTINUED | OUTPATIENT
Start: 2020-12-30 | End: 2020-12-30 | Stop reason: SURG

## 2020-12-30 RX ORDER — ASPIRIN 81 MG/1
81 TABLET ORAL DAILY
Status: DISCONTINUED | OUTPATIENT
Start: 2020-12-31 | End: 2021-01-01 | Stop reason: HOSPADM

## 2020-12-30 RX ORDER — PROPOFOL 10 MG/ML
VIAL (ML) INTRAVENOUS AS NEEDED
Status: DISCONTINUED | OUTPATIENT
Start: 2020-12-30 | End: 2020-12-30 | Stop reason: SURG

## 2020-12-30 RX ORDER — SODIUM CHLORIDE 0.9 % (FLUSH) 0.9 %
3 SYRINGE (ML) INJECTION EVERY 12 HOURS SCHEDULED
Status: DISCONTINUED | OUTPATIENT
Start: 2020-12-30 | End: 2020-12-30 | Stop reason: HOSPADM

## 2020-12-30 RX ORDER — LIDOCAINE HYDROCHLORIDE 10 MG/ML
0.5 INJECTION, SOLUTION EPIDURAL; INFILTRATION; INTRACAUDAL; PERINEURAL ONCE AS NEEDED
Status: DISCONTINUED | OUTPATIENT
Start: 2020-12-30 | End: 2020-12-30 | Stop reason: HOSPADM

## 2020-12-30 RX ORDER — EPHEDRINE SULFATE 50 MG/ML
5 INJECTION, SOLUTION INTRAVENOUS ONCE AS NEEDED
Status: DISCONTINUED | OUTPATIENT
Start: 2020-12-30 | End: 2020-12-30 | Stop reason: HOSPADM

## 2020-12-30 RX ORDER — PROMETHAZINE HYDROCHLORIDE 25 MG/1
25 SUPPOSITORY RECTAL ONCE AS NEEDED
Status: DISCONTINUED | OUTPATIENT
Start: 2020-12-30 | End: 2020-12-30 | Stop reason: HOSPADM

## 2020-12-30 RX ORDER — SODIUM CHLORIDE 0.9 % (FLUSH) 0.9 %
3-10 SYRINGE (ML) INJECTION AS NEEDED
Status: DISCONTINUED | OUTPATIENT
Start: 2020-12-30 | End: 2020-12-30 | Stop reason: HOSPADM

## 2020-12-30 RX ORDER — NALOXONE HCL 0.4 MG/ML
0.2 VIAL (ML) INJECTION AS NEEDED
Status: DISCONTINUED | OUTPATIENT
Start: 2020-12-30 | End: 2020-12-30 | Stop reason: HOSPADM

## 2020-12-30 RX ORDER — OXYCODONE AND ACETAMINOPHEN 7.5; 325 MG/1; MG/1
1 TABLET ORAL ONCE AS NEEDED
Status: DISCONTINUED | OUTPATIENT
Start: 2020-12-30 | End: 2020-12-30 | Stop reason: HOSPADM

## 2020-12-30 RX ORDER — TRANEXAMIC ACID 100 MG/ML
INJECTION, SOLUTION INTRAVENOUS AS NEEDED
Status: DISCONTINUED | OUTPATIENT
Start: 2020-12-30 | End: 2020-12-30 | Stop reason: SURG

## 2020-12-30 RX ORDER — DIPHENHYDRAMINE HCL 25 MG
25 CAPSULE ORAL
Status: DISCONTINUED | OUTPATIENT
Start: 2020-12-30 | End: 2020-12-30 | Stop reason: HOSPADM

## 2020-12-30 RX ORDER — FENTANYL CITRATE 50 UG/ML
50 INJECTION, SOLUTION INTRAMUSCULAR; INTRAVENOUS
Status: DISCONTINUED | OUTPATIENT
Start: 2020-12-30 | End: 2020-12-30 | Stop reason: HOSPADM

## 2020-12-30 RX ORDER — DEXAMETHASONE SODIUM PHOSPHATE 10 MG/ML
INJECTION INTRAMUSCULAR; INTRAVENOUS AS NEEDED
Status: DISCONTINUED | OUTPATIENT
Start: 2020-12-30 | End: 2020-12-30 | Stop reason: SURG

## 2020-12-30 RX ORDER — HYDROMORPHONE HYDROCHLORIDE 1 MG/ML
0.5 INJECTION, SOLUTION INTRAMUSCULAR; INTRAVENOUS; SUBCUTANEOUS
Status: DISCONTINUED | OUTPATIENT
Start: 2020-12-30 | End: 2020-12-30 | Stop reason: HOSPADM

## 2020-12-30 RX ORDER — PROMETHAZINE HYDROCHLORIDE 25 MG/1
25 TABLET ORAL ONCE AS NEEDED
Status: DISCONTINUED | OUTPATIENT
Start: 2020-12-30 | End: 2020-12-30 | Stop reason: HOSPADM

## 2020-12-30 RX ORDER — CEFAZOLIN SODIUM 2 G/100ML
INJECTION, SOLUTION INTRAVENOUS AS NEEDED
Status: DISCONTINUED | OUTPATIENT
Start: 2020-12-30 | End: 2020-12-30 | Stop reason: SURG

## 2020-12-30 RX ADMIN — FENTANYL CITRATE 50 MCG: 50 INJECTION, SOLUTION INTRAMUSCULAR; INTRAVENOUS at 18:21

## 2020-12-30 RX ADMIN — TRANEXAMIC ACID 1000 MG: 100 INJECTION, SOLUTION INTRAVENOUS at 16:55

## 2020-12-30 RX ADMIN — FENTANYL CITRATE 25 MCG: 50 INJECTION INTRAMUSCULAR; INTRAVENOUS at 16:56

## 2020-12-30 RX ADMIN — FENTANYL CITRATE 50 MCG: 50 INJECTION, SOLUTION INTRAMUSCULAR; INTRAVENOUS at 19:30

## 2020-12-30 RX ADMIN — ROCURONIUM BROMIDE 40 MG: 10 INJECTION INTRAVENOUS at 16:56

## 2020-12-30 RX ADMIN — CEFAZOLIN SODIUM 2 G: 2 INJECTION, SOLUTION INTRAVENOUS at 16:49

## 2020-12-30 RX ADMIN — HYDROCODONE BITARTRATE AND ACETAMINOPHEN 1 TABLET: 5; 325 TABLET ORAL at 21:35

## 2020-12-30 RX ADMIN — PROPOFOL 100 MG: 10 INJECTION, EMULSION INTRAVENOUS at 16:43

## 2020-12-30 RX ADMIN — SODIUM CHLORIDE 75 ML/HR: 9 INJECTION, SOLUTION INTRAVENOUS at 10:30

## 2020-12-30 RX ADMIN — SODIUM CHLORIDE, PRESERVATIVE FREE 10 ML: 5 INJECTION INTRAVENOUS at 20:38

## 2020-12-30 RX ADMIN — SODIUM CHLORIDE, PRESERVATIVE FREE 10 ML: 5 INJECTION INTRAVENOUS at 08:08

## 2020-12-30 RX ADMIN — FENTANYL CITRATE 50 MCG: 50 INJECTION INTRAMUSCULAR; INTRAVENOUS at 17:50

## 2020-12-30 RX ADMIN — ROCURONIUM BROMIDE 10 MG: 10 INJECTION INTRAVENOUS at 16:43

## 2020-12-30 RX ADMIN — ATENOLOL 25 MG: 25 TABLET ORAL at 08:08

## 2020-12-30 RX ADMIN — LIDOCAINE HYDROCHLORIDE 50 MG: 20 INJECTION, SOLUTION INFILTRATION; PERINEURAL at 16:43

## 2020-12-30 RX ADMIN — HYDROMORPHONE HYDROCHLORIDE 0.5 MG: 1 INJECTION, SOLUTION INTRAMUSCULAR; INTRAVENOUS; SUBCUTANEOUS at 18:35

## 2020-12-30 RX ADMIN — DEXAMETHASONE SODIUM PHOSPHATE 4 MG: 10 INJECTION INTRAMUSCULAR; INTRAVENOUS at 16:51

## 2020-12-30 RX ADMIN — SUCCINYLCHOLINE CHLORIDE 80 MG: 20 INJECTION, SOLUTION INTRAMUSCULAR; INTRAVENOUS; PARENTERAL at 16:43

## 2020-12-30 RX ADMIN — FENTANYL CITRATE 25 MCG: 50 INJECTION INTRAMUSCULAR; INTRAVENOUS at 17:12

## 2020-12-30 RX ADMIN — APIXABAN 2.5 MG: 2.5 TABLET, FILM COATED ORAL at 21:35

## 2020-12-30 NOTE — ANESTHESIA PROCEDURE NOTES
Airway  Urgency: elective    Date/Time: 12/30/2020 4:44 PM  Airway not difficult    General Information and Staff    Patient location during procedure: OR  CRNA: Redd Romero CRNA    Indications and Patient Condition  Indications for airway management: airway protection    Preoxygenated: yes  MILS maintained throughout  Mask difficulty assessment: 1 - vent by mask    Final Airway Details  Final airway type: endotracheal airway      Successful airway: ETT  Cuffed: yes   Successful intubation technique: direct laryngoscopy  Endotracheal tube insertion site: oral  Blade: Salvatore  Blade size: 3  ETT size (mm): 7.0  Cormack-Lehane Classification: grade I - full view of glottis  Placement verified by: chest auscultation   Measured from: gums  ETT/EBT to gums (cm): 21  Number of attempts at approach: 1  Assessment: lips, teeth, and gum same as pre-op and atraumatic intubation

## 2020-12-30 NOTE — ANESTHESIA PREPROCEDURE EVALUATION
Anesthesia Evaluation     Patient summary reviewed and Nursing notes reviewed                Airway   Mallampati: II  TM distance: >3 FB  Neck ROM: full  No difficulty expected  Dental    (+) upper dentures, lower dentures and edentulous    Pulmonary    (+) pleural effusion,   Cardiovascular     ECG reviewed  Rhythm: regular  Rate: normal    (+) hypertension, CHF , hyperlipidemia,       Neuro/Psych- negative ROS  GI/Hepatic/Renal/Endo    (+)  hiatal hernia, GERD,  renal disease CRI,     Musculoskeletal     Abdominal    Substance History - negative use     OB/GYN negative ob/gyn ROS         Other   arthritis,                      Anesthesia Plan    ASA 3     general   (Mild chronic right pleural effusion secondary to a degree of CHF    Dentures have been removed)  intravenous induction     Anesthetic plan, all risks, benefits, and alternatives have been provided, discussed and informed consent has been obtained with: patient.

## 2020-12-31 LAB
ANION GAP SERPL CALCULATED.3IONS-SCNC: 10.6 MMOL/L (ref 5–15)
BUN SERPL-MCNC: 17 MG/DL (ref 8–23)
BUN/CREAT SERPL: 22.7 (ref 7–25)
CALCIUM SPEC-SCNC: 8.5 MG/DL (ref 8.6–10.5)
CHLORIDE SERPL-SCNC: 98 MMOL/L (ref 98–107)
CO2 SERPL-SCNC: 28.4 MMOL/L (ref 22–29)
CREAT SERPL-MCNC: 0.75 MG/DL (ref 0.57–1)
DEPRECATED RDW RBC AUTO: 48.1 FL (ref 37–54)
ERYTHROCYTE [DISTWIDTH] IN BLOOD BY AUTOMATED COUNT: 15.9 % (ref 12.3–15.4)
GFR SERPL CREATININE-BSD FRML MDRD: 74 ML/MIN/1.73
GLUCOSE SERPL-MCNC: 136 MG/DL (ref 65–99)
HCT VFR BLD AUTO: 28.6 % (ref 34–46.6)
HGB BLD-MCNC: 8.7 G/DL (ref 12–15.9)
MCH RBC QN AUTO: 25.2 PG (ref 26.6–33)
MCHC RBC AUTO-ENTMCNC: 30.4 G/DL (ref 31.5–35.7)
MCV RBC AUTO: 82.9 FL (ref 79–97)
PLATELET # BLD AUTO: 249 10*3/MM3 (ref 140–450)
PMV BLD AUTO: 9.1 FL (ref 6–12)
POTASSIUM SERPL-SCNC: 4.4 MMOL/L (ref 3.5–5.2)
RBC # BLD AUTO: 3.45 10*6/MM3 (ref 3.77–5.28)
SODIUM SERPL-SCNC: 137 MMOL/L (ref 136–145)
WBC # BLD AUTO: 11.09 10*3/MM3 (ref 3.4–10.8)

## 2020-12-31 PROCEDURE — 97162 PT EVAL MOD COMPLEX 30 MIN: CPT

## 2020-12-31 PROCEDURE — 85027 COMPLETE CBC AUTOMATED: CPT | Performed by: ORTHOPAEDIC SURGERY

## 2020-12-31 PROCEDURE — 25010000003 CEFAZOLIN IN DEXTROSE 2-4 GM/100ML-% SOLUTION: Performed by: ORTHOPAEDIC SURGERY

## 2020-12-31 PROCEDURE — 97110 THERAPEUTIC EXERCISES: CPT

## 2020-12-31 PROCEDURE — 80048 BASIC METABOLIC PNL TOTAL CA: CPT | Performed by: ORTHOPAEDIC SURGERY

## 2020-12-31 RX ORDER — CEFAZOLIN SODIUM 2 G/100ML
2 INJECTION, SOLUTION INTRAVENOUS EVERY 8 HOURS
Status: COMPLETED | OUTPATIENT
Start: 2020-12-31 | End: 2020-12-31

## 2020-12-31 RX ADMIN — ATENOLOL 25 MG: 25 TABLET ORAL at 08:18

## 2020-12-31 RX ADMIN — PANTOPRAZOLE SODIUM 40 MG: 40 TABLET, DELAYED RELEASE ORAL at 06:10

## 2020-12-31 RX ADMIN — ATORVASTATIN CALCIUM 10 MG: 20 TABLET, FILM COATED ORAL at 08:18

## 2020-12-31 RX ADMIN — FLUTICASONE PROPIONATE 2 SPRAY: 50 SPRAY, METERED NASAL at 08:17

## 2020-12-31 RX ADMIN — Medication 1 APPLICATION: at 22:02

## 2020-12-31 RX ADMIN — SODIUM CHLORIDE 75 ML/HR: 9 INJECTION, SOLUTION INTRAVENOUS at 22:03

## 2020-12-31 RX ADMIN — CEFAZOLIN SODIUM 2 G: 2 INJECTION, SOLUTION INTRAVENOUS at 22:03

## 2020-12-31 RX ADMIN — SERTRALINE HYDROCHLORIDE 50 MG: 50 TABLET, FILM COATED ORAL at 08:18

## 2020-12-31 RX ADMIN — HYDROCODONE BITARTRATE AND ACETAMINOPHEN 1 TABLET: 5; 325 TABLET ORAL at 22:03

## 2020-12-31 RX ADMIN — CEFAZOLIN SODIUM 2 G: 2 INJECTION, SOLUTION INTRAVENOUS at 02:29

## 2020-12-31 RX ADMIN — HYDROCODONE BITARTRATE AND ACETAMINOPHEN 1 TABLET: 5; 325 TABLET ORAL at 13:39

## 2020-12-31 RX ADMIN — AMLODIPINE BESYLATE 5 MG: 5 TABLET ORAL at 08:18

## 2020-12-31 RX ADMIN — APIXABAN 2.5 MG: 2.5 TABLET, FILM COATED ORAL at 08:18

## 2020-12-31 RX ADMIN — CEFAZOLIN SODIUM 2 G: 2 INJECTION, SOLUTION INTRAVENOUS at 11:04

## 2020-12-31 RX ADMIN — Medication 1 CAPSULE: at 08:18

## 2020-12-31 RX ADMIN — APIXABAN 2.5 MG: 2.5 TABLET, FILM COATED ORAL at 22:02

## 2020-12-31 RX ADMIN — Medication 1 APPLICATION: at 08:18

## 2020-12-31 RX ADMIN — FUROSEMIDE 20 MG: 20 TABLET ORAL at 08:18

## 2020-12-31 RX ADMIN — SODIUM CHLORIDE, PRESERVATIVE FREE 10 ML: 5 INJECTION INTRAVENOUS at 08:19

## 2020-12-31 RX ADMIN — SODIUM CHLORIDE, PRESERVATIVE FREE 10 ML: 5 INJECTION INTRAVENOUS at 22:03

## 2020-12-31 RX ADMIN — SODIUM CHLORIDE 75 ML/HR: 9 INJECTION, SOLUTION INTRAVENOUS at 06:11

## 2020-12-31 RX ADMIN — ASPIRIN 81 MG: 81 TABLET, COATED ORAL at 08:18

## 2020-12-31 NOTE — ANESTHESIA POSTPROCEDURE EVALUATION
"Patient: Gabbi Brandon    Procedure Summary     Date: 12/30/20 Room / Location: [facility] OR  /  [facility] OR    Anesthesia Start: 1639 Anesthesia Stop: 1754    Procedure: TOTAL HIP ARTHROPLASTY ANTERIOR WITH HANA TABLE (Right Hip) Diagnosis:       Closed fracture of neck of right femur, initial encounter (CMS/[facility])      (Closed fracture of neck of right femur, initial encounter (CMS/[facility]) [S72.001A])    Surgeon: Brent Ang II, MD Provider: Oni Doan MD    Anesthesia Type: general ASA Status: 3          Anesthesia Type: general    Vitals  Vitals Value Taken Time   /70 12/30/20 2011   Temp 36.5 °C (97.7 °F) 12/30/20 2011   Pulse 71 12/30/20 2011   Resp 16 12/30/20 2011   SpO2 91 % 12/30/20 2011           Post Anesthesia Care and Evaluation    Patient location during evaluation: bedside  Patient participation: complete - patient participated  Level of consciousness: awake and alert  Pain management: adequate  Airway patency: patent  Anesthetic complications: No anesthetic complications    Cardiovascular status: acceptable  Respiratory status: acceptable  Hydration status: acceptable    Comments: /70 (BP Location: Left arm, Patient Position: Lying)   Pulse 71   Temp 36.5 °C (97.7 °F) (Oral)   Resp 16   Ht 162.6 cm (64.02\")   Wt 79.1 kg (174 lb 4.8 oz)   SpO2 91%   BMI 29.90 kg/m²       "

## 2021-01-01 VITALS
DIASTOLIC BLOOD PRESSURE: 73 MMHG | WEIGHT: 174.3 LBS | SYSTOLIC BLOOD PRESSURE: 137 MMHG | HEIGHT: 64 IN | RESPIRATION RATE: 16 BRPM | OXYGEN SATURATION: 94 % | HEART RATE: 72 BPM | TEMPERATURE: 97.1 F | BODY MASS INDEX: 29.76 KG/M2

## 2021-01-01 LAB
ANION GAP SERPL CALCULATED.3IONS-SCNC: 7.8 MMOL/L (ref 5–15)
BUN SERPL-MCNC: 17 MG/DL (ref 8–23)
BUN/CREAT SERPL: 24.3 (ref 7–25)
CALCIUM SPEC-SCNC: 8 MG/DL (ref 8.6–10.5)
CHLORIDE SERPL-SCNC: 101 MMOL/L (ref 98–107)
CO2 SERPL-SCNC: 27.2 MMOL/L (ref 22–29)
CREAT SERPL-MCNC: 0.7 MG/DL (ref 0.57–1)
DEPRECATED RDW RBC AUTO: 48.4 FL (ref 37–54)
ERYTHROCYTE [DISTWIDTH] IN BLOOD BY AUTOMATED COUNT: 15.9 % (ref 12.3–15.4)
GFR SERPL CREATININE-BSD FRML MDRD: 80 ML/MIN/1.73
GLUCOSE SERPL-MCNC: 95 MG/DL (ref 65–99)
HCT VFR BLD AUTO: 28.3 % (ref 34–46.6)
HGB BLD-MCNC: 8.6 G/DL (ref 12–15.9)
MCH RBC QN AUTO: 25.4 PG (ref 26.6–33)
MCHC RBC AUTO-ENTMCNC: 30.4 G/DL (ref 31.5–35.7)
MCV RBC AUTO: 83.7 FL (ref 79–97)
PLATELET # BLD AUTO: 243 10*3/MM3 (ref 140–450)
PMV BLD AUTO: 9 FL (ref 6–12)
POTASSIUM SERPL-SCNC: 3.4 MMOL/L (ref 3.5–5.2)
RBC # BLD AUTO: 3.38 10*6/MM3 (ref 3.77–5.28)
SODIUM SERPL-SCNC: 136 MMOL/L (ref 136–145)
WBC # BLD AUTO: 9.31 10*3/MM3 (ref 3.4–10.8)

## 2021-01-01 PROCEDURE — 80048 BASIC METABOLIC PNL TOTAL CA: CPT | Performed by: NURSE PRACTITIONER

## 2021-01-01 PROCEDURE — 85027 COMPLETE CBC AUTOMATED: CPT | Performed by: NURSE PRACTITIONER

## 2021-01-01 RX ORDER — DOCUSATE SODIUM 100 MG/1
100 CAPSULE, LIQUID FILLED ORAL 2 TIMES DAILY
Status: DISCONTINUED | OUTPATIENT
Start: 2021-01-01 | End: 2021-01-01 | Stop reason: HOSPADM

## 2021-01-01 RX ORDER — POLYETHYLENE GLYCOL 3350 17 G/17G
17 POWDER, FOR SOLUTION ORAL DAILY PRN
Status: DISCONTINUED | OUTPATIENT
Start: 2021-01-01 | End: 2021-01-01 | Stop reason: HOSPADM

## 2021-01-01 RX ORDER — POLYETHYLENE GLYCOL 3350 17 G/17G
17 POWDER, FOR SOLUTION ORAL DAILY PRN
Start: 2021-01-01 | End: 2021-03-22

## 2021-01-01 RX ORDER — ACETAMINOPHEN AND CODEINE PHOSPHATE 300; 30 MG/1; MG/1
1 TABLET ORAL EVERY 6 HOURS PRN
Qty: 12 TABLET | Refills: 0 | Status: SHIPPED | OUTPATIENT
Start: 2021-01-01 | End: 2021-01-04

## 2021-01-01 RX ORDER — PSEUDOEPHEDRINE HCL 30 MG
100 TABLET ORAL 2 TIMES DAILY
Start: 2021-01-01 | End: 2021-03-22

## 2021-01-01 RX ORDER — ACETAMINOPHEN 500 MG
1000 TABLET ORAL 2 TIMES DAILY
Qty: 28 TABLET | Refills: 0
Start: 2021-01-01 | End: 2021-01-08

## 2021-01-01 RX ORDER — ATENOLOL 25 MG/1
25 TABLET ORAL DAILY
Start: 2021-01-02 | End: 2021-04-20 | Stop reason: SDUPTHER

## 2021-01-01 RX ORDER — POTASSIUM CHLORIDE 750 MG/1
40 TABLET, FILM COATED, EXTENDED RELEASE ORAL ONCE
Status: COMPLETED | OUTPATIENT
Start: 2021-01-01 | End: 2021-01-01

## 2021-01-01 RX ORDER — ACETAMINOPHEN 500 MG
1000 TABLET ORAL 2 TIMES DAILY
Status: DISCONTINUED | OUTPATIENT
Start: 2021-01-01 | End: 2021-01-01 | Stop reason: HOSPADM

## 2021-01-01 RX ORDER — ASPIRIN 81 MG/1
81 TABLET ORAL DAILY
Start: 2021-02-01 | End: 2021-10-14 | Stop reason: SDUPTHER

## 2021-01-01 RX ADMIN — APIXABAN 2.5 MG: 2.5 TABLET, FILM COATED ORAL at 09:05

## 2021-01-01 RX ADMIN — DOCUSATE SODIUM 100 MG: 100 CAPSULE, LIQUID FILLED ORAL at 11:00

## 2021-01-01 RX ADMIN — Medication 1 CAPSULE: at 09:04

## 2021-01-01 RX ADMIN — ATENOLOL 25 MG: 25 TABLET ORAL at 09:04

## 2021-01-01 RX ADMIN — PANTOPRAZOLE SODIUM 40 MG: 40 TABLET, DELAYED RELEASE ORAL at 09:04

## 2021-01-01 RX ADMIN — FLUTICASONE PROPIONATE 2 SPRAY: 50 SPRAY, METERED NASAL at 09:04

## 2021-01-01 RX ADMIN — ASPIRIN 81 MG: 81 TABLET, COATED ORAL at 09:04

## 2021-01-01 RX ADMIN — AMLODIPINE BESYLATE 5 MG: 5 TABLET ORAL at 09:04

## 2021-01-01 RX ADMIN — POTASSIUM CHLORIDE 40 MEQ: 750 TABLET, EXTENDED RELEASE ORAL at 11:00

## 2021-01-01 RX ADMIN — ATORVASTATIN CALCIUM 10 MG: 20 TABLET, FILM COATED ORAL at 09:04

## 2021-01-01 RX ADMIN — ACETAMINOPHEN 1000 MG: 500 TABLET, FILM COATED ORAL at 11:00

## 2021-01-01 RX ADMIN — Medication 1 APPLICATION: at 09:04

## 2021-01-01 RX ADMIN — FUROSEMIDE 20 MG: 20 TABLET ORAL at 09:04

## 2021-01-01 RX ADMIN — SERTRALINE HYDROCHLORIDE 50 MG: 50 TABLET, FILM COATED ORAL at 09:04

## 2021-01-01 NOTE — PLAN OF CARE
Goal Outcome Evaluation:  Plan of Care Reviewed With: patient  Progress: improving  Outcome Summary: POD 1 RTHA. VSS, pain controlled with prn norco. does not tolerate ambulation well at this time, assist x2, walker, and gait belt needed. plans to d/c to SNF for rehab when ready. discussed BP monitoring r/t hx HTN. will continue to monitor.

## 2021-01-01 NOTE — SIGNIFICANT NOTE
01/01/21 1226   OTHER   Discipline speech language pathologist   Rehab Time/Intention   Session Not Performed patient unavailable for evaluation     SLP attempted bedside swallow evaluation. Pt is sleeping soundly, not waking with maximal verbal cues. Will attempt follow up bedside swallow evaluation as pt is awake and able to participate.

## 2021-01-01 NOTE — PROGRESS NOTES
"Physicians Statement of Medical Necessity for  Ambulance Transportation    GENERAL INFORMATION     Name: Gabbi Brandon  YOB: 1940    Medicare #: [id number]   Transport Date: 1/1/2021   (Valid for round trips this date, or for scheduled repetitive trips for 60 days from the date signed below.)  Origin: [facility]  Destination: [facility]  Is the Patient's stay covered under Medicare Part A (PPS/DRG?)Yes  Closest appropriate facility? Yes  If this a hosp-hosp transfer? No  Is this a hospice patient? No    MEDICAL NECESSITY QUESTIONAIRE    Ambulance Transportation is medically necessary only if other means of transportation are contraindicated or would be potentially harmful to the patient.  To meet this requirement, the patient must be either \"bed confined\" or suffer from a condition such that transport by means other than an ambulance is contraindicated by the patient's condition.  The following questions must be answered by the healthcare professional signing below for this form to be valid:     1) Describe the MEDICAL CONDITION (physical and/or mental) of this patient AT THE TIME OF AMBULANCE TRANSPORT that requires the patient to be transported in an ambulance, and why transport by other means is contraindicated by the patient's condition:   Closed fracture of neck of right femur (CMS/HCC)    Essential hypertension    Stage 2 chronic kidney disease    Bilateral lower extremity edema    Oropharyngeal dysphagia    Anemia, chronic disease    Hiatal hernia    Pressure injury of buttock, stage 2 (CMS/HCC)    Closed displaced fracture of right femoral neck (CMS/HCC)  Past Medical History:   Diagnosis Date   • Arthritis    • GERD (gastroesophageal reflux disease)    • Hyperlipidemia    • Hypertension    • Incontinent of urine    • PNA (pneumonia)    • Seasonal allergies    • UTI (urinary tract infection)       Past Surgical History:   Procedure Laterality Date   • BREAST BIOPSY " "    • COLONOSCOPY N/A 8/26/2016    Procedure: COLONOSCOPY WITH POLYPECTOMY (HOT SNARE);  Surgeon: Paulino Narvaez MD;  Location: [facility] ENDOSCOPY;  Service:    • ENDOSCOPY N/A 8/11/2020    Procedure: ESOPHAGOGASTRODUODENOSCOPY with biopsies;  Surgeon: Eugene George MD;  Location: [facility] ENDOSCOPY;  Service: Gastroenterology;  Laterality: N/A;  pre-- melena and abdominal pain  post-- hiatel hernia   • TOTAL HIP ARTHROPLASTY Right 12/30/2020    Procedure: TOTAL HIP ARTHROPLASTY ANTERIOR WITH HANA TABLE;  Surgeon: Brent Ang II, MD;  Location: [facility] MAIN OR;  Service: Orthopedics;  Laterality: Right;   • VAGINAL HYSTERECTOMY        2) Is this patient \"bed confined\" as defined below?Yes   To be \"bed confined\" the patient must satisfy all three of the following criteria:  (1) unable to get up from bed without assistance; AND (2) unable to ambulate;  AND (3) unable to sit in a chair or wheelchair.  3) Can this patient safely be transported by car or wheelchair van (I.e., may safely sit during transport, without an attendant or monitoring?)No   4. In addition to completing questions 1-3 above, please check any of the following conditions that apply*:          *Note: supporting documentation for any boxes checked must be maintained in the patient's medical records Patient is confused      SIGNATURE OF PHYSICIAN OR OTHER AUTHORIZED HEALTHCARE PROFESSIONAL    I certify that the above information is true and correct based on my evaluation of this patient, and represent that the patient requires transport by ambulance and that other forms of transport are contraindicated.  I understand that this information will be used by the Centers for Medicare and Medicaid Services (CMS) to support the determiniation of medical necessity for ambulance services, and I represent that I have personal knowledge of the patient's condition at the time of transport.       If this box is checked, I also certify that the " patient is physically or mentally incapable of signing the ambulance service's claim form and that the institution with which I am affiliated has furnished care, services or assistance to the patient.  My signature below is made on behalf of the patient pursuant to 42 .36(b)(4). In accordance with 42 .37, the specific reason(s) that the patient is physically or mentally incapable of signing the claim for is as follows:     Signature of Physician or Healthcare Professional  Date/Time:        (For Scheduled repetitive transport, this form is not valid for transports performed more than 60 days after this date).                                                                                                                                            --------------------------------------------------------------------------------------------  Printed Name and Credentials of Physician or Authorized Healthcare Professional     *Form must be signed by patient's attending physician for scheduled, repetitive transports,.  For non-repetitive ambulance transports, if unable to obtain the signature of the attending physician, any of the following may sign (please select below):     Physician  Clinical Nurse Specialist  Registered Nurse     Physician Assistant  Discharge Planner  Licensed Practical Nurse     Nurse Practitioner

## 2021-01-01 NOTE — DISCHARGE SUMMARY
Discharge Summary    Patient Name: Gabbi Brandon  Age/Sex: 80 y.o. female  : 1940  MRN: [id number]  Patient Care Team:  Gordo Mg MD as PCP - General (Internal Medicine)    Hospital Course     Date of Admit: 2020  Date of Discharge:  21   Discharge Condition: Stable    Discharge Diagnoses:    Closed fracture of neck of right femur (CMS/HCC)    Essential hypertension    Stage 2 chronic kidney disease    Bilateral lower extremity edema    Oropharyngeal dysphagia    Anemia, chronic disease    Hiatal hernia    Pressure injury of buttock, stage 2 (CMS/HCC)    Closed displaced fracture of right femoral neck (CMS/HCC)    Present on Admission:  • Closed fracture of neck of right femur (CMS/HCC)  • Anemia, chronic disease  • Essential hypertension  • Hiatal hernia  • Stage 2 chronic kidney disease  • Bilateral lower extremity edema  • Pressure injury of buttock, stage 2 (CMS/HCC)  • Oropharyngeal dysphagia  • Closed displaced fracture of right femoral neck (CMS/HCC)      Hospital Course:   Gabbi Brandon presented to [facility] with complaints of R hip pain after sustaining a fall. Please see the admitting history and physical for further details. She was found to have right femoral neck fracture and was admitted to the hospital for further evaluation and treatment.  Orthopedic surgery was consulted and was cleared for surgery.  She underwent right anterior total hip arthroplasty on 2020.    Postoperatively she did have some blood loss anemia and hemoglobin overall stable at 8.6.  She reports prior issues with dark-colored stools had a low hemoglobin even on admission.  Per orthopedic recommendations will continue with Eliquis 2.5 twice daily for VTE prophylaxis for 30 days, but will need close monitoring of bowel movements.  While on Eliquis I will hold home aspirin and Celebrex and monitor hemoglobin closely. She is on chronic lasix and had some mild  hypokalemia at time of discharge treated with potassium supplement. I would recommend repeat hemoglobin and BMP on Friday.     She has a chronic pressure ulcer and would recommend continued frequent turning and local wound care. Oxygen has been weaned to room air with no further issues with cough. Patient declined speech evaluation, but would recommend continuing GERD and aspiration precautions given history of large hiatal hernia and prior backflow noted on VFSS in 8/2020.     She has been working with PT, but would benefit from further skilled therapies and plan discharge to rehab once bed available. After discussion about pain control and issues with confusion with pain meds the decision was made to go back to home tylenol 3 PRN with scheduled tylenol. MARYANN was reviewed and 3 day script was provided.     Consults:   IP CONSULT TO HOSPITALIST  IP CONSULT TO ORTHOPEDIC SURGERY  IP CONSULT TO CASE MANAGEMENT     Significant Discharge Diagnostics   Procedures Performed:  Procedure(s):  TOTAL HIP ARTHROPLASTY ANTERIOR WITH HANA TABLE       Pertinent Lab Results:  Results from last 7 days   Lab Units 01/01/21  0646 12/31/20  0600 12/30/20  0619 12/29/20  1548   SODIUM mmol/L 136 137 138 137   POTASSIUM mmol/L 3.4* 4.4 4.0 3.6   CHLORIDE mmol/L 101 98 98 97*   CO2 mmol/L 27.2 28.4 30.3* 29.6*   BUN mg/dL 17 17 12 15   CREATININE mg/dL 0.70 0.75 0.63 0.79   GLUCOSE mg/dL 95 136* 98 122*   CALCIUM mg/dL 8.0* 8.5* 8.8 8.6   AST (SGOT) U/L  --   --  14 15   ALT (SGPT) U/L  --   --  8 6         Results from last 7 days   Lab Units 01/01/21  0646 12/31/20  0600 12/30/20  0619 12/29/20  1548   WBC 10*3/mm3 9.31 11.09* 8.70 9.57   HEMOGLOBIN g/dL 8.6* 8.7* 9.5* 9.9*   HEMATOCRIT % 28.3* 28.6* 30.5* 32.8*   PLATELETS 10*3/mm3 243 249 306 301   MCV fL 83.7 82.9 83.3 84.5   MCH pg 25.4* 25.2* 26.0* 25.5*   MCHC g/dL 30.4* 30.4* 31.1* 30.2*   RDW % 15.9* 15.9* 15.8* 15.9*   RDW-SD fl 48.4 48.1 47.5 48.9   MPV fL  9.0 9.1 9.6 9.4   NEUTROPHIL % %  --   --  74.0 76.8*   LYMPHOCYTE % %  --   --  16.6* 13.1*   MONOCYTES % %  --   --  8.2 8.5   EOSINOPHIL % %  --   --  0.6 0.5   BASOPHIL % %  --   --  0.1 0.3   IMM GRAN % %  --   --  0.5 0.8*   NEUTROS ABS 10*3/mm3  --   --  6.45 7.35*   LYMPHS ABS 10*3/mm3  --   --  1.44 1.25   MONOS ABS 10*3/mm3  --   --  0.71 0.81   EOS ABS 10*3/mm3  --   --  0.05 0.05   BASOS ABS 10*3/mm3  --   --  0.01 0.03   IMMATURE GRANS (ABS) 10*3/mm3  --   --  0.04 0.08*   NRBC /100 WBC  --   --  0.1 0.0     Results from last 7 days   Lab Units 12/29/20  1548   INR  1.05               Invalid input(s): LDLCALC                                            Imaging Results:  Imaging Results (Most Recent)     Procedure Component Value Units Date/Time    XR Hip With or Without Pelvis 1 View Right [[id number]] Collected: 12/30/20 1836     Updated: 12/30/20 1841    Narrative:      Right hip radiograph     HISTORY:Postop total hip arthroplasty     TECHNIQUE: 2 AP radiographs of the right hip     COMPARISON:Right hip radiograph 12/29/2020     FINDINGS:  Post surgical changes from recent right total hip arthroplasty are  present. The hardware is intact. There is no new periprosthetic  fracture.       Impression:      Postoperative changes from recent right total hip  arthroplasty without findings of immediate complication.     This report was finalized on 12/30/2020 6:38 PM by Dr. Geoffrey Jarvis M.D.       XR Hip With or Without Pelvis 1 View Right [[id number]] Collected: 12/30/20 1808     Updated: 12/30/20 1812    Narrative:      INTRAOPERATIVE PORTABLE VIEW RIGHT HIP     CLINICAL INFORMATION: Post total hip arthroplasty     FINDINGS: A complicating process is not identified.     Fluoroscopy time 13 s, 1 image     This report was finalized on 12/30/2020 6:09 PM by Dr. Geoffrey Jarvis M.D.       FL C Arm During Surgery [[id number]] Resulted: 12/30/20 1748     Updated: 12/30/20 1748    Narrative:      This procedure  was auto-finalized with no dictation required.    XR Hip With or Without Pelvis 2 - 3 View Right [[id number]] Collected: 12/29/20 1640     Updated: 12/29/20 1644    Narrative:      XR HIP W OR WO PELVIS 2-3 VIEW RIGHT-     INDICATIONS: Trauma     TECHNIQUE: Frontal view of the pelvis, 3 views of the right hip     COMPARISON: None available     FINDINGS:     Right femoral neck fracture is noted with about 1.9 cm proximal  retraction of the distal fracture fragment. No other fractures are  noted. No dislocation. Degenerative changes are seen at the symphysis  pubis, partly included lower lumbar spine.       Impression:         Right femoral neck fracture.     This report was finalized on 12/29/2020 4:41 PM by Dr. Boogie Tran M.D.       XR Chest 1 View [[id number]] Collected: 12/29/20 1637     Updated: 12/29/20 1643    Narrative:      XR CHEST 1 VW-     HISTORY: Female who is 80 years-old,  short of breath     TECHNIQUE: Frontal view of the chest     COMPARISON: 09/05/2020     FINDINGS: Heart size is borderline. Pulmonary vasculature is  unremarkable. Aorta is tortuous, calcified. No focal pulmonary  consolidation. Mild right pleural effusion. No pneumothorax is seen,  limited by supine positioning. Large hiatal hernia. No acute osseous  process.       Impression:      Mild right pleural effusion. Borderline heart size. Tortuous  aorta.     This report was finalized on 12/29/2020 4:40 PM by Dr. Boogie Tran M.D.               Objective:   Temp:  [96.9 °F (36.1 °C)-98.4 °F (36.9 °C)] 96.9 °F (36.1 °C)  Heart Rate:  [64-96] 74  Resp:  [16-18] 16  BP: (121-143)/(62-75) 143/74   SpO2:  [94 %-97 %] 97 %  on    Device (Oxygen Therapy): room air    Intake/Output Summary (Last 24 hours) at 1/1/2021 1047  Last data filed at 1/1/2021 0709  Gross per 24 hour   Intake 1652 ml   Output 450 ml   Net 1202 ml     Body mass index is 29.9 kg/m².      12/29/20  1533   Weight: 79.1 kg (174 lb 4.8 oz)       Physical  Exam  Vitals signs and nursing note reviewed.   Constitutional:       General: She is not in acute distress.     Appearance: She is ill-appearing (chronically). She is not toxic-appearing.      Comments: Frail, elderly.    HENT:      Head: Normocephalic.      Right Ear: External ear normal.      Left Ear: External ear normal.      Nose: Nose normal.   Eyes:      General:         Right eye: No discharge.         Left eye: No discharge.      Conjunctiva/sclera: Conjunctivae normal.   Cardiovascular:      Rate and Rhythm: Normal rate and regular rhythm.      Pulses: Normal pulses.      Heart sounds: Normal heart sounds.   Pulmonary:      Effort: Pulmonary effort is normal. No respiratory distress.      Comments: Diminished bases.   Abdominal:      General: Bowel sounds are normal. There is no distension.      Palpations: Abdomen is soft.      Tenderness: There is no abdominal tenderness.   Musculoskeletal: Normal range of motion.         General: Swelling (1+ R hip) present.   Skin:     General: Skin is warm and dry.      Findings: No bruising.   Neurological:      Mental Status: She is alert and oriented to person, place, and time.      Motor: Weakness present.   Psychiatric:         Mood and Affect: Mood normal.         Behavior: Behavior normal.         Discharge Medications and Instructions:     Discharge Medications     Discharge Medications      New Medications      Instructions Start Date   acetaminophen 500 MG tablet  Commonly known as: TYLENOL   1,000 mg, Oral, 2 times daily      apixaban 2.5 MG tablet tablet  Commonly known as: ELIQUIS   2.5 mg, Oral, Every 12 Hours Scheduled      docusate sodium 100 MG capsule   100 mg, Oral, 2 Times Daily      hydrocortisone-bacitracin-zinc oxide-nystatin  Commonly known as: MAGIC BARRIER   1 application, Topical, 2 times daily      polyethylene glycol 17 g packet  Commonly known as: MIRALAX   17 g, Oral, Daily PRN         Changes to Medications      Instructions Start Date    aspirin 81 MG EC tablet  What changed: These instructions start on February 1, 2021. If you are unsure what to do until then, ask your doctor or other care provider.   81 mg, Oral, Daily   Start Date: February 1, 2021     atenolol 25 MG tablet  Commonly known as: TENORMIN  What changed:   · medication strength  · how much to take   25 mg, Oral, Daily   Start Date: January 2, 2021        Continue These Medications      Instructions Start Date   acetaminophen-codeine 300-30 MG per tablet  Commonly known as: TYLENOL #3   1 tablet, Oral, Every 6 Hours PRN      amLODIPine 5 MG tablet  Commonly known as: NORVASC   TAKE 1 TABLET EVERY DAY      fluticasone 50 MCG/ACT nasal spray  Commonly known as: FLONASE   2 sprays, Each Nare, Daily      furosemide 40 MG tablet  Commonly known as: Lasix   20 mg, Oral, Daily      lactobacillus acidophilus capsule capsule   1 capsule, Oral, Daily      omeprazole 20 MG capsule  Commonly known as: priLOSEC   TAKE 1 CAPSULE EVERY DAY      sertraline 50 MG tablet  Commonly known as: Zoloft   50 mg, Oral, Daily      simvastatin 20 MG tablet  Commonly known as: ZOCOR   20 mg, Oral, Nightly      SSD 1 % cream  Generic drug: silver sulfadiazine   APPLY  CREAM EXTERNALLY TO AFFECTED AREA ONCE DAILY AS DIRECTED         Stop These Medications    celecoxib 200 MG capsule  Commonly known as: CeleBREX     Foam Dressing Circular Border pads             Medication Reconciliation: Please see electronically completed Med Rec.    Discharge Diet:    Dietary Orders (From admission, onward)     Start     Ordered    12/30/20 2018  Diet Regular  Diet Effective Now     Question:  Diet Texture / Consistency  Answer:  Regular    12/30/20 2017                Activity at Discharge:    Activity Instructions     Activity as Tolerated             Discharge disposition: Rehab    Discharge Instructions and Follow ups:  Follow-up Information     Gordo Mg MD .    Specialty: Internal Medicine  Contact  information:  [address]  [address] [address]  [phone]                 Future Appointments   Date Time Provider Department Center   4/20/2021  1:30 PM Paulino Casas MD [facility]  [facility]       Total time spent discharging patient including evaluation, medication reconciliation, arranging follow up, and post hospitalization instructions and education total time exceeds 30 minutes.      ANA LUISA Luu  01/01/21  10:09 AM EST

## 2021-01-01 NOTE — PROGRESS NOTES
"Hemoglobin stable at 8.6.  Patient confused at baseline.  Plan is for rehab at SNF when stable, okay from my standpoint anytime.    R \"González\" Sav CARRASCO MD  Orthopaedic Surgery  [facility]  [phone] - [facility]  [phone] - [place] [facility]    "

## 2021-01-01 NOTE — ED TRIAGE NOTES
Daughter called stating pt reports SOA, seen here for same 3 days ago.     Pt placed in mask at triage, all staff wearing appropriate ppe   BS 94  
DISPLAY PLAN FREE TEXT

## 2021-01-01 NOTE — PLAN OF CARE
Goal Outcome Evaluation:  Plan of Care Reviewed With: patient  Progress: improving  Outcome Summary: vss. dsg cdi. voiding per brief. pt stable at time of dc. report called to dorys petersen to EDITH saldivar. pt being transfered by ambulance.

## 2021-01-01 NOTE — PROGRESS NOTES
Continued Stay Note  [facility]     Patient Name: Gabbi Brandon  MRN: [id number]  Today's Date: 1/1/2021    Admit Date: 12/29/2020    Discharge Plan     Row Name 01/01/21 1203       Plan    Plan Comments  DC orders noted. Spoke with Keyonna Amezquita who states a bed is available at [facility] today.  Ne bed available at [facility]. CCP spoke with pt's dtosiris Ruiz ([phone]) who is in agreement iwth DC to [facility] today.  [facility] notified pt's RN who confirms packet is in cubby.  [facility] arranged for today at 1400..........sk        Discharge Codes    No documentation.       Expected Discharge Date and Time     Expected Discharge Date Expected Discharge Time    Jan 1, 2021             Kathryn Jerez RN

## 2021-01-04 NOTE — PROGRESS NOTES
Case Management Discharge Note      Final Note: Skilled bed at [facility] via  EMS.Denise Delaney CSW    Provided Post Acute Provider List?: Yes  Post Acute Provider List: Nursing Home(SNF.)    Selected Continued Care - Discharged on 1/1/2021 Admission date: 12/29/2020 - Discharge disposition: Rehab Facility or Unit (DC - External)    Destination Coordination complete    Service Provider Selected Services Address Phone Fax Patient Preferred    [facility]  Skilled Nursing [address] [place], [place] [id number] [phone] --          Durable Medical Equipment    No services have been selected for the patient.              Dialysis/Infusion    No services have been selected for the patient.              Home Medical Care    No services have been selected for the patient.              Therapy    No services have been selected for the patient.              Community Resources    No services have been selected for the patient.                  Transportation Services  Ambulance: [facility]    Final Discharge Disposition Code: 03 - skilled nursing facility (SNF)

## 2021-01-04 NOTE — PROGRESS NOTES
Case Management Discharge Note           Provided Post Acute Provider List?: Yes  Post Acute Provider List: Nursing Home(SNF.)    Selected Continued Care - Discharged on 1/1/2021 Admission date: 12/29/2020 - Discharge disposition: Rehab Facility or Unit (DC - External)    Destination Coordination complete    Service Provider Selected Services Address Phone Fax Patient Preferred    [facility]  Skilled Nursing [address] [place] [id number] [phone] --          Durable Medical Equipment    No services have been selected for the patient.              Dialysis/Infusion    No services have been selected for the patient.              Home Medical Care    No services have been selected for the patient.              Therapy    No services have been selected for the patient.              Community Resources    No services have been selected for the patient.                  Transportation Services  Ambulance: [facility]    Final Discharge Disposition Code: 03 - skilled nursing facility (SNF)

## 2021-01-07 ENCOUNTER — EPISODE CHANGES (OUTPATIENT)
Dept: CASE MANAGEMENT | Facility: OTHER | Age: 81
End: 2021-01-07

## 2021-02-04 ENCOUNTER — TRANSITIONAL CARE MANAGEMENT TELEPHONE ENCOUNTER (OUTPATIENT)
Dept: CALL CENTER | Facility: HOSPITAL | Age: 81
End: 2021-02-04

## 2021-02-04 ENCOUNTER — READMISSION MANAGEMENT (OUTPATIENT)
Dept: CALL CENTER | Facility: HOSPITAL | Age: 81
End: 2021-02-04

## 2021-02-04 NOTE — OUTREACH NOTE
"Call Center TCM Note      Responses   [facility] patient discharged from?  Non-   Does the patient have one of the following disease processes/diagnoses(primary or secondary)?  Total Joint Replacement   TCM attempt successful?  Yes [verbal release is over a year old]   Call start time  1133   Call end time  1140   Discharge diagnosis  s/p right anterior total hip arthroplasty    Is patient permission given to speak with other caregiver?  Yes   List who call center can speak with  Sara-daughter    Person spoke with today (if not patient) and relationship  patient and Sara   Meds reviewed with patient/caregiver?  Yes   Is the patient having any side effects they believe may be caused by any medication additions or changes?  No   Does the patient have all medications ordered at discharge?  Yes   Is the patient taking all medications as directed (includes completed medication regime)?  Yes   Comments regarding appointments  Appt with ortho is 3/29/21   Does the patient have a primary care provider?   Yes   Does the patient have an appointment with their PCP within 7 days of discharge?  No   What is preventing the patient from scheduling follow up appointments within 7 days of discharge?  -- [Daughter and spouse have COVID. Daughter will call to schedule hospital d/c f/a appt after COVID passes. Video visit was offered but declined. ]   Nursing Interventions  Advised patient to make appointment   Has the patient kept scheduled appointments due by today?  N/A   Psychosocial issues?  No   Did the patient receive a copy of their discharge instructions?  Yes   Nursing interventions  Reviewed instructions with patient   What is the patient's perception of their health status since discharge?  Improving [Daughter reports, \"I can't believe this but she's amazing\"]   Is the patient/caregiver able to teach back signs and symptoms related to disease process for when to call PCP?  Yes   Is the patient/caregiver " able to teach back signs and symptoms related to disease process for when to call 911?  Yes   Is the patient/caregiver able to teach back the hierarchy of who to call/visit for symptoms/problems? PCP, Specialist, Home health nurse, Urgent Care, ED, 911  Yes   TCM call completed?  Yes          Mallorie Logan RN    2/4/2021, 11:40 EST

## 2021-02-04 NOTE — OUTREACH NOTE
Prep Survey      Responses   [facility] facility patient discharged from?  Non-BH   Is LACE score < 7 ?  Non-BH Discharge   Emergency Room discharge w/ pulse ox?  No   Eligibility  [facility]   Date of Admission  01/01/21   Date of Discharge  02/03/21   Discharge diagnosis  s/p right anterior total hip arthroplasty    Does the patient have one of the following disease processes/diagnoses(primary or secondary)?  Total Joint Replacement   Prep survey completed?  Yes          Angelica Santoyo RN

## 2021-02-18 ENCOUNTER — PATIENT OUTREACH (OUTPATIENT)
Dept: CASE MANAGEMENT | Facility: OTHER | Age: 81
End: 2021-02-18

## 2021-02-18 NOTE — OUTREACH NOTE
Patient Outreach Note    Spoke with patient's daugher, Sara, and reports her mother is doing very well.  [facility] has discharged from nursing and physical therapy services due to goals met.  Independent with ambulation and transfers, using walker, and has had a follow-up appt with ortho, Dr. Ang.  Incision healed and next appt 3/29.  Transitioning to new PCP in April and have included in appt notes, scheduling of AWV.  Education related to fall preventive measures and have removed all throw rugs and rearranged furniture to allow for a  clear path for her ambulation. Denies further needs or concerns for services today. .      Alpa Magallanes RN  Ambulatory     2/18/2021, 13:26 EST

## 2021-03-02 DIAGNOSIS — Z23 IMMUNIZATION DUE: ICD-10-CM

## 2021-03-18 ENCOUNTER — TELEPHONE (OUTPATIENT)
Dept: INTERNAL MEDICINE | Facility: CLINIC | Age: 81
End: 2021-03-18

## 2021-03-18 NOTE — TELEPHONE ENCOUNTER
BOBBY THE PATIENTS DAUGHTER CALLED IN STATING THE PATIENT HAS BED SORES SHE WOULD LIKE TO SPEAK WITH THE NURSE ABOUT.    BEST CALL BACK # [phone]

## 2021-03-22 ENCOUNTER — OFFICE VISIT (OUTPATIENT)
Dept: INTERNAL MEDICINE | Facility: CLINIC | Age: 81
End: 2021-03-22

## 2021-03-22 VITALS
HEART RATE: 71 BPM | HEIGHT: 64 IN | DIASTOLIC BLOOD PRESSURE: 80 MMHG | TEMPERATURE: 97.5 F | WEIGHT: 160 LBS | BODY MASS INDEX: 27.31 KG/M2 | OXYGEN SATURATION: 98 % | SYSTOLIC BLOOD PRESSURE: 128 MMHG

## 2021-03-22 DIAGNOSIS — L89.312 PRESSURE INJURY OF RIGHT BUTTOCK, STAGE 2 (HCC): Primary | ICD-10-CM

## 2021-03-22 DIAGNOSIS — M15.9 PRIMARY OSTEOARTHRITIS INVOLVING MULTIPLE JOINTS: ICD-10-CM

## 2021-03-22 PROCEDURE — 99214 OFFICE O/P EST MOD 30 MIN: CPT | Performed by: NURSE PRACTITIONER

## 2021-03-22 RX ORDER — ACETAMINOPHEN AND CODEINE PHOSPHATE 300; 30 MG/1; MG/1
1 TABLET ORAL EVERY 6 HOURS PRN
COMMUNITY
End: 2021-03-26 | Stop reason: SDUPTHER

## 2021-03-22 RX ORDER — CELECOXIB 200 MG/1
200 CAPSULE ORAL DAILY
COMMUNITY
End: 2021-04-16 | Stop reason: SDUPTHER

## 2021-03-22 NOTE — PROGRESS NOTES
"Chief Complaint  Skin Lesion and Arthritis    Subjective          Gabbi Brandon presents to [facility] due to diffuse joint pain and for f/u regarding pressure sores.    She presents for follow-up regarding 2 pressure ulcers on the buttock.  This is been ongoing for the past several months and has had care both through home health and while in rehab.  She states the lesions have significantly improved over the past few months.  Her daughter is currently applying Silvadene daily.  She does sit for long periods of time and has recently bought a foam cushion to relieve pressure.  She also complains of increased joint pain and stiffness.  She has a history of osteoarthritis and is currently managed on Celebrex 200 mg daily and Tylenol #3 q6 hours. She states her pain is a dull ache and is diffuse; c/o bilateral shoulder pain, knee pain and hip pain. Denies recent injuries or falls.          Objective   Vital Signs:   /80 (BP Location: Left arm, Patient Position: Sitting, Cuff Size: Adult)   Pulse 71   Temp 97.5 °F (36.4 °C) (Temporal)   Ht 162.6 cm (64\")   Wt 72.6 kg (160 lb)   SpO2 98%   BMI 27.46 kg/m²     Physical Exam  Constitutional:       Appearance: She is well-developed. She is not ill-appearing.   HENT:      Head: Normocephalic.      Right Ear: Hearing, tympanic membrane and external ear normal.      Left Ear: Hearing, tympanic membrane and external ear normal.      Nose: Nose normal. No nasal deformity, mucosal edema or rhinorrhea.      Right Sinus: No maxillary sinus tenderness or frontal sinus tenderness.      Left Sinus: No maxillary sinus tenderness or frontal sinus tenderness.      Mouth/Throat:      Dentition: Normal dentition.   Eyes:      General: Lids are normal.         Right eye: No discharge.         Left eye: No discharge.      Conjunctiva/sclera: Conjunctivae normal.      Right eye: No exudate.     Left eye: No exudate.  Neck:      Thyroid: No " thyroid mass or thyromegaly.      Vascular: No carotid bruit.      Trachea: Trachea normal.   Cardiovascular:      Rate and Rhythm: Regular rhythm.      Pulses: Normal pulses.      Heart sounds: Normal heart sounds. No murmur heard.     Pulmonary:      Effort: No respiratory distress.      Breath sounds: Normal breath sounds. No decreased breath sounds, wheezing, rhonchi or rales.   Musculoskeletal:      Cervical back: Normal range of motion. No edema.   Lymphadenopathy:      Head:      Right side of head: No submental, submandibular, tonsillar, preauricular, posterior auricular or occipital adenopathy.      Left side of head: No submental, submandibular, tonsillar, preauricular, posterior auricular or occipital adenopathy.   Skin:     General: Skin is warm and dry.      Nails: There is no clubbing.      Comments: 2 pressure ulcers at mid buttocks, one is erin 0.75cm in diameter with mild drainage and minimal skin breakdown.  There is a small one (erin 0.25cm) distally that is dry and with minimal skin breakdown  diameter   Neurological:      Mental Status: She is alert.   Psychiatric:         Behavior: Behavior is cooperative.        Result Review :   The following data was reviewed by: ANA LUISA Sidhu on 03/22/2021:  Common labs    Common Labsle 12/30/20 12/30/20 12/31/20 12/31/20 1/1/21 1/1/21    0619 0619 0600 0600 0646 0646   Glucose  98  136 (A)  95   BUN  12  17  17   Creatinine  0.63  0.75  0.70   eGFR Non African Am  90  74  80   Sodium  138  137  136   Potassium  4.0  4.4  3.4 (A)   Chloride  98  98  101   Calcium  8.8  8.5 (A)  8.0 (A)   Albumin  2.60 (A)       Total Bilirubin  0.4       Alkaline Phosphatase  67       AST (SGOT)  14       ALT (SGPT)  8       WBC 8.70  11.09 (A)  9.31    Hemoglobin 9.5 (A)  8.7 (A)  8.6 (A)    Hematocrit 30.5 (A)  28.6 (A)  28.3 (A)    Platelets 306  249  243    (A) Abnormal value                      Assessment and Plan    Diagnoses and all orders for this  visit:    1. Pressure injury of right buttock, stage 2 (CMS/HCC) (Primary)    2. Primary osteoarthritis involving multiple joints    1. Pressure injury of buttock x2 (Stage 2)-her daughter is applying Silvadene daily (discussed applying thin layer). They have been unable to locate Mepilex at the pharmacy and/or [facility]. She may apply Duoderm daily.  However, we discussed positioning to remove pressure on area. They have recently purchased a cushion to relieve pressure which should be helpful as well.    2. Primary OA of multiple joints-we discussed pain mgmt. She is taking Celebrex 200 mg daily and Tylenol #3 q6 hours.  She is requesting a stronger pain medication which I do not feel is appropriate due to increased risk of falls and other side effects.  We discussed topical medications that she may use including Salonpas.     She has a f/u appt next month to establish care with new PCP.          Follow Up   Return if symptoms worsen or fail to improve, for Next scheduled follow up.  Patient was given instructions and counseling regarding her condition or for health maintenance advice. Please see specific information pulled into the AVS if appropriate.

## 2021-03-26 DIAGNOSIS — M15.9 PRIMARY OSTEOARTHRITIS INVOLVING MULTIPLE JOINTS: Primary | ICD-10-CM

## 2021-03-26 NOTE — TELEPHONE ENCOUNTER
Caller: Sara Hernandez    Relationship: Emergency Contact    Best call back number: [phone]     Medication needed:   Requested Prescriptions     Pending Prescriptions Disp Refills   • acetaminophen-codeine (TYLENOL #3) 300-30 MG per tablet       Sig: Take 1 tablet by mouth Every 6 (Six) Hours As Needed for Moderate Pain .       When do you need the refill by: 03/26/21    What additional details did the patient provide when requesting the medication: IS TOTALLY OUT OF THIS MEDICATION AND IT IS THE WEEKEND CAN YOU SEND ASAP.    Does the patient have less than a 3 day supply:  [x] Yes  [] No    What is the patient's preferred pharmacy: [address] [phone] [facility] [phone]

## 2021-03-29 RX ORDER — ACETAMINOPHEN AND CODEINE PHOSPHATE 300; 30 MG/1; MG/1
1 TABLET ORAL EVERY 6 HOURS PRN
Qty: 120 TABLET | Refills: 0 | Status: SHIPPED | OUTPATIENT
Start: 2021-03-29 | End: 2021-04-20 | Stop reason: SDUPTHER

## 2021-03-29 NOTE — TELEPHONE ENCOUNTER
She was just seen on 3/22 with Zayra García.   Previous pt of Dr. Mg, has appt next month to establish with you.

## 2021-03-29 NOTE — TELEPHONE ENCOUNTER
Left message to inform Sara of comments on Tylenol #3 dosing and appt needed with NP to address diarrhea.

## 2021-03-29 NOTE — TELEPHONE ENCOUNTER
Caller: Bobby Hernandez    Relationship: Emergency Contact    Best call back number: [phone] (M)    What is the best time to reach you: ANYTIME    Who are you requesting to speak with (clinical staff, provider,  specific staff member): DR. MORE OR CLINICAL STAFF    Do you know the name of the person who called: N/A    What was the call regarding: PATIENT'S MEDICATION FOR HER ARTHRITIS. BOBBY PATIENT'S DAUGHTER STATES THAT SHE HAS BEEN CALLING SINCE 03/25/21 TO REQUEST PATIENT'S MEDICATION. PATIENT HAS BEEN WITHOUT MEDICATION SINCE 03/25/2.    Do you require a callback: NO, JUST WANT'S MEDICATION CALLED INTO THE PHARMACY.       PATIENT HAS ALSO HAD DIARRHEA ALL WEEKEND. IS THERE SOMETHING BETTER THAN KAOPECTATE.     PHARMACY:  [address] [phone] [facility] [phone] FX

## 2021-03-29 NOTE — TELEPHONE ENCOUNTER
I will refill the medication but I will not be giving it past today's date for every 6 hours.  She will have to go to pain management if she wants it that often.    Also, she will have to make an appointment with a midlevel or something to address the diarrhea.

## 2021-04-05 ENCOUNTER — TELEPHONE (OUTPATIENT)
Dept: INTERNAL MEDICINE | Facility: CLINIC | Age: 81
End: 2021-04-05

## 2021-04-05 NOTE — TELEPHONE ENCOUNTER
Caller: Sara Hernandez    Relationship to patient: Emergency Contact    Best call back number: [phone]    Patient is needing:     PATIENT'S DAUGHTER CALLED IN REQUESTING THE NAME TO THE PAIN MANAGEMENT AND WOUND SPECIALIST AT [facility]   DR MORE SUGGESTED PATIENT GOING TO PAIN MANAGEMENT AND A WOUND SPECIALIST AND IF THEY WANTED TO FOLLOW THROUGH WITH IT, THEY WERE TOLD TO CALL AND THEY WOULD GET SOMETHING SET UP.    THEY WERE ADVISED TO SPEAK WITH ALICIA IF SHE COULD RETURN THEIR CALL WHEN SEEING THIS

## 2021-04-12 DIAGNOSIS — M15.9 PRIMARY OSTEOARTHRITIS INVOLVING MULTIPLE JOINTS: Primary | ICD-10-CM

## 2021-04-12 DIAGNOSIS — G89.4 CHRONIC PAIN SYNDROME: ICD-10-CM

## 2021-04-16 DIAGNOSIS — M15.9 PRIMARY OSTEOARTHRITIS INVOLVING MULTIPLE JOINTS: Primary | ICD-10-CM

## 2021-04-16 RX ORDER — CELECOXIB 200 MG/1
200 CAPSULE ORAL DAILY
Status: CANCELLED | OUTPATIENT
Start: 2021-04-16

## 2021-04-16 RX ORDER — CELECOXIB 200 MG/1
200 CAPSULE ORAL DAILY
Qty: 90 CAPSULE | Refills: 0 | Status: SHIPPED | OUTPATIENT
Start: 2021-04-16 | End: 2021-04-20 | Stop reason: SDUPTHER

## 2021-04-16 NOTE — TELEPHONE ENCOUNTER
Caller: Sara Hernandez    Relationship: Emergency Contact    Best call back number: [phone]    Medication needed:   Requested Prescriptions     Pending Prescriptions Disp Refills   • celecoxib (CeleBREX) 200 MG capsule       Sig: Take 1 capsule by mouth Daily.       When do you need the refill by: TODAY    What additional details did the patient provide when requesting the medication: PATIENT IS OUT OF MEDICINE.    Does the patient have less than a 3 day supply:  [x] Yes  [] No    What is the patient's preferred pharmacy: [address] [phone] [facility] [phone] FX           D

## 2021-04-19 NOTE — PROGRESS NOTES
"Chief Complaint  Establish Care    Subjective          Gabbi Brandon presents to [facility]  History of Present Illness     Previous PCP Haritha    Discussed multiple medical problems as listed below.  Will refill pain medications for now and defer to pain management for further evaluation and refills.  She says she uses the Tylenol 3 4 times a day and Celebrex for rheumatoid arthritis although the prescriptions were linked to primary osteoarthritis.  She says she feels like she burns all over without the medication.    Also mentioned that I am concerned because she has a history of chronic kidney disease as well as anemia which appears to be anemia of chronic disease which has not been worked up fully.  With the Celebrex I am concerned over time that she could have kidney damage as well as being on the furosemide.    Objective   Vital Signs:   /78 (BP Location: Left arm, Patient Position: Sitting, Cuff Size: Adult)   Pulse 62   Temp 97.1 °F (36.2 °C)   Resp 16   Ht 162.6 cm (64\")   Wt 69.5 kg (153 lb 3.2 oz)   SpO2 98%   BMI 26.30 kg/m²     Physical Exam  Vitals and nursing note reviewed.   Constitutional:       General: She is not in acute distress.     Appearance: Normal appearance.      Comments: Sitting in wheelchair   Cardiovascular:      Rate and Rhythm: Normal rate and regular rhythm.      Heart sounds: Normal heart sounds.   Pulmonary:      Effort: Pulmonary effort is normal.      Breath sounds: Normal breath sounds.   Neurological:      Mental Status: She is alert.        Result Review :   The following data was reviewed by: Paulino Casas MD on 04/20/2021:  Common labs    Common Labsle 12/30/20 12/30/20 12/31/20 12/31/20 1/1/21 1/1/21    0619 0619 0600 0600 0646 0646   Glucose  98  136 (A)  95   BUN  12  17  17   Creatinine  0.63  0.75  0.70   eGFR Non African Am  90  74  80   Sodium  138  137  136   Potassium  4.0  4.4  3.4 (A)   Chloride  98  98  " 101   Calcium  8.8  8.5 (A)  8.0 (A)   Albumin  2.60 (A)       Total Bilirubin  0.4       Alkaline Phosphatase  67       AST (SGOT)  14       ALT (SGPT)  8       WBC 8.70  11.09 (A)  9.31    Hemoglobin 9.5 (A)  8.7 (A)  8.6 (A)    Hematocrit 30.5 (A)  28.6 (A)  28.3 (A)    Platelets 306  249  243    (A) Abnormal value                      Assessment and Plan    Diagnoses and all orders for this visit:    1. Encounter to establish care with new doctor (Primary)  -     Comprehensive Metabolic Panel  -     Lipid Panel  -     CBC w AUTO Differential  -     Ferritin  -     Transferrin  -     Iron and TIBC  -     Reticulocytes  -     Magnesium    2. Primary osteoarthritis involving multiple joints  -     acetaminophen-codeine (TYLENOL #3) 300-30 MG per tablet; Take 1 tablet by mouth Every 6 (Six) Hours As Needed for Moderate Pain .  Dispense: 120 tablet; Refill: 0  -     celecoxib (CeleBREX) 200 MG capsule; Take 1 capsule by mouth Daily.  Dispense: 90 capsule; Refill: 0    3. Essential hypertension  -     amLODIPine (NORVASC) 5 MG tablet; Take 1 tablet by mouth Daily.  Dispense: 90 tablet; Refill: 3  -     atenolol (TENORMIN) 25 MG tablet; Take 1 tablet by mouth Daily.  Dispense: 90 tablet; Refill: 3  -     Comprehensive Metabolic Panel  -     CBC w AUTO Differential    4. Familial hypercholesterolemia  -     simvastatin (ZOCOR) 20 MG tablet; Take 1 tablet by mouth Every Night.  Dispense: 90 tablet; Refill: 3  -     Comprehensive Metabolic Panel  -     Lipid Panel  -     CBC w AUTO Differential    5. Pedal edema  -     furosemide (Lasix) 40 MG tablet; Take 0.5 tablets by mouth Daily.  Dispense: 90 tablet; Refill: 3  -     Comprehensive Metabolic Panel    6. Stage 2 chronic kidney disease  -     Comprehensive Metabolic Panel    7. Gastroesophageal reflux disease without esophagitis  -     omeprazole (priLOSEC) 20 MG capsule; Take 1 capsule by mouth Daily.  Dispense: 90 capsule; Refill: 3    8. Allergic rhinitis due to  pollen, unspecified seasonality  -     fluticasone (FLONASE) 50 MCG/ACT nasal spray; 2 sprays by Each Nare route Daily.  Dispense: 48 g; Refill: 2    9. Anxiety disorder, unspecified type  -     sertraline (Zoloft) 50 MG tablet; Take 1 tablet by mouth Daily.  Dispense: 90 tablet; Refill: 3    10. Pressure injury of right buttock, stage 2 (CMS/HCC)  -     silver sulfadiazine (SILVADENE, SSD) 1 % cream; Apply  topically to the appropriate area as directed Daily.  Dispense: 25 g; Refill: 1    11. Anemia, unspecified type  -     CBC w AUTO Differential  -     Ferritin  -     Transferrin  -     Iron and TIBC  -     Reticulocytes    12. Generalized arthritis  -     acetaminophen-codeine (TYLENOL #3) 300-30 MG per tablet; Take 1 tablet by mouth Every 6 (Six) Hours As Needed for Moderate Pain .  Dispense: 120 tablet; Refill: 0  -     Ambulatory Referral to Pain Management    13. Hypomagnesemia  -     Magnesium      Pleasant patient although very medically complex.  I will get some labs to find out the status of her anemia as well as chronic kidney disease.  Refilled her medications.  As stated that she will need to see a pain doctor for her Tylenol 3 and further pain complaints given that I need to focus on her other chronic medical problems.        I spent 40 minutes caring for Gabbi on this date of service. This time includes time spent by me in the following activities:preparing for the visit, reviewing tests, obtaining and/or reviewing a separately obtained history, performing a medically appropriate examination and/or evaluation , counseling and educating the patient/family/caregiver, ordering medications, tests, or procedures, referring and communicating with other health care professionals  and documenting information in the medical record  Follow Up   Return in about 6 months (around 10/20/2021) for Recheck.  Patient was given instructions and counseling regarding her condition or for health maintenance advice.  Please see specific information pulled into the AVS if appropriate.

## 2021-04-20 ENCOUNTER — OFFICE VISIT (OUTPATIENT)
Dept: INTERNAL MEDICINE | Facility: CLINIC | Age: 81
End: 2021-04-20

## 2021-04-20 VITALS
DIASTOLIC BLOOD PRESSURE: 78 MMHG | TEMPERATURE: 97.1 F | HEART RATE: 62 BPM | SYSTOLIC BLOOD PRESSURE: 124 MMHG | BODY MASS INDEX: 26.15 KG/M2 | RESPIRATION RATE: 16 BRPM | WEIGHT: 153.2 LBS | OXYGEN SATURATION: 98 % | HEIGHT: 64 IN

## 2021-04-20 DIAGNOSIS — I10 ESSENTIAL HYPERTENSION: ICD-10-CM

## 2021-04-20 DIAGNOSIS — J30.1 ALLERGIC RHINITIS DUE TO POLLEN, UNSPECIFIED SEASONALITY: ICD-10-CM

## 2021-04-20 DIAGNOSIS — L89.312 PRESSURE INJURY OF RIGHT BUTTOCK, STAGE 2 (HCC): ICD-10-CM

## 2021-04-20 DIAGNOSIS — N18.2 STAGE 2 CHRONIC KIDNEY DISEASE: ICD-10-CM

## 2021-04-20 DIAGNOSIS — R60.0 PEDAL EDEMA: ICD-10-CM

## 2021-04-20 DIAGNOSIS — Z76.89 ENCOUNTER TO ESTABLISH CARE WITH NEW DOCTOR: Primary | ICD-10-CM

## 2021-04-20 DIAGNOSIS — F41.9 ANXIETY DISORDER, UNSPECIFIED TYPE: ICD-10-CM

## 2021-04-20 DIAGNOSIS — M15.9 PRIMARY OSTEOARTHRITIS INVOLVING MULTIPLE JOINTS: ICD-10-CM

## 2021-04-20 DIAGNOSIS — M19.90 GENERALIZED ARTHRITIS: ICD-10-CM

## 2021-04-20 DIAGNOSIS — E83.42 HYPOMAGNESEMIA: ICD-10-CM

## 2021-04-20 DIAGNOSIS — K21.9 GASTROESOPHAGEAL REFLUX DISEASE WITHOUT ESOPHAGITIS: ICD-10-CM

## 2021-04-20 DIAGNOSIS — D64.9 ANEMIA, UNSPECIFIED TYPE: ICD-10-CM

## 2021-04-20 DIAGNOSIS — E78.01 FAMILIAL HYPERCHOLESTEROLEMIA: ICD-10-CM

## 2021-04-20 PROCEDURE — 99215 OFFICE O/P EST HI 40 MIN: CPT | Performed by: FAMILY MEDICINE

## 2021-04-20 RX ORDER — AMLODIPINE BESYLATE 5 MG/1
5 TABLET ORAL DAILY
Qty: 90 TABLET | Refills: 3 | Status: SHIPPED | OUTPATIENT
Start: 2021-04-20 | End: 2021-10-14

## 2021-04-20 RX ORDER — FLUTICASONE PROPIONATE 50 MCG
2 SPRAY, SUSPENSION (ML) NASAL DAILY
Qty: 48 G | Refills: 2 | Status: SHIPPED | OUTPATIENT
Start: 2021-04-20 | End: 2021-10-14 | Stop reason: SDUPTHER

## 2021-04-20 RX ORDER — ACETAMINOPHEN AND CODEINE PHOSPHATE 300; 30 MG/1; MG/1
1 TABLET ORAL EVERY 6 HOURS PRN
Qty: 120 TABLET | Refills: 0 | Status: SHIPPED | OUTPATIENT
Start: 2021-04-20 | End: 2021-05-20

## 2021-04-20 RX ORDER — ATENOLOL 25 MG/1
25 TABLET ORAL DAILY
Qty: 90 TABLET | Refills: 3 | Status: SHIPPED | OUTPATIENT
Start: 2021-04-20 | End: 2021-10-14 | Stop reason: SDUPTHER

## 2021-04-20 RX ORDER — CELECOXIB 200 MG/1
200 CAPSULE ORAL DAILY
Qty: 90 CAPSULE | Refills: 0 | Status: SHIPPED | OUTPATIENT
Start: 2021-04-20 | End: 2021-07-22

## 2021-04-20 RX ORDER — FUROSEMIDE 40 MG/1
20 TABLET ORAL DAILY
Qty: 90 TABLET | Refills: 3 | Status: SHIPPED | OUTPATIENT
Start: 2021-04-20 | End: 2021-10-14

## 2021-04-20 RX ORDER — SIMVASTATIN 20 MG
20 TABLET ORAL NIGHTLY
Qty: 90 TABLET | Refills: 3 | Status: SHIPPED | OUTPATIENT
Start: 2021-04-20 | End: 2021-10-14 | Stop reason: SDUPTHER

## 2021-04-20 RX ORDER — OMEPRAZOLE 20 MG/1
20 CAPSULE, DELAYED RELEASE ORAL DAILY
Qty: 90 CAPSULE | Refills: 3 | Status: SHIPPED | OUTPATIENT
Start: 2021-04-20 | End: 2021-10-14 | Stop reason: SDUPTHER

## 2021-04-20 NOTE — TELEPHONE ENCOUNTER
PATIENT DAUGHTER CALLING TO ADVISED THAT RX FOR ATENOLOL NEEDS TO GO TO [facility] - [place], [place] - [address]  - [phone]  - [phone] FX

## 2021-04-21 RX ORDER — ATENOLOL 25 MG/1
25 TABLET ORAL DAILY
Qty: 90 TABLET | Refills: 3 | OUTPATIENT
Start: 2021-04-21

## 2021-04-26 ENCOUNTER — TELEPHONE (OUTPATIENT)
Dept: INTERNAL MEDICINE | Facility: CLINIC | Age: 81
End: 2021-04-26

## 2021-04-26 NOTE — TELEPHONE ENCOUNTER
Caller: Sara Hernandez    Relationship: Emergency Contact    Best call back number: [phone] (M) OK TO LEAVE MESSAGE IF NO ANWER    What is the best time to reach you: ANYTIME     Who are you requesting to speak with (clinical staff, provider,  specific staff member): CLINICAL     Do you know the name of the person who called:     What was the call regarding: WOULD LIKE TO KNOW IF PATIENT NEEDS TO HAVE LABS.    Do you require a callback: YES         THANKS

## 2021-04-28 ENCOUNTER — TELEPHONE (OUTPATIENT)
Dept: INTERNAL MEDICINE | Facility: CLINIC | Age: 81
End: 2021-04-28

## 2021-04-28 NOTE — TELEPHONE ENCOUNTER
Caller: Sara Hernandez    Relationship: Emergency Contact    Best call back number: [phone]    What orders are you requesting (i.e. lab or imaging): LABS    In what timeframe would the patient need to come in: ANY DAY AROUND 10:00 AM       Additional notes: PATIENT DAUGHTER REQUESTING PATIENT TO BE SCHEDULED FOR LABS.PLEASE ADVISE.

## 2021-04-28 NOTE — TELEPHONE ENCOUNTER
Left a detailed message in regards to scheduling a lab appt. Z    Daughter advised we are not scheduling lab appt prior to an OV. Provider will order what is necessary the day of the Pt's appt.

## 2021-04-29 ENCOUNTER — TELEPHONE (OUTPATIENT)
Dept: INTERNAL MEDICINE | Facility: CLINIC | Age: 81
End: 2021-04-29

## 2021-04-29 DIAGNOSIS — M15.9 PRIMARY OSTEOARTHRITIS INVOLVING MULTIPLE JOINTS: Primary | ICD-10-CM

## 2021-04-29 NOTE — TELEPHONE ENCOUNTER
A referral was already placed for Dr. Mima Delarosa w/ [facility].     Dr. Sergey Stephen is w/ [facility] who daughter would akbar her mother to see instead.    Thanks,    Connor

## 2021-04-29 NOTE — TELEPHONE ENCOUNTER
Caller: Sara Hernandez    Relationship: Emergency Contact    Best call back number:[phone]    What is the medical concern/diagnosis:   ARTHRITIS     What specialty or service is being requested: PAIN MANAGEMENT     What is the provider, practice or medical service name: [facility]     What is the office location: [address] [place] [address]     What is the office phone number: [phone]    Any additional details:   PATIENTS DAUGHTER CALLED AND WANTED A REFERRAL TO [facility]     PATIENT DAUGHTER ALSO STATED DOCTOR WAS GOING TO GIVE A NAME OF WOUND SPECIALIST AND WANTED TO KNOW THE NAME.

## 2021-05-04 ENCOUNTER — TELEPHONE (OUTPATIENT)
Dept: INTERNAL MEDICINE | Facility: CLINIC | Age: 81
End: 2021-05-04

## 2021-05-04 NOTE — TELEPHONE ENCOUNTER
Caller: Sara Hernandez    Relationship: Emergency Contact    Best call back number: [phone]    What is the medical concern/diagnosis: ARTHRITIS     What specialty or service is being requested: PAIN MANAGEMENT     What is the provider, practice or medical service name: [facility]     What is the office location: [address] [place] [address]     What is the office phone number: [phone]    Any additional details: PATIENT DAUGHTER STATED THIS OFFICE WAS CLOSER AND WOULD BE EASIER TO GET TO.     PLEASE ADVISE    WOULD LIKE A CALL BACK

## 2021-05-04 NOTE — TELEPHONE ENCOUNTER
Left message to let Ms. Hernandez know referral was faxed to Dr. Stephen on 4/29, left their phone number on her voicemail.

## 2021-05-20 DIAGNOSIS — M19.90 GENERALIZED ARTHRITIS: ICD-10-CM

## 2021-05-20 DIAGNOSIS — M15.9 PRIMARY OSTEOARTHRITIS INVOLVING MULTIPLE JOINTS: ICD-10-CM

## 2021-05-20 RX ORDER — ACETAMINOPHEN AND CODEINE PHOSPHATE 300; 30 MG/1; MG/1
TABLET ORAL
Qty: 120 TABLET | Refills: 0 | Status: SHIPPED | OUTPATIENT
Start: 2021-05-20 | End: 2022-12-22 | Stop reason: HOSPADM

## 2021-05-20 NOTE — TELEPHONE ENCOUNTER
PATIENT'S DAUGHTER BOBBY IS CALLING TO STATE PATIENT HAS PAIN MANAGEMENT APPOINTMENT ON 06/03/21.  SHE STATES PATIENT WILL BE OUT OF THE FOLLOWING MEDICATION IN 1 DAY.      acetaminophen-codeine (TYLENOL #3) 300-30 MG per tablet     [address] [place] [facility] [phone] [facility] [phone]   [phone]    PLEASE ADVISE.

## 2021-06-01 NOTE — PROGRESS NOTES
Exercise Oximetry    Patient Name:Gabbi Brandon   MRN: [id number]   Date: 09/05/20             ROOM AIR BASELINE   SpO2% 92   Heart Rate 75   Blood Pressure      EXERCISE ON ROOM AIR SpO2% EXERCISE ON O2 @ 2LPM SpO2%   1 MINUTE 91 1 MINUTE    2 MINUTES 93 2 MINUTES    3 MINUTES 89 3 MINUTES    4 MINUTES 87 4 MINUTES    5 MINUTES  5 MINUTES 90   6 MINUTES  6 MINUTES 93              Distance Walked   Distance Walked   Dyspnea (Nani Scale)   Dyspnea (Nani Scale)   Fatigue (Nani Scale)   Fatigue (Nani Scale)   SpO2% Post Exercise   SpO2% Post Exercise 92   HR Post Exercise   HR Post Exercise 78   Time to Recovery   Time to Recovery     Comments: Patient walked slowly with walker.  Patient Sats dropped to 87% at 4 minutes.  Put on 2L NC    Good Communication Strategies    Communication can be challenging for anyone, but can be especially difficult for those with some degree of hearing loss. While we may not be able to control every factor that may lead to difficulty with communication, there are Good Communication Strategies that we can all use in our day-to-day lives. Communication takes both parties working together for it to be successful. Tips as a Listener:   1. Control your environment. It is important to limit the amount of background noise in the room when possible. You should also consider having a good light source in the room to best see the other person. 2. Ask for clarification. Instead of saying \"What?\", you can use parts of what you heard to make a new question. For example, if you heard the word \"Thursday\" but not the rest of the week, you may ask \"What was that about Thursday? \" or \"What did you want to do Thursday? \". This shows the person talking that you are listening and will help them better explain what they are saying. 3. Be an advocate for yourself. If you are hearing but not understanding, tell the other person \"I can hear you, but I need you to slow down when you speak. \"  Or if someone is facing the other direction, say \"I cannot hear you when you are not looking at me when we talk. \"       Tips as a Talker:   - Sit or stand 3 to 6 feet away to maximize audibility         -- It is unrealistic to believe someone else will fully hear your message if you are speaking from across the room or in a different room in the house   - Stay at eye level to help with visual cues   - Make sure you have the persons attention before speaking   - Use facial expressions and gestures to accentuate your message   - Raise your voice slightly (do not scream)   - Speak slowly and distinctly   - Use short, simple sentences   - Rephrase your words if the person is having a hard time understanding you    - To avoid distortion, dont speak directly into a persons ear      Some additional items that may be helpful:   - Remain patient - this is important for both parties   - Write down items that still cannot be heard/understood. You may write with pen/paper or consider typing/texting on a cell phone or smart device. - If background noise is unavoidable, try to keep yourself in a good position in the room. By sitting at a hanson on the side of the restaurant (preferably a corner), it will be easier to communicate than if you were sitting at a table in the middle with background noise surrounding you. Keep yourself positioned away from music speakers or heavy foot traffic. Hearing Loss: Care Instructions  Your Care Instructions      Hearing loss is a sudden or slow decrease in how well you hear. It can range from mild to profound. Permanent hearing loss can occur with aging, and it can happen when you are exposed long-term to loud noise. Examples include listening to loud music, riding motorcycles, or being around other loud machines. Hearing loss can affect your work and home life. It can make you feel lonely or depressed. You may feel that you have lost your independence. But hearing aids and other devices can help you hear better and feel connected to others. Follow-up care is a key part of your treatment and safety. Be sure to make and go to all appointments, and call your doctor if you are having problems. It's also a good idea to know your test results and keep a list of the medicines you take. How can you care for yourself at home? · Avoid loud noises whenever possible. This helps keep your hearing from getting worse. Always wear hearing protection around loud noises. · If appropriate, wear hearing aid(s) as directed. It is recommended that hearing aids are worn during all waking hours to keep your brain active and give it access to the sounds it is missing.       · If you are beginning your process with hearing

## 2021-07-06 ENCOUNTER — TELEPHONE (OUTPATIENT)
Dept: INTERNAL MEDICINE | Facility: CLINIC | Age: 81
End: 2021-07-06

## 2021-07-06 NOTE — TELEPHONE ENCOUNTER
I would be fine to leave in my mailbox.  I will try to take care of that by tomorrow morning and then you can mail it out to them.

## 2021-07-06 NOTE — TELEPHONE ENCOUNTER
"I can mail if you would fill out form.  I will place disabled parking form in your clinic basket.   I also left message with Sara to make sure that is what she needs because message just says, \"parking pass\".   "

## 2021-07-06 NOTE — TELEPHONE ENCOUNTER
Caller: Bobby Hernandez    Relationship: Emergency Contact    Best call back number:     What is the best time to reach you: ANYTIME    Who are you requesting to speak with (clinical staff, provider,  specific staff member):     Do you know the name of the person who called:     What was the call regarding: PATIENTS DAUGHTER BOBBY IS CALLING IN STATING THAT THE PATIENT NEEDS A PARKING PASS.  BOBBY WANTS TO KNOW IF THIS WILL BE MAILED TO HER OF IF SHE WILL NEED TO PICK THIS UP FROM THE OFFICE. BOBBY WOULD PREFER THIS BE MAILED TO THEM.     Do you require a callback: YES

## 2021-07-22 DIAGNOSIS — M15.9 PRIMARY OSTEOARTHRITIS INVOLVING MULTIPLE JOINTS: ICD-10-CM

## 2021-07-22 RX ORDER — CELECOXIB 200 MG/1
CAPSULE ORAL
Qty: 90 CAPSULE | Refills: 0 | Status: SHIPPED | OUTPATIENT
Start: 2021-07-22 | End: 2021-10-14 | Stop reason: SDUPTHER

## 2021-09-14 ENCOUNTER — NURSE TRIAGE (OUTPATIENT)
Dept: CALL CENTER | Facility: HOSPITAL | Age: 81
End: 2021-09-14

## 2021-09-14 NOTE — TELEPHONE ENCOUNTER
Reviewed guideline with caller, advises she call Poison Control center for advice. Caller given number to Poison Control center.     Reason for Disposition  • MORE THAN A DOUBLE DOSE of a prescription or over-the-counter (OTC) drug    Additional Information  • Negative: SEVERE difficulty breathing (e.g., struggling for each breath, speaks in single words)  • Negative: Bluish (or gray) lips or face now  • Negative: Seizure  • Negative: Difficult to awaken or acting confused (e.g., disoriented, slurred speech)  • Negative: Shock suspected (e.g., cold/pale/clammy skin, too weak to stand, low BP, rapid pulse)  • Negative: [1] Intentional overdose AND [2] suicidal thoughts or ideas  • Negative: Suicide attempt, known or suspected  • Negative: Sounds like a life-threatening emergency to the triager  • Negative: Inhalation of smoke or fumes  • Negative: Carbon monoxide exposure, known or suspected  • Negative: Chemical in the eye  • Negative: Chemical on the skin  • Negative: Swallowed a (non-poisonous) foreign body  • Negative: [1] HARMFUL SUBSTANCE or ACID or ALKALI ingestion (e.g., toilet , drain , lye, Clinitest tablets, ammonia, bleaches) AND [2] any symptoms (e.g., mouth pain, sore throat, breathing difficulty)  • Negative: [1] PETROLEUM PRODUCT ingestion (e.g., kerosene, gasoline, benzene, furniture polish, lighter fluid) AND [2] any symptoms (e.g., breathing difficulty, coughing, vomiting)  • Negative: [1] Poison Center advised caller to go to ED AND [2] caller seeking second opinion  • Negative: Patient sounds very sick or weak to the triager  • Negative: [1] HARMFUL SUBSTANCE or ACID or ALKALI ingestion (e.g., toilet , drain , lye, Clinitest tablets, ammonia, bleaches) AND [2] NO symptoms  • Negative: [1] PETROLEUM PRODUCT ingestion (e.g., kerosene, gasoline, benzene, furniture polish, lighter fluid) AND [2] NO symptoms    Answer Assessment - Initial Assessment Questions  1.  "SUBSTANCE: \"What was swallowed?\" If necessary, have the caller look at the label on the container.       3 Tylenol #3 taken before 4pm   2. AMOUNT: \"How much was swallowed?\" (Err on the side of recording the maximal amount that is missing)        3 tablets, 300-30 Tylenol  3. ONSET: \"When was it probably swallowed?\" (Minutes or hours ago)       Before 4pm  4. SYMPTOMS: \"Do you have any symptoms?\" If Yes, ask: \"What are they?\" (e.g., abdominal pain, vomiting, weakness)       Awake and alert  5. SUICIDAL: \"Did you take this to hurt or kill yourself?\"      accident  6. PREGNANCY: \"Is there any chance you are pregnant?\" \"When was your last menstrual period?\"      na    Protocols used: POISONING-ADULT-AH      "

## 2021-09-20 ENCOUNTER — TELEPHONE (OUTPATIENT)
Dept: INTERNAL MEDICINE | Facility: CLINIC | Age: 81
End: 2021-09-20

## 2021-09-20 NOTE — TELEPHONE ENCOUNTER
THE PATIENT WOULD LIKE LABS ORDERED FOR HER APPOINTMENT ON 10/14/2021, IF POSSIBLE. PLEASE SCHEDULE BY CALLING [phone].

## 2021-09-20 NOTE — TELEPHONE ENCOUNTER
Pt and daughter advised that we are currently not drawing labs prior to appointments. The provider will order what they think is necessary the day of the appointment.     Pt's daughter verbally agreed and verbally understood.

## 2021-10-06 ENCOUNTER — NURSE TRIAGE (OUTPATIENT)
Dept: CALL CENTER | Facility: HOSPITAL | Age: 81
End: 2021-10-06

## 2021-10-06 NOTE — TELEPHONE ENCOUNTER
"    Reason for Disposition  • Information only question and nurse able to answer    Additional Information  • Negative: Nursing judgment  • Negative: Nursing judgment  • Negative: Nursing judgment  • Negative: Nursing judgment    Answer Assessment - Initial Assessment Questions  1. REASON FOR CALL or QUESTION: \"What is your reason for calling today?\" or \"How can I best help you?\" or \"What question do you have that I can help answer?\"      My dad was wanting me to call and ask about signs of a stroke for my mom.  Went over the signs with her.  States her mother is not having any of these, but appreciate the information she will let him know.    Protocols used: INFORMATION ONLY CALL - NO TRIAGE-ADULT-[place]      "

## 2021-10-14 ENCOUNTER — TELEPHONE (OUTPATIENT)
Dept: INTERNAL MEDICINE | Facility: CLINIC | Age: 81
End: 2021-10-14

## 2021-10-14 ENCOUNTER — OFFICE VISIT (OUTPATIENT)
Dept: INTERNAL MEDICINE | Facility: CLINIC | Age: 81
End: 2021-10-14

## 2021-10-14 VITALS
SYSTOLIC BLOOD PRESSURE: 95 MMHG | HEIGHT: 64 IN | DIASTOLIC BLOOD PRESSURE: 60 MMHG | HEART RATE: 61 BPM | WEIGHT: 137 LBS | BODY MASS INDEX: 23.39 KG/M2 | TEMPERATURE: 97.8 F | OXYGEN SATURATION: 94 %

## 2021-10-14 DIAGNOSIS — R60.0 BILATERAL LOWER EXTREMITY EDEMA: Chronic | ICD-10-CM

## 2021-10-14 DIAGNOSIS — E83.42 HYPOMAGNESEMIA: Chronic | ICD-10-CM

## 2021-10-14 DIAGNOSIS — J30.1 ALLERGIC RHINITIS DUE TO POLLEN, UNSPECIFIED SEASONALITY: ICD-10-CM

## 2021-10-14 DIAGNOSIS — R60.0 PEDAL EDEMA: ICD-10-CM

## 2021-10-14 DIAGNOSIS — I10 ESSENTIAL HYPERTENSION: Chronic | ICD-10-CM

## 2021-10-14 DIAGNOSIS — D63.8 ANEMIA, CHRONIC DISEASE: ICD-10-CM

## 2021-10-14 DIAGNOSIS — K21.9 GASTROESOPHAGEAL REFLUX DISEASE WITHOUT ESOPHAGITIS: Chronic | ICD-10-CM

## 2021-10-14 DIAGNOSIS — F41.9 ANXIETY DISORDER, UNSPECIFIED TYPE: ICD-10-CM

## 2021-10-14 DIAGNOSIS — M15.9 PRIMARY OSTEOARTHRITIS INVOLVING MULTIPLE JOINTS: ICD-10-CM

## 2021-10-14 DIAGNOSIS — L30.9 ECZEMA, UNSPECIFIED TYPE: ICD-10-CM

## 2021-10-14 DIAGNOSIS — E78.5 DYSLIPIDEMIA: Primary | Chronic | ICD-10-CM

## 2021-10-14 DIAGNOSIS — E78.01 FAMILIAL HYPERCHOLESTEROLEMIA: ICD-10-CM

## 2021-10-14 PROBLEM — J90 PLEURAL EFFUSION ON RIGHT: Status: RESOLVED | Noted: 2020-09-04 | Resolved: 2021-10-14

## 2021-10-14 PROBLEM — R62.7 FAILURE TO THRIVE IN ADULT: Status: RESOLVED | Noted: 2020-07-01 | Resolved: 2021-10-14

## 2021-10-14 PROBLEM — M19.90 OSTEOARTHRITIS: Status: ACTIVE | Noted: 2021-10-14

## 2021-10-14 PROBLEM — J69.0 ASPIRATION PNEUMONIA OF RIGHT LOWER LOBE: Status: RESOLVED | Noted: 2020-09-02 | Resolved: 2021-10-14

## 2021-10-14 PROBLEM — J18.9 PNEUMONIA OF RIGHT LOWER LOBE DUE TO INFECTIOUS ORGANISM: Status: RESOLVED | Noted: 2020-07-01 | Resolved: 2021-10-14

## 2021-10-14 PROBLEM — M06.9 RHEUMATOID ARTHRITIS (HCC): Status: ACTIVE | Noted: 2021-10-14

## 2021-10-14 PROBLEM — R09.02 HYPOXIA: Status: RESOLVED | Noted: 2020-07-02 | Resolved: 2021-10-14

## 2021-10-14 LAB
ALBUMIN SERPL-MCNC: 3.2 G/DL (ref 3.5–5.2)
ALBUMIN/GLOB SERPL: 1 G/DL
ALP SERPL-CCNC: 78 U/L (ref 39–117)
ALT SERPL-CCNC: 6 U/L (ref 1–33)
AST SERPL-CCNC: 13 U/L (ref 1–32)
BASOPHILS # BLD AUTO: 0.02 10*3/MM3 (ref 0–0.2)
BASOPHILS NFR BLD AUTO: 0.3 % (ref 0–1.5)
BILIRUB SERPL-MCNC: <0.2 MG/DL (ref 0–1.2)
BUN SERPL-MCNC: 15 MG/DL (ref 8–23)
BUN/CREAT SERPL: 17 (ref 7–25)
CALCIUM SERPL-MCNC: 9.3 MG/DL (ref 8.6–10.5)
CHLORIDE SERPL-SCNC: 99 MMOL/L (ref 98–107)
CHOLEST SERPL-MCNC: 88 MG/DL (ref 0–200)
CO2 SERPL-SCNC: 33.1 MMOL/L (ref 22–29)
CREAT SERPL-MCNC: 0.88 MG/DL (ref 0.57–1)
EOSINOPHIL # BLD AUTO: 0.13 10*3/MM3 (ref 0–0.4)
EOSINOPHIL NFR BLD AUTO: 1.7 % (ref 0.3–6.2)
ERYTHROCYTE [DISTWIDTH] IN BLOOD BY AUTOMATED COUNT: 15.9 % (ref 12.3–15.4)
GLOBULIN SER CALC-MCNC: 3.3 GM/DL
GLUCOSE SERPL-MCNC: 97 MG/DL (ref 65–99)
HCT VFR BLD AUTO: 34.7 % (ref 34–46.6)
HDLC SERPL-MCNC: 28 MG/DL (ref 40–60)
HGB BLD-MCNC: 10.9 G/DL (ref 12–15.9)
IMM GRANULOCYTES # BLD AUTO: 0.02 10*3/MM3 (ref 0–0.05)
IMM GRANULOCYTES NFR BLD AUTO: 0.3 % (ref 0–0.5)
LDLC SERPL CALC-MCNC: 39 MG/DL (ref 0–100)
LYMPHOCYTES # BLD AUTO: 2.09 10*3/MM3 (ref 0.7–3.1)
LYMPHOCYTES NFR BLD AUTO: 26.6 % (ref 19.6–45.3)
MAGNESIUM SERPL-MCNC: 1.8 MG/DL (ref 1.6–2.4)
MCH RBC QN AUTO: 27.7 PG (ref 26.6–33)
MCHC RBC AUTO-ENTMCNC: 31.4 G/DL (ref 31.5–35.7)
MCV RBC AUTO: 88.1 FL (ref 79–97)
MONOCYTES # BLD AUTO: 0.66 10*3/MM3 (ref 0.1–0.9)
MONOCYTES NFR BLD AUTO: 8.4 % (ref 5–12)
NEUTROPHILS # BLD AUTO: 4.93 10*3/MM3 (ref 1.7–7)
NEUTROPHILS NFR BLD AUTO: 62.7 % (ref 42.7–76)
NRBC BLD AUTO-RTO: 0 /100 WBC (ref 0–0.2)
PLATELET # BLD AUTO: 336 10*3/MM3 (ref 140–450)
POTASSIUM SERPL-SCNC: 4.4 MMOL/L (ref 3.5–5.2)
PROT SERPL-MCNC: 6.5 G/DL (ref 6–8.5)
RBC # BLD AUTO: 3.94 10*6/MM3 (ref 3.77–5.28)
SODIUM SERPL-SCNC: 140 MMOL/L (ref 136–145)
TRIGL SERPL-MCNC: 114 MG/DL (ref 0–150)
VLDLC SERPL CALC-MCNC: 21 MG/DL (ref 5–40)
WBC # BLD AUTO: 7.85 10*3/MM3 (ref 3.4–10.8)

## 2021-10-14 PROCEDURE — 99214 OFFICE O/P EST MOD 30 MIN: CPT | Performed by: NURSE PRACTITIONER

## 2021-10-14 RX ORDER — SIMVASTATIN 20 MG
20 TABLET ORAL NIGHTLY
Qty: 90 TABLET | Refills: 3 | Status: SHIPPED | OUTPATIENT
Start: 2021-10-14 | End: 2022-03-25 | Stop reason: SDUPTHER

## 2021-10-14 RX ORDER — OMEPRAZOLE 20 MG/1
20 CAPSULE, DELAYED RELEASE ORAL DAILY
Qty: 90 CAPSULE | Refills: 3 | Status: SHIPPED | OUTPATIENT
Start: 2021-10-14 | End: 2022-03-25 | Stop reason: SDUPTHER

## 2021-10-14 RX ORDER — FLUTICASONE PROPIONATE 50 MCG
2 SPRAY, SUSPENSION (ML) NASAL DAILY
Qty: 48 G | Refills: 2 | Status: SHIPPED | OUTPATIENT
Start: 2021-10-14 | End: 2022-01-25

## 2021-10-14 RX ORDER — ATENOLOL 25 MG/1
12.5 TABLET ORAL DAILY
Qty: 90 TABLET | Refills: 0 | Status: SHIPPED | OUTPATIENT
Start: 2021-10-14 | End: 2022-01-28 | Stop reason: SDUPTHER

## 2021-10-14 RX ORDER — ASPIRIN 81 MG/1
81 TABLET ORAL DAILY
Start: 2021-10-14 | End: 2022-04-20

## 2021-10-14 RX ORDER — FUROSEMIDE 20 MG/1
20 TABLET ORAL DAILY
Qty: 90 TABLET | Refills: 0 | Status: SHIPPED | OUTPATIENT
Start: 2021-10-14 | End: 2022-01-12

## 2021-10-14 RX ORDER — CELECOXIB 200 MG/1
200 CAPSULE ORAL DAILY
Qty: 90 CAPSULE | Refills: 0 | Status: SHIPPED | OUTPATIENT
Start: 2021-10-14 | End: 2022-01-03

## 2021-10-14 NOTE — PROGRESS NOTES
"Chief Complaint  Hyperlipidemia and Hypertension    Subjective          Gabbi Brandon presents to [facility]  History of Present Illness  This is an 82 y/o female presenting to office for recheck of hypertension and HLD. This patient reports low blood pressures at home. Patient reports following with wound care for stage 2 pressure ulcer. Patient reports she has one more visit then it should be completely healed.     Patient reports history of anemia. We will recheck CBC today. Patient continues on iron supplement.     Patient reports history of leg swelling. Patient currently taking furosemide every day and uses leg compression stockings on both legs.     Patient is on OTC magnesium, potassium, and iron supplementation.    Patient's daughter is with patient at this visit. Patient reports some mild forgetfulness at this time but would like to wait on cognitive evaluation.     Patient follows with pain management for chronic pain where patient is on tylenol #3. [facility] follows with [facility].     Objective   Vital Signs:   BP 95/60 (BP Location: Right arm, Patient Position: Sitting)   Pulse 61   Temp 97.8 °F (36.6 °C)   Ht 162.6 cm (64\")   Wt 62.1 kg (137 lb)   SpO2 94%   BMI 23.52 kg/m²     Physical Exam  Vitals and nursing note reviewed.   Constitutional:       Appearance: Normal appearance.   HENT:      Head: Normocephalic.   Eyes:      Pupils: Pupils are equal, round, and reactive to light.   Cardiovascular:      Rate and Rhythm: Normal rate and regular rhythm.      Pulses: Normal pulses.      Heart sounds: Normal heart sounds. No murmur heard.  No friction rub. No gallop.    Pulmonary:      Effort: Pulmonary effort is normal. No respiratory distress.      Breath sounds: Normal breath sounds. No stridor. No wheezing, rhonchi or rales.   Abdominal:      General: There is no distension.      Palpations: Abdomen is soft.      Tenderness: There is no abdominal " tenderness.   Musculoskeletal:         General: No swelling or tenderness. Normal range of motion.      Cervical back: Normal range of motion and neck supple.   Skin:     General: Skin is warm and dry.      Capillary Refill: Capillary refill takes less than 2 seconds.          Neurological:      General: No focal deficit present.      Mental Status: She is alert and oriented to person, place, and time. Mental status is at baseline.   Psychiatric:         Mood and Affect: Mood normal.         Behavior: Behavior normal.         Thought Content: Thought content normal.         Judgment: Judgment normal.        Result Review :   The following data was reviewed by: ANA LUISA Choudhary on 10/14/2021:  Common labs    Common Labsle 12/30/20 12/30/20 12/31/20 12/31/20 1/1/21 1/1/21    0619 0619 0600 0600 0646 0646   Glucose  98  136 (A)  95   BUN  12  17  17   Creatinine  0.63  0.75  0.70   eGFR Non African Am  90  74  80   Sodium  138  137  136   Potassium  4.0  4.4  3.4 (A)   Chloride  98  98  101   Calcium  8.8  8.5 (A)  8.0 (A)   Albumin  2.60 (A)       Total Bilirubin  0.4       Alkaline Phosphatase  67       AST (SGOT)  14       ALT (SGPT)  8       WBC 8.70  11.09 (A)  9.31    Hemoglobin 9.5 (A)  8.7 (A)  8.6 (A)    Hematocrit 30.5 (A)  28.6 (A)  28.3 (A)    Platelets 306  249  243    (A) Abnormal value               Assessment and Plan    Diagnoses and all orders for this visit:    1. Dyslipidemia (Primary)  Assessment & Plan:  Continue on statin.     Orders:  -     Lipid panel    2. Essential hypertension  Assessment & Plan:  Hypertension is improving with treatment.  Continue current treatment regimen.  Dietary sodium restriction.  Regular aerobic exercise.  Medication changes per orders.  Blood pressure will be reassessed at the next regular appointment.  Stop amlodipine.   Continue on furosemide.   Continue monitoring blood pressure.     Orders:  -     Comprehensive metabolic panel  -     Magnesium  -     atenolol  (TENORMIN) 25 MG tablet; Take 0.5 tablets by mouth Daily.  Dispense: 90 tablet; Refill: 0    3. Bilateral lower extremity edema  Assessment & Plan:  Continue on furosemide.   Continue with compression stockings.       4. Gastroesophageal reflux disease without esophagitis  Assessment & Plan:  Continue on omeprazole.     Orders:  -     omeprazole (priLOSEC) 20 MG capsule; Take 1 capsule by mouth Daily.  Dispense: 90 capsule; Refill: 3    5. Hypomagnesemia  Assessment & Plan:  Mag level today.  Continue on supplementation.       6. Anemia, chronic disease  Assessment & Plan:  CBC today.   Continue on iron supplementation.     Orders:  -     CBC & Differential    7. Eczema, unspecified type  Assessment & Plan:  Triamcinolone to area BID.   Patient to wash area with mild soap and water twice daily.     Orders:  -     triamcinolone (KENALOG) 0.1 % ointment; Apply 1 application topically to the appropriate area as directed 2 (Two) Times a Day.  Dispense: 30 g; Refill: 0    8. Primary osteoarthritis involving multiple joints  -     celecoxib (CeleBREX) 200 MG capsule; Take 1 capsule by mouth Daily.  Dispense: 90 capsule; Refill: 0    9. Allergic rhinitis due to pollen, unspecified seasonality  -     fluticasone (FLONASE) 50 MCG/ACT nasal spray; 2 sprays by Each Nare route Daily.  Dispense: 48 g; Refill: 2    10. Pedal edema    11. Anxiety disorder, unspecified type  -     sertraline (Zoloft) 50 MG tablet; Take 1 tablet by mouth Daily.  Dispense: 90 tablet; Refill: 3    12. Familial hypercholesterolemia  -     simvastatin (ZOCOR) 20 MG tablet; Take 1 tablet by mouth Every Night.  Dispense: 90 tablet; Refill: 3    Other orders  -     aspirin (aspirin) 81 MG EC tablet; Take 1 tablet by mouth Daily.  -     furosemide (Lasix) 20 MG tablet; Take 1 tablet by mouth Daily.  Dispense: 90 tablet; Refill: 0      Follow Up   Return in about 6 months (around 4/14/2022) for Recheck with Dr. Casas.  Patient was given instructions and  counseling regarding her condition or for health maintenance advice. Please see specific information pulled into the AVS if appropriate.

## 2021-10-14 NOTE — TELEPHONE ENCOUNTER
Caller: Sara Hernandez    Relationship to patient: Emergency Contact    Best call back number: [phone]    Patient is needing: PATIENT'S DAUGHTER RETURNING MISSED CALL FROM [place]. UNABLE TO WARM TRANSFER.

## 2021-10-14 NOTE — PATIENT INSTRUCTIONS
Dyslipidemia  Dyslipidemia is an imbalance of waxy, fat-like substances (lipids) in the blood. The body needs lipids in small amounts. Dyslipidemia often involves a high level of cholesterol or triglycerides, which are types of lipids.  Common forms of dyslipidemia include:  · High levels of LDL cholesterol. LDL is the type of cholesterol that causes fatty deposits (plaques) to build up in the blood vessels that carry blood away from your heart (arteries).  · Low levels of HDL cholesterol. HDL cholesterol is the type of cholesterol that protects against heart disease. High levels of HDL remove the LDL buildup from arteries.  · High levels of triglycerides. Triglycerides are a fatty substance in the blood that is linked to a buildup of plaques in the arteries.  What are the causes?  Primary dyslipidemia is caused by changes (mutations) in genes that are passed down through families (inherited). These mutations cause several types of dyslipidemia.  Secondary dyslipidemia is caused by lifestyle choices and diseases that lead to dyslipidemia, such as:  · Eating a diet that is high in animal fat.  · Not getting enough exercise.  · Having diabetes, kidney disease, liver disease, or thyroid disease.  · Drinking large amounts of alcohol.  · Using certain medicines.  What increases the risk?  You are more likely to develop this condition if you are an older man or if you are a woman who has gone through menopause. Other risk factors include:  · Having a family history of dyslipidemia.  · Taking certain medicines, including birth control pills, steroids, some diuretics, and beta-blockers.  · Smoking cigarettes.  · Eating a high-fat diet.  · Having certain medical conditions such as diabetes, polycystic ovary syndrome (PCOS), kidney disease, liver disease, or hypothyroidism.  · Not exercising regularly.  · Being overweight or obese with too much belly fat.  What are the signs or symptoms?  In most cases, dyslipidemia does not  usually cause any symptoms.  In severe cases, very high lipid levels can cause:  · Fatty bumps under the skin (xanthomas).  · White or gray ring around the black center (pupil) of the eye.  Very high triglyceride levels can cause inflammation of the pancreas (pancreatitis).  How is this diagnosed?  Your health care provider may diagnose dyslipidemia based on a routine blood test (fasting blood test). Because most people do not have symptoms of the condition, this blood testing (lipid profile) is done on adults age 20 and older and is repeated every 5 years. This test checks:  · Total cholesterol. This measures the total amount of cholesterol in your blood, including LDL cholesterol, HDL cholesterol, and triglycerides. A healthy number is below 200.  · LDL cholesterol. The target number for LDL cholesterol is different for each person, depending on individual risk factors. Ask your health care provider what your LDL cholesterol should be.  · HDL cholesterol. An HDL level of 60 or higher is best because it helps to protect against heart disease. A number below 40 for men or below 50 for women increases the risk for heart disease.  · Triglycerides. A healthy triglyceride number is below 150.  If your lipid profile is abnormal, your health care provider may do other blood tests.  How is this treated?  Treatment depends on the type of dyslipidemia that you have and your other risk factors for heart disease and stroke. Your health care provider will have a target range for your lipid levels based on this information.  For many people, this condition may be treated by lifestyle changes, such as diet and exercise. Your health care provider may recommend that you:  · Get regular exercise.  · Make changes to your diet.  · Quit smoking if you smoke.  If diet changes and exercise do not help you reach your goals, your health care provider may also prescribe medicine to lower lipids. The most commonly prescribed type of medicine  lowers your LDL cholesterol (statin drug). If you have a high triglyceride level, your provider may prescribe another type of drug (fibrate) or an omega-3 fish oil supplement, or both.  Follow these instructions at home:    Eating and drinking  · Follow instructions from your health care provider or dietitian about eating or drinking restrictions.  · Eat a healthy diet as told by your health care provider. This can help you reach and maintain a healthy weight, lower your LDL cholesterol, and raise your HDL cholesterol. This may include:  ? Limiting your calories, if you are overweight.  ? Eating more fruits, vegetables, whole grains, fish, and lean meats.  ? Limiting saturated fat, trans fat, and cholesterol.  · If you drink alcohol:  ? Limit how much you use.  ? Be aware of how much alcohol is in your drink. In the [place] one drink equals one 12 oz bottle of beer (355 mL), one 5 oz glass of wine (148 mL), or one 1½ oz glass of hard liquor (44 mL).  · Do not drink alcohol if:  ? Your health care provider tells you not to drink.  ? You are pregnant, may be pregnant, or are planning to become pregnant.  Activity  · Get regular exercise. Start an exercise and strength training program as told by your health care provider. Ask your health care provider what activities are safe for you. Your health care provider may recommend:  ? 30 minutes of aerobic activity 4-6 days a week. Brisk walking is an example of aerobic activity.  ? Strength training 2 days a week.  General instructions  · Do not use any products that contain nicotine or tobacco, such as cigarettes, e-cigarettes, and chewing tobacco. If you need help quitting, ask your health care provider.  · Take over-the-counter and prescription medicines only as told by your health care provider. This includes supplements.  · Keep all follow-up visits as told by your health care provider.  Contact a health care provider if:  · You are:  ? Having trouble sticking to your  exercise or diet plan.  ? Struggling to quit smoking or control your use of alcohol.  Summary  · Dyslipidemia often involves a high level of cholesterol or triglycerides, which are types of lipids.  · Treatment depends on the type of dyslipidemia that you have and your other risk factors for heart disease and stroke.  · For many people, treatment starts with lifestyle changes, such as diet and exercise.  · Your health care provider may prescribe medicine to lower lipids.  This information is not intended to replace advice given to you by your health care provider. Make sure you discuss any questions you have with your health care provider.  Document Revised: 08/12/2019 Document Reviewed: 07/19/2019  [employer] Patient Education © 2021 [employer]    Eczema  Eczema is a broad term for a group of skin conditions that cause skin to become rough and inflamed. Each type of eczema has different triggers, symptoms, and treatments. Eczema of any type is usually itchy and symptoms range from mild to severe.  Eczema and its symptoms are not spread from person to person (are not contagious). It can appear on different parts of the body at different times. Your eczema may not look the same as someone else's eczema.  What are the types of eczema?  Atopic dermatitis  This is a long-term (chronic) skin disease that keeps coming back (recurring). Usual symptoms are dry skin and small, solid pimples that may swell and leak fluid (weep).  Contact dermatitis    This happens when something irritates the skin and causes a rash. The irritation can come from substances that you are allergic to (allergens), such as poison ivy, chemicals, or medicines that were applied to your skin.  Dyshidrotic eczema  This is a form of eczema on the hands and feet. It shows up as very itchy, fluid-filled blisters. It can affect people of any age, but is more common before age 40.  Hand eczema    This causes very itchy areas of skin on the palms and sides  "of the hands and fingers. This type of eczema is common in industrial jobs where you may be exposed to many different types of irritants.  Lichen simplex chronicus  This type of eczema occurs when a person constantly scratches one area of the body. Repeated scratching of the area leads to thickened skin (lichenification). Lichen simplex chronicus can occur along with other types of eczema. It is more common in adults, but may be seen in children as well.  Nummular eczema  This is a common type of eczema. It has no known cause. It typically causes a red, circular, crusty lesion (plaque) that may be itchy. Scratching may become a habit and can cause bleeding. Nummular eczema occurs most often in people of middle-age or older. It most often affects the hands.  Seborrheic dermatitis  This is a common skin disease that mainly affects the scalp. It may also affect any oily areas of the body, such as the face, sides of nose, eyebrows, ears, eyelids, and chest. It is marked by small scaling and redness of the skin (erythema). This can affect people of all ages. In infants, this condition is known as \"cradle cap.\"  Stasis dermatitis  This is a common skin disease that usually appears on the legs and feet. It most often occurs in people who have a condition that prevents blood from being pumped through the veins in the legs (chronic venous insufficiency). Stasis dermatitis is a chronic condition that needs long-term management.  How is eczema diagnosed?  Your health care provider will examine your skin and review your medical history. He or she may also give you skin patch tests. These tests involve taking patches that contain possible allergens and placing them on your back. He or she will then check in a few days to see if an allergic reaction occurred.  What are the common treatments?  Treatment for eczema is based on the type of eczema you have. Hydrocortisone steroid medicine can relieve itching quickly and help reduce " inflammation. This medicine may be prescribed or obtained over-the-counter, depending on the strength of the medicine that is needed.  Follow these instructions at home:  · Take over-the-counter and prescription medicines only as told by your health care provider.  · Use creams or ointments to moisturize your skin. Do not use lotions.  · Learn what triggers or irritates your symptoms. Avoid these things.  · Treat symptom flare-ups quickly.  · Do not itch your skin. This can make your rash worse.  · Keep all follow-up visits as told by your health care provider. This is important.  Where to find more information  · The American Academy of Dermatology: www.aad.org  · The National Eczema Association: www.nationaleczema.org  Contact a health care provider if:  · You have serious itching, even with treatment.  · You regularly scratch your skin until it bleeds.  · Your rash looks different than usual.  · Your skin is painful, swollen, or more red than usual.  · You have a fever.  Summary  · There are eight general types of eczema. Each type has different triggers.  · Eczema of any type causes itching that may range from mild to severe.  · Treatment varies based on the type of eczema you have. Hydrocortisone steroid medicine can help with itching and inflammation.  · Protecting your skin is the best way to prevent eczema. Use moisturizers and lotions. Avoid triggers and irritants, and treat flare-ups quickly.  This information is not intended to replace advice given to you by your health care provider. Make sure you discuss any questions you have with your health care provider.  Document Revised: 11/30/2018 Document Reviewed: 05/03/2018  [employer] Patient Education © 2021 [employer]    Gastroesophageal Reflux Disease, Adult    Gastroesophageal reflux (JORGE) happens when acid from the stomach flows up into the tube that connects the mouth and the stomach (esophagus). Normally, food travels down the esophagus and stays in  the stomach to be digested. With JORGE, food and stomach acid sometimes move back up into the esophagus.  You may have a disease called gastroesophageal reflux disease (GERD) if the reflux:  · Happens often.  · Causes frequent or very bad symptoms.  · Causes problems such as damage to the esophagus.  When this happens, the esophagus becomes sore and swollen. Over time, GERD can make small holes (ulcers) in the lining of the esophagus.  What are the causes?  This condition is caused by a problem with the muscle between the esophagus and the stomach. When this muscle is weak or not normal, it does not close properly to keep food and acid from coming back up from the stomach.  The muscle can be weak because of:  · Tobacco use.  · Pregnancy.  · Having a certain type of hernia (hiatal hernia).  · Alcohol use.  · Certain foods and drinks, such as coffee, chocolate, onions, and peppermint.  What increases the risk?  · Being overweight.  · Having a disease that affects your connective tissue.  · Taking NSAIDs, such a ibuprofen.  What are the signs or symptoms?  · Heartburn.  · Difficult or painful swallowing.  · The feeling of having a lump in the throat.  · A bitter taste in the mouth.  · Bad breath.  · Having a lot of saliva.  · Having an upset or bloated stomach.  · Burping.  · Chest pain. Different conditions can cause chest pain. Make sure you see your doctor if you have chest pain.  · Shortness of breath or wheezing.  · A long-term cough or a cough at night.  · Wearing away of the surface of teeth (tooth enamel).  · Weight loss.  How is this treated?  · Making changes to your diet.  · Taking medicine.  · Having surgery.  Treatment will depend on how bad your symptoms are.  Follow these instructions at home:  Eating and drinking    · Follow a diet as told by your doctor. You may need to avoid foods and drinks such as:  ? Coffee and tea, with or without caffeine.  ? Drinks that contain alcohol.  ? Energy drinks and sports  drinks.  ? Bubbly (carbonated) drinks or sodas.  ? Chocolate and cocoa.  ? Peppermint and mint flavorings.  ? Garlic and onions.  ? Horseradish.  ? Spicy and acidic foods. These include peppers, chili powder, cintron powder, vinegar, hot sauces, and BBQ sauce.  ? Citrus fruit juices and citrus fruits, such as oranges, aretha, and limes.  ? Tomato-based foods. These include red sauce, chili, salsa, and pizza with red sauce.  ? Fried and fatty foods. These include donuts, french fries, potato chips, and high-fat dressings.  ? High-fat meats. These include hot dogs, rib eye steak, sausage, ham, and tariq.  ? High-fat dairy items, such as whole milk, butter, and cream cheese.  · Eat small meals often. Avoid eating large meals.  · Avoid drinking large amounts of liquid with your meals.  · Avoid eating meals during the 2-3 hours before bedtime.  · Avoid lying down right after you eat.  · Do not exercise right after you eat.    Lifestyle    · Do not smoke or use any products that contain nicotine or tobacco. If you need help quitting, ask your doctor.  · Try to lower your stress. If you need help doing this, ask your doctor.  · If you are overweight, lose an amount of weight that is healthy for you. Ask your doctor about a safe weight loss goal.    General instructions  · Pay attention to any changes in your symptoms.  · Take over-the-counter and prescription medicines only as told by your doctor.  · Do not take aspirin, ibuprofen, or other NSAIDs unless your doctor says it is okay.  · Wear loose clothes. Do not wear anything tight around your waist.  · Raise (elevate) the head of your bed about 6 inches (15 cm). You may need to use a wedge to do this.  · Avoid bending over if this makes your symptoms worse.  · Keep all follow-up visits.  Contact a doctor if:  · You have new symptoms.  · You lose weight and you do not know why.  · You have trouble swallowing or it hurts to swallow.  · You have wheezing or a cough that keeps  happening.  · You have a hoarse voice.  · Your symptoms do not get better with treatment.  Get help right away if:  · You have sudden pain in your arms, neck, jaw, teeth, or back.  · You suddenly feel sweaty, dizzy, or light-headed.  · You have chest pain or shortness of breath.  · You vomit and the vomit is green, yellow, or black, or it looks like blood or coffee grounds.  · You faint.  · Your poop (stool) is red, bloody, or black.  · You cannot swallow, drink, or eat.  These symptoms may represent a serious problem that is an emergency. Do not wait to see if the symptoms will go away. Get medical help right away. Call your local emergency services (911 in the [place] Do not drive yourself to the hospital.  Summary  · If a person has gastroesophageal reflux disease (GERD), food and stomach acid move back up into the esophagus and cause symptoms or problems such as damage to the esophagus.  · Treatment will depend on how bad your symptoms are.  · Follow a diet as told by your doctor.  · Take all medicines only as told by your doctor.  This information is not intended to replace advice given to you by your health care provider. Make sure you discuss any questions you have with your health care provider.  Document Revised: 06/28/2021 Document Reviewed: 06/28/2021  [employer] Patient Education © 2021 [employer]    High Cholesterol    High cholesterol is a condition in which the blood has high levels of a white, waxy substance similar to fat (cholesterol). The liver makes all the cholesterol that the body needs. The human body needs small amounts of cholesterol to help build cells. A person gets extra or excess cholesterol from the food that he or she eats.  The blood carries cholesterol from the liver to the rest of the body. If you have high cholesterol, deposits (plaques) may build up on the walls of your arteries. Arteries are the blood vessels that carry blood away from your heart. These plaques make the arteries  "narrow and stiff.  Cholesterol plaques increase your risk for heart attack and stroke. Work with your health care provider to keep your cholesterol levels in a healthy range.  What increases the risk?  The following factors may make you more likely to develop this condition:  · Eating foods that are high in animal fat (saturated fat) or cholesterol.  · Being overweight.  · Not getting enough exercise.  · A family history of high cholesterol (familial hypercholesterolemia).  · Use of tobacco products.  · Having diabetes.  What are the signs or symptoms?  There are no symptoms of this condition.  How is this diagnosed?  This condition may be diagnosed based on the results of a blood test.  · If you are older than 20 years of age, your health care provider may check your cholesterol levels every 4-6 years.  · You may be checked more often if you have high cholesterol or other risk factors for heart disease.  The blood test for cholesterol measures:  · \"Bad\" cholesterol, or LDL cholesterol. This is the main type of cholesterol that causes heart disease. The desired level is less than 100 mg/dL.  · \"Good\" cholesterol, or HDL cholesterol. HDL helps protect against heart disease by cleaning the arteries and carrying the LDL to the liver for processing. The desired level for HDL is 60 mg/dL or higher.  · Triglycerides. These are fats that your body can store or burn for energy. The desired level is less than 150 mg/dL.  · Total cholesterol. This measures the total amount of cholesterol in your blood and includes LDL, HDL, and triglycerides. The desired level is less than 200 mg/dL.  How is this treated?  This condition may be treated with:  · Diet changes. You may be asked to eat foods that have more fiber and less saturated fats or added sugar.  · Lifestyle changes. These may include regular exercise, maintaining a healthy weight, and quitting use of tobacco products.  · Medicines. These are given when diet and lifestyle " "changes have not worked. You may be prescribed a statin medicine to help lower your cholesterol levels.  Follow these instructions at home:  Eating and drinking    · Eat a healthy, balanced diet. This diet includes:  ? Daily servings of a variety of fresh, frozen, or canned fruits and vegetables.  ? Daily servings of whole grain foods that are rich in fiber.  ? Foods that are low in saturated fats and trans fats. These include poultry and fish without skin, lean cuts of meat, and low-fat dairy products.  ? A variety of fish, especially oily fish that contain omega-3 fatty acids. Aim to eat fish at least 2 times a week.  · Avoid foods and drinks that have added sugar.  · Use healthy cooking methods, such as roasting, grilling, broiling, baking, poaching, steaming, and stir-frying. Do not maher your food except for stir-frying.    Lifestyle    · Get regular exercise. Aim to exercise for a total of 150 minutes a week. Increase your activity level by doing activities such as gardening, walking, and taking the stairs.  · Do not use any products that contain nicotine or tobacco, such as cigarettes, e-cigarettes, and chewing tobacco. If you need help quitting, ask your health care provider.    General instructions  · Take over-the-counter and prescription medicines only as told by your health care provider.  · Keep all follow-up visits as told by your health care provider. This is important.  Where to find more information  · American Heart Association: www.heart.org  · National Heart, Lung, and Blood [place]: www.nhlbi.nih.gov  Contact a health care provider if:  · You have trouble achieving or maintaining a healthy diet or weight.  · You are starting an exercise program.  · You are unable to stop smoking.  Get help right away if:  · You have chest pain.  · You have trouble breathing.  · You have any symptoms of a stroke. \"BE FAST\" is an easy way to remember the main warning signs of a stroke:  ? B - Balance. Signs are " dizziness, sudden trouble walking, or loss of balance.  ? E - Eyes. Signs are trouble seeing or a sudden change in vision.  ? F - Face. Signs are sudden weakness or numbness of the face, or the face or eyelid drooping on one side.  ? A - Arms. Signs are weakness or numbness in an arm. This happens suddenly and usually on one side of the body.  ? S - Speech. Signs are sudden trouble speaking, slurred speech, or trouble understanding what people say.  ? T - Time. Time to call emergency services. Write down what time symptoms started.  · You have other signs of a stroke, such as:  ? A sudden, severe headache with no known cause.  ? Nausea or vomiting.  ? Seizure.  These symptoms may represent a serious problem that is an emergency. Do not wait to see if the symptoms will go away. Get medical help right away. Call your local emergency services (911 in the [place] Do not drive yourself to the hospital.  Summary  · Cholesterol plaques increase your risk for heart attack and stroke. Work with your health care provider to keep your cholesterol levels in a healthy range.  · Eat a healthy, balanced diet, get regular exercise, and maintain a healthy weight.  · Do not use any products that contain nicotine or tobacco, such as cigarettes, e-cigarettes, and chewing tobacco.  · Get help right away if you have any symptoms of a stroke.  This information is not intended to replace advice given to you by your health care provider. Make sure you discuss any questions you have with your health care provider.  Document Revised: 11/16/2020 Document Reviewed: 11/16/2020  [employer] Patient Education © 2021 [employer]    Hypertension, Adult  Hypertension is another name for high blood pressure. High blood pressure forces your heart to work harder to pump blood. This can cause problems over time.  There are two numbers in a blood pressure reading. There is a top number (systolic) over a bottom number (diastolic). It is best to have a blood  pressure that is below 120/80. Healthy choices can help lower your blood pressure, or you may need medicine to help lower it.  What are the causes?  The cause of this condition is not known. Some conditions may be related to high blood pressure.  What increases the risk?  · Smoking.  · Having type 2 diabetes mellitus, high cholesterol, or both.  · Not getting enough exercise or physical activity.  · Being overweight.  · Having too much fat, sugar, calories, or salt (sodium) in your diet.  · Drinking too much alcohol.  · Having long-term (chronic) kidney disease.  · Having a family history of high blood pressure.  · Age. Risk increases with age.  · Race. You may be at higher risk if you are .  · Gender. Men are at higher risk than women before age 45. After age 65, women are at higher risk than men.  · Having obstructive sleep apnea.  · Stress.  What are the signs or symptoms?  · High blood pressure may not cause symptoms. Very high blood pressure (hypertensive crisis) may cause:  ? Headache.  ? Feelings of worry or nervousness (anxiety).  ? Shortness of breath.  ? Nosebleed.  ? A feeling of being sick to your stomach (nausea).  ? Throwing up (vomiting).  ? Changes in how you see.  ? Very bad chest pain.  ? Seizures.  How is this treated?  · This condition is treated by making healthy lifestyle changes, such as:  ? Eating healthy foods.  ? Exercising more.  ? Drinking less alcohol.  · Your health care provider may prescribe medicine if lifestyle changes are not enough to get your blood pressure under control, and if:  ? Your top number is above 130.  ? Your bottom number is above 80.  · Your personal target blood pressure may vary.  Follow these instructions at home:  Eating and drinking    · If told, follow the DASH eating plan. To follow this plan:  ? Fill one half of your plate at each meal with fruits and vegetables.  ? Fill one fourth of your plate at each meal with whole grains. Whole grains  include whole-wheat pasta, brown rice, and whole-grain bread.  ? Eat or drink low-fat dairy products, such as skim milk or low-fat yogurt.  ? Fill one fourth of your plate at each meal with low-fat (lean) proteins. Low-fat proteins include fish, chicken without skin, eggs, beans, and tofu.  ? Avoid fatty meat, cured and processed meat, or chicken with skin.  ? Avoid pre-made or processed food.  · Eat less than 1,500 mg of salt each day.  · Do not drink alcohol if:  ? Your doctor tells you not to drink.  ? You are pregnant, may be pregnant, or are planning to become pregnant.  · If you drink alcohol:  ? Limit how much you use to:  § 0-1 drink a day for women.  § 0-2 drinks a day for men.  ? Be aware of how much alcohol is in your drink. In the [place] one drink equals one 12 oz bottle of beer (355 mL), one 5 oz glass of wine (148 mL), or one 1½ oz glass of hard liquor (44 mL).    Lifestyle    · Work with your doctor to stay at a healthy weight or to lose weight. Ask your doctor what the best weight is for you.  · Get at least 30 minutes of exercise most days of the week. This may include walking, swimming, or biking.  · Get at least 30 minutes of exercise that strengthens your muscles (resistance exercise) at least 3 days a week. This may include lifting weights or doing Pilates.  · Do not use any products that contain nicotine or tobacco, such as cigarettes, e-cigarettes, and chewing tobacco. If you need help quitting, ask your doctor.  · Check your blood pressure at home as told by your doctor.  · Keep all follow-up visits as told by your doctor. This is important.    Medicines  · Take over-the-counter and prescription medicines only as told by your doctor. Follow directions carefully.  · Do not skip doses of blood pressure medicine. The medicine does not work as well if you skip doses. Skipping doses also puts you at risk for problems.  · Ask your doctor about side effects or reactions to medicines that you should  watch for.  Contact a doctor if you:  · Think you are having a reaction to the medicine you are taking.  · Have headaches that keep coming back (recurring).  · Feel dizzy.  · Have swelling in your ankles.  · Have trouble with your vision.  Get help right away if you:  · Get a very bad headache.  · Start to feel mixed up (confused).  · Feel weak or numb.  · Feel faint.  · Have very bad pain in your:  ? Chest.  ? Belly (abdomen).  · Throw up more than once.  · Have trouble breathing.  Summary  · Hypertension is another name for high blood pressure.  · High blood pressure forces your heart to work harder to pump blood.  · For most people, a normal blood pressure is less than 120/80.  · Making healthy choices can help lower blood pressure. If your blood pressure does not get lower with healthy choices, you may need to take medicine.  This information is not intended to replace advice given to you by your health care provider. Make sure you discuss any questions you have with your health care provider.  Document Revised: 08/28/2019 Document Reviewed: 08/28/2019  delicious Patient Education © 2021 [employer]    Hypomagnesemia  Hypomagnesemia is a condition in which the level of magnesium in the blood is low. Magnesium is a mineral that is found in many foods. It is used in many different processes in the body. Hypomagnesemia can affect every organ in the body. In severe cases, it can cause life-threatening problems.  What are the causes?  This condition may be caused by:  · Not getting enough magnesium in your diet.  · Malnutrition.  · Problems with absorbing magnesium from the intestines.  · Dehydration.  · Alcohol abuse.  · Vomiting.  · Severe or chronic diarrhea.  · Some medicines, including medicines that make you urinate more (diuretics).  · Certain diseases, such as kidney disease, diabetes, celiac disease, and overactive thyroid.  What are the signs or symptoms?  Symptoms of this condition include:  · Loss of  appetite.  · Nausea and vomiting.  · Involuntary shaking or trembling of a body part (tremor).  · Muscle weakness.  · Tingling in the arms and legs.  · Sudden tightening of muscles (muscle spasms).  · Confusion.  · Psychiatric issues, such as depression, irritability, or psychosis.  · A feeling of fluttering of the heart.  · Seizures.  These symptoms are more severe if magnesium levels drop suddenly.  How is this diagnosed?  This condition may be diagnosed based on:  · Your symptoms and medical history.  · A physical exam.  · Blood and urine tests.  How is this treated?  Treatment depends on the cause and the severity of the condition. It may be treated with:  · A magnesium supplement. This can be taken in pill form. If the condition is severe, magnesium is usually given through an IV.  · Changes to your diet. You may be directed to eat foods that have a lot of magnesium, such as green leafy vegetables, peas, beans, and nuts.  · Stopping any intake of alcohol.  Follow these instructions at home:         · Make sure that your diet includes foods with magnesium. Foods that have a lot of magnesium in them include:  ? Green leafy vegetables, such as spinach and broccoli.  ? Beans and peas.  ? Nuts and seeds, such as almonds and sunflower seeds.  ? Whole grains, such as whole grain bread and fortified cereals.  · Take magnesium supplements if your health care provider tells you to do that. Take them as directed.  · Take over-the-counter and prescription medicines only as told by your health care provider.  · Have your magnesium levels monitored as told by your health care provider.  · When you are active, drink fluids that contain electrolytes.  · Avoid drinking alcohol.  · Keep all follow-up visits as told by your health care provider. This is important.  Contact a health care provider if:  · You get worse instead of better.  · Your symptoms return.  Get help right away if you:  · Develop severe muscle weakness.  · Have  trouble breathing.  · Feel that your heart is racing.  Summary  · Hypomagnesemia is a condition in which the level of magnesium in the blood is low.  · Hypomagnesemia can affect every organ in the body.  · Treatment may include eating more foods that contain magnesium, taking magnesium supplements, and not drinking alcohol.  · Have your magnesium levels monitored as told by your health care provider.  This information is not intended to replace advice given to you by your health care provider. Make sure you discuss any questions you have with your health care provider.  Document Revised: 05/20/2021 Document Reviewed: 05/20/2021  Elsevier Patient Education © 2021 [employer]

## 2021-10-14 NOTE — TELEPHONE ENCOUNTER
Caller: VIOLET    Relationship to Patient: DAUGHTER    Phone Number: [phone]  Reason for Call: PATIENT'S DAUGHTER CALLED TO REPORT THE DOSAGE ON HER MEDICATION. IT IS ATENOLOL 50MG BUT CUTS THEM IN HALF AND TAKES 25MG. SHE SAID THIS GOES TO [address] [place], [place] - [address] [facility] [phone] [facility] [phone] FX  SHE ALSO TAKES celecoxib (CeleBREX) 200 MG capsule AND furosemide (Lasix) 40 MG tablet. THESE WILL ALSO BE GOING TO [facility].

## 2021-10-14 NOTE — ASSESSMENT & PLAN NOTE
Hypertension is improving with treatment.  Continue current treatment regimen.  Dietary sodium restriction.  Regular aerobic exercise.  Medication changes per orders.  Blood pressure will be reassessed at the next regular appointment.  Stop amlodipine.   Continue on furosemide.   Continue monitoring blood pressure.

## 2021-10-15 NOTE — PROGRESS NOTES
Please notify patient or patient's daughter:    Lipid panel at goal.     CMP is WNL.     CBC has improved-- HGB stable at 10.9 from 8.6 9 months ago.     Magnesium level WNL.

## 2021-11-22 ENCOUNTER — TELEPHONE (OUTPATIENT)
Dept: INTERNAL MEDICINE | Facility: CLINIC | Age: 81
End: 2021-11-22

## 2021-11-22 DIAGNOSIS — F41.9 ANXIETY DISORDER, UNSPECIFIED TYPE: ICD-10-CM

## 2021-11-22 NOTE — TELEPHONE ENCOUNTER
Caller: Bobby Hernandez    Relationship: Emergency Contact    Best call back number: [phone] (M)    What is the best time to reach you: ANYTIME, ASAP    Who are you requesting to speak with (clinical staff, provider,  specific staff member): CLINICAL STAFF/ RONALDO BUSTILLO    Do you know the name of the person who called: BOBBY HERNANDEZ, ON  VERBAL    What was the call regarding: PATIENT'S DAUGHTER STATES PATIENT IS TAKING SERTRALINE 50MG, PATIENT'S DAUGHTER WANTS TO INCREASE TO SERTRALINE 75MG IF POSSIBLE, STATES PATIENT IS HAVING A LOT GOING ON IN HER HEAD AND PATIENT'S DAUGHTER WANTS TO KNOW WHAT RONALDO BUSTILLO THINKS ABOUT INCREASING THIS MEDICATION, PLEASE ADVISE ASAP    Do you require a callback: YES, ASAP

## 2022-01-03 DIAGNOSIS — M15.9 PRIMARY OSTEOARTHRITIS INVOLVING MULTIPLE JOINTS: ICD-10-CM

## 2022-01-03 RX ORDER — CELECOXIB 200 MG/1
CAPSULE ORAL
Qty: 90 CAPSULE | Refills: 0 | Status: SHIPPED | OUTPATIENT
Start: 2022-01-03 | End: 2022-01-06 | Stop reason: SDUPTHER

## 2022-01-06 ENCOUNTER — TELEPHONE (OUTPATIENT)
Dept: INTERNAL MEDICINE | Facility: CLINIC | Age: 82
End: 2022-01-06

## 2022-01-06 DIAGNOSIS — M15.9 PRIMARY OSTEOARTHRITIS INVOLVING MULTIPLE JOINTS: ICD-10-CM

## 2022-01-06 RX ORDER — CELECOXIB 200 MG/1
200 CAPSULE ORAL DAILY
Qty: 90 CAPSULE | Refills: 2 | Status: SHIPPED | OUTPATIENT
Start: 2022-01-06 | End: 2022-01-28 | Stop reason: SDUPTHER

## 2022-01-06 NOTE — TELEPHONE ENCOUNTER
Caller: Sara Hernandez    Relationship to patient: Emergency Contact    Best call back number:[phone] (M)    Patient is needing: DAUGHTER CALLING TO CHECK THE STATUS OF MEDICATION REFILL DAUGHTER  WAS TOLD BY PHARMACY THAT MEDICATION WAS AUTHORIZED    celecoxib (CeleBREX) 200 MG capsule    [address] [phone] [facility] [phone]   [phone]

## 2022-01-12 RX ORDER — FUROSEMIDE 20 MG/1
TABLET ORAL
Qty: 90 TABLET | Refills: 1 | Status: SHIPPED | OUTPATIENT
Start: 2022-01-12 | End: 2022-01-28 | Stop reason: SDUPTHER

## 2022-01-24 DIAGNOSIS — J30.1 ALLERGIC RHINITIS DUE TO POLLEN, UNSPECIFIED SEASONALITY: ICD-10-CM

## 2022-01-25 RX ORDER — FLUTICASONE PROPIONATE 50 MCG
SPRAY, SUSPENSION (ML) NASAL
Qty: 48 G | Refills: 2 | Status: SHIPPED | OUTPATIENT
Start: 2022-01-25 | End: 2022-03-25 | Stop reason: SDUPTHER

## 2022-01-27 DIAGNOSIS — L30.9 ECZEMA, UNSPECIFIED TYPE: ICD-10-CM

## 2022-01-28 DIAGNOSIS — M15.9 PRIMARY OSTEOARTHRITIS INVOLVING MULTIPLE JOINTS: ICD-10-CM

## 2022-01-28 DIAGNOSIS — I10 ESSENTIAL HYPERTENSION: Chronic | ICD-10-CM

## 2022-01-28 RX ORDER — CELECOXIB 200 MG/1
200 CAPSULE ORAL DAILY
Qty: 90 CAPSULE | Refills: 0 | Status: SHIPPED | OUTPATIENT
Start: 2022-01-28 | End: 2022-04-19 | Stop reason: SDUPTHER

## 2022-01-28 RX ORDER — ATENOLOL 25 MG/1
12.5 TABLET ORAL DAILY
Qty: 90 TABLET | Refills: 0 | Status: SHIPPED | OUTPATIENT
Start: 2022-01-28 | End: 2022-04-19 | Stop reason: SDUPTHER

## 2022-01-28 RX ORDER — FUROSEMIDE 20 MG/1
20 TABLET ORAL DAILY
Qty: 90 TABLET | Refills: 0 | Status: SHIPPED | OUTPATIENT
Start: 2022-01-28 | End: 2022-04-19 | Stop reason: SDUPTHER

## 2022-02-07 ENCOUNTER — TELEPHONE (OUTPATIENT)
Dept: INTERNAL MEDICINE | Facility: CLINIC | Age: 82
End: 2022-02-07

## 2022-02-07 NOTE — TELEPHONE ENCOUNTER
Called and spoke w/ Mr Brandon- he asked I call back and speak with Daughter who will be in around 12:00 today

## 2022-02-07 NOTE — TELEPHONE ENCOUNTER
Returned call to daughter- she said they did not request orthotics and do not sign off on forms that may come

## 2022-02-07 NOTE — TELEPHONE ENCOUNTER
Returned call to daughter- she said they did not request orthotics and do not sign off on forms that may come  This was a scam request

## 2022-02-07 NOTE — TELEPHONE ENCOUNTER
René called back from [facility] to make sure we received fax forms for Dr Casas to sign for orthotic orders.

## 2022-02-07 NOTE — TELEPHONE ENCOUNTER
Pharmacy Name:  [facility]    Pharmacy representative name: NEYDA     Pharmacy representative phone number: [phone]    What medication are you calling in regards to:     What question does the pharmacy have:     Who is the provider that prescribed the medication:     Additional notes: NEYDA WANTS TO KNOW IF THE OFFICE RECEIVED A FAX THAT HE SENT IN ON THE PATIENT.  HE IS NOW ASKING TO BE TRANSFERRED TO THE OFFICE.

## 2022-02-07 NOTE — TELEPHONE ENCOUNTER
.  Caller: Sara Hernandez    Relationship to patient: Emergency Contact    Best call back number: [phone]    Patient is needing: PTS DAUGHTER IS CALLING MORRO VARGAS BACK. UNABLE TO WT, NO ANSWER

## 2022-03-25 DIAGNOSIS — J30.1 ALLERGIC RHINITIS DUE TO POLLEN, UNSPECIFIED SEASONALITY: ICD-10-CM

## 2022-03-25 DIAGNOSIS — E78.01 FAMILIAL HYPERCHOLESTEROLEMIA: ICD-10-CM

## 2022-03-25 DIAGNOSIS — K21.9 GASTROESOPHAGEAL REFLUX DISEASE WITHOUT ESOPHAGITIS: Chronic | ICD-10-CM

## 2022-03-25 DIAGNOSIS — F41.9 ANXIETY DISORDER, UNSPECIFIED TYPE: ICD-10-CM

## 2022-03-25 RX ORDER — FLUTICASONE PROPIONATE 50 MCG
2 SPRAY, SUSPENSION (ML) NASAL DAILY
Qty: 48 G | Refills: 2 | Status: SHIPPED | OUTPATIENT
Start: 2022-03-25 | End: 2022-12-07 | Stop reason: SDUPTHER

## 2022-03-25 RX ORDER — OMEPRAZOLE 20 MG/1
20 CAPSULE, DELAYED RELEASE ORAL DAILY
Qty: 90 CAPSULE | Refills: 3 | Status: SHIPPED | OUTPATIENT
Start: 2022-03-25 | End: 2022-04-04

## 2022-03-25 RX ORDER — SIMVASTATIN 20 MG
20 TABLET ORAL NIGHTLY
Qty: 90 TABLET | Refills: 3 | Status: SHIPPED | OUTPATIENT
Start: 2022-03-25 | End: 2022-04-04

## 2022-03-25 NOTE — TELEPHONE ENCOUNTER
Caller: Carolann Hernandezherine    Relationship: Emergency Contact    Best call back number: [phone]    Requested Prescriptions:   Requested Prescriptions     Pending Prescriptions Disp Refills   • simvastatin (ZOCOR) 20 MG tablet 90 tablet 3     Sig: Take 1 tablet by mouth Every Night.   • sertraline (Zoloft) 50 MG tablet 135 tablet 3     Sig: Take 1.5 tablets by mouth Daily.   • omeprazole (priLOSEC) 20 MG capsule 90 capsule 3     Sig: Take 1 capsule by mouth Daily.   • fluticasone (FLONASE) 50 MCG/ACT nasal spray 48 g 2     Si sprays by Each Nare route Daily.        Pharmacy where request should be sent: [facility] [address] [facility] - [phone] [facility] [phone]      Additional details provided by patient: N/A    Does the patient have less than a 3 day supply:  [] Yes  [x] No    Marco A Daniel Rep   22 11:34 EDT

## 2022-04-03 DIAGNOSIS — K21.9 GASTROESOPHAGEAL REFLUX DISEASE WITHOUT ESOPHAGITIS: Chronic | ICD-10-CM

## 2022-04-03 DIAGNOSIS — E78.01 FAMILIAL HYPERCHOLESTEROLEMIA: ICD-10-CM

## 2022-04-04 RX ORDER — OMEPRAZOLE 20 MG/1
CAPSULE, DELAYED RELEASE ORAL
Qty: 90 CAPSULE | Refills: 1 | Status: SHIPPED | OUTPATIENT
Start: 2022-04-04 | End: 2022-12-07 | Stop reason: SDUPTHER

## 2022-04-04 RX ORDER — SIMVASTATIN 20 MG
20 TABLET ORAL NIGHTLY
Qty: 90 TABLET | Refills: 1 | Status: SHIPPED | OUTPATIENT
Start: 2022-04-04 | End: 2022-04-20

## 2022-04-19 ENCOUNTER — OFFICE VISIT (OUTPATIENT)
Dept: INTERNAL MEDICINE | Facility: CLINIC | Age: 82
End: 2022-04-19

## 2022-04-19 VITALS
SYSTOLIC BLOOD PRESSURE: 132 MMHG | TEMPERATURE: 96 F | HEIGHT: 64 IN | OXYGEN SATURATION: 96 % | DIASTOLIC BLOOD PRESSURE: 78 MMHG | WEIGHT: 128.4 LBS | HEART RATE: 64 BPM | BODY MASS INDEX: 21.92 KG/M2

## 2022-04-19 DIAGNOSIS — I10 ESSENTIAL HYPERTENSION: Primary | Chronic | ICD-10-CM

## 2022-04-19 DIAGNOSIS — F41.1 GENERALIZED ANXIETY DISORDER: Chronic | ICD-10-CM

## 2022-04-19 DIAGNOSIS — D63.8 ANEMIA, CHRONIC DISEASE: Chronic | ICD-10-CM

## 2022-04-19 DIAGNOSIS — M19.90 GENERALIZED ARTHRITIS: Chronic | ICD-10-CM

## 2022-04-19 DIAGNOSIS — K21.9 GASTROESOPHAGEAL REFLUX DISEASE WITHOUT ESOPHAGITIS: Chronic | ICD-10-CM

## 2022-04-19 DIAGNOSIS — M15.9 PRIMARY OSTEOARTHRITIS INVOLVING MULTIPLE JOINTS: ICD-10-CM

## 2022-04-19 DIAGNOSIS — E78.01 FAMILIAL HYPERCHOLESTEROLEMIA: Chronic | ICD-10-CM

## 2022-04-19 PROBLEM — I95.9 HYPOTENSION: Status: RESOLVED | Noted: 2020-08-08 | Resolved: 2022-04-19

## 2022-04-19 PROBLEM — N18.2 STAGE 2 CHRONIC KIDNEY DISEASE: Chronic | Status: RESOLVED | Noted: 2018-09-18 | Resolved: 2022-04-19

## 2022-04-19 PROCEDURE — 99214 OFFICE O/P EST MOD 30 MIN: CPT | Performed by: FAMILY MEDICINE

## 2022-04-19 RX ORDER — ATENOLOL 25 MG/1
25 TABLET ORAL DAILY
Qty: 90 TABLET | Refills: 4 | Status: SHIPPED | OUTPATIENT
Start: 2022-04-19 | End: 2022-12-07 | Stop reason: SDUPTHER

## 2022-04-19 RX ORDER — FUROSEMIDE 20 MG/1
TABLET ORAL
Qty: 90 TABLET | Refills: 4 | Status: SHIPPED | OUTPATIENT
Start: 2022-04-19 | End: 2022-08-25

## 2022-04-19 RX ORDER — CELECOXIB 200 MG/1
200 CAPSULE ORAL DAILY
Qty: 90 CAPSULE | Refills: 4 | Status: SHIPPED | OUTPATIENT
Start: 2022-04-19 | End: 2022-12-07 | Stop reason: SDUPTHER

## 2022-04-19 NOTE — PROGRESS NOTES
"Chief Complaint  Hyperlipidemia and Hypertension    Subjective          Gabbi Brandon presents to [facility]  History of Present Illness     Hypertension - stable.  Patient taking medication as prescribed.  Denies chest pain, shortness of breath, headache, lower extremity edema.  Patient is taking atenolol 25 mg half tablet daily.  Also takes furosemide 20 mg daily    HLD-stable.  Patient taking simvastatin 20 mg as prescribed.  Trying to adhere to a balanced diet.    GERD -stable on omeprazole 20 mg daily for reflux symptoms which are stable.  Avoiding triggering foods.    Anxiety disorder-stable.  Patient is taking sertraline 50 mg daily.  The patient is not having any side effects.  Denies depression, poor concentration, poor sleep, fatigue, irritability.    Chronic normocytic anemia -appears to be anemia of chronic disease.  It has been stable over the last year with last hemoglobin 10.9 with MCV 88.1.  Hematocrit 34.7.    Also on Celebrex 200 mg daily for generalized arthritis which has been helping.  We have been monitoring her kidney function and it has been stable.    Objective   Vital Signs:   /78 (BP Location: Left arm, Patient Position: Sitting, Cuff Size: Adult)   Pulse 64   Temp 96 °F (35.6 °C) (Temporal)   Ht 162.6 cm (64\")   Wt 58.2 kg (128 lb 6.4 oz)   SpO2 96%   BMI 22.04 kg/m²     BMI has not been calculated during today's encounter.       Physical Exam  Vitals and nursing note reviewed.   Constitutional:       General: She is not in acute distress.     Appearance: Normal appearance.   Cardiovascular:      Rate and Rhythm: Normal rate and regular rhythm.      Heart sounds: Normal heart sounds. No murmur heard.  Pulmonary:      Effort: Pulmonary effort is normal.      Breath sounds: Normal breath sounds.   Neurological:      Mental Status: She is alert.   Psychiatric:         Attention and Perception: Attention and perception normal.         Mood and " Affect: Mood and affect normal.         Speech: Speech normal.         Behavior: Behavior normal. Behavior is cooperative.         Thought Content: Thought content normal.         Judgment: Judgment normal.        Result Review :   The following data was reviewed by: Paulino Casas MD on 04/19/2022:  Common labs    Common Labsle 10/14/21 10/14/21 10/14/21    1152 1152 1152   Glucose  97    BUN  15    Creatinine  0.88    eGFR Non  Am  61    eGFR  Am  74    Sodium  140    Potassium  4.4    Chloride  99    Calcium  9.3    Total Protein  6.5    Albumin  3.20 (A)    Total Bilirubin  <0.2    Alkaline Phosphatase  78    AST (SGOT)  13    ALT (SGPT)  6    WBC   7.85   Hemoglobin   10.9 (A)   Hematocrit   34.7   Platelets   336   Total Cholesterol 88     Triglycerides 114     HDL Cholesterol 28 (A)     LDL Cholesterol  39     (A) Abnormal value       Comments are available for some flowsheets but are not being displayed.                     Assessment and Plan    Diagnoses and all orders for this visit:    1. Essential hypertension (Primary)  -     CBC & Differential  -     Comprehensive Metabolic Panel  -     Lipid Panel With LDL / HDL Ratio  -     atenolol (TENORMIN) 25 MG tablet; Take 1 tablet by mouth Daily.  Dispense: 90 tablet; Refill: 4  -     furosemide (LASIX) 20 MG tablet; Take once daily PRN for lower extremity swelling.  Dispense: 90 tablet; Refill: 4    2. Familial hypercholesterolemia  -     CBC & Differential  -     Comprehensive Metabolic Panel  -     Lipid Panel With LDL / HDL Ratio    3. Gastroesophageal reflux disease without esophagitis    4. Anemia, chronic disease  -     CBC & Differential  -     Comprehensive Metabolic Panel    5. Generalized arthritis  -     celecoxib (CeleBREX) 200 MG capsule; Take 1 capsule by mouth Daily. Pt needs to make an appt with HER PCP  Dispense: 90 capsule; Refill: 4    6. Generalized anxiety disorder    7. Primary osteoarthritis involving multiple joints  -      celecoxib (CeleBREX) 200 MG capsule; Take 1 capsule by mouth Daily. Pt needs to make an appt with HER PCP  Dispense: 90 capsule; Refill: 4      Stable chronic conditions as above.  Continue all medications as above.  Labs today.        Follow Up   Return in about 6 months (around 10/20/2022) for Medicare Wellness.  Patient was given instructions and counseling regarding her condition or for health maintenance advice. Please see specific information pulled into the AVS if appropriate.

## 2022-04-20 DIAGNOSIS — E78.01 FAMILIAL HYPERCHOLESTEROLEMIA: Primary | ICD-10-CM

## 2022-04-20 LAB
ALBUMIN SERPL-MCNC: 3 G/DL (ref 3.6–4.6)
ALBUMIN/GLOB SERPL: 0.8 {RATIO} (ref 1.2–2.2)
ALP SERPL-CCNC: 86 IU/L (ref 44–121)
ALT SERPL-CCNC: 5 IU/L (ref 0–32)
AST SERPL-CCNC: 17 IU/L (ref 0–40)
BASOPHILS # BLD AUTO: 0 X10E3/UL (ref 0–0.2)
BASOPHILS NFR BLD AUTO: 0 %
BILIRUB SERPL-MCNC: 0.2 MG/DL (ref 0–1.2)
BUN SERPL-MCNC: 18 MG/DL (ref 8–27)
BUN/CREAT SERPL: 19 (ref 12–28)
CALCIUM SERPL-MCNC: 9.2 MG/DL (ref 8.7–10.3)
CHLORIDE SERPL-SCNC: 99 MMOL/L (ref 96–106)
CHOLEST SERPL-MCNC: 95 MG/DL (ref 100–199)
CO2 SERPL-SCNC: 27 MMOL/L (ref 20–29)
CREAT SERPL-MCNC: 0.94 MG/DL (ref 0.57–1)
EGFRCR SERPLBLD CKD-EPI 2021: 60 ML/MIN/1.73
EOSINOPHIL # BLD AUTO: 0.1 X10E3/UL (ref 0–0.4)
EOSINOPHIL NFR BLD AUTO: 2 %
ERYTHROCYTE [DISTWIDTH] IN BLOOD BY AUTOMATED COUNT: 14.4 % (ref 11.7–15.4)
GLOBULIN SER CALC-MCNC: 3.7 G/DL (ref 1.5–4.5)
GLUCOSE SERPL-MCNC: 101 MG/DL (ref 65–99)
HCT VFR BLD AUTO: 36.3 % (ref 34–46.6)
HDLC SERPL-MCNC: 29 MG/DL
HGB BLD-MCNC: 11.3 G/DL (ref 11.1–15.9)
IMM GRANULOCYTES # BLD AUTO: 0 X10E3/UL (ref 0–0.1)
IMM GRANULOCYTES NFR BLD AUTO: 0 %
LDLC SERPL CALC-MCNC: 46 MG/DL (ref 0–99)
LDLC/HDLC SERPL: 1.6 RATIO (ref 0–3.2)
LYMPHOCYTES # BLD AUTO: 2 X10E3/UL (ref 0.7–3.1)
LYMPHOCYTES NFR BLD AUTO: 26 %
MCH RBC QN AUTO: 27.6 PG (ref 26.6–33)
MCHC RBC AUTO-ENTMCNC: 31.1 G/DL (ref 31.5–35.7)
MCV RBC AUTO: 89 FL (ref 79–97)
MONOCYTES # BLD AUTO: 0.6 X10E3/UL (ref 0.1–0.9)
MONOCYTES NFR BLD AUTO: 8 %
NEUTROPHILS # BLD AUTO: 4.9 X10E3/UL (ref 1.4–7)
NEUTROPHILS NFR BLD AUTO: 64 %
PLATELET # BLD AUTO: 288 X10E3/UL (ref 150–450)
POTASSIUM SERPL-SCNC: 4.2 MMOL/L (ref 3.5–5.2)
PROT SERPL-MCNC: 6.7 G/DL (ref 6–8.5)
RBC # BLD AUTO: 4.1 X10E6/UL (ref 3.77–5.28)
SODIUM SERPL-SCNC: 140 MMOL/L (ref 134–144)
TRIGL SERPL-MCNC: 107 MG/DL (ref 0–149)
VLDLC SERPL CALC-MCNC: 20 MG/DL (ref 5–40)
WBC # BLD AUTO: 7.6 X10E3/UL (ref 3.4–10.8)

## 2022-04-20 RX ORDER — SIMVASTATIN 20 MG
20 TABLET ORAL EVERY OTHER DAY
Qty: 90 TABLET | Refills: 1
Start: 2022-04-20 | End: 2022-08-25

## 2022-04-27 ENCOUNTER — TELEPHONE (OUTPATIENT)
Dept: INTERNAL MEDICINE | Facility: CLINIC | Age: 82
End: 2022-04-27

## 2022-04-27 NOTE — TELEPHONE ENCOUNTER
It was sent through her [email or web address].  I got a notification today that she has not seen the results so a notice was sent to the MA to call her with the results and mail her a copy.

## 2022-04-27 NOTE — TELEPHONE ENCOUNTER
Cholesterol is too low for my liking so I would like you to take your simvastatin every other day for now.  I would like you to follow-up in 4 months with a recheck of your cholesterol labs 1 week prior fasting.  I will write the orders.  Also would like you to stop aspirin as I do not see any benefit for being on aspirin at this point given your history and cholesterol levels.  I think it is just putting you at higher bleeding risk.     If the cholesterol is still too low next time, I may discontinue the simvastatin as well.     Dr. Casas      Gave message and scheduled 4 month f/u with fasting labs prior

## 2022-05-09 ENCOUNTER — TELEPHONE (OUTPATIENT)
Dept: INTERNAL MEDICINE | Facility: CLINIC | Age: 82
End: 2022-05-09

## 2022-05-09 NOTE — TELEPHONE ENCOUNTER
Caller: Sara Hernandez    Relationship: Emergency Contact    Best call back number: [phone]  Who are you requesting to speak with (clinical staff, provider,  specific staff member): CLINICAL         What was the call regarding: WOULD LIKE A COPY OF THE LAB RESULTS MAILED TO THE HOME ADDRESS ON FILE

## 2022-05-10 ENCOUNTER — TELEPHONE (OUTPATIENT)
Dept: INTERNAL MEDICINE | Facility: CLINIC | Age: 82
End: 2022-05-10

## 2022-05-10 NOTE — TELEPHONE ENCOUNTER
Sara Hernandez () [phone] (M)       PATIENT IS REQUESTING A COPY OF LAST LABS TO BE MAILED TO HER HOME ADDRESS    PLEASE ADVISE

## 2022-05-30 DIAGNOSIS — M19.90 GENERALIZED ARTHRITIS: Chronic | ICD-10-CM

## 2022-05-30 DIAGNOSIS — I10 ESSENTIAL HYPERTENSION: Chronic | ICD-10-CM

## 2022-05-30 DIAGNOSIS — M15.9 PRIMARY OSTEOARTHRITIS INVOLVING MULTIPLE JOINTS: ICD-10-CM

## 2022-05-31 RX ORDER — CELECOXIB 200 MG/1
CAPSULE ORAL
Qty: 90 CAPSULE | Refills: 4 | OUTPATIENT
Start: 2022-05-31

## 2022-05-31 RX ORDER — FUROSEMIDE 20 MG/1
TABLET ORAL
Qty: 90 TABLET | Refills: 4 | OUTPATIENT
Start: 2022-05-31

## 2022-08-25 ENCOUNTER — OFFICE VISIT (OUTPATIENT)
Dept: INTERNAL MEDICINE | Facility: CLINIC | Age: 82
End: 2022-08-25

## 2022-08-25 VITALS
DIASTOLIC BLOOD PRESSURE: 64 MMHG | HEIGHT: 64 IN | BODY MASS INDEX: 21.27 KG/M2 | OXYGEN SATURATION: 94 % | WEIGHT: 124.6 LBS | RESPIRATION RATE: 18 BRPM | HEART RATE: 68 BPM | TEMPERATURE: 97.3 F | SYSTOLIC BLOOD PRESSURE: 124 MMHG

## 2022-08-25 DIAGNOSIS — Z87.898 HISTORY OF PERIPHERAL EDEMA: ICD-10-CM

## 2022-08-25 DIAGNOSIS — E78.2 MIXED HYPERLIPIDEMIA: Primary | Chronic | ICD-10-CM

## 2022-08-25 PROCEDURE — 99213 OFFICE O/P EST LOW 20 MIN: CPT | Performed by: FAMILY MEDICINE

## 2022-08-25 NOTE — PROGRESS NOTES
"Chief Complaint  Hyperlipidemia    Subjective        Gabbi Brandon presents to [facility]  History of Present Illness     Last time when she was seen in April, it was noticed that her cholesterol was a little low for my liking.  I asked that she take her simvastatin every other day and asked that she discontinue aspirin as it did not see any benefit for her age and risk factors.    On review of the most recent labs done a few days ago, total cholesterol 100, HDL 28, LDL 50, triglyceride 119.  LDL HDL ratio 1.8.  Cholesterol about the same as last time.  I am wondering if she really needs to be on this medicine at this point.  She has no cardiovascular disease nor stroke history.      Her diet changed dramatically after the hiatal hernia to get rid of greasy foods.    Objective   Vital Signs:  /64   Pulse 68   Temp 97.3 °F (36.3 °C)   Resp 18   Ht 162.6 cm (64\")   Wt 56.5 kg (124 lb 9.6 oz)   SpO2 94%   BMI 21.39 kg/m²   Estimated body mass index is 21.39 kg/m² as calculated from the following:    Height as of this encounter: 162.6 cm (64\").    Weight as of this encounter: 56.5 kg (124 lb 9.6 oz).    BMI is within normal parameters. No other follow-up for BMI required.      Physical Exam  Vitals and nursing note reviewed.   Constitutional:       General: She is not in acute distress.     Appearance: Normal appearance.   Cardiovascular:      Rate and Rhythm: Normal rate and regular rhythm.      Heart sounds: Normal heart sounds. No murmur heard.  Pulmonary:      Effort: Pulmonary effort is normal.      Breath sounds: Normal breath sounds.   Neurological:      Mental Status: She is alert.        Result Review :  The following data was reviewed by: Paulino Casas MD on 08/25/2022:  Common labs    Common Labsle 10/14/21 10/14/21 10/14/21 4/19/22 4/19/22 4/19/22 8/22/22 8/22/22    1152 1152 1152 1058 1058 1058 1026 1026   Glucose  97   101 (A)  95    BUN  15   18  17  "   Creatinine  0.88   0.94  0.86    eGFR Non  Am  61         eGFR African Am  74         Sodium  140   140  139    Potassium  4.4   4.2  4.2    Chloride  99   99  101    Calcium  9.3   9.2  8.8    Total Protein  6.5   6.7  6.5    Albumin  3.20 (A)   3.0 (A)  3.0 (A)    Total Bilirubin  <0.2   0.2  0.2    Alkaline Phosphatase  78   86  81    AST (SGOT)  13   17  17    ALT (SGPT)  6   5  6    WBC   7.85 7.6       Hemoglobin   10.9 (A) 11.3       Hematocrit   34.7 36.3       Platelets   336 288       Total Cholesterol 88     95 (A)  100   Triglycerides 114     107  119   HDL Cholesterol 28 (A)     29 (A)  28 (A)   LDL Cholesterol  39     46  50   (A) Abnormal value       Comments are available for some flowsheets but are not being displayed.                     Assessment and Plan   Diagnoses and all orders for this visit:    1. Mixed hyperlipidemia (Primary)  -     CBC & Differential; Future  -     Comprehensive Metabolic Panel; Future  -     Lipid Panel With LDL / HDL Ratio; Future    2. History of peripheral edema      D/C simvastatin check lipids again November.  Diet control only.    D/C furosemide as there is no more evidence of edema.  Recommend propping legs up, gentle wraps, any watching salt intake.             Follow Up   Return in about 3 months (around 11/22/2022).  Patient was given instructions and counseling regarding her condition or for health maintenance advice. Please see specific information pulled into the AVS if appropriate.

## 2022-08-30 ENCOUNTER — HOSPITAL ENCOUNTER (EMERGENCY)
Facility: HOSPITAL | Age: 82
Discharge: HOME | End: 2022-08-30
Attending: EMERGENCY MEDICINE | Admitting: EMERGENCY MEDICINE

## 2022-08-30 VITALS
RESPIRATION RATE: 18 BRPM | SYSTOLIC BLOOD PRESSURE: 148 MMHG | OXYGEN SATURATION: 96 % | HEIGHT: 62 IN | BODY MASS INDEX: 22.82 KG/M2 | DIASTOLIC BLOOD PRESSURE: 85 MMHG | TEMPERATURE: 97.5 F | HEART RATE: 74 BPM | WEIGHT: 124 LBS

## 2022-08-30 DIAGNOSIS — F43.21 GRIEF REACTION: ICD-10-CM

## 2022-08-30 DIAGNOSIS — E86.0 DEHYDRATION: ICD-10-CM

## 2022-08-30 DIAGNOSIS — R19.7 DIARRHEA, UNSPECIFIED TYPE: Primary | ICD-10-CM

## 2022-08-30 DIAGNOSIS — R63.8 DECREASED ORAL INTAKE: ICD-10-CM

## 2022-08-30 LAB
ALBUMIN SERPL-MCNC: 2.8 G/DL (ref 3.5–5.2)
ALBUMIN/GLOB SERPL: 0.8 G/DL
ALP SERPL-CCNC: 83 U/L (ref 39–117)
ALT SERPL W P-5'-P-CCNC: 6 U/L (ref 1–33)
ANION GAP SERPL CALCULATED.3IONS-SCNC: 7.2 MMOL/L (ref 5–15)
AST SERPL-CCNC: 16 U/L (ref 1–32)
BACTERIA UR QL AUTO: ABNORMAL /HPF
BASOPHILS # BLD AUTO: 0.01 10*3/MM3 (ref 0–0.2)
BASOPHILS NFR BLD AUTO: 0.2 % (ref 0–1.5)
BILIRUB SERPL-MCNC: <0.2 MG/DL (ref 0–1.2)
BILIRUB UR QL STRIP: NEGATIVE
BUN SERPL-MCNC: 14 MG/DL (ref 8–23)
BUN/CREAT SERPL: 18.4 (ref 7–25)
CALCIUM SPEC-SCNC: 8.5 MG/DL (ref 8.6–10.5)
CHLORIDE SERPL-SCNC: 103 MMOL/L (ref 98–107)
CLARITY UR: CLEAR
CO2 SERPL-SCNC: 29.8 MMOL/L (ref 22–29)
COLOR UR: YELLOW
CREAT SERPL-MCNC: 0.76 MG/DL (ref 0.57–1)
DEPRECATED RDW RBC AUTO: 46.6 FL (ref 37–54)
EGFRCR SERPLBLD CKD-EPI 2021: 77.9 ML/MIN/1.73
EOSINOPHIL # BLD AUTO: 0.1 10*3/MM3 (ref 0–0.4)
EOSINOPHIL NFR BLD AUTO: 1.6 % (ref 0.3–6.2)
ERYTHROCYTE [DISTWIDTH] IN BLOOD BY AUTOMATED COUNT: 14.7 % (ref 12.3–15.4)
GLOBULIN UR ELPH-MCNC: 3.4 GM/DL
GLUCOSE SERPL-MCNC: 101 MG/DL (ref 65–99)
GLUCOSE UR STRIP-MCNC: NEGATIVE MG/DL
HCT VFR BLD AUTO: 32.9 % (ref 34–46.6)
HGB BLD-MCNC: 10.8 G/DL (ref 12–15.9)
HGB UR QL STRIP.AUTO: NEGATIVE
HOLD SPECIMEN: NORMAL
HOLD SPECIMEN: NORMAL
HYALINE CASTS UR QL AUTO: ABNORMAL /LPF
IMM GRANULOCYTES # BLD AUTO: 0.01 10*3/MM3 (ref 0–0.05)
IMM GRANULOCYTES NFR BLD AUTO: 0.2 % (ref 0–0.5)
KETONES UR QL STRIP: NEGATIVE
LEUKOCYTE ESTERASE UR QL STRIP.AUTO: ABNORMAL
LYMPHOCYTES # BLD AUTO: 1.95 10*3/MM3 (ref 0.7–3.1)
LYMPHOCYTES NFR BLD AUTO: 30.6 % (ref 19.6–45.3)
MCH RBC QN AUTO: 28.4 PG (ref 26.6–33)
MCHC RBC AUTO-ENTMCNC: 32.8 G/DL (ref 31.5–35.7)
MCV RBC AUTO: 86.6 FL (ref 79–97)
MONOCYTES # BLD AUTO: 0.52 10*3/MM3 (ref 0.1–0.9)
MONOCYTES NFR BLD AUTO: 8.2 % (ref 5–12)
NEUTROPHILS NFR BLD AUTO: 3.78 10*3/MM3 (ref 1.7–7)
NEUTROPHILS NFR BLD AUTO: 59.2 % (ref 42.7–76)
NITRITE UR QL STRIP: NEGATIVE
NRBC BLD AUTO-RTO: 0 /100 WBC (ref 0–0.2)
PH UR STRIP.AUTO: 6.5 [PH] (ref 5–8)
PLATELET # BLD AUTO: 211 10*3/MM3 (ref 140–450)
PMV BLD AUTO: 9.8 FL (ref 6–12)
POTASSIUM SERPL-SCNC: 3.5 MMOL/L (ref 3.5–5.2)
PROT SERPL-MCNC: 6.2 G/DL (ref 6–8.5)
PROT UR QL STRIP: NEGATIVE
RBC # BLD AUTO: 3.8 10*6/MM3 (ref 3.77–5.28)
RBC # UR STRIP: ABNORMAL /HPF
REF LAB TEST METHOD: ABNORMAL
SODIUM SERPL-SCNC: 140 MMOL/L (ref 136–145)
SP GR UR STRIP: <1.005 (ref 1–1.03)
SQUAMOUS #/AREA URNS HPF: ABNORMAL /HPF
UROBILINOGEN UR QL STRIP: ABNORMAL
WBC # UR STRIP: ABNORMAL /HPF
WBC NRBC COR # BLD: 6.37 10*3/MM3 (ref 3.4–10.8)
WHOLE BLOOD HOLD COAG: NORMAL
WHOLE BLOOD HOLD SPECIMEN: NORMAL

## 2022-08-30 PROCEDURE — 85025 COMPLETE CBC W/AUTO DIFF WBC: CPT | Performed by: EMERGENCY MEDICINE

## 2022-08-30 PROCEDURE — 99283 EMERGENCY DEPT VISIT LOW MDM: CPT

## 2022-08-30 PROCEDURE — 80053 COMPREHEN METABOLIC PANEL: CPT | Performed by: EMERGENCY MEDICINE

## 2022-08-30 PROCEDURE — 81001 URINALYSIS AUTO W/SCOPE: CPT | Performed by: EMERGENCY MEDICINE

## 2022-08-30 RX ORDER — SODIUM CHLORIDE 0.9 % (FLUSH) 0.9 %
10 SYRINGE (ML) INJECTION AS NEEDED
Status: DISCONTINUED | OUTPATIENT
Start: 2022-08-30 | End: 2022-08-30 | Stop reason: HOSPADM

## 2022-08-30 RX ADMIN — SODIUM CHLORIDE 500 ML: 9 INJECTION, SOLUTION INTRAVENOUS at 10:24

## 2022-11-15 ENCOUNTER — TELEPHONE (OUTPATIENT)
Dept: INTERNAL MEDICINE | Facility: CLINIC | Age: 82
End: 2022-11-15

## 2022-11-15 NOTE — TELEPHONE ENCOUNTER
Caller: Sara Hernandez    Relationship to patient: Emergency Contact    Best call back number: [phone]    Type of visit:  SUBSEQUENT MEDICARE WELLNESS    Requested date: EARLIER TIME    If rescheduling, when is the original appointment: 11/22/22 AT 10:45 AM    Additional notes: PATIENT'S DAUGHTER STATES PATIENT HAS AN APPOINTMENT ON 11/22/22 AT 10:45, BUT WILL NEED AN EARLIER TIME DUE TO HER NEEDING TO EAT WITH HER ARTHRITIS MEDICATION AND THERE IS NOTHING EARLIER THAN 10:45 AM. PLEASE CALL AND ADVISE.

## 2022-11-28 ENCOUNTER — OFFICE VISIT (OUTPATIENT)
Dept: INTERNAL MEDICINE | Facility: CLINIC | Age: 82
End: 2022-11-28

## 2022-11-28 VITALS
BODY MASS INDEX: 21.71 KG/M2 | OXYGEN SATURATION: 90 % | WEIGHT: 118 LBS | HEART RATE: 67 BPM | SYSTOLIC BLOOD PRESSURE: 148 MMHG | TEMPERATURE: 98.7 F | DIASTOLIC BLOOD PRESSURE: 78 MMHG | HEIGHT: 62 IN

## 2022-11-28 DIAGNOSIS — I10 ESSENTIAL HYPERTENSION: Chronic | ICD-10-CM

## 2022-11-28 DIAGNOSIS — Z00.00 HEALTH CARE MAINTENANCE: ICD-10-CM

## 2022-11-28 DIAGNOSIS — E78.2 MIXED HYPERLIPIDEMIA: Primary | Chronic | ICD-10-CM

## 2022-11-28 DIAGNOSIS — L98.9 FACIAL SKIN LESION: ICD-10-CM

## 2022-11-28 PROCEDURE — G0439 PPPS, SUBSEQ VISIT: HCPCS

## 2022-11-28 PROCEDURE — 1170F FXNL STATUS ASSESSED: CPT

## 2022-11-28 PROCEDURE — 1160F RVW MEDS BY RX/DR IN RCRD: CPT

## 2022-11-28 RX ORDER — QUETIAPINE FUMARATE 25 MG/1
25 TABLET, FILM COATED ORAL NIGHTLY
COMMUNITY
Start: 2022-11-21 | End: 2023-01-31

## 2022-11-29 LAB
ALBUMIN SERPL-MCNC: 3 G/DL (ref 3.5–5.2)
ALBUMIN/GLOB SERPL: 0.8 G/DL
ALP SERPL-CCNC: 88 U/L (ref 39–117)
ALT SERPL-CCNC: 5 U/L (ref 1–33)
AST SERPL-CCNC: 17 U/L (ref 1–32)
BASOPHILS # BLD AUTO: 0.02 10*3/MM3 (ref 0–0.2)
BASOPHILS NFR BLD AUTO: 0.3 % (ref 0–1.5)
BILIRUB SERPL-MCNC: 0.3 MG/DL (ref 0–1.2)
BUN SERPL-MCNC: 18 MG/DL (ref 8–23)
BUN/CREAT SERPL: 20.5 (ref 7–25)
CALCIUM SERPL-MCNC: 9.1 MG/DL (ref 8.6–10.5)
CHLORIDE SERPL-SCNC: 102 MMOL/L (ref 98–107)
CHOLEST SERPL-MCNC: 137 MG/DL (ref 0–200)
CHOLEST/HDLC SERPL: 4.15 {RATIO}
CO2 SERPL-SCNC: 31.4 MMOL/L (ref 22–29)
CREAT SERPL-MCNC: 0.88 MG/DL (ref 0.57–1)
EGFRCR SERPLBLD CKD-EPI 2021: 64.9 ML/MIN/1.73
EOSINOPHIL # BLD AUTO: 0.1 10*3/MM3 (ref 0–0.4)
EOSINOPHIL NFR BLD AUTO: 1.7 % (ref 0.3–6.2)
ERYTHROCYTE [DISTWIDTH] IN BLOOD BY AUTOMATED COUNT: 14.2 % (ref 12.3–15.4)
GLOBULIN SER CALC-MCNC: 3.6 GM/DL
GLUCOSE SERPL-MCNC: 99 MG/DL (ref 65–99)
HCT VFR BLD AUTO: 34.6 % (ref 34–46.6)
HDLC SERPL-MCNC: 33 MG/DL (ref 40–60)
HGB BLD-MCNC: 11.1 G/DL (ref 12–15.9)
IMM GRANULOCYTES # BLD AUTO: 0.01 10*3/MM3 (ref 0–0.05)
IMM GRANULOCYTES NFR BLD AUTO: 0.2 % (ref 0–0.5)
LDLC SERPL CALC-MCNC: 83 MG/DL (ref 0–100)
LYMPHOCYTES # BLD AUTO: 1.71 10*3/MM3 (ref 0.7–3.1)
LYMPHOCYTES NFR BLD AUTO: 29.1 % (ref 19.6–45.3)
MCH RBC QN AUTO: 29 PG (ref 26.6–33)
MCHC RBC AUTO-ENTMCNC: 32.1 G/DL (ref 31.5–35.7)
MCV RBC AUTO: 90.3 FL (ref 79–97)
MONOCYTES # BLD AUTO: 0.41 10*3/MM3 (ref 0.1–0.9)
MONOCYTES NFR BLD AUTO: 7 % (ref 5–12)
NEUTROPHILS # BLD AUTO: 3.63 10*3/MM3 (ref 1.7–7)
NEUTROPHILS NFR BLD AUTO: 61.7 % (ref 42.7–76)
NRBC BLD AUTO-RTO: 0 /100 WBC (ref 0–0.2)
PLATELET # BLD AUTO: 254 10*3/MM3 (ref 140–450)
POTASSIUM SERPL-SCNC: 4.5 MMOL/L (ref 3.5–5.2)
PROT SERPL-MCNC: 6.6 G/DL (ref 6–8.5)
RBC # BLD AUTO: 3.83 10*6/MM3 (ref 3.77–5.28)
SODIUM SERPL-SCNC: 142 MMOL/L (ref 136–145)
TRIGL SERPL-MCNC: 112 MG/DL (ref 0–150)
VLDLC SERPL CALC-MCNC: 21 MG/DL (ref 5–40)
WBC # BLD AUTO: 5.88 10*3/MM3 (ref 3.4–10.8)

## 2022-12-05 ENCOUNTER — TELEPHONE (OUTPATIENT)
Dept: INTERNAL MEDICINE | Facility: CLINIC | Age: 82
End: 2022-12-05

## 2022-12-05 NOTE — TELEPHONE ENCOUNTER
Caller: Gabbi Brandon    Relationship: Self    Best call back number: [phone]    Caller requesting test results: PATIENT     What test was performed: BLOOD WORK     When was the test performed: 11/30/22    Where was the test performed: OFFICE

## 2022-12-07 DIAGNOSIS — J30.1 ALLERGIC RHINITIS DUE TO POLLEN, UNSPECIFIED SEASONALITY: ICD-10-CM

## 2022-12-07 DIAGNOSIS — M15.9 PRIMARY OSTEOARTHRITIS INVOLVING MULTIPLE JOINTS: ICD-10-CM

## 2022-12-07 DIAGNOSIS — K21.9 GASTROESOPHAGEAL REFLUX DISEASE WITHOUT ESOPHAGITIS: Chronic | ICD-10-CM

## 2022-12-07 DIAGNOSIS — M19.90 GENERALIZED ARTHRITIS: ICD-10-CM

## 2022-12-07 DIAGNOSIS — F41.9 ANXIETY DISORDER, UNSPECIFIED TYPE: ICD-10-CM

## 2022-12-07 DIAGNOSIS — I10 ESSENTIAL HYPERTENSION: Chronic | ICD-10-CM

## 2022-12-07 DIAGNOSIS — L30.9 ECZEMA, UNSPECIFIED TYPE: ICD-10-CM

## 2022-12-07 RX ORDER — OMEPRAZOLE 20 MG/1
20 CAPSULE, DELAYED RELEASE ORAL DAILY
Qty: 90 CAPSULE | Refills: 4 | Status: SHIPPED | OUTPATIENT
Start: 2022-12-07

## 2022-12-07 RX ORDER — ATENOLOL 25 MG/1
25 TABLET ORAL DAILY
Qty: 90 TABLET | Refills: 4 | Status: SHIPPED | OUTPATIENT
Start: 2022-12-07

## 2022-12-07 RX ORDER — CELECOXIB 200 MG/1
200 CAPSULE ORAL DAILY
Qty: 90 CAPSULE | Refills: 4 | Status: SHIPPED | OUTPATIENT
Start: 2022-12-07 | End: 2022-12-22 | Stop reason: HOSPADM

## 2022-12-07 RX ORDER — ACETAMINOPHEN AND CODEINE PHOSPHATE 300; 30 MG/1; MG/1
1 TABLET ORAL EVERY 6 HOURS PRN
Qty: 120 TABLET | Refills: 0 | OUTPATIENT
Start: 2022-12-07

## 2022-12-07 RX ORDER — FLUTICASONE PROPIONATE 50 MCG
2 SPRAY, SUSPENSION (ML) NASAL DAILY
Qty: 48 G | Refills: 4 | Status: SHIPPED | OUTPATIENT
Start: 2022-12-07

## 2022-12-07 NOTE — TELEPHONE ENCOUNTER
Caller: Gabbi Brandon    Relationship: Self    Requested Prescriptions:   Requested Prescriptions     Pending Prescriptions Disp Refills   • acetaminophen-codeine (TYLENOL #3) 300-30 MG per tablet 120 tablet 0     Sig: Take 1 tablet by mouth Every 6 (Six) Hours As Needed for Moderate Pain.   • sertraline (Zoloft) 50 MG tablet 135 tablet 3     Sig: Take 1.5 tablets by mouth Daily.   • triamcinolone (KENALOG) 0.1 % ointment 30 g 0   • omeprazole (priLOSEC) 20 MG capsule 90 capsule 1     Sig: Take 1 capsule by mouth Daily.   • hydrocortisone-bacitracin-zinc oxide-nystatin (MAGIC BARRIER)       Sig: Apply 1 application topically to the appropriate area as directed.   • fluticasone (FLONASE) 50 MCG/ACT nasal spray 48 g 2     Si sprays by Each Nare route Daily.   • celecoxib (CeleBREX) 200 MG capsule 90 capsule 4     Sig: Take 1 capsule by mouth Daily. Pt needs to make an appt with HER PCP   • atenolol (TENORMIN) 25 MG tablet 90 tablet 4     Sig: Take 1 tablet by mouth Daily.        Pharmacy where request should be sent: [facility] - [facility] [address] [facility] - [phone] [facility] [phone] FX     Additional details provided by patient: PATIENT IS REQUESTING A 6 MONTH SUPPLY.        Marco A Le Rep   22 10:28 EST

## 2022-12-07 NOTE — TELEPHONE ENCOUNTER
The Tylenol #3 is from a different provider    The ointments and creams usually go to retail or are over the counter.

## 2022-12-07 NOTE — TELEPHONE ENCOUNTER
Rx Refill Note  Requested Prescriptions     Pending Prescriptions Disp Refills   • acetaminophen-codeine (TYLENOL #3) 300-30 MG per tablet 120 tablet 0     Sig: Take 1 tablet by mouth Every 6 (Six) Hours As Needed for Moderate Pain.   • sertraline (Zoloft) 50 MG tablet 135 tablet 3     Sig: Take 1.5 tablets by mouth Daily.   • triamcinolone (KENALOG) 0.1 % ointment 30 g 0   • omeprazole (priLOSEC) 20 MG capsule 90 capsule 1     Sig: Take 1 capsule by mouth Daily.   • hydrocortisone-bacitracin-zinc oxide-nystatin (MAGIC BARRIER)       Sig: Apply 1 application topically to the appropriate area as directed.   • fluticasone (FLONASE) 50 MCG/ACT nasal spray 48 g 2     Si sprays by Each Nare route Daily.   • celecoxib (CeleBREX) 200 MG capsule 90 capsule 4     Sig: Take 1 capsule by mouth Daily. Pt needs to make an appt with HER PCP   • atenolol (TENORMIN) 25 MG tablet 90 tablet 4     Sig: Take 1 tablet by mouth Daily.      Last office visit with prescribing clinician: 2022     Next office visit with prescribing clinician: 2023     Ricardo Arellano MA  22, 12:17 EST

## 2022-12-17 ENCOUNTER — APPOINTMENT (OUTPATIENT)
Dept: GENERAL RADIOLOGY | Facility: HOSPITAL | Age: 82
DRG: 467 | End: 2022-12-17
Payer: MEDICARE

## 2022-12-17 ENCOUNTER — APPOINTMENT (OUTPATIENT)
Dept: CT IMAGING | Facility: HOSPITAL | Age: 82
DRG: 467 | End: 2022-12-17
Payer: MEDICARE

## 2022-12-17 ENCOUNTER — HOSPITAL ENCOUNTER (INPATIENT)
Facility: HOSPITAL | Age: 82
LOS: 4 days | Discharge: NURSING, REHAB OR LONG-TERM CARE | DRG: 467 | End: 2022-12-22
Attending: EMERGENCY MEDICINE | Admitting: INTERNAL MEDICINE
Payer: MEDICARE
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
## 2022-12-17 DIAGNOSIS — Z96.641 STATUS POST TOTAL HIP REPLACEMENT, RIGHT: ICD-10-CM

## 2022-12-17 DIAGNOSIS — M97.01XA PERIPROSTHETIC FRACTURE AROUND INTERNAL PROSTHETIC RIGHT HIP JOINT, INITIAL ENCOUNTER: ICD-10-CM

## 2022-12-17 DIAGNOSIS — M79.651 ACUTE PAIN OF RIGHT THIGH: Primary | ICD-10-CM

## 2022-12-17 PROBLEM — F03.90 DEMENTIA (HCC): Status: ACTIVE | Noted: 2022-12-17

## 2022-12-17 PROBLEM — S72.91XA CLOSED FRACTURE OF RIGHT FEMUR (HCC): Status: ACTIVE | Noted: 2022-12-17

## 2022-12-17 PROBLEM — M79.604 RIGHT LEG PAIN: Status: ACTIVE | Noted: 2022-12-17

## 2022-12-17 LAB
ALBUMIN SERPL-MCNC: 2.8 G/DL (ref 3.5–5.2)
ALBUMIN/GLOB SERPL: 0.7 G/DL
ALP SERPL-CCNC: 88 U/L (ref 39–117)
ALT SERPL W P-5'-P-CCNC: 7 U/L (ref 1–33)
ANION GAP SERPL CALCULATED.3IONS-SCNC: 5.3 MMOL/L (ref 5–15)
APTT PPP: 28 SECONDS (ref 22.7–35.4)
AST SERPL-CCNC: 15 U/L (ref 1–32)
BASOPHILS # BLD AUTO: 0.02 10*3/MM3 (ref 0–0.2)
BASOPHILS NFR BLD AUTO: 0.3 % (ref 0–1.5)
BILIRUB SERPL-MCNC: <0.2 MG/DL (ref 0–1.2)
BUN SERPL-MCNC: 17 MG/DL (ref 8–23)
BUN/CREAT SERPL: 20.2 (ref 7–25)
CALCIUM SPEC-SCNC: 8.6 MG/DL (ref 8.6–10.5)
CHLORIDE SERPL-SCNC: 105 MMOL/L (ref 98–107)
CO2 SERPL-SCNC: 32.7 MMOL/L (ref 22–29)
CREAT SERPL-MCNC: 0.84 MG/DL (ref 0.57–1)
DEPRECATED RDW RBC AUTO: 49.4 FL (ref 37–54)
EGFRCR SERPLBLD CKD-EPI 2021: 68.6 ML/MIN/1.73
EOSINOPHIL # BLD AUTO: 0.07 10*3/MM3 (ref 0–0.4)
EOSINOPHIL NFR BLD AUTO: 1 % (ref 0.3–6.2)
ERYTHROCYTE [DISTWIDTH] IN BLOOD BY AUTOMATED COUNT: 14.1 % (ref 12.3–15.4)
GLOBULIN UR ELPH-MCNC: 3.8 GM/DL
GLUCOSE SERPL-MCNC: 169 MG/DL (ref 65–99)
HCT VFR BLD AUTO: 34.7 % (ref 34–46.6)
HGB BLD-MCNC: 10.8 G/DL (ref 12–15.9)
IMM GRANULOCYTES # BLD AUTO: 0.03 10*3/MM3 (ref 0–0.05)
IMM GRANULOCYTES NFR BLD AUTO: 0.4 % (ref 0–0.5)
INR PPP: 1.01 (ref 0.9–1.1)
LYMPHOCYTES # BLD AUTO: 1.74 10*3/MM3 (ref 0.7–3.1)
LYMPHOCYTES NFR BLD AUTO: 24.7 % (ref 19.6–45.3)
MCH RBC QN AUTO: 29.5 PG (ref 26.6–33)
MCHC RBC AUTO-ENTMCNC: 31.1 G/DL (ref 31.5–35.7)
MCV RBC AUTO: 94.8 FL (ref 79–97)
MONOCYTES # BLD AUTO: 0.52 10*3/MM3 (ref 0.1–0.9)
MONOCYTES NFR BLD AUTO: 7.4 % (ref 5–12)
NEUTROPHILS NFR BLD AUTO: 4.66 10*3/MM3 (ref 1.7–7)
NEUTROPHILS NFR BLD AUTO: 66.2 % (ref 42.7–76)
NRBC BLD AUTO-RTO: 0 /100 WBC (ref 0–0.2)
PLATELET # BLD AUTO: 199 10*3/MM3 (ref 140–450)
PMV BLD AUTO: 9.6 FL (ref 6–12)
POTASSIUM SERPL-SCNC: 4 MMOL/L (ref 3.5–5.2)
PROT SERPL-MCNC: 6.6 G/DL (ref 6–8.5)
PROTHROMBIN TIME: 13.4 SECONDS (ref 11.7–14.2)
RBC # BLD AUTO: 3.66 10*6/MM3 (ref 3.77–5.28)
SODIUM SERPL-SCNC: 143 MMOL/L (ref 136–145)
WBC NRBC COR # BLD: 7.04 10*3/MM3 (ref 3.4–10.8)

## 2022-12-17 PROCEDURE — 73552 X-RAY EXAM OF FEMUR 2/>: CPT

## 2022-12-17 PROCEDURE — G0378 HOSPITAL OBSERVATION PER HR: HCPCS

## 2022-12-17 PROCEDURE — 25010000002 FENTANYL CITRATE (PF) 50 MCG/ML SOLUTION: Performed by: EMERGENCY MEDICINE

## 2022-12-17 PROCEDURE — 85730 THROMBOPLASTIN TIME PARTIAL: CPT | Performed by: EMERGENCY MEDICINE

## 2022-12-17 PROCEDURE — 85025 COMPLETE CBC W/AUTO DIFF WBC: CPT | Performed by: EMERGENCY MEDICINE

## 2022-12-17 PROCEDURE — 73502 X-RAY EXAM HIP UNI 2-3 VIEWS: CPT

## 2022-12-17 PROCEDURE — 80053 COMPREHEN METABOLIC PANEL: CPT | Performed by: EMERGENCY MEDICINE

## 2022-12-17 PROCEDURE — 73700 CT LOWER EXTREMITY W/O DYE: CPT

## 2022-12-17 PROCEDURE — 85610 PROTHROMBIN TIME: CPT | Performed by: EMERGENCY MEDICINE

## 2022-12-17 PROCEDURE — 99285 EMERGENCY DEPT VISIT HI MDM: CPT

## 2022-12-17 RX ORDER — ACETAMINOPHEN 325 MG/1
650 TABLET ORAL EVERY 4 HOURS PRN
Status: DISCONTINUED | OUTPATIENT
Start: 2022-12-17 | End: 2022-12-22 | Stop reason: HOSPADM

## 2022-12-17 RX ORDER — SODIUM CHLORIDE 0.9 % (FLUSH) 0.9 %
10 SYRINGE (ML) INJECTION AS NEEDED
Status: DISCONTINUED | OUTPATIENT
Start: 2022-12-17 | End: 2022-12-22 | Stop reason: HOSPADM

## 2022-12-17 RX ORDER — MORPHINE SULFATE 2 MG/ML
2 INJECTION, SOLUTION INTRAMUSCULAR; INTRAVENOUS EVERY 4 HOURS PRN
Status: DISCONTINUED | OUTPATIENT
Start: 2022-12-17 | End: 2022-12-22 | Stop reason: HOSPADM

## 2022-12-17 RX ORDER — AMOXICILLIN 250 MG
2 CAPSULE ORAL 2 TIMES DAILY PRN
Status: DISCONTINUED | OUTPATIENT
Start: 2022-12-17 | End: 2022-12-22 | Stop reason: HOSPADM

## 2022-12-17 RX ORDER — MORPHINE SULFATE 2 MG/ML
2 INJECTION, SOLUTION INTRAMUSCULAR; INTRAVENOUS
Status: DISCONTINUED | OUTPATIENT
Start: 2022-12-17 | End: 2022-12-17

## 2022-12-17 RX ORDER — HYDROCODONE BITARTRATE AND ACETAMINOPHEN 5; 325 MG/1; MG/1
1 TABLET ORAL ONCE
Status: COMPLETED | OUTPATIENT
Start: 2022-12-17 | End: 2022-12-17

## 2022-12-17 RX ORDER — FENTANYL CITRATE 50 UG/ML
50 INJECTION, SOLUTION INTRAMUSCULAR; INTRAVENOUS ONCE
Status: COMPLETED | OUTPATIENT
Start: 2022-12-17 | End: 2022-12-17

## 2022-12-17 RX ORDER — ACETAMINOPHEN 160 MG/5ML
650 SOLUTION ORAL EVERY 4 HOURS PRN
Status: DISCONTINUED | OUTPATIENT
Start: 2022-12-17 | End: 2022-12-22 | Stop reason: HOSPADM

## 2022-12-17 RX ORDER — SODIUM CHLORIDE 9 MG/ML
75 INJECTION, SOLUTION INTRAVENOUS CONTINUOUS
Status: DISCONTINUED | OUTPATIENT
Start: 2022-12-18 | End: 2022-12-22 | Stop reason: HOSPADM

## 2022-12-17 RX ORDER — PANTOPRAZOLE SODIUM 40 MG/1
40 TABLET, DELAYED RELEASE ORAL EVERY MORNING
Status: DISCONTINUED | OUTPATIENT
Start: 2022-12-18 | End: 2022-12-22 | Stop reason: HOSPADM

## 2022-12-17 RX ORDER — QUETIAPINE FUMARATE 25 MG/1
25 TABLET, FILM COATED ORAL NIGHTLY
Status: DISCONTINUED | OUTPATIENT
Start: 2022-12-17 | End: 2022-12-22 | Stop reason: HOSPADM

## 2022-12-17 RX ORDER — ACETAMINOPHEN 650 MG/1
650 SUPPOSITORY RECTAL EVERY 4 HOURS PRN
Status: DISCONTINUED | OUTPATIENT
Start: 2022-12-17 | End: 2022-12-22 | Stop reason: HOSPADM

## 2022-12-17 RX ORDER — CALCIUM CARBONATE 200(500)MG
2 TABLET,CHEWABLE ORAL 2 TIMES DAILY PRN
Status: DISCONTINUED | OUTPATIENT
Start: 2022-12-17 | End: 2022-12-22 | Stop reason: HOSPADM

## 2022-12-17 RX ORDER — HYDROCODONE BITARTRATE AND ACETAMINOPHEN 5; 325 MG/1; MG/1
1 TABLET ORAL EVERY 4 HOURS PRN
Status: DISCONTINUED | OUTPATIENT
Start: 2022-12-17 | End: 2022-12-22 | Stop reason: HOSPADM

## 2022-12-17 RX ORDER — SODIUM CHLORIDE 9 MG/ML
40 INJECTION, SOLUTION INTRAVENOUS AS NEEDED
Status: DISCONTINUED | OUTPATIENT
Start: 2022-12-17 | End: 2022-12-22 | Stop reason: HOSPADM

## 2022-12-17 RX ORDER — SODIUM CHLORIDE 0.9 % (FLUSH) 0.9 %
10 SYRINGE (ML) INJECTION EVERY 12 HOURS SCHEDULED
Status: DISCONTINUED | OUTPATIENT
Start: 2022-12-17 | End: 2022-12-22 | Stop reason: HOSPADM

## 2022-12-17 RX ORDER — FLUTICASONE PROPIONATE 50 MCG
2 SPRAY, SUSPENSION (ML) NASAL DAILY
Status: DISCONTINUED | OUTPATIENT
Start: 2022-12-18 | End: 2022-12-22 | Stop reason: HOSPADM

## 2022-12-17 RX ORDER — ONDANSETRON 4 MG/1
4 TABLET, FILM COATED ORAL EVERY 6 HOURS PRN
Status: DISCONTINUED | OUTPATIENT
Start: 2022-12-17 | End: 2022-12-22 | Stop reason: HOSPADM

## 2022-12-17 RX ORDER — ATENOLOL 25 MG/1
25 TABLET ORAL DAILY
Status: DISCONTINUED | OUTPATIENT
Start: 2022-12-18 | End: 2022-12-22 | Stop reason: HOSPADM

## 2022-12-17 RX ORDER — ONDANSETRON 2 MG/ML
4 INJECTION INTRAMUSCULAR; INTRAVENOUS EVERY 6 HOURS PRN
Status: DISCONTINUED | OUTPATIENT
Start: 2022-12-17 | End: 2022-12-22 | Stop reason: HOSPADM

## 2022-12-17 RX ORDER — HYDROCODONE BITARTRATE AND ACETAMINOPHEN 5; 325 MG/1; MG/1
1 TABLET ORAL EVERY 6 HOURS PRN
Status: DISCONTINUED | OUTPATIENT
Start: 2022-12-17 | End: 2022-12-17

## 2022-12-17 RX ADMIN — HYDROCODONE BITARTRATE AND ACETAMINOPHEN 1 TABLET: 5; 325 TABLET ORAL at 17:37

## 2022-12-17 RX ADMIN — FENTANYL CITRATE 50 MCG: 50 INJECTION, SOLUTION INTRAMUSCULAR; INTRAVENOUS at 15:59

## 2022-12-17 NOTE — ED NOTES
Nursing report ED to floor  Gabbi Brandon  82 y.o.  female    HPI :   Chief Complaint   Patient presents with    Hip Injury     R    Fall       Admitting doctor:   Anderson Alvarez MD    Admitting diagnosis:   The primary encounter diagnosis was Acute pain of right thigh. Diagnoses of Status post total hip replacement, right and Fall, initial encounter were also pertinent to this visit.    Code status:   Current Code Status       Date Active Code Status Order ID Comments User Context       Prior            Allergies:   Penicillins    Isolation:   No active isolations    Intake and Output  No intake or output data in the 24 hours ending 12/17/22 1829    Weight:   There were no vitals filed for this visit.    Most recent vitals:   Vitals:    12/17/22 1531 12/17/22 1556 12/17/22 1601   BP: 146/72 159/77 166/79   Pulse: 78     Resp: 16     Temp: 98.1 °F (36.7 °C)     TempSrc: Tympanic     SpO2: 96%         Active LDAs/IV Access:   Lines, Drains & Airways       Active LDAs       Name Placement date Placement time Site Days    Peripheral IV 12/17/22 1553 Right Antecubital 12/17/22  1553  Antecubital  less than 1                    Labs (abnormal labs have a star):   Labs Reviewed   COMPREHENSIVE METABOLIC PANEL - Abnormal; Notable for the following components:       Result Value    Glucose 169 (*)     CO2 32.7 (*)     Albumin 2.80 (*)     All other components within normal limits    Narrative:     GFR Normal >60  Chronic Kidney Disease <60  Kidney Failure <15    The GFR formula is only valid for adults with stable renal function between ages 18 and 70.   CBC WITH AUTO DIFFERENTIAL - Abnormal; Notable for the following components:    RBC 3.66 (*)     Hemoglobin 10.8 (*)     MCHC 31.1 (*)     All other components within normal limits   PROTIME-INR - Normal   APTT - Normal   CBC AND DIFFERENTIAL    Narrative:     The following orders were created for panel order CBC & Differential.  Procedure                                Abnormality         Status                     ---------                               -----------         ------                     CBC Auto Differential[[id number]]        Abnormal            Final result                 Please view results for these tests on the individual orders.       EKG:   No orders to display       Meds given in ED:   Medications   sodium chloride 0.9 % flush 10 mL (has no administration in time range)   fentaNYL citrate (PF) (SUBLIMAZE) injection 50 mcg (50 mcg Intravenous Given 12/17/22 5139)   HYDROcodone-acetaminophen (NORCO) 5-325 MG per tablet 1 tablet (1 tablet Oral Given 12/17/22 6547)       Imaging results:  XR Femur 2 View Right    Result Date: 12/17/2022   As described.    This report was finalized on 12/17/2022 5:01 PM by Dr. Boogie Tran M.D.      XR Hip With or Without Pelvis 2 - 3 View Right    Result Date: 12/17/2022   As described.    This report was finalized on 12/17/2022 5:01 PM by Dr. Boogie Tran M.D.       Ambulatory status:   - Bed Rest    Social issues:   Social History     Socioeconomic History    Marital status:    Tobacco Use    Smoking status: Never    Smokeless tobacco: Never   Vaping Use    Vaping Use: Never used   Substance and Sexual Activity    Alcohol use: Not Currently    Drug use: No    Sexual activity: Defer       NIH Stroke Scale:         Registered Nurse, RN  12/17/22 18:29 EST

## 2022-12-17 NOTE — ED PROVIDER NOTES
EMERGENCY DEPARTMENT ENCOUNTER    Room Number:  34/34  Date seen:  12/17/2022  PCP: Paulino Casas MD  Historian: Patient, family      HPI:  Chief Complaint: Fall, right thigh pain  A complete HPI/ROS/PMH/PSH/SH/FH are unobtainable due to: Nothing  Context: Gabbi Brandon is a 82 y.o. female who presents to the ED c/o acute pain in the right thigh and hip after she fell.  Patient does not believe that she hit her head or loss consciousness.  She takes no blood thinner medications.  She is unable to move or walk on her right lower extremity.  The pain is moderate to severe.  The pain is localized to the right thigh and right hip.  She denies pain in her back.  She has had previous right total hip replacement with Dr. Ang.            PAST MEDICAL HISTORY  Active Ambulatory Problems     Diagnosis Date Noted   • Irregular bleeding 09/07/2016   • Urinary tract infection 09/14/2016   • Mixed hyperlipidemia 10/03/2014   • Essential hypertension 01/10/2018   • Gastroesophageal reflux disease without esophagitis 05/02/2019   • Seasonal allergies 05/02/2019   • Bladder spasm 11/04/2019   • Bilateral lower extremity edema 06/25/2020   • Hyponatremia 07/01/2020   • DNR (do not resuscitate) 07/01/2020   • Oropharyngeal dysphagia 07/02/2020   • Anemia, chronic disease 08/08/2020   • Diarrhea 08/09/2020   • Hypocalcemia 08/09/2020   • Non-intractable vomiting 08/07/2020   • Hiatal hernia 09/02/2020   • Arthritis    • Pressure injury of buttock, stage 2 ([facility]) 11/17/2020   • Closed fracture of neck of right femur ([facility]) 12/29/2020   • Closed displaced fracture of right femoral neck ([facility]) 12/29/2020   • Rheumatoid arthritis ([facility]) 10/14/2021   • Generalized arthritis 10/14/2021   • Hypomagnesemia 10/14/2021   • Eczema 10/14/2021   • Generalized anxiety disorder 04/19/2022     Resolved Ambulatory Problems     Diagnosis Date Noted   • Stage 2 chronic kidney disease 09/18/2018   • Familial hypercholesterolemia 11/04/2019   •  Pneumonia of right lower lobe due to infectious organism 07/01/2020   • Failure to thrive in adult 07/01/2020   • Hypoxia 07/02/2020   • Hypotension 08/08/2020   • Aspiration pneumonia of right lower lobe (HCC) 09/02/2020   • Metabolic encephalopathy 09/02/2020   • Pleural effusion on right 09/04/2020     Past Medical History:   Diagnosis Date   • GERD (gastroesophageal reflux disease)    • Hyperlipidemia    • Hypertension    • Incontinent of urine    • PNA (pneumonia)    • UTI (urinary tract infection)          PAST SURGICAL HISTORY  Past Surgical History:   Procedure Laterality Date   • BREAST BIOPSY     • COLONOSCOPY N/A 8/26/2016    Procedure: COLONOSCOPY WITH POLYPECTOMY (HOT SNARE);  Surgeon: Paulino Narvaez MD;  Location: [facility] ENDOSCOPY;  Service:    • ENDOSCOPY N/A 8/11/2020    Procedure: ESOPHAGOGASTRODUODENOSCOPY with biopsies;  Surgeon: Eugene George MD;  Location: [facility] ENDOSCOPY;  Service: Gastroenterology;  Laterality: N/A;  pre-- melena and abdominal pain  post-- hiatel hernia   • TOTAL HIP ARTHROPLASTY Right 12/30/2020    Procedure: TOTAL HIP ARTHROPLASTY ANTERIOR WITH HANA TABLE;  Surgeon: Brent Ang II, MD;  Location: [facility] MAIN OR;  Service: Orthopedics;  Laterality: Right;   • VAGINAL HYSTERECTOMY           FAMILY HISTORY  Family History   Problem Relation Age of Onset   • Colon cancer Brother    • Heart attack Mother    • Stroke Father    • Lung cancer Sister    • Lung cancer Brother         3 brothers   • Deep vein thrombosis Brother    • Brain cancer Brother    • Uterine cancer Neg Hx    • Ovarian cancer Neg Hx    • Breast cancer Neg Hx    • Pulmonary embolism Neg Hx          SOCIAL HISTORY  Social History     Socioeconomic History   • Marital status:    Tobacco Use   • Smoking status: Never   • Smokeless tobacco: Never   Vaping Use   • Vaping Use: Never used   Substance and Sexual Activity   • Alcohol use: Not Currently   • Drug use: No   • Sexual  activity: Defer         ALLERGIES  Penicillins        REVIEW OF SYSTEMS  Review of Systems   Review of all 14 systems is negative other than stated in the HPI above.      PHYSICAL EXAM  ED Triage Vitals [12/17/22 1531]   Temp Heart Rate Resp BP SpO2   98.1 °F (36.7 °C) 78 16 146/72 96 %      Temp src Heart Rate Source Patient Position BP Location FiO2 (%)   Tympanic -- -- -- --       Physical Exam      GENERAL: Awake and alert, no acute distress  HENT: nares patent, no scalp hemorrhage,  EYES: no scleral icterus, pupils 3 mm reactive bilaterally  CV: regular rhythm, normal rate  RESPIRATORY: normal effort  ABDOMEN: soft, nondistended, nontender throughout  MUSCULOSKELETAL: no deformity.  There is no significant pain with passive internal/external rotation of the right hip.  No point tenderness of the right knee or right lower leg.  When I attempt to passively flex the right hip patient complains of severe pain in the right mid thigh.  NEURO: alert, moves all extremities, follows commands, normal sensation light touch of the right lower extremity  PSYCH:  calm, cooperative  SKIN: warm, dry    Vital signs and nursing notes reviewed.          LAB RESULTS  Recent Results (from the past 24 hour(s))   Comprehensive Metabolic Panel    Collection Time: 12/17/22  3:52 PM    Specimen: Blood   Result Value Ref Range    Glucose 169 (H) 65 - 99 mg/dL    BUN 17 8 - 23 mg/dL    Creatinine 0.84 0.57 - 1.00 mg/dL    Sodium 143 136 - 145 mmol/L    Potassium 4.0 3.5 - 5.2 mmol/L    Chloride 105 98 - 107 mmol/L    CO2 32.7 (H) 22.0 - 29.0 mmol/L    Calcium 8.6 8.6 - 10.5 mg/dL    Total Protein 6.6 6.0 - 8.5 g/dL    Albumin 2.80 (L) 3.50 - 5.20 g/dL    ALT (SGPT) 7 1 - 33 U/L    AST (SGOT) 15 1 - 32 U/L    Alkaline Phosphatase 88 39 - 117 U/L    Total Bilirubin <0.2 0.0 - 1.2 mg/dL    Globulin 3.8 gm/dL    A/G Ratio 0.7 g/dL    BUN/Creatinine Ratio 20.2 7.0 - 25.0    Anion Gap 5.3 5.0 - 15.0 mmol/L    eGFR 68.6 >60.0 mL/min/1.73    Protime-INR    Collection Time: 12/17/22  3:52 PM    Specimen: Blood   Result Value Ref Range    Protime 13.4 11.7 - 14.2 Seconds    INR 1.01 0.90 - 1.10   aPTT    Collection Time: 12/17/22  3:52 PM    Specimen: Blood   Result Value Ref Range    PTT 28.0 22.7 - 35.4 seconds   CBC Auto Differential    Collection Time: 12/17/22  3:52 PM    Specimen: Blood   Result Value Ref Range    WBC 7.04 3.40 - 10.80 10*3/mm3    RBC 3.66 (L) 3.77 - 5.28 10*6/mm3    Hemoglobin 10.8 (L) 12.0 - 15.9 g/dL    Hematocrit 34.7 34.0 - 46.6 %    MCV 94.8 79.0 - 97.0 fL    MCH 29.5 26.6 - 33.0 pg    MCHC 31.1 (L) 31.5 - 35.7 g/dL    RDW 14.1 12.3 - 15.4 %    RDW-SD 49.4 37.0 - 54.0 fl    MPV 9.6 6.0 - 12.0 fL    Platelets 199 140 - 450 10*3/mm3    Neutrophil % 66.2 42.7 - 76.0 %    Lymphocyte % 24.7 19.6 - 45.3 %    Monocyte % 7.4 5.0 - 12.0 %    Eosinophil % 1.0 0.3 - 6.2 %    Basophil % 0.3 0.0 - 1.5 %    Immature Grans % 0.4 0.0 - 0.5 %    Neutrophils, Absolute 4.66 1.70 - 7.00 10*3/mm3    Lymphocytes, Absolute 1.74 0.70 - 3.10 10*3/mm3    Monocytes, Absolute 0.52 0.10 - 0.90 10*3/mm3    Eosinophils, Absolute 0.07 0.00 - 0.40 10*3/mm3    Basophils, Absolute 0.02 0.00 - 0.20 10*3/mm3    Immature Grans, Absolute 0.03 0.00 - 0.05 10*3/mm3    nRBC 0.0 0.0 - 0.2 /100 WBC       Ordered the above labs and reviewed the results.        RADIOLOGY  XR Hip With or Without Pelvis 2 - 3 View Right, XR Femur 2 View Right    Result Date: 12/17/2022  XR HIP W OR WO PELVIS 2-3 VIEW RIGHT-, XR FEMUR 2 VW RIGHT-  INDICATIONS: Trauma  TECHNIQUE: Frontal views of the pelvis and right hip, 4 views of the right femur  COMPARISON: 12/30/2020  FINDINGS:  No acute fracture, erosion, or dislocation is identified. Right hip arthroplasty hardware appears intact. Degenerative changes are conspicuous at the right knee. Degenerative changes are also seen at the symphysis pubis and partly included lumbar spine. Follow-up/further evaluation can be obtained as  indications persist.       As described.    This report was finalized on 12/17/2022 5:01 PM by Dr. Boogie Tran M.D.        Ordered the above noted radiological studies. Reviewed by me in PACS.            PROCEDURES  Procedures              MEDICATIONS GIVEN IN ER  Medications   sodium chloride 0.9 % flush 10 mL (has no administration in time range)   fentaNYL citrate (PF) (SUBLIMAZE) injection 50 mcg (50 mcg Intravenous Given 12/17/22 2667)   HYDROcodone-acetaminophen (NORCO) 5-325 MG per tablet 1 tablet (1 tablet Oral Given 12/17/22 2780)                   MEDICAL DECISION MAKING, PROGRESS, and CONSULTS    All labs have been independently reviewed by me.  All radiology studies have been reviewed by me and I have also reviewed the radiology report.   EKG's independently viewed and interpreted by me.  Discussion below represents my analysis of pertinent findings related to patient's condition, differential diagnosis, treatment plan and final disposition.      Additional sources:  - Discussed/ obtained information from independent historians: Further history obtained from patient's family the bedside    - External (non-ED) record review: I reviewed ER visit from August 22 where patient was seen for dehydration.  I reviewed op note from 12/30/2020 where patient underwent right total hip arthroplasty with Dr. Ang.    - Chronic or social conditions impacting care: Dementia    - Shared decision making: Due to patient's persistent pain and inability to walk or weight-bear, I discussed the need for admission and further evaluation with a CT of the right lower extremity to evaluate for occult periprosthetic fracture.      Orders placed during this visit:  Orders Placed This Encounter   Procedures   • XR Hip With or Without Pelvis 2 - 3 View Right   • XR Femur 2 View Right   • CT Lower Extremity Right Without Contrast   • Comprehensive Metabolic Panel   • Protime-INR   • aPTT   • CBC Auto Differential   • Monitor  Blood Pressure   • Pulse Oximetry, Continuous   • [facility] (on-call MD unless specified) Details   • Insert Peripheral IV   • Initiate Observation Status   • CBC & Differential             Differential diagnosis:    Hip fracture  Hip dislocation  Distal femur fracture  Periprosthetic femur fracture        Independent interpretation of labs, radiology studies, and discussions with consultants:  ED Course as of 12/17/22 1827   Sat Dec 17, 2022   1551 First look:  Patient with significant dementia who took a fall today and now declines to walk complaining of right hip/femur pain.  Family at bedside is primary historian.  Right hip/femur pain.  Eval: Patient resting comfortably in bed with tender right thigh and complains of discomfort with any attempts at range of motion of the right hip both with extension as well as with rotation.  Plan: Pain control and radiologic evaluation. [RS]   1728 I have independently interpreted patient's plain films of the right femur and right hip in PACS.  I do not see evidence of definite fracture.  She has had prior right total hip arthroplasty. [JR]   1729 On repeat examination, patient has extreme pain with attempted passive ranging of the right leg.  She complains of pain in her mid thigh region.  I am concerned that she may have an occult periprosthetic femur fracture.  There is no way that she can walk considering the amount of pain that she is in.  I will plan for admission and further evaluation with CT of the right lower extremity. [JR]   1827 Discussed with ANA LUISA Sánchez for [facility].  I discussed patient's labs, imaging studies, and indications for admission.  She agrees to admit to a [facility] bed on behalf of Dr. Souza. [JR]      ED Course User Index  [JR] Foster Mariscal MD  [RS] Zaid Damon MD             I wore an N95 mask, face shield, and gloves during this patient encounter.  Patient also wearing a surgical mask.  Hand hygeine performed before and after seeing  the patient.    DIAGNOSIS  Final diagnoses:   Acute pain of right thigh   Status post total hip replacement, right   Fall, initial encounter         DISPOSITION  Admit            Latest Documented Vital Signs:  As of 18:27 EST  BP- 166/79 HR- 78 Temp- 98.1 °F (36.7 °C) (Tympanic) O2 sat- 96%              --    Please note that portions of this were completed with a voice recognition program.       Note Disclaimer: At [facility], we believe that sharing information builds trust and better relationships. You are receiving this note because you are receiving care at [facility] or recently visited. It is possible you will see health information before a provider has talked with you about it. This kind of information can be easy to misunderstand. To help you fully understand what it means for your health, we urge you to discuss this note with your provider.           Foster Mariscal MD  12/17/22 4778

## 2022-12-17 NOTE — PROGRESS NOTES
Clinical Pharmacy Services: Medication History    Gabbi Brandon is a 82 y.o. female presenting to [facility] for   Chief Complaint   Patient presents with   • Hip Injury     R   • Fall       She  has a past medical history of Arthritis, GERD (gastroesophageal reflux disease), Hyperlipidemia, Hypertension, Hypotension (8/8/2020), Incontinent of urine, PNA (pneumonia), Seasonal allergies, Stage 2 chronic kidney disease (9/18/2018), and UTI (urinary tract infection).    Allergies as of 12/17/2022 - Reviewed 12/17/2022   Allergen Reaction Noted   • Penicillins Unknown (See Comments) 10/03/2014       Medication information was obtained from: Patient  Pharmacy and Phone Number:     Prior to Admission Medications     Prescriptions Last Dose Informant Patient Reported? Taking?    acetaminophen-codeine (TYLENOL #3) 300-30 MG per tablet  Family Member No Yes    TAKE 1 TABLET BY MOUTH EVERY 6 HOURS AS NEEDED FOR MODERATE PAIN    Patient taking differently:  Take 1 tablet by mouth Every 6 (Six) Hours As Needed.    atenolol (TENORMIN) 25 MG tablet  Family Member No Yes    Take 1 tablet by mouth Daily.    celecoxib (CeleBREX) 200 MG capsule  Family Member No Yes    Take 1 capsule by mouth Daily. Pt needs to make an appt with HER PCP    Patient taking differently:  Take 200 mg by mouth Daily.    fluticasone (FLONASE) 50 MCG/ACT nasal spray  Family Member No Yes    2 sprays by Each Nare route Daily.    omeprazole (priLOSEC) 20 MG capsule  Family Member No Yes    Take 1 capsule by mouth Daily.    QUEtiapine (SEROquel) 25 MG tablet  Family Member Yes Yes    Take 25 mg by mouth At Night As Needed.    sertraline (Zoloft) 50 MG tablet  Family Member No Yes    Take 1.5 tablets by mouth Daily.            Medication notes:     This medication list is complete to the best of my knowledge as of 12/17/2022    Please call if questions.    Elham Aguayo  Medication History Technician   [phone]    12/17/2022 18:01 EST

## 2022-12-17 NOTE — ED NOTES
Pt via [facility] from home with c/o R hip/thigh pain d/t fall.   Pt denies hitting head, denies blood thinners   RLE appears to be shortened and rotated.    All triage performed with this RN wearing appropriate PPE.  Pt placed in mask upon arrival to ED.

## 2022-12-17 NOTE — H&P
Patient Name:  Gabbi Brandon  YOB: 1940  MRN:  [id number]  Admit Date:  12/17/2022  Patient Care Team:  Paulino Casas MD as PCP - General (Family Medicine)  Brent Ang II, MD as Consulting Physician (Orthopedic Surgery)      Subjective   History Present Illness     Chief Complaint   Patient presents with   • Hip Injury     R   • Fall       Ms. Brandon is a 82 y.o. non-smoker with a history of dementia, hypertension, hyperlipidemia, GERD, anemia, and anxiety that presents to [facility] complaining of a fall and right leg pain.  She is a poor historian on exam so history is obtained from medical records.  Per ED notes, she fell at home tonight and arrived complaining of pain in her right thigh and hip.  The patient is confused and cannot answer any questions, but grimaces and yells out when the right lateral thigh is palpated.  X-ray of the right femur and right hip were negative.  A CT of the right lower extremity showed a mildly displaced oblique fracture of the proximal right femoral metaphysis.  She is being admitted for further evaluation.      History of Present Illness  Review of Systems   Unable to perform ROS: Dementia        Personal History     Past Medical History:   Diagnosis Date   • Arthritis    • GERD (gastroesophageal reflux disease)    • Hyperlipidemia    • Hypertension    • Hypotension 8/8/2020   • Incontinent of urine    • PNA (pneumonia)    • Seasonal allergies    • Stage 2 chronic kidney disease 9/18/2018   • UTI (urinary tract infection)      Past Surgical History:   Procedure Laterality Date   • BREAST BIOPSY     • COLONOSCOPY N/A 8/26/2016    Procedure: COLONOSCOPY WITH POLYPECTOMY (HOT SNARE);  Surgeon: Paulino Narvaez MD;  Location: [facility] ENDOSCOPY;  Service:    • ENDOSCOPY N/A 8/11/2020    Procedure: ESOPHAGOGASTRODUODENOSCOPY with biopsies;  Surgeon: Eugene George MD;  Location: [facility] ENDOSCOPY;  Service:  Gastroenterology;  Laterality: N/A;  pre-- melena and abdominal pain  post-- hiatel hernia   • TOTAL HIP ARTHROPLASTY Right 12/30/2020    Procedure: TOTAL HIP ARTHROPLASTY ANTERIOR WITH HANA TABLE;  Surgeon: Brent Ang II, MD;  Location: [facility];  Service: Orthopedics;  Laterality: Right;   • VAGINAL HYSTERECTOMY       Family History   Problem Relation Age of Onset   • Colon cancer Brother    • Heart attack Mother    • Stroke Father    • Lung cancer Sister    • Lung cancer Brother         3 brothers   • Deep vein thrombosis Brother    • Brain cancer Brother    • Uterine cancer Neg Hx    • Ovarian cancer Neg Hx    • Breast cancer Neg Hx    • Pulmonary embolism Neg Hx      Social History     Tobacco Use   • Smoking status: Never   • Smokeless tobacco: Never   Vaping Use   • Vaping Use: Never used   Substance Use Topics   • Alcohol use: Not Currently   • Drug use: No     Medications Prior to Admission   Medication Sig Dispense Refill Last Dose   • acetaminophen-codeine (TYLENOL #3) 300-30 MG per tablet TAKE 1 TABLET BY MOUTH EVERY 6 HOURS AS NEEDED FOR MODERATE PAIN (Patient taking differently: Take 1 tablet by mouth Every 6 (Six) Hours As Needed.) 120 tablet 0    • atenolol (TENORMIN) 25 MG tablet Take 1 tablet by mouth Daily. 90 tablet 4    • celecoxib (CeleBREX) 200 MG capsule Take 1 capsule by mouth Daily. Pt needs to make an appt with HER PCP (Patient taking differently: Take 200 mg by mouth Daily.) 90 capsule 4    • fluticasone (FLONASE) 50 MCG/ACT nasal spray 2 sprays by Each Nare route Daily. 48 g 4    • omeprazole (priLOSEC) 20 MG capsule Take 1 capsule by mouth Daily. 90 capsule 4    • QUEtiapine (SEROquel) 25 MG tablet Take 25 mg by mouth At Night As Needed.      • sertraline (Zoloft) 50 MG tablet Take 1.5 tablets by mouth Daily. 135 tablet 4      Allergies:    Allergies   Allergen Reactions   • Penicillins Unknown (See Comments)     Caused a red streak down her leg.       Objective     Objective     Vital Signs  Temp:  [98.1 °F (36.7 °C)-98.2 °F (36.8 °C)] 98.2 °F (36.8 °C)  Heart Rate:  [70-78] 70  Resp:  [16-18] 18  BP: (140-178)/(72-99) 140/86  SpO2:  [94 %-97 %] 96 %  on   ;   Device (Oxygen Therapy): room air  Body mass index is 21.77 kg/m².    Physical Exam  Vitals and nursing note reviewed.   Constitutional:       Comments: Elderly, frail   HENT:      Head: Normocephalic and atraumatic.      Nose: Nose normal.      Mouth/Throat:      Mouth: Mucous membranes are dry.      Pharynx: Oropharynx is clear.   Eyes:      Extraocular Movements: Extraocular movements intact.      Conjunctiva/sclera: Conjunctivae normal.   Cardiovascular:      Rate and Rhythm: Normal rate and regular rhythm.      Pulses: Normal pulses.      Heart sounds: Normal heart sounds.   Pulmonary:      Effort: Pulmonary effort is normal.      Breath sounds: Normal breath sounds.   Abdominal:      General: Bowel sounds are normal.      Palpations: Abdomen is soft.   Musculoskeletal:         General: Tenderness present.      Cervical back: Normal range of motion and neck supple.      Right lower leg: No edema.      Left lower leg: No edema.      Comments: RLE externally rotated   Skin:     General: Skin is warm and dry.   Neurological:      General: No focal deficit present.      Mental Status: She is alert. She is confused.   Psychiatric:         Mood and Affect: Mood normal.         Behavior: Behavior normal.         Results Review:  I reviewed the patient's new clinical results.  I reviewed the patient's new imaging results and agree with the interpretation.  I reviewed the patient's other test results and agree with the interpretation  I personally viewed and interpreted the patient's EKG/Telemetry data  Discussed with ED provider.    Lab Results (last 24 hours)     Procedure Component Value Units Date/Time    CBC & Differential [[id number]]  (Abnormal) Collected: 12/17/22 9261    Specimen: Blood Updated: 12/17/22 9600     Narrative:      The following orders were created for panel order CBC & Differential.  Procedure                               Abnormality         Status                     ---------                               -----------         ------                     CBC Auto Differential[[id number]]        Abnormal            Final result                 Please view results for these tests on the individual orders.    Comprehensive Metabolic Panel [[id number]]  (Abnormal) Collected: 12/17/22 1552    Specimen: Blood Updated: 12/17/22 1625     Glucose 169 mg/dL      BUN 17 mg/dL      Creatinine 0.84 mg/dL      Sodium 143 mmol/L      Potassium 4.0 mmol/L      Chloride 105 mmol/L      CO2 32.7 mmol/L      Calcium 8.6 mg/dL      Total Protein 6.6 g/dL      Albumin 2.80 g/dL      ALT (SGPT) 7 U/L      AST (SGOT) 15 U/L      Alkaline Phosphatase 88 U/L      Total Bilirubin <0.2 mg/dL      Globulin 3.8 gm/dL      A/G Ratio 0.7 g/dL      BUN/Creatinine Ratio 20.2     Anion Gap 5.3 mmol/L      eGFR 68.6 mL/min/1.73      Comment: National Kidney Foundation and American Society of Nephrology (ASN) Task Force recommended calculation based on the Chronic Kidney Disease Epidemiology Collaboration (CKD-EPI) equation refit without adjustment for race.       Narrative:      GFR Normal >60  Chronic Kidney Disease <60  Kidney Failure <15    The GFR formula is only valid for adults with stable renal function between ages 18 and 70.    Protime-INR [[id number]]  (Normal) Collected: 12/17/22 1552    Specimen: Blood Updated: 12/17/22 1636     Protime 13.4 Seconds      INR 1.01    aPTT [[id number]]  (Normal) Collected: 12/17/22 1552    Specimen: Blood Updated: 12/17/22 1636     PTT 28.0 seconds     CBC Auto Differential [[id number]]  (Abnormal) Collected: 12/17/22 1552    Specimen: Blood Updated: 12/17/22 1606     WBC 7.04 10*3/mm3      RBC 3.66 10*6/mm3      Hemoglobin 10.8 g/dL      Hematocrit 34.7 %      MCV 94.8 fL      MCH 29.5 pg      MCHC 31.1  g/dL      RDW 14.1 %      RDW-SD 49.4 fl      MPV 9.6 fL      Platelets 199 10*3/mm3      Neutrophil % 66.2 %      Lymphocyte % 24.7 %      Monocyte % 7.4 %      Eosinophil % 1.0 %      Basophil % 0.3 %      Immature Grans % 0.4 %      Neutrophils, Absolute 4.66 10*3/mm3      Lymphocytes, Absolute 1.74 10*3/mm3      Monocytes, Absolute 0.52 10*3/mm3      Eosinophils, Absolute 0.07 10*3/mm3      Basophils, Absolute 0.02 10*3/mm3      Immature Grans, Absolute 0.03 10*3/mm3      nRBC 0.0 /100 WBC           Imaging Results (Last 24 Hours)     Procedure Component Value Units Date/Time    CT Lower Extremity Right Without Contrast [[id number]] Collected: 12/17/22 2003     Updated: 12/17/22 2003    Narrative:        Patient: NEAL HENNING  Time Out: 20:02  Exam(s): CT EXTREMITY RIGHT LOWER Without Contrast     EXAM:    CT Right Lower Extremity Without Intravenous Contrast    CLINICAL HISTORY:     Reason for exam: right thigh pain.    TECHNIQUE:    Axial computed tomography images of the right lower extremity without   intravenous contrast.  This CT exam was performed according to the   principle of ALARA (As Low As Reasonably Achievable) by using one or more   of the following dose reduction techniques: automated exposure control,   adjustment of the mA and or kV according to patient size, and or use of   iterative reconstruction technique.    COMPARISON:    No relevant prior studies available.    FINDINGS:    Artifacts:  Orthopedic hardware.    Bones joints:  Acute mildly displaced oblique fracture of the proximal   right femoral metaphysis.  Moderate knee effusion.    Soft tissues:  No significant abnormality.    Other findings:  3.4 cm popliteal fossa cyst.    IMPRESSION:         Mildly displaced oblique fracture of the proximal right femoral   metaphysis.  Tricompartmental degenerative changes.      Impression:          Electronically signed by Tina Zapata MD on 12-17-22 at 2002    XR Hip With or Without  Pelvis 2 - 3 View Right [[id number]] Collected: 12/17/22 1659     Updated: 12/17/22 1704    Narrative:      XR HIP W OR WO PELVIS 2-3 VIEW RIGHT-, XR FEMUR 2 VW RIGHT-     INDICATIONS: Trauma     TECHNIQUE: Frontal views of the pelvis and right hip, 4 views of the  right femur     COMPARISON: 12/30/2020     FINDINGS:     No acute fracture, erosion, or dislocation is identified. Right hip  arthroplasty hardware appears intact. Degenerative changes are  conspicuous at the right knee. Degenerative changes are also seen at the  symphysis pubis and partly included lumbar spine. Follow-up/further  evaluation can be obtained as indications persist.       Impression:         As described.           This report was finalized on 12/17/2022 5:01 PM by Dr. Boogie Tran M.D.       XR Femur 2 View Right [[id number]] Collected: 12/17/22 1659     Updated: 12/17/22 1704    Narrative:      XR HIP W OR WO PELVIS 2-3 VIEW RIGHT-, XR FEMUR 2 VW RIGHT-     INDICATIONS: Trauma     TECHNIQUE: Frontal views of the pelvis and right hip, 4 views of the  right femur     COMPARISON: 12/30/2020     FINDINGS:     No acute fracture, erosion, or dislocation is identified. Right hip  arthroplasty hardware appears intact. Degenerative changes are  conspicuous at the right knee. Degenerative changes are also seen at the  symphysis pubis and partly included lumbar spine. Follow-up/further  evaluation can be obtained as indications persist.       Impression:         As described.           This report was finalized on 12/17/2022 5:01 PM by Dr. Boogie Tran M.D.                 No orders to display        Assessment/Plan     Active Hospital Problems    Diagnosis  POA   • **Closed fracture of right femur (HCC) [S72.91XA]  Unknown   • Dementia (HCC) [F03.90]  Unknown   • Generalized anxiety disorder [F41.1]  Yes   • Anemia, chronic disease [D63.8]  Yes   • Gastroesophageal reflux disease without esophagitis [K21.9]  Yes   • Essential  hypertension [I10]  Yes   • Mixed hyperlipidemia [E78.2]  Yes       Right Femur Fracture  -Admit to a med/surg unit for monitoring  -Orthopedic surgery consult  -Neurovascular checks  -NPO after midnight  -IVF  -PRN analgesia  -PT/OT consults  -Check chest x-ray and EKG for surgical clearance    Hypertension  -Blood pressures stable. Continue home regimen  -Monitor    GERD  -Continue home PPI    Generalized Anxiety Disorder/Depression  -Continue Seroquel and Sertraline    Anemia  -Hgb 10.8, baseline around 10.5  -No signs of active bleeding  -Repeat CBC in AM    -I discussed the patients findings and my recommendations with patient.    VTE Prophylaxis - SCDs.  Code Status - Full code.       ANA LUISA Velasquez  [facility]  12/17/22  21:33 EST

## 2022-12-18 ENCOUNTER — APPOINTMENT (OUTPATIENT)
Dept: GENERAL RADIOLOGY | Facility: HOSPITAL | Age: 82
DRG: 467 | End: 2022-12-18
Payer: MEDICARE

## 2022-12-18 PROBLEM — M79.651 ACUTE PAIN OF RIGHT THIGH: Status: ACTIVE | Noted: 2022-12-18

## 2022-12-18 LAB
ANION GAP SERPL CALCULATED.3IONS-SCNC: 6 MMOL/L (ref 5–15)
BUN SERPL-MCNC: 14 MG/DL (ref 8–23)
BUN/CREAT SERPL: 24.1 (ref 7–25)
CALCIUM SPEC-SCNC: 8.5 MG/DL (ref 8.6–10.5)
CHLORIDE SERPL-SCNC: 104 MMOL/L (ref 98–107)
CO2 SERPL-SCNC: 31 MMOL/L (ref 22–29)
CREAT SERPL-MCNC: 0.58 MG/DL (ref 0.57–1)
DEPRECATED RDW RBC AUTO: 47.7 FL (ref 37–54)
EGFRCR SERPLBLD CKD-EPI 2021: 89.4 ML/MIN/1.73
ERYTHROCYTE [DISTWIDTH] IN BLOOD BY AUTOMATED COUNT: 14.1 % (ref 12.3–15.4)
GLUCOSE SERPL-MCNC: 95 MG/DL (ref 65–99)
HCT VFR BLD AUTO: 31.6 % (ref 34–46.6)
HGB BLD-MCNC: 10.3 G/DL (ref 12–15.9)
MCH RBC QN AUTO: 29.7 PG (ref 26.6–33)
MCHC RBC AUTO-ENTMCNC: 32.6 G/DL (ref 31.5–35.7)
MCV RBC AUTO: 91.1 FL (ref 79–97)
PLATELET # BLD AUTO: 182 10*3/MM3 (ref 140–450)
PMV BLD AUTO: 10 FL (ref 6–12)
POTASSIUM SERPL-SCNC: 4 MMOL/L (ref 3.5–5.2)
QT INTERVAL: 409 MS
RBC # BLD AUTO: 3.47 10*6/MM3 (ref 3.77–5.28)
SODIUM SERPL-SCNC: 141 MMOL/L (ref 136–145)
WBC NRBC COR # BLD: 6.24 10*3/MM3 (ref 3.4–10.8)

## 2022-12-18 PROCEDURE — 36415 COLL VENOUS BLD VENIPUNCTURE: CPT | Performed by: NURSE PRACTITIONER

## 2022-12-18 PROCEDURE — 99222 1ST HOSP IP/OBS MODERATE 55: CPT | Performed by: ORTHOPAEDIC SURGERY

## 2022-12-18 PROCEDURE — 93010 ELECTROCARDIOGRAM REPORT: CPT | Performed by: INTERNAL MEDICINE

## 2022-12-18 PROCEDURE — 80048 BASIC METABOLIC PNL TOTAL CA: CPT | Performed by: NURSE PRACTITIONER

## 2022-12-18 PROCEDURE — 93005 ELECTROCARDIOGRAM TRACING: CPT | Performed by: NURSE PRACTITIONER

## 2022-12-18 PROCEDURE — 71045 X-RAY EXAM CHEST 1 VIEW: CPT

## 2022-12-18 PROCEDURE — 85027 COMPLETE CBC AUTOMATED: CPT | Performed by: NURSE PRACTITIONER

## 2022-12-18 RX ORDER — QUETIAPINE FUMARATE 25 MG/1
25 TABLET, FILM COATED ORAL NIGHTLY PRN
COMMUNITY
End: 2023-02-09

## 2022-12-18 RX ADMIN — PANTOPRAZOLE SODIUM 40 MG: 40 TABLET, DELAYED RELEASE ORAL at 06:47

## 2022-12-18 RX ADMIN — FLUTICASONE PROPIONATE 2 SPRAY: 50 SPRAY, METERED NASAL at 13:57

## 2022-12-18 RX ADMIN — QUETIAPINE FUMARATE 25 MG: 25 TABLET ORAL at 00:12

## 2022-12-18 RX ADMIN — HYDROCODONE BITARTRATE AND ACETAMINOPHEN 1 TABLET: 5; 325 TABLET ORAL at 06:47

## 2022-12-18 RX ADMIN — SODIUM CHLORIDE 75 ML/HR: 9 INJECTION, SOLUTION INTRAVENOUS at 00:11

## 2022-12-18 RX ADMIN — SODIUM CHLORIDE 75 ML/HR: 9 INJECTION, SOLUTION INTRAVENOUS at 21:50

## 2022-12-18 RX ADMIN — Medication 10 ML: at 00:11

## 2022-12-18 RX ADMIN — QUETIAPINE FUMARATE 25 MG: 25 TABLET ORAL at 21:50

## 2022-12-18 RX ADMIN — SODIUM CHLORIDE 75 ML/HR: 9 INJECTION, SOLUTION INTRAVENOUS at 11:58

## 2022-12-18 RX ADMIN — HYDROCODONE BITARTRATE AND ACETAMINOPHEN 1 TABLET: 5; 325 TABLET ORAL at 00:11

## 2022-12-18 RX ADMIN — ATENOLOL 25 MG: 25 TABLET ORAL at 13:57

## 2022-12-18 RX ADMIN — SERTRALINE 75 MG: 25 TABLET, FILM COATED ORAL at 13:57

## 2022-12-18 RX ADMIN — ACETAMINOPHEN 650 MG: 325 TABLET, FILM COATED ORAL at 21:50

## 2022-12-18 NOTE — PLAN OF CARE
Chart review indicates Amlodpine was increased to 2.5mg BID on 10/29/21.   Goal Outcome Evaluation:           Progress: no change  Outcome Evaluation: confused, bed alarm, pure wick (takes out at times)-so also incont, unable to give hx-will call dtr, bed rest, accumax pump, ortho consult (will see in am), npo after 12 mid for poss sx, med for pain

## 2022-12-18 NOTE — PROGRESS NOTES
DAILY PROGRESS NOTE  [facility]    Patient Identification:  Name: Gabbi Brandon  Age: 82 y.o.  Sex: female  :  1940  MRN: [id number]         Primary Care Physician: Paulino Casas MD    Subjective:  Interval History:She complains of pain.    Objective:    Scheduled Meds:atenolol, 25 mg, Oral, Daily  fluticasone, 2 spray, Each Nare, Daily  pantoprazole, 40 mg, Oral, QAM  QUEtiapine, 25 mg, Oral, Nightly  sertraline, 75 mg, Oral, Daily  sodium chloride, 10 mL, Intravenous, Q12H      Continuous Infusions:sodium chloride, 75 mL/hr, Last Rate: 75 mL/hr (22 1158)        Vital signs in last 24 hours:  Temp:  [97.5 °F (36.4 °C)-98.2 °F (36.8 °C)] 97.5 °F (36.4 °C)  Heart Rate:  [70-78] 73  Resp:  [16-18] 18  BP: (140-178)/(72-99) 171/85    Intake/Output:    Intake/Output Summary (Last 24 hours) at 2022 1423  Last data filed at 2022 0530  Gross per 24 hour   Intake --   Output 50 ml   Net -50 ml       Exam:  /85 (BP Location: Left arm, Patient Position: Lying)   Pulse 73   Temp 97.5 °F (36.4 °C) (Oral)   Resp 18   Wt 54 kg (119 lb 0.8 oz)   SpO2 96%   BMI 21.77 kg/m²     General Appearance:    Alert, cooperative, no distress   Head:    Normocephalic, without obvious abnormality, atraumatic   Eyes:       Throat:   Lips, tongue, gums normal   Neck:   Supple, symmetrical, trachea midline, no JVD   Lungs:     Clear to auscultation bilaterally, respirations unlabored   Chest Wall:    No tenderness or deformity    Heart:    Regular rate and rhythm, S1 and S2 normal, no murmur,no  Rub or gallop   Abdomen:     Soft, nontender, bowel sounds active, no masses, no organomegaly    Extremities:   Extremities normal, atraumatic, no cyanosis or edema   Pulses:      Skin:   Skin is warm and dry,  no rashes or palpable lesions   Neurologic:   no focal deficits noted      Lab Results (last 72 hours)     Procedure Component Value Units Date/Time    Basic Metabolic Panel  [[id number]]  (Abnormal) Collected: 12/18/22 0540    Specimen: Blood Updated: 12/18/22 0705     Glucose 95 mg/dL      BUN 14 mg/dL      Creatinine 0.58 mg/dL      Sodium 141 mmol/L      Potassium 4.0 mmol/L      Chloride 104 mmol/L      CO2 31.0 mmol/L      Calcium 8.5 mg/dL      BUN/Creatinine Ratio 24.1     Anion Gap 6.0 mmol/L      eGFR 89.4 mL/min/1.73      Comment: National Kidney Foundation and American Society of Nephrology (ASN) Task Force recommended calculation based on the Chronic Kidney Disease Epidemiology Collaboration (CKD-EPI) equation refit without adjustment for race.       Narrative:      GFR Normal >60  Chronic Kidney Disease <60  Kidney Failure <15    The GFR formula is only valid for adults with stable renal function between ages 18 and 70.    CBC (No Diff) [[id number]]  (Abnormal) Collected: 12/18/22 0540    Specimen: Blood Updated: 12/18/22 0639     WBC 6.24 10*3/mm3      RBC 3.47 10*6/mm3      Hemoglobin 10.3 g/dL      Hematocrit 31.6 %      MCV 91.1 fL      MCH 29.7 pg      MCHC 32.6 g/dL      RDW 14.1 %      RDW-SD 47.7 fl      MPV 10.0 fL      Platelets 182 10*3/mm3     Protime-INR [[id number]]  (Normal) Collected: 12/17/22 1552    Specimen: Blood Updated: 12/17/22 1636     Protime 13.4 Seconds      INR 1.01    aPTT [[id number]]  (Normal) Collected: 12/17/22 1552    Specimen: Blood Updated: 12/17/22 1636     PTT 28.0 seconds     Comprehensive Metabolic Panel [[id number]]  (Abnormal) Collected: 12/17/22 1552    Specimen: Blood Updated: 12/17/22 1625     Glucose 169 mg/dL      BUN 17 mg/dL      Creatinine 0.84 mg/dL      Sodium 143 mmol/L      Potassium 4.0 mmol/L      Chloride 105 mmol/L      CO2 32.7 mmol/L      Calcium 8.6 mg/dL      Total Protein 6.6 g/dL      Albumin 2.80 g/dL      ALT (SGPT) 7 U/L      AST (SGOT) 15 U/L      Alkaline Phosphatase 88 U/L      Total Bilirubin <0.2 mg/dL      Globulin 3.8 gm/dL      A/G Ratio 0.7 g/dL      BUN/Creatinine Ratio 20.2     Anion Gap 5.3 mmol/L       eGFR 68.6 mL/min/1.73      Comment: National Kidney Foundation and American Society of Nephrology (ASN) Task Force recommended calculation based on the Chronic Kidney Disease Epidemiology Collaboration (CKD-EPI) equation refit without adjustment for race.       Narrative:      GFR Normal >60  Chronic Kidney Disease <60  Kidney Failure <15    The GFR formula is only valid for adults with stable renal function between ages 18 and 70.    CBC & Differential [[id number]]  (Abnormal) Collected: 12/17/22 1552    Specimen: Blood Updated: 12/17/22 1606    Narrative:      The following orders were created for panel order CBC & Differential.  Procedure                               Abnormality         Status                     ---------                               -----------         ------                     CBC Auto Differential[[id number]]        Abnormal            Final result                 Please view results for these tests on the individual orders.    CBC Auto Differential [[id number]]  (Abnormal) Collected: 12/17/22 1552    Specimen: Blood Updated: 12/17/22 1606     WBC 7.04 10*3/mm3      RBC 3.66 10*6/mm3      Hemoglobin 10.8 g/dL      Hematocrit 34.7 %      MCV 94.8 fL      MCH 29.5 pg      MCHC 31.1 g/dL      RDW 14.1 %      RDW-SD 49.4 fl      MPV 9.6 fL      Platelets 199 10*3/mm3      Neutrophil % 66.2 %      Lymphocyte % 24.7 %      Monocyte % 7.4 %      Eosinophil % 1.0 %      Basophil % 0.3 %      Immature Grans % 0.4 %      Neutrophils, Absolute 4.66 10*3/mm3      Lymphocytes, Absolute 1.74 10*3/mm3      Monocytes, Absolute 0.52 10*3/mm3      Eosinophils, Absolute 0.07 10*3/mm3      Basophils, Absolute 0.02 10*3/mm3      Immature Grans, Absolute 0.03 10*3/mm3      nRBC 0.0 /100 WBC         Data Review:  Results from last 7 days   Lab Units 12/18/22  0540 12/17/22  1552   SODIUM mmol/L 141 143   POTASSIUM mmol/L 4.0 4.0   CHLORIDE mmol/L 104 105   CO2 mmol/L 31.0* 32.7*   BUN mg/dL 14 17   CREATININE  mg/dL 0.58 0.84   GLUCOSE mg/dL 95 169*   CALCIUM mg/dL 8.5* 8.6     Results from last 7 days   Lab Units 12/18/22  0540 12/17/22  1552   WBC 10*3/mm3 6.24 7.04   HEMOGLOBIN g/dL 10.3* 10.8*   HEMATOCRIT % 31.6* 34.7   PLATELETS 10*3/mm3 182 199             Lab Results   Lab Value Date/Time    TROPONINT <0.010 09/02/2020 0936    TROPONINT <0.010 08/09/2020 1656    TROPONINT <0.010 08/08/2020 0022    TROPONINT <0.010 07/01/2020 1652    TROPONINT <0.010 06/28/2020 2312    TROPONINT <0.01 09/06/2014 0100         Results from last 7 days   Lab Units 12/17/22  1552   ALK PHOS U/L 88   BILIRUBIN mg/dL <0.2   ALT (SGPT) U/L 7   AST (SGOT) U/L 15             No results found for: POCGLU  Results from last 7 days   Lab Units 12/17/22  1552   INR  1.01       Past Medical History:   Diagnosis Date   • Arthritis    • Depression    • GERD (gastroesophageal reflux disease)    • Hyperlipidemia    • Hypertension    • Hypotension 08/08/2020   • Incontinent of urine    • PNA (pneumonia)    • Seasonal allergies    • Stage 2 chronic kidney disease 09/18/2018   • UTI (urinary tract infection)        Assessment:  Active Hospital Problems    Diagnosis  POA   • **Closed fracture of right femur (HCC) [S72.91XA]  Unknown   • Dementia (HCC) [F03.90]  Unknown   • Generalized anxiety disorder [F41.1]  Yes   • Anemia, chronic disease [D63.8]  Yes   • Gastroesophageal reflux disease without esophagitis [K21.9]  Yes   • Essential hypertension [I10]  Yes   • Mixed hyperlipidemia [E78.2]  Yes      Resolved Hospital Problems   No resolved problems to display.       Plan:  Await ortho. Pain control. Follow lab.    Alfredo Reid MD  12/18/2022  14:23 EST

## 2022-12-18 NOTE — CONSULTS
Orthopedic Consult      Patient: Gabbi Brandon    Date of Admission: 12/17/2022  3:34 PM    YOB: 1940    Medical Record Number: [id number]    Consulting Physician: Alfredo Reid MD    Chief Complaints: Right hip pain    History of Present Illness: 82 y.o. female admitted to [facility] to services of Alfredo Reid MD with right hip pain after a fall.  Initial x-rays were indeterminate CT scan did show periprosthetic right hip fracture orthopedics was consulted for further evaluation management.  I did see and evaluate the patient.  She has underlying dementia but she is pretty well with that just a little hard of hearing her daughter was bedside.  Her daughter lives with her at home full-time and stated that her mom usually uses a walker to get around however she was not using one and tripped and fell at home.  She had immediate right leg pain was not able to ambulate so she was brought to the hospital for the evaluation and management.  She did have a right total hip done almost 2 years ago by Dr. González Ang.  She states she had been doing well before her fall.    Allergies:   Allergies   Allergen Reactions   • Penicillins Unknown (See Comments)     Caused a red streak down her leg.       Home Medications:    Current Facility-Administered Medications:   •  acetaminophen (TYLENOL) tablet 650 mg, 650 mg, Oral, Q4H PRN **OR** acetaminophen (TYLENOL) 160 MG/5ML solution 650 mg, 650 mg, Oral, Q4H PRN **OR** acetaminophen (TYLENOL) suppository 650 mg, 650 mg, Rectal, Q4H PRN, Nataliya Edgar APRN  •  atenolol (TENORMIN) tablet 25 mg, 25 mg, Oral, Daily, Nataliya Edgar APRN, 25 mg at 12/18/22 1357  •  calcium carbonate (TUMS) chewable tablet 500 mg (200 mg elemental), 2 tablet, Oral, BID PRN, Nataliya Edgar APRN  •  fluticasone (FLONASE) 50 MCG/ACT nasal spray 2 spray, 2 spray, Each Nare, Daily, Nataliya Edgar APRN, 2 spray at 12/18/22 1357  •   HYDROcodone-acetaminophen (NORCO) 5-325 MG per tablet 1 tablet, 1 tablet, Oral, Q4H PRN, Anderson Alvarez MD, 1 tablet at 12/18/22 0647  •  morphine injection 2 mg, 2 mg, Intravenous, Q4H PRN, Anderson Alvarez MD  •  ondansetron (ZOFRAN) tablet 4 mg, 4 mg, Oral, Q6H PRN **OR** ondansetron (ZOFRAN) injection 4 mg, 4 mg, Intravenous, Q6H PRN, Nataliya Edgar APRN  •  pantoprazole (PROTONIX) EC tablet 40 mg, 40 mg, Oral, QAM, Nataliya Edgar APRN, 40 mg at 12/18/22 0647  •  QUEtiapine (SEROquel) tablet 25 mg, 25 mg, Oral, Nightly, Nataliya Edgar APRN, 25 mg at 12/18/22 0012  •  sennosides-docusate (PERICOLACE) 8.6-50 MG per tablet 2 tablet, 2 tablet, Oral, BID PRN, Anderson Alvarez MD  •  sertraline (ZOLOFT) tablet 75 mg, 75 mg, Oral, Daily, Nataliya Edgar APRN, 75 mg at 12/18/22 1357  •  [COMPLETED] Insert Peripheral IV, , , Once **AND** sodium chloride 0.9 % flush 10 mL, 10 mL, Intravenous, PRN, Zaid Damon MD  •  sodium chloride 0.9 % flush 10 mL, 10 mL, Intravenous, Q12H, Nataliya Edgar APRN, 10 mL at 12/18/22 0011  •  sodium chloride 0.9 % flush 10 mL, 10 mL, Intravenous, PRN, Nataliya Edgar APRN  •  sodium chloride 0.9 % infusion 40 mL, 40 mL, Intravenous, PRN, Nataliya Edgar APRN  •  sodium chloride 0.9 % infusion, 75 mL/hr, Intravenous, Continuous, Anderson Alvarez MD, Last Rate: 75 mL/hr at 12/18/22 1158, 75 mL/hr at 12/18/22 1158    Current Medications:  Scheduled Meds:atenolol, 25 mg, Oral, Daily  fluticasone, 2 spray, Each Nare, Daily  pantoprazole, 40 mg, Oral, QAM  QUEtiapine, 25 mg, Oral, Nightly  sertraline, 75 mg, Oral, Daily  sodium chloride, 10 mL, Intravenous, Q12H      Continuous Infusions:sodium chloride, 75 mL/hr, Last Rate: 75 mL/hr (12/18/22 5138)      PRN Meds:.•  acetaminophen **OR** acetaminophen **OR** acetaminophen  •  calcium carbonate  •  HYDROcodone-acetaminophen  •  Morphine  •  ondansetron **OR** ondansetron  •   senna-docusate sodium  •  [COMPLETED] Insert Peripheral IV **AND** sodium chloride  •  sodium chloride  •  sodium chloride    Past Medical History:   Diagnosis Date   • Arthritis    • Depression    • GERD (gastroesophageal reflux disease)    • Hyperlipidemia    • Hypertension    • Hypotension 08/08/2020   • Incontinent of urine    • PNA (pneumonia)    • Seasonal allergies    • Stage 2 chronic kidney disease 09/18/2018   • UTI (urinary tract infection)        Past Surgical History:   Procedure Laterality Date   • BREAST BIOPSY     • COLONOSCOPY N/A 08/26/2016    Procedure: COLONOSCOPY WITH POLYPECTOMY (HOT SNARE);  Surgeon: Paulino Narvaez MD;  Location: [facility] ENDOSCOPY;  Service:    • ENDOSCOPY N/A 08/11/2020    Procedure: ESOPHAGOGASTRODUODENOSCOPY with biopsies;  Surgeon: Eugene George MD;  Location: [facility] ENDOSCOPY;  Service: Gastroenterology;  Laterality: N/A;  pre-- melena and abdominal pain  post-- hiatel hernia   • FRACTURE SURGERY     • TOTAL HIP ARTHROPLASTY Right 12/30/2020    Procedure: TOTAL HIP ARTHROPLASTY ANTERIOR WITH HANA TABLE;  Surgeon: Brent Ang II, MD;  Location: [facility] MAIN OR;  Service: Orthopedics;  Laterality: Right;   • VAGINAL HYSTERECTOMY         Social History     Occupational History   • Not on file   Tobacco Use   • Smoking status: Never   • Smokeless tobacco: Never   Vaping Use   • Vaping Use: Never used   Substance and Sexual Activity   • Alcohol use: Not Currently   • Drug use: No   • Sexual activity: Defer      Social History     Social History Narrative   • Not on file       Family History   Problem Relation Age of Onset   • Heart attack Mother    • Stroke Father    • Heart disease Sister    • Lung cancer Sister    • Heart disease Brother    • Cancer Brother    • Colon cancer Brother    • Cancer Brother    • Lung cancer Brother         3 brothers   • Deep vein thrombosis Brother    • Brain cancer Brother    • Uterine cancer Neg Hx    • Ovarian cancer  Neg Hx    • Breast cancer Neg Hx    • Pulmonary embolism Neg Hx        Review of Systems:     Constitutional:  Denies fever, shaking or chills   Eyes:  Denies change in visual acuity   HEENT:  Denies nasal congestion or sore throat   Respiratory:  Denies cough or shortness of breath   Cardiovascular:  Denies chest pain or edema  Endocrine: Denies tremors, palpitations, intolerance of heat or cold, polyuria, polydipsia.  GI:  Denies abdominal pain, nausea, vomiting, bloody stools or diarrhea  :  Denies frequency, urgency, incontinence, retention, or nocturia.  Musculoskeletal:  Denies numbness tingling or loss of motor function except as above  Integument:  Denies rash, lesion or ulceration   Neurologic:  Denies headache or focal weakness, deficits  Heme:  Denies epistaxis, spontaneous or excessive bleeding, hematuria, melena, fatigue, enlarged or tender lymph nodes.      All other pertinent positives and negatives as noted above in HPI.    Physical Exam: 82 y.o. female    Vitals:    12/17/22 1831 12/17/22 2059 12/18/22 0530 12/18/22 1000   BP: 160/82 140/86 173/83 171/85   BP Location:  Right arm Right arm Left arm   Patient Position:  Lying Lying Lying   Pulse: 73 70 75 73   Resp:  18 18 18   Temp:  98.2 °F (36.8 °C) 97.8 °F (36.6 °C) 97.5 °F (36.4 °C)   TempSrc:  Oral Oral Oral   SpO2: 94% 96% 95% 96%   Weight:  54 kg (119 lb 0.8 oz)       General:  Awake, alert. No acute distress.      Head/Neck:  Normocephalic, atraumatic.  Conjunctiva and sclera clear.  Hearing adequate for the exam.  Neck is supple with normal ROM.    Psych:  Affect and demeanor appropriate.    CV:  Regular rate and rhythm.  Hemodynamically stable.    Lungs:  Good chest expansion, breathing unlabored.    Abdomen:  Soft.  Non-tender, non-distended.    Extremities: Right lower extremity examined:  Skin appears benign without obvious lacerations, ulcerations or lesions.  No gross deformity of malalignment noted.  Compartments soft without  evidence for DVT or compartment syndrome.  No atrophy.  No palpable masses or adenopathy.  Focal tenderness noted over proximal thigh.  ROM limited due to pain.   No obvious instability although exam is limited due to discomfort.  Strength well-preserved distally.  Sensation to light touch grossly intact distally.  Good skin turgor, brisk cap refill and good pulses distally.    All other extremities atraumatic without gross abnormality.     Diagnostic Tests:    Admission on 12/17/2022   Component Date Value Ref Range Status   • Glucose 12/17/2022 169 (H)  65 - 99 mg/dL Final   • BUN 12/17/2022 17  8 - 23 mg/dL Final   • Creatinine 12/17/2022 0.84  0.57 - 1.00 mg/dL Final   • Sodium 12/17/2022 143  136 - 145 mmol/L Final   • Potassium 12/17/2022 4.0  3.5 - 5.2 mmol/L Final   • Chloride 12/17/2022 105  98 - 107 mmol/L Final   • CO2 12/17/2022 32.7 (H)  22.0 - 29.0 mmol/L Final   • Calcium 12/17/2022 8.6  8.6 - 10.5 mg/dL Final   • Total Protein 12/17/2022 6.6  6.0 - 8.5 g/dL Final   • Albumin 12/17/2022 2.80 (L)  3.50 - 5.20 g/dL Final   • ALT (SGPT) 12/17/2022 7  1 - 33 U/L Final   • AST (SGOT) 12/17/2022 15  1 - 32 U/L Final   • Alkaline Phosphatase 12/17/2022 88  39 - 117 U/L Final   • Total Bilirubin 12/17/2022 <0.2  0.0 - 1.2 mg/dL Final   • Globulin 12/17/2022 3.8  gm/dL Final   • A/G Ratio 12/17/2022 0.7  g/dL Final   • BUN/Creatinine Ratio 12/17/2022 20.2  7.0 - 25.0 Final   • Anion Gap 12/17/2022 5.3  5.0 - 15.0 mmol/L Final   • eGFR 12/17/2022 68.6  >60.0 mL/min/1.73 Final    National Kidney Foundation and American Society of Nephrology (ASN) Task Force recommended calculation based on the Chronic Kidney Disease Epidemiology Collaboration (CKD-EPI) equation refit without adjustment for race.   • Protime 12/17/2022 13.4  11.7 - 14.2 Seconds Final   • INR 12/17/2022 1.01  0.90 - 1.10 Final   • PTT 12/17/2022 28.0  22.7 - 35.4 seconds Final   • WBC 12/17/2022 7.04  3.40 - 10.80 10*3/mm3 Final   • RBC 12/17/2022  3.66 (L)  3.77 - 5.28 10*6/mm3 Final   • Hemoglobin 12/17/2022 10.8 (L)  12.0 - 15.9 g/dL Final   • Hematocrit 12/17/2022 34.7  34.0 - 46.6 % Final   • MCV 12/17/2022 94.8  79.0 - 97.0 fL Final   • MCH 12/17/2022 29.5  26.6 - 33.0 pg Final   • MCHC 12/17/2022 31.1 (L)  31.5 - 35.7 g/dL Final   • RDW 12/17/2022 14.1  12.3 - 15.4 % Final   • RDW-SD 12/17/2022 49.4  37.0 - 54.0 fl Final   • MPV 12/17/2022 9.6  6.0 - 12.0 fL Final   • Platelets 12/17/2022 199  140 - 450 10*3/mm3 Final   • Neutrophil % 12/17/2022 66.2  42.7 - 76.0 % Final   • Lymphocyte % 12/17/2022 24.7  19.6 - 45.3 % Final   • Monocyte % 12/17/2022 7.4  5.0 - 12.0 % Final   • Eosinophil % 12/17/2022 1.0  0.3 - 6.2 % Final   • Basophil % 12/17/2022 0.3  0.0 - 1.5 % Final   • Immature Grans % 12/17/2022 0.4  0.0 - 0.5 % Final   • Neutrophils, Absolute 12/17/2022 4.66  1.70 - 7.00 10*3/mm3 Final   • Lymphocytes, Absolute 12/17/2022 1.74  0.70 - 3.10 10*3/mm3 Final   • Monocytes, Absolute 12/17/2022 0.52  0.10 - 0.90 10*3/mm3 Final   • Eosinophils, Absolute 12/17/2022 0.07  0.00 - 0.40 10*3/mm3 Final   • Basophils, Absolute 12/17/2022 0.02  0.00 - 0.20 10*3/mm3 Final   • Immature Grans, Absolute 12/17/2022 0.03  0.00 - 0.05 10*3/mm3 Final   • nRBC 12/17/2022 0.0  0.0 - 0.2 /100 WBC Final   • Glucose 12/18/2022 95  65 - 99 mg/dL Final   • BUN 12/18/2022 14  8 - 23 mg/dL Final   • Creatinine 12/18/2022 0.58  0.57 - 1.00 mg/dL Final   • Sodium 12/18/2022 141  136 - 145 mmol/L Final   • Potassium 12/18/2022 4.0  3.5 - 5.2 mmol/L Final   • Chloride 12/18/2022 104  98 - 107 mmol/L Final   • CO2 12/18/2022 31.0 (H)  22.0 - 29.0 mmol/L Final   • Calcium 12/18/2022 8.5 (L)  8.6 - 10.5 mg/dL Final   • BUN/Creatinine Ratio 12/18/2022 24.1  7.0 - 25.0 Final   • Anion Gap 12/18/2022 6.0  5.0 - 15.0 mmol/L Final   • eGFR 12/18/2022 89.4  >60.0 mL/min/1.73 Final    National Kidney Foundation and American Society of Nephrology (ASN) Task Force recommended calculation  based on the Chronic Kidney Disease Epidemiology Collaboration (CKD-EPI) equation refit without adjustment for race.   • WBC 12/18/2022 6.24  3.40 - 10.80 10*3/mm3 Final   • RBC 12/18/2022 3.47 (L)  3.77 - 5.28 10*6/mm3 Final   • Hemoglobin 12/18/2022 10.3 (L)  12.0 - 15.9 g/dL Final   • Hematocrit 12/18/2022 31.6 (L)  34.0 - 46.6 % Final   • MCV 12/18/2022 91.1  79.0 - 97.0 fL Final   • MCH 12/18/2022 29.7  26.6 - 33.0 pg Final   • MCHC 12/18/2022 32.6  31.5 - 35.7 g/dL Final   • RDW 12/18/2022 14.1  12.3 - 15.4 % Final   • RDW-SD 12/18/2022 47.7  37.0 - 54.0 fl Final   • MPV 12/18/2022 10.0  6.0 - 12.0 fL Final   • Platelets 12/18/2022 182  140 - 450 10*3/mm3 Final   • QT Interval 12/18/2022 409  ms Final     Lab Results (last 24 hours)     Procedure Component Value Units Date/Time    Basic Metabolic Panel [[id number]]  (Abnormal) Collected: 12/18/22 0540    Specimen: Blood Updated: 12/18/22 0705     Glucose 95 mg/dL      BUN 14 mg/dL      Creatinine 0.58 mg/dL      Sodium 141 mmol/L      Potassium 4.0 mmol/L      Chloride 104 mmol/L      CO2 31.0 mmol/L      Calcium 8.5 mg/dL      BUN/Creatinine Ratio 24.1     Anion Gap 6.0 mmol/L      eGFR 89.4 mL/min/1.73      Comment: National Kidney Foundation and American Society of Nephrology (ASN) Task Force recommended calculation based on the Chronic Kidney Disease Epidemiology Collaboration (CKD-EPI) equation refit without adjustment for race.       Narrative:      GFR Normal >60  Chronic Kidney Disease <60  Kidney Failure <15    The GFR formula is only valid for adults with stable renal function between ages 18 and 70.    CBC (No Diff) [[id number]]  (Abnormal) Collected: 12/18/22 0540    Specimen: Blood Updated: 12/18/22 0639     WBC 6.24 10*3/mm3      RBC 3.47 10*6/mm3      Hemoglobin 10.3 g/dL      Hematocrit 31.6 %      MCV 91.1 fL      MCH 29.7 pg      MCHC 32.6 g/dL      RDW 14.1 %      RDW-SD 47.7 fl      MPV 10.0 fL      Platelets 182 10*3/mm3           Imaging:  Hip films show previous right total hip replacement with concern for fracture when compared to initial postop films from 2020 there appears to be change in stem position.      CT scan was performed:   CT Right Lower Extremity Without Intravenous Contrast     CLINICAL HISTORY:     Reason for exam: right thigh pain.     TECHNIQUE:    Axial computed tomography images of the right lower extremity without   intravenous contrast.  This CT exam was performed according to the   principle of ALARA (As Low As Reasonably Achievable) by using one or more   of the following dose reduction techniques: automated exposure control,   adjustment of the mA and or kV according to patient size, and or use of   iterative reconstruction technique.     COMPARISON:    No relevant prior studies available.     FINDINGS:    Artifacts:  Orthopedic hardware.    Bones joints:  Acute mildly displaced oblique fracture of the proximal   right femoral metaphysis.  Moderate knee effusion.    Soft tissues:  No significant abnormality.    Other findings:  3.4 cm popliteal fossa cyst.     IMPRESSION:         Mildly displaced oblique fracture of the proximal right femoral   metaphysis.  Tricompartmental degenerative changes.         Assessment: Right hip periprosthetic fracture    Plan: I discussed the findings with the patient and her daughter.  Given the fracture displacement and continued pain I do think surgical intervention is warranted.  I had told the daughter and the patient I had reached out to Dr. Cantrell who is one of Dr. Ang's partners as Dr. Ang is currently out of town.  They were okay with this.  I will discussed the case with Dr. Cantrell who may end up taking over care in order to get her to the OR in a more timely manner.  We will make her n.p.o. at midnight for possible surgery tomorrow but may be early this week due to potential complexity of the case.  Patient and her daughter are understanding of this and all questions were  answered.    Date: 12/18/2022    Vinicius Boothe MD    CC: Alfredo Reid MD

## 2022-12-19 ENCOUNTER — ANESTHESIA EVENT (OUTPATIENT)
Dept: PERIOP | Facility: HOSPITAL | Age: 82
DRG: 467 | End: 2022-12-19
Payer: MEDICARE

## 2022-12-19 ENCOUNTER — ANESTHESIA (OUTPATIENT)
Dept: PERIOP | Facility: HOSPITAL | Age: 82
DRG: 467 | End: 2022-12-19
Payer: MEDICARE

## 2022-12-19 ENCOUNTER — APPOINTMENT (OUTPATIENT)
Dept: GENERAL RADIOLOGY | Facility: HOSPITAL | Age: 82
DRG: 467 | End: 2022-12-19
Payer: MEDICARE

## 2022-12-19 PROBLEM — Z96.649 PERIPROSTHETIC FRACTURE AROUND INTERNAL PROSTHETIC HIP JOINT: Status: ACTIVE | Noted: 2022-12-19

## 2022-12-19 PROBLEM — M97.8XXA PERIPROSTHETIC FRACTURE AROUND INTERNAL PROSTHETIC HIP JOINT: Status: ACTIVE | Noted: 2022-12-19

## 2022-12-19 LAB
ABO GROUP BLD: NORMAL
BLD GP AB SCN SERPL QL: NEGATIVE
RH BLD: POSITIVE
T&S EXPIRATION DATE: NORMAL

## 2022-12-19 PROCEDURE — 86923 COMPATIBILITY TEST ELECTRIC: CPT

## 2022-12-19 PROCEDURE — 25010000002 FENTANYL CITRATE (PF) 50 MCG/ML SOLUTION: Performed by: NURSE ANESTHETIST, CERTIFIED REGISTERED

## 2022-12-19 PROCEDURE — 76000 FLUOROSCOPY <1 HR PHYS/QHP: CPT

## 2022-12-19 PROCEDURE — 25010000002 KETOROLAC TROMETHAMINE PER 15 MG: Performed by: ORTHOPAEDIC SURGERY

## 2022-12-19 PROCEDURE — 86900 BLOOD TYPING SEROLOGIC ABO: CPT | Performed by: ORTHOPAEDIC SURGERY

## 2022-12-19 PROCEDURE — C1776 JOINT DEVICE (IMPLANTABLE): HCPCS | Performed by: ORTHOPAEDIC SURGERY

## 2022-12-19 PROCEDURE — 25010000002 DEXAMETHASONE SODIUM PHOSPHATE 20 MG/5ML SOLUTION: Performed by: NURSE ANESTHETIST, CERTIFIED REGISTERED

## 2022-12-19 PROCEDURE — 25010000002 HYDROMORPHONE PER 4 MG: Performed by: NURSE ANESTHETIST, CERTIFIED REGISTERED

## 2022-12-19 PROCEDURE — 25010000002 CLONIDINE PER 1 MG: Performed by: ORTHOPAEDIC SURGERY

## 2022-12-19 PROCEDURE — 25010000002 PROPOFOL 10 MG/ML EMULSION: Performed by: NURSE ANESTHETIST, CERTIFIED REGISTERED

## 2022-12-19 PROCEDURE — 86850 RBC ANTIBODY SCREEN: CPT | Performed by: ORTHOPAEDIC SURGERY

## 2022-12-19 PROCEDURE — C1713 ANCHOR/SCREW BN/BN,TIS/BN: HCPCS | Performed by: ORTHOPAEDIC SURGERY

## 2022-12-19 PROCEDURE — 25010000002 ROPIVACAINE PER 1 MG: Performed by: ORTHOPAEDIC SURGERY

## 2022-12-19 PROCEDURE — 0QS604Z REPOSITION RIGHT UPPER FEMUR WITH INTERNAL FIXATION DEVICE, OPEN APPROACH: ICD-10-PCS | Performed by: ORTHOPAEDIC SURGERY

## 2022-12-19 PROCEDURE — 25010000002 ONDANSETRON PER 1 MG: Performed by: ANESTHESIOLOGY

## 2022-12-19 PROCEDURE — 25010000002 ONDANSETRON PER 1 MG: Performed by: NURSE ANESTHETIST, CERTIFIED REGISTERED

## 2022-12-19 PROCEDURE — 0SRR03Z REPLACEMENT OF RIGHT HIP JOINT, FEMORAL SURFACE WITH CERAMIC SYNTHETIC SUBSTITUTE, OPEN APPROACH: ICD-10-PCS | Performed by: ORTHOPAEDIC SURGERY

## 2022-12-19 PROCEDURE — 0SPR0JZ REMOVAL OF SYNTHETIC SUBSTITUTE FROM RIGHT HIP JOINT, FEMORAL SURFACE, OPEN APPROACH: ICD-10-PCS | Performed by: ORTHOPAEDIC SURGERY

## 2022-12-19 PROCEDURE — 73501 X-RAY EXAM HIP UNI 1 VIEW: CPT

## 2022-12-19 PROCEDURE — 25010000002 VANCOMYCIN 750 MG RECONSTITUTED SOLUTION 1 EACH VIAL: Performed by: ORTHOPAEDIC SURGERY

## 2022-12-19 PROCEDURE — 86901 BLOOD TYPING SEROLOGIC RH(D): CPT | Performed by: ORTHOPAEDIC SURGERY

## 2022-12-19 PROCEDURE — 25010000002 HEPARIN (PORCINE) PER 1000 UNITS: Performed by: ORTHOPAEDIC SURGERY

## 2022-12-19 PROCEDURE — 25010000002 CEFAZOLIN IN DEXTROSE 2-4 GM/100ML-% SOLUTION: Performed by: ORTHOPAEDIC SURGERY

## 2022-12-19 PROCEDURE — 86900 BLOOD TYPING SEROLOGIC ABO: CPT

## 2022-12-19 PROCEDURE — 86901 BLOOD TYPING SEROLOGIC RH(D): CPT

## 2022-12-19 DEVICE — CONE FEM BDY PROX STDOFFST ARCOS A 60: Type: IMPLANTABLE DEVICE | Site: HIP | Status: FUNCTIONAL

## 2022-12-19 DEVICE — ADAPT HIP BIOLOX OPTN TYPE1 TPR PLS3: Type: IMPLANTABLE DEVICE | Site: HIP | Status: FUNCTIONAL

## 2022-12-19 DEVICE — IMPLANTABLE DEVICE: Type: IMPLANTABLE DEVICE | Site: HIP | Status: FUNCTIONAL

## 2022-12-19 DEVICE — HD FEM/HIP G7 BIOLOX/DELTA OPTN 28MM: Type: IMPLANTABLE DEVICE | Site: HIP | Status: FUNCTIONAL

## 2022-12-19 DEVICE — SUT FW #2 W/TPR NDL 1/2 CIR 38IN 97CM 26.5MM BLU: Type: IMPLANTABLE DEVICE | Site: HIP | Status: FUNCTIONAL

## 2022-12-19 DEVICE — DEV CONTRL TISS STRATAFIX SYMM PDS PLUS VIL CT-1 45CM: Type: IMPLANTABLE DEVICE | Site: HIP | Status: FUNCTIONAL

## 2022-12-19 DEVICE — CABL CERCLG GTR COCR 1.8MM 63.5CM: Type: IMPLANTABLE DEVICE | Site: HIP | Status: FUNCTIONAL

## 2022-12-19 DEVICE — OR3O DUAL MOBILITY XLPE INSERT 28/40
Type: IMPLANTABLE DEVICE | Site: HIP | Status: FUNCTIONAL
Brand: OR3O DUAL MOBILITY

## 2022-12-19 RX ORDER — TRANEXAMIC ACID 100 MG/ML
INJECTION, SOLUTION INTRAVENOUS AS NEEDED
Status: DISCONTINUED | OUTPATIENT
Start: 2022-12-19 | End: 2022-12-19 | Stop reason: SURG

## 2022-12-19 RX ORDER — ASPIRIN 81 MG/1
81 TABLET ORAL EVERY 12 HOURS SCHEDULED
Status: DISCONTINUED | OUTPATIENT
Start: 2022-12-19 | End: 2022-12-22 | Stop reason: HOSPADM

## 2022-12-19 RX ORDER — ACETAMINOPHEN 325 MG/1
325 TABLET ORAL EVERY 4 HOURS PRN
Status: DISCONTINUED | OUTPATIENT
Start: 2022-12-19 | End: 2022-12-22 | Stop reason: HOSPADM

## 2022-12-19 RX ORDER — ONDANSETRON 2 MG/ML
INJECTION INTRAMUSCULAR; INTRAVENOUS AS NEEDED
Status: DISCONTINUED | OUTPATIENT
Start: 2022-12-19 | End: 2022-12-19 | Stop reason: SURG

## 2022-12-19 RX ORDER — PROMETHAZINE HYDROCHLORIDE 25 MG/1
25 SUPPOSITORY RECTAL ONCE AS NEEDED
Status: DISCONTINUED | OUTPATIENT
Start: 2022-12-19 | End: 2022-12-19 | Stop reason: HOSPADM

## 2022-12-19 RX ORDER — FENTANYL CITRATE 50 UG/ML
INJECTION, SOLUTION INTRAMUSCULAR; INTRAVENOUS AS NEEDED
Status: DISCONTINUED | OUTPATIENT
Start: 2022-12-19 | End: 2022-12-19 | Stop reason: SURG

## 2022-12-19 RX ORDER — HYDROMORPHONE HCL 110MG/55ML
PATIENT CONTROLLED ANALGESIA SYRINGE INTRAVENOUS AS NEEDED
Status: DISCONTINUED | OUTPATIENT
Start: 2022-12-19 | End: 2022-12-19 | Stop reason: SURG

## 2022-12-19 RX ORDER — EPHEDRINE SULFATE 50 MG/ML
INJECTION, SOLUTION INTRAVENOUS AS NEEDED
Status: DISCONTINUED | OUTPATIENT
Start: 2022-12-19 | End: 2022-12-19 | Stop reason: SURG

## 2022-12-19 RX ORDER — FENTANYL CITRATE 50 UG/ML
50 INJECTION, SOLUTION INTRAMUSCULAR; INTRAVENOUS
Status: DISCONTINUED | OUTPATIENT
Start: 2022-12-19 | End: 2022-12-19 | Stop reason: HOSPADM

## 2022-12-19 RX ORDER — FAMOTIDINE 10 MG/ML
20 INJECTION, SOLUTION INTRAVENOUS ONCE
Status: COMPLETED | OUTPATIENT
Start: 2022-12-19 | End: 2022-12-19

## 2022-12-19 RX ORDER — DIPHENHYDRAMINE HYDROCHLORIDE 50 MG/ML
12.5 INJECTION INTRAMUSCULAR; INTRAVENOUS
Status: DISCONTINUED | OUTPATIENT
Start: 2022-12-19 | End: 2022-12-19 | Stop reason: HOSPADM

## 2022-12-19 RX ORDER — EPHEDRINE SULFATE 50 MG/ML
5 INJECTION, SOLUTION INTRAVENOUS ONCE AS NEEDED
Status: DISCONTINUED | OUTPATIENT
Start: 2022-12-19 | End: 2022-12-19 | Stop reason: HOSPADM

## 2022-12-19 RX ORDER — SODIUM CHLORIDE 0.9 % (FLUSH) 0.9 %
10 SYRINGE (ML) INJECTION EVERY 12 HOURS SCHEDULED
Status: DISCONTINUED | OUTPATIENT
Start: 2022-12-19 | End: 2022-12-22 | Stop reason: HOSPADM

## 2022-12-19 RX ORDER — DEXAMETHASONE SODIUM PHOSPHATE 4 MG/ML
INJECTION, SOLUTION INTRA-ARTICULAR; INTRALESIONAL; INTRAMUSCULAR; INTRAVENOUS; SOFT TISSUE AS NEEDED
Status: DISCONTINUED | OUTPATIENT
Start: 2022-12-19 | End: 2022-12-19 | Stop reason: SURG

## 2022-12-19 RX ORDER — PROPOFOL 10 MG/ML
VIAL (ML) INTRAVENOUS AS NEEDED
Status: DISCONTINUED | OUTPATIENT
Start: 2022-12-19 | End: 2022-12-19 | Stop reason: SURG

## 2022-12-19 RX ORDER — CEFAZOLIN SODIUM 2 G/100ML
2 INJECTION, SOLUTION INTRAVENOUS EVERY 8 HOURS
Status: COMPLETED | OUTPATIENT
Start: 2022-12-19 | End: 2022-12-20

## 2022-12-19 RX ORDER — LABETALOL HYDROCHLORIDE 5 MG/ML
5 INJECTION, SOLUTION INTRAVENOUS
Status: DISCONTINUED | OUTPATIENT
Start: 2022-12-19 | End: 2022-12-19 | Stop reason: HOSPADM

## 2022-12-19 RX ORDER — LIDOCAINE HYDROCHLORIDE 20 MG/ML
INJECTION, SOLUTION INFILTRATION; PERINEURAL AS NEEDED
Status: DISCONTINUED | OUTPATIENT
Start: 2022-12-19 | End: 2022-12-19 | Stop reason: SURG

## 2022-12-19 RX ORDER — HYDROCODONE BITARTRATE AND ACETAMINOPHEN 7.5; 325 MG/1; MG/1
1 TABLET ORAL ONCE AS NEEDED
Status: DISCONTINUED | OUTPATIENT
Start: 2022-12-19 | End: 2022-12-19 | Stop reason: HOSPADM

## 2022-12-19 RX ORDER — PROMETHAZINE HYDROCHLORIDE 25 MG/1
25 TABLET ORAL ONCE AS NEEDED
Status: DISCONTINUED | OUTPATIENT
Start: 2022-12-19 | End: 2022-12-19 | Stop reason: HOSPADM

## 2022-12-19 RX ORDER — CEFAZOLIN SODIUM 2 G/100ML
2 INJECTION, SOLUTION INTRAVENOUS ONCE
Status: COMPLETED | OUTPATIENT
Start: 2022-12-19 | End: 2022-12-19

## 2022-12-19 RX ORDER — OXYCODONE AND ACETAMINOPHEN 7.5; 325 MG/1; MG/1
1 TABLET ORAL EVERY 4 HOURS PRN
Status: DISCONTINUED | OUTPATIENT
Start: 2022-12-19 | End: 2022-12-19 | Stop reason: HOSPADM

## 2022-12-19 RX ORDER — HYDROMORPHONE HYDROCHLORIDE 1 MG/ML
0.5 INJECTION, SOLUTION INTRAMUSCULAR; INTRAVENOUS; SUBCUTANEOUS
Status: DISCONTINUED | OUTPATIENT
Start: 2022-12-19 | End: 2022-12-19 | Stop reason: HOSPADM

## 2022-12-19 RX ORDER — MIDAZOLAM HYDROCHLORIDE 1 MG/ML
0.5 INJECTION INTRAMUSCULAR; INTRAVENOUS
Status: DISCONTINUED | OUTPATIENT
Start: 2022-12-19 | End: 2022-12-19 | Stop reason: HOSPADM

## 2022-12-19 RX ORDER — SODIUM CHLORIDE 0.9 % (FLUSH) 0.9 %
3 SYRINGE (ML) INJECTION EVERY 12 HOURS SCHEDULED
Status: DISCONTINUED | OUTPATIENT
Start: 2022-12-19 | End: 2022-12-19 | Stop reason: HOSPADM

## 2022-12-19 RX ORDER — DOCUSATE SODIUM 100 MG/1
100 CAPSULE, LIQUID FILLED ORAL 2 TIMES DAILY PRN
Status: DISCONTINUED | OUTPATIENT
Start: 2022-12-19 | End: 2022-12-22 | Stop reason: HOSPADM

## 2022-12-19 RX ORDER — LIDOCAINE HYDROCHLORIDE 10 MG/ML
0.5 INJECTION, SOLUTION EPIDURAL; INFILTRATION; INTRACAUDAL; PERINEURAL ONCE AS NEEDED
Status: DISCONTINUED | OUTPATIENT
Start: 2022-12-19 | End: 2022-12-19 | Stop reason: HOSPADM

## 2022-12-19 RX ORDER — MAGNESIUM HYDROXIDE 1200 MG/15ML
LIQUID ORAL AS NEEDED
Status: DISCONTINUED | OUTPATIENT
Start: 2022-12-19 | End: 2022-12-19 | Stop reason: HOSPADM

## 2022-12-19 RX ORDER — FLUMAZENIL 0.1 MG/ML
0.2 INJECTION INTRAVENOUS AS NEEDED
Status: DISCONTINUED | OUTPATIENT
Start: 2022-12-19 | End: 2022-12-19 | Stop reason: HOSPADM

## 2022-12-19 RX ORDER — SODIUM CHLORIDE 9 MG/ML
100 INJECTION, SOLUTION INTRAVENOUS CONTINUOUS
Status: ACTIVE | OUTPATIENT
Start: 2022-12-19 | End: 2022-12-20

## 2022-12-19 RX ORDER — ROCURONIUM BROMIDE 10 MG/ML
INJECTION, SOLUTION INTRAVENOUS AS NEEDED
Status: DISCONTINUED | OUTPATIENT
Start: 2022-12-19 | End: 2022-12-19 | Stop reason: SURG

## 2022-12-19 RX ORDER — SODIUM CHLORIDE, SODIUM LACTATE, POTASSIUM CHLORIDE, CALCIUM CHLORIDE 600; 310; 30; 20 MG/100ML; MG/100ML; MG/100ML; MG/100ML
9 INJECTION, SOLUTION INTRAVENOUS CONTINUOUS
Status: DISCONTINUED | OUTPATIENT
Start: 2022-12-19 | End: 2022-12-19

## 2022-12-19 RX ORDER — HYDRALAZINE HYDROCHLORIDE 20 MG/ML
5 INJECTION INTRAMUSCULAR; INTRAVENOUS
Status: DISCONTINUED | OUTPATIENT
Start: 2022-12-19 | End: 2022-12-19 | Stop reason: HOSPADM

## 2022-12-19 RX ORDER — NALOXONE HCL 0.4 MG/ML
0.2 VIAL (ML) INJECTION AS NEEDED
Status: DISCONTINUED | OUTPATIENT
Start: 2022-12-19 | End: 2022-12-19 | Stop reason: HOSPADM

## 2022-12-19 RX ORDER — SODIUM CHLORIDE 9 MG/ML
40 INJECTION, SOLUTION INTRAVENOUS AS NEEDED
Status: DISCONTINUED | OUTPATIENT
Start: 2022-12-19 | End: 2022-12-19 | Stop reason: HOSPADM

## 2022-12-19 RX ORDER — DIPHENHYDRAMINE HCL 25 MG
25 CAPSULE ORAL
Status: DISCONTINUED | OUTPATIENT
Start: 2022-12-19 | End: 2022-12-19 | Stop reason: HOSPADM

## 2022-12-19 RX ORDER — ONDANSETRON 2 MG/ML
4 INJECTION INTRAMUSCULAR; INTRAVENOUS ONCE AS NEEDED
Status: COMPLETED | OUTPATIENT
Start: 2022-12-19 | End: 2022-12-19

## 2022-12-19 RX ORDER — SODIUM CHLORIDE 0.9 % (FLUSH) 0.9 %
3-10 SYRINGE (ML) INJECTION AS NEEDED
Status: DISCONTINUED | OUTPATIENT
Start: 2022-12-19 | End: 2022-12-19 | Stop reason: HOSPADM

## 2022-12-19 RX ORDER — SODIUM CHLORIDE 0.9 % (FLUSH) 0.9 %
1-10 SYRINGE (ML) INJECTION AS NEEDED
Status: DISCONTINUED | OUTPATIENT
Start: 2022-12-19 | End: 2022-12-22 | Stop reason: HOSPADM

## 2022-12-19 RX ADMIN — VANCOMYCIN HYDROCHLORIDE 750 MG: 750 INJECTION, POWDER, LYOPHILIZED, FOR SOLUTION INTRAVENOUS at 11:00

## 2022-12-19 RX ADMIN — CEFAZOLIN SODIUM 2 G: 2 INJECTION, SOLUTION INTRAVENOUS at 11:59

## 2022-12-19 RX ADMIN — CEFAZOLIN SODIUM 2 G: 2 INJECTION, SOLUTION INTRAVENOUS at 21:14

## 2022-12-19 RX ADMIN — EPHEDRINE SULFATE 10 MG: 50 INJECTION INTRAVENOUS at 15:30

## 2022-12-19 RX ADMIN — QUETIAPINE FUMARATE 25 MG: 25 TABLET ORAL at 22:41

## 2022-12-19 RX ADMIN — ACETAMINOPHEN 650 MG: 325 TABLET, FILM COATED ORAL at 06:24

## 2022-12-19 RX ADMIN — ONDANSETRON 4 MG: 2 INJECTION INTRAMUSCULAR; INTRAVENOUS at 15:06

## 2022-12-19 RX ADMIN — Medication 10 ML: at 21:13

## 2022-12-19 RX ADMIN — HYDROMORPHONE HYDROCHLORIDE 0.5 MG: 2 INJECTION, SOLUTION INTRAMUSCULAR; INTRAVENOUS; SUBCUTANEOUS at 14:49

## 2022-12-19 RX ADMIN — SODIUM CHLORIDE, POTASSIUM CHLORIDE, SODIUM LACTATE AND CALCIUM CHLORIDE: 600; 310; 30; 20 INJECTION, SOLUTION INTRAVENOUS at 12:07

## 2022-12-19 RX ADMIN — TRANEXAMIC ACID 1000 MG: 100 INJECTION INTRAVENOUS at 12:30

## 2022-12-19 RX ADMIN — PROPOFOL 100 MG: 10 INJECTION, EMULSION INTRAVENOUS at 12:20

## 2022-12-19 RX ADMIN — ONDANSETRON 4 MG: 2 INJECTION INTRAMUSCULAR; INTRAVENOUS at 16:34

## 2022-12-19 RX ADMIN — ASPIRIN 81 MG: 81 TABLET, COATED ORAL at 21:13

## 2022-12-19 RX ADMIN — FENTANYL CITRATE 50 MCG: 50 INJECTION, SOLUTION INTRAMUSCULAR; INTRAVENOUS at 16:34

## 2022-12-19 RX ADMIN — EPHEDRINE SULFATE 10 MG: 50 INJECTION INTRAVENOUS at 15:21

## 2022-12-19 RX ADMIN — ROCURONIUM BROMIDE 50 MG: 50 INJECTION, SOLUTION INTRAVENOUS at 12:21

## 2022-12-19 RX ADMIN — SODIUM CHLORIDE 75 ML/HR: 9 INJECTION, SOLUTION INTRAVENOUS at 08:37

## 2022-12-19 RX ADMIN — ROCURONIUM BROMIDE 20 MG: 50 INJECTION, SOLUTION INTRAVENOUS at 13:39

## 2022-12-19 RX ADMIN — PANTOPRAZOLE SODIUM 40 MG: 40 TABLET, DELAYED RELEASE ORAL at 06:24

## 2022-12-19 RX ADMIN — SODIUM CHLORIDE 100 ML/HR: 9 INJECTION, SOLUTION INTRAVENOUS at 16:43

## 2022-12-19 RX ADMIN — ROCURONIUM BROMIDE 10 MG: 50 INJECTION, SOLUTION INTRAVENOUS at 14:09

## 2022-12-19 RX ADMIN — FLUTICASONE PROPIONATE 2 SPRAY: 50 SPRAY, METERED NASAL at 08:54

## 2022-12-19 RX ADMIN — ATENOLOL 25 MG: 25 TABLET ORAL at 08:51

## 2022-12-19 RX ADMIN — SUGAMMADEX 200 MG: 100 INJECTION, SOLUTION INTRAVENOUS at 15:00

## 2022-12-19 RX ADMIN — EPHEDRINE SULFATE 10 MG: 50 INJECTION INTRAVENOUS at 15:19

## 2022-12-19 RX ADMIN — FAMOTIDINE 20 MG: 10 INJECTION INTRAVENOUS at 11:15

## 2022-12-19 RX ADMIN — HYDROMORPHONE HYDROCHLORIDE 0.5 MG: 1 INJECTION, SOLUTION INTRAMUSCULAR; INTRAVENOUS; SUBCUTANEOUS at 17:07

## 2022-12-19 RX ADMIN — DEXAMETHASONE SODIUM PHOSPHATE 8 MG: 4 INJECTION, SOLUTION INTRAMUSCULAR; INTRAVENOUS at 13:05

## 2022-12-19 RX ADMIN — LIDOCAINE HYDROCHLORIDE 100 MG: 20 INJECTION, SOLUTION INFILTRATION; PERINEURAL at 12:20

## 2022-12-19 RX ADMIN — FENTANYL CITRATE 50 MCG: 50 INJECTION, SOLUTION INTRAMUSCULAR; INTRAVENOUS at 12:15

## 2022-12-19 RX ADMIN — FENTANYL CITRATE 50 MCG: 50 INJECTION, SOLUTION INTRAMUSCULAR; INTRAVENOUS at 13:16

## 2022-12-19 RX ADMIN — EPHEDRINE SULFATE 10 MG: 50 INJECTION INTRAVENOUS at 15:40

## 2022-12-19 RX ADMIN — EPHEDRINE SULFATE 10 MG: 50 INJECTION INTRAVENOUS at 15:28

## 2022-12-19 NOTE — OP NOTE
ORTHOPAEDIC OPERATIVE NOTE    Facility: [facility]    Patient Name: Gabbi Brandon     YOB: 1940    Date: 12/19/2022    Medical Record Number: [id number]    Attending Physician: Alfredo Reid MD    Date of Service: 12/19/2022    Pre-op Diagnosis:   Periprosthetic fracture around internal prosthetic right hip joint, initial encounter     Post-Op Diagnosis Codes:     * Periprosthetic fracture around internal prosthetic right hip joint, initial encounter [T84.040A]    PROCEDURES PERFORMED: REVISION RIGHT TOTAL HIP REPLACEMENT  utilizing a posterior approach with        ALYSON    FEMUR            -  Bhavik distal conical femur stem 15 mmx 150 mm standard offset, prox body A - 60 mm  HEAD              - delta ceramic femoral head 28 mm, and neck length + 3 mm  (Alyson)                           - Poly insert for dual mobility 40 mm outer diameter - S&N                          Trochanteric hook plate alyson      Implant Name Type Inv. Item Serial No.  Lot No. LRB No. Used Action   DEV CONTRL TISS STRATAFIX SYMM PDS PLUS NOE CT-1 45CM - [id number] Implant DEV CONTRL TISS STRATAFIX SYMM PDS PLUS NOE CT-1 45CM  [employer]  DIV OF J AND J [id number] Right 1 Implanted   SUT FW #2 W/TPR NDL 1/2 CIR 38IN 97CM 26.5MM GAVIN - [id number] Implant SUT FW #2 W/TPR NDL 1/2 CIR 38IN 97CM 26.5MM GAVIN  ARTHREX [id number] Right 1 Implanted   SUT FW #2 W/TPR NDL 1/2 CIR 38IN 97CM 26.5MM GAIVN - [id number] Implant SUT FW #2 W/TPR NDL 1/2 CIR 38IN 97CM 26.5MM GAVIN  ARTHREX [id number] Right 1 Implanted   CABL CERCLG GTR COCR 1.8MM 63.5CM - [id number] Implant CABL CERCLG GTR COCR 1.8MM 63.5CM  [employer]YSON [employer] [id number] Right 1 Implanted   CABL CERCLG GTR COCR 1.8MM 63.5CM - [id number] Implant CABL CERCLG GTR COCR 1.8MM 63.5CM  [employer]YSON [employer] [id number] Right 1 Implanted   STEM DIST FEM/HIP BHAVIK STS 55M021MC - [id number] Implant STEM DIST FEM/HIP BHAVIK STS [id number]  [employer]YSON [employer] INC [id number] Right 1 Implanted   DEV GTR  INTEGRAL W/2CABL SHRT 79M17JU - [id number] Implant DEV GTR INTEGRAL W/2CABL SHRT [id number]  [employer]YSON [employer] [id number] Right 1 Implanted   INSRT HIP OR30 2/MOBL XLPE OXINIUM SZ28/40 - [id number] Implant INSRT HIP OR30 2/MOBL XLPE OXINIUM SZ28/40  THOMPSON AND NEPHEW [id number] Right 1 Implanted   CONE FEM BDY PROX STDOFFST BHAVIK A 60 - [id number] Implant CONE FEM BDY PROX STDOFFST BHAVIK A 60  [employer]YSON [employer] [id number] Right 1 Implanted   ADAPT HIP BIOLOX OPTN TYPE1 TPR PLS3 - [id number] Implant ADAPT HIP BIOLOX OPTN TYPE1 TPR PLS3  [employer]ON [employer] [id number] Right 1 Implanted   HD FEM/HIP G7 BIOLOX/DELTA OPTN 28MM - [id number] Implant HD FEM/HIP G7 BIOLOX/DELTA OPTN 28MM  [employer] [id number] Right 1 Implanted   CABL CERCLG GTR COCR 1.8MM 63.5CM - [id number] Implant CABL CERCLG GTR COCR 1.8MM 63.5CM  [employer] [id number] Right 1 Implanted   CABL CERCLG GTR COCR 1.8MM 63.5CM - [id number] Implant CABL CERCLG GTR COCR 1.8MM 63.5CM  [employer] [id number] Right 1 Implanted   SUT FW #2 W/TPR NDL 1/2 CIR 38IN 97CM 26.5MM GAVIN - [id number] Implant SUT FW #2 W/TPR NDL 1/2 CIR 38IN 97CM 26.5MM GAVIN  ARTHREX [id number] Right 1 Implanted       Surgeon(s):  Halie Cantrell MD    Anesthesia: General  Anesthesiologist: Mario Alberto Nichols MD; Oni Doan MD  CRNA: Quita Starkey CRNA    Staff:   Cell Saver : Arvind Kelley; Barbara Smith  Circulator: Eduardo Quiñones RN; Taisha Samuel RN  Radiology Technologist: Gildardo Magana  Scrmorelia Person: Janneth Hurst  Vendor Representative: Pop Gallagher; Baron Mulugeta; Dalal, Robert    Assistants :  FELIPE Gan       The services of a skilled first assistant were necessary for performing the procedure safely and expeditiously.  The first assist was present for the entire duration of the case and helped with positioning, retraction and closure of the incision.     Estimated Blood Loss: 300 mL    Specimens:   Order Name Source Comment Collection Info Order Time   TYPE  AND SCREEN   Collected By: Tonya Solis RN 12/19/2022 10:55 AM     Release to patient   Routine Release        PREPARE RBC Other   12/19/2022 10:55 AM     When to Transfuse?   Hold          Indication for Transfusion   Surgery          Surgery Date   12/19/2022            COMPLICATIONS: Nil.      DRAINS: Nil.     INDICATIONS:  82 y.o. female who presented to [facility] following a history of fall.  She has a history of right total hip replacement January 2021 and subsequently has been doing well.  She had the surgery with Dr. Kevan Ang.  Dr Vinicius Boothe  was on call and he notified me regarding the patient's admission as Dr. Ang was not available.  She had a periprosthetic fracture involving the right femur.  CT scan was done.  Evaluation was made with x rays and physical examination.  The patient's history was reviewed and preoperative medications were reviewed specifically for anticoagulants. A risk assessment was made for any recent DVT or PE and infection risk.      The patient's options and alternatives were discussed in detail with the patient and  family . The patient is indicated for a revision of the right total hip replacement with repair of the periprosthetic fracture. The patient elected to proceed with a revision right total hip arthroplasty. Likely risks and benefits of the procedure including, but not limited to infection, DVT, pulmonary embolism, leg length discrepancy, recurrent dislocation, periprosthetic fractures, possibility of injury to nerves or vessels, risk for cardiac events, MI, stroke, post operative delirium,  mortality, morbidity, and immobility syndrome have been discussed in detail. Despite the risks involved, the patient and family elected to proceed. An informed consent has been obtained, and patient  has been scheduled for surgery.   Patient was seen in the preoperative holding area and the operative site was marked.      DESCRIPTION OF PROCEDURE: The patient has  been transferred to [facility] Operating Room. Preoperative antibiotics were given in the form of vancomycin and Kefzol  IV according to SCIP protocol prior to the incision.  After achieving adequate general anesthesia, the patient was placed in the lateral position utilizing a pegboard. All bony prominences were padded well. The operative hip was prepped and draped in the usual sterile fashion. Surgical time-out was done. Correct patient, surgical side and site were identified. Tranexamic acid was given intravenously just prior to the incision.      A skin incision was made centering over the greater trochanter for a posterior  approach. Skin and subcutaneous tissue were incised and the deep fascia was incised in line with the skin incision.  I did extend the incision along the lateral aspect of the thigh and the proximal third to expose the femoral shaft.  The gluteus sophy muscle fibers were split in their direction. Posterior aspect of the hip joint was identified. An L-shaped arthrotomy was made along the superior border of the pyriformis tendon and along the posterior aspect of the greater trochanter. The capsule was released. There was a small effusion. The femoral head was dislocated.  This was a dual mobility Smith [employer] Nephew head.  The femoral head measured 40 mm in its outer diameter.  The femoral implant itself was found to be loose from the underlying bone.  The acetabular cavity was inspected.  Acetabular implant was found to be stable and the liner was a dual mobility liner.  It was in good position.    Attention was then directed to the femoral shaft.  I placed 2 circumferential cables along the shaft of the femur just past the stem and 1 along the distal third of the stem.  These were tensioned crimped and cut.  Next I was able to dislocate the hip again and remove the femoral stem without difficulty.  There was no bony ingrowth.  This was a fibrous growth.  There was not much bone  loss.  I started using a conical reamer for a distal diaphyseal fitting modular stem.  Appropriate fit was found to be obtained with a 15 mm reamer.  I seated the distal conical femoral stem and reamed the proximal body and placed a trial body.  The trial body was placed at appropriate anteversion.  Trial reduction was performed.  Reduction was found to be satisfactory with good restoration of leg lengths.  Range of motion was checked and there was excellent range of motion with good stability throughout the range for flexion, adduction , internal rotation and external rotation. There was no sign of impingement.  The trial was dislocated and the trial body was replaced with a proximal body cone size a 60 mm length, checked for stability.  Again made certain that this was placed at appropriate anteversion.  The setscrew was seated.  The delta ceramic head 28 mm outer diameter with a +3 mm neck length was assembled to the polyethylene insert for dual mobility Smith [employer] Nephew 40 mm outer diameter on the back table and seated into position, checked again for stability and the hip was reduced. Reduction was found to be satisfactory.     I elected to repair the greater trochanter fracture and the proximal femur fracture with the help of a short trochanteric plate.  Cables were passed and the plate was seated into position and cables were tensioned crimped and cut.  Reduction of the fracture fixation of the fracture and position of the hardware was found to be satisfactory and stable.    The hip joint was thoroughly irrigated with saline and soft tissue hemostasis was secured. The posterior capsule was re-anchored to the greater trochanter with the help of FiberWire sutures and drill holes made into the greater trochanter. The sponge count and needle count was correct.  The incision was closed in layers with Ethibond, vicryl and staples. Sterile dressings were placed and the patient was transferred to the recovery room in a  stable condition.      The patient prior to closure received periarticular injection of  Naropin, clonidine, and ketorolac mixture. The patient tolerated the procedure well and had adequate distal pulses and good capillary refill. The patient was then transferred to the recovery room and then later to the floor without any complications.     The patient will receive postoperative antibiotics in the form of  Kefzol IV q.8 h. within the first 24 hours for 2 more doses according to SCIP protocol.   ASA for DVT prophylaxis will begin in the morning.      I discussed these satisfactory performance of the procedure with the patient's family and discussed with them the postoperative management.    Halie Cantrell MD     Date: 12/19/2022  Time: 15:56 EST    CC: Paulino Casas MD; MD Jeanette Cisneros Lyle E, MD

## 2022-12-19 NOTE — ANESTHESIA PROCEDURE NOTES
Airway  Urgency: elective    Date/Time: 12/19/2022 12:23 PM  Airway not difficult    General Information and Staff    Patient location during procedure: OR  Anesthesiologist: Oni Doan MD  CRNA/CAA: Quita Starkey CRNA    Indications and Patient Condition  Indications for airway management: airway protection    Preoxygenated: yes  Mask difficulty assessment: 1 - vent by mask    Final Airway Details  Final airway type: endotracheal airway      Successful airway: ETT  Cuffed: yes   Successful intubation technique: direct laryngoscopy  Facilitating devices/methods: intubating stylet and cricoid pressure  Endotracheal tube insertion site: oral  Blade: Dalal  Blade size: 2  ETT size (mm): 7.0  Cormack-Lehane Classification: grade I - full view of glottis  Placement verified by: chest auscultation and capnometry   Cuff volume (mL): 3  Measured from: lips  ETT/EBT  to lips (cm): 20  Number of attempts at approach: 1  Assessment: lips, teeth, and gum same as pre-op    Additional Comments  EBBS: ETCO2+: Atraumatic intubation; pt put under GA while on bed due to pain in hip

## 2022-12-19 NOTE — ANESTHESIA PREPROCEDURE EVALUATION
Anesthesia Evaluation                  Airway   Mallampati: II  Neck ROM: limited  Small opening  Dental    (+) edentulous    Pulmonary - normal exam   (+) pneumonia resolved ,   Cardiovascular - normal exam    (+) hypertension, hyperlipidemia,       Neuro/Psych  (+) psychiatric history, dementia,    GI/Hepatic/Renal/Endo    (+)  hiatal hernia, GERD,  renal disease,     Musculoskeletal     Abdominal    Substance History      OB/GYN          Other   arthritis,        Other Comment: RA                  Anesthesia Plan    ASA 3     general     intravenous induction     Anesthetic plan, risks, benefits, and alternatives have been provided, discussed and informed consent has been obtained with: patient.        CODE STATUS:    Code Status (Patient has no pulse and is not breathing): CPR (Attempt to Resuscitate)  Medical Interventions (Patient has pulse or is breathing): Full Support

## 2022-12-19 NOTE — PROGRESS NOTES
DAILY PROGRESS NOTE  [facility]    Patient Identification:  Name: Gabbi Brandon  Age: 82 y.o.  Sex: female  :  1940  MRN: [id number]         Primary Care Physician: Paulino Casas MD    Subjective:  Interval History:She complains of pain.    Objective:    Scheduled Meds:atenolol, 25 mg, Oral, Daily  ceFAZolin, 2 g, Intravenous, Once  [MAR Hold] fluticasone, 2 spray, Each Nare, Daily  [MAR Hold] pantoprazole, 40 mg, Oral, QAM  [MAR Hold] QUEtiapine, 25 mg, Oral, Nightly  Orthopedic surgery custom cocktail, , Injection, Once  [MAR Hold] sertraline, 75 mg, Oral, Daily  [MAR Hold] sodium chloride, 10 mL, Intravenous, Q12H  sodium chloride, 3 mL, Intravenous, Q12H      Continuous Infusions:lactated ringers, 9 mL/hr  sodium chloride, 75 mL/hr, Last Rate: Stopped (22 1000)        Vital signs in last 24 hours:  Temp:  [97.4 °F (36.3 °C)-99.6 °F (37.6 °C)] 99.2 °F (37.3 °C)  Heart Rate:  [69-80] 74  Resp:  [16-18] 16  BP: (145-177)/(74-95) 145/75    Intake/Output:    Intake/Output Summary (Last 24 hours) at 2022 1145  Last data filed at 2022 0900  Gross per 24 hour   Intake 3028 ml   Output 1600 ml   Net 1428 ml       Exam:  /75 (BP Location: Right arm, Patient Position: Lying)   Pulse 74   Temp 99.2 °F (37.3 °C) (Oral)   Resp 16   Wt 54 kg (119 lb 0.8 oz)   SpO2 96%   BMI 21.77 kg/m²     General Appearance:    Alert, cooperative, no distress   Head:    Normocephalic, without obvious abnormality, atraumatic   Eyes:       Throat:   Lips, tongue, gums normal   Neck:   Supple, symmetrical, trachea midline, no JVD   Lungs:     Clear to auscultation bilaterally, respirations unlabored   Chest Wall:    No tenderness or deformity    Heart:    Regular rate and rhythm, S1 and S2 normal, no murmur,no  Rub or gallop   Abdomen:     Soft, nontender, bowel sounds active, no masses, no organomegaly    Extremities:   Extremities normal, atraumatic, no cyanosis or edema    Pulses:      Skin:   Skin is warm and dry,  no rashes or palpable lesions   Neurologic:   no focal deficits noted      Lab Results (last 72 hours)     Procedure Component Value Units Date/Time    Basic Metabolic Panel [[id number]]  (Abnormal) Collected: 12/18/22 0540    Specimen: Blood Updated: 12/18/22 0705     Glucose 95 mg/dL      BUN 14 mg/dL      Creatinine 0.58 mg/dL      Sodium 141 mmol/L      Potassium 4.0 mmol/L      Chloride 104 mmol/L      CO2 31.0 mmol/L      Calcium 8.5 mg/dL      BUN/Creatinine Ratio 24.1     Anion Gap 6.0 mmol/L      eGFR 89.4 mL/min/1.73      Comment: National Kidney Foundation and American Society of Nephrology (ASN) Task Force recommended calculation based on the Chronic Kidney Disease Epidemiology Collaboration (CKD-EPI) equation refit without adjustment for race.       Narrative:      GFR Normal >60  Chronic Kidney Disease <60  Kidney Failure <15    The GFR formula is only valid for adults with stable renal function between ages 18 and 70.    CBC (No Diff) [[id number]]  (Abnormal) Collected: 12/18/22 0540    Specimen: Blood Updated: 12/18/22 0639     WBC 6.24 10*3/mm3      RBC 3.47 10*6/mm3      Hemoglobin 10.3 g/dL      Hematocrit 31.6 %      MCV 91.1 fL      MCH 29.7 pg      MCHC 32.6 g/dL      RDW 14.1 %      RDW-SD 47.7 fl      MPV 10.0 fL      Platelets 182 10*3/mm3     Protime-INR [[id number]]  (Normal) Collected: 12/17/22 1552    Specimen: Blood Updated: 12/17/22 1636     Protime 13.4 Seconds      INR 1.01    aPTT [[id number]]  (Normal) Collected: 12/17/22 1552    Specimen: Blood Updated: 12/17/22 1636     PTT 28.0 seconds     Comprehensive Metabolic Panel [[id number]]  (Abnormal) Collected: 12/17/22 1552    Specimen: Blood Updated: 12/17/22 1625     Glucose 169 mg/dL      BUN 17 mg/dL      Creatinine 0.84 mg/dL      Sodium 143 mmol/L      Potassium 4.0 mmol/L      Chloride 105 mmol/L      CO2 32.7 mmol/L      Calcium 8.6 mg/dL      Total Protein 6.6 g/dL      Albumin 2.80  g/dL      ALT (SGPT) 7 U/L      AST (SGOT) 15 U/L      Alkaline Phosphatase 88 U/L      Total Bilirubin <0.2 mg/dL      Globulin 3.8 gm/dL      A/G Ratio 0.7 g/dL      BUN/Creatinine Ratio 20.2     Anion Gap 5.3 mmol/L      eGFR 68.6 mL/min/1.73      Comment: National Kidney Foundation and American Society of Nephrology (ASN) Task Force recommended calculation based on the Chronic Kidney Disease Epidemiology Collaboration (CKD-EPI) equation refit without adjustment for race.       Narrative:      GFR Normal >60  Chronic Kidney Disease <60  Kidney Failure <15    The GFR formula is only valid for adults with stable renal function between ages 18 and 70.    CBC & Differential [[id number]]  (Abnormal) Collected: 12/17/22 1552    Specimen: Blood Updated: 12/17/22 1606    Narrative:      The following orders were created for panel order CBC & Differential.  Procedure                               Abnormality         Status                     ---------                               -----------         ------                     CBC Auto Differential[[id number]]        Abnormal            Final result                 Please view results for these tests on the individual orders.    CBC Auto Differential [[id number]]  (Abnormal) Collected: 12/17/22 1552    Specimen: Blood Updated: 12/17/22 1606     WBC 7.04 10*3/mm3      RBC 3.66 10*6/mm3      Hemoglobin 10.8 g/dL      Hematocrit 34.7 %      MCV 94.8 fL      MCH 29.5 pg      MCHC 31.1 g/dL      RDW 14.1 %      RDW-SD 49.4 fl      MPV 9.6 fL      Platelets 199 10*3/mm3      Neutrophil % 66.2 %      Lymphocyte % 24.7 %      Monocyte % 7.4 %      Eosinophil % 1.0 %      Basophil % 0.3 %      Immature Grans % 0.4 %      Neutrophils, Absolute 4.66 10*3/mm3      Lymphocytes, Absolute 1.74 10*3/mm3      Monocytes, Absolute 0.52 10*3/mm3      Eosinophils, Absolute 0.07 10*3/mm3      Basophils, Absolute 0.02 10*3/mm3      Immature Grans, Absolute 0.03 10*3/mm3      nRBC 0.0 /100 WBC          Data Review:  Results from last 7 days   Lab Units 12/18/22  0540 12/17/22  1552   SODIUM mmol/L 141 143   POTASSIUM mmol/L 4.0 4.0   CHLORIDE mmol/L 104 105   CO2 mmol/L 31.0* 32.7*   BUN mg/dL 14 17   CREATININE mg/dL 0.58 0.84   GLUCOSE mg/dL 95 169*   CALCIUM mg/dL 8.5* 8.6     Results from last 7 days   Lab Units 12/18/22  0540 12/17/22  1552   WBC 10*3/mm3 6.24 7.04   HEMOGLOBIN g/dL 10.3* 10.8*   HEMATOCRIT % 31.6* 34.7   PLATELETS 10*3/mm3 182 199             Lab Results   Lab Value Date/Time    TROPONINT <0.010 09/02/2020 0936    TROPONINT <0.010 08/09/2020 1656    TROPONINT <0.010 08/08/2020 0022    TROPONINT <0.010 07/01/2020 1652    TROPONINT <0.010 06/28/2020 2312    TROPONINT <0.01 09/06/2014 0100         Results from last 7 days   Lab Units 12/17/22  1552   ALK PHOS U/L 88   BILIRUBIN mg/dL <0.2   ALT (SGPT) U/L 7   AST (SGOT) U/L 15             No results found for: POCGLU  Results from last 7 days   Lab Units 12/17/22  1552   INR  1.01       Past Medical History:   Diagnosis Date   • Arthritis    • Dementia (HCC)     per daughter   • Depression    • GERD (gastroesophageal reflux disease)    • Hyperlipidemia    • Hypertension    • Hypotension 08/08/2020   • Incontinent of urine    • PNA (pneumonia)    • Seasonal allergies    • Stage 2 chronic kidney disease 09/18/2018   • UTI (urinary tract infection)        Assessment:  Active Hospital Problems    Diagnosis  POA   • **Closed fracture of right femur (HCC) [S72.91XA]  Unknown   • Acute pain of right thigh [M79.651]  Yes   • Dementia (HCC) [F03.90]  Unknown   • Generalized anxiety disorder [F41.1]  Yes   • Anemia, chronic disease [D63.8]  Yes   • Gastroesophageal reflux disease without esophagitis [K21.9]  Yes   • Essential hypertension [I10]  Yes   • Mixed hyperlipidemia [E78.2]  Yes      Resolved Hospital Problems   No resolved problems to display.       Plan:  Ortho consult noted. Pain control. Follow lab.  Medically stable for surgery  today.    Alfredo Reid MD  12/19/2022  11:45 EST

## 2022-12-19 NOTE — PLAN OF CARE
Goal Outcome Evaluation:  Plan of Care Reviewed With: patient        Progress: no change  Outcome Evaluation: bed alarm, confused with periods of oriented, pure wick, had bm, accumax pump, med for pain, npo after 12 mid for poss sx

## 2022-12-19 NOTE — ANESTHESIA POSTPROCEDURE EVALUATION
Patient: Gabbi Brandon    Procedure Summary     Date: 12/19/22 Room / Location: [facility] OR [address] [place] [address] MAIN OR    Anesthesia Start: 1207 Anesthesia Stop: 1605    Procedure: REVISION RIGHT TOTAL HIP REPLACEMENT (Right: Hip) Diagnosis:       Periprosthetic fracture around internal prosthetic right hip joint, initial encounter      (Periprosthetic fracture around internal prosthetic right hip joint, initial encounter )    Surgeons: Halie Cantrell MD Provider: Oni Doan MD    Anesthesia Type: general ASA Status: 3          Anesthesia Type: general    Vitals  Vitals Value Taken Time   /83 12/19/22 1716   Temp 36.6 °C (97.9 °F) 12/19/22 1601   Pulse 82 12/19/22 1729   Resp 16 12/19/22 1700   SpO2 100 % 12/19/22 1729   Vitals shown include unvalidated device data.        Post Anesthesia Care and Evaluation    Patient location during evaluation: bedside  Patient participation: complete - patient participated  Level of consciousness: awake and alert  Pain management: adequate    Airway patency: patent  Anesthetic complications: No anesthetic complications    Cardiovascular status: acceptable  Respiratory status: acceptable  Hydration status: acceptable    Comments: /79 (BP Location: Right arm, Patient Position: Lying)   Pulse 82   Temp 36 °C (96.8 °F) (Oral)   Resp 16   Wt 54 kg (119 lb 0.8 oz)   SpO2 100%   BMI 21.77 kg/m²

## 2022-12-19 NOTE — NURSING NOTE
To OR via bed.  Alert, oriented to self, place, situation.  Telephone consent for surgery obtained from daughter Sara Hernandez and cosigned by 2 RNs.  Pt states no dentures or hearing aids present.  Jewelry removed (2 rings and chain necklace) and given to security for lock up in safe.

## 2022-12-19 NOTE — SIGNIFICANT NOTE
12/19/22 1059   OTHER   Discipline physical therapist   Rehab Time/Intention   Session Not Performed other (see comments)  (pt to OR for hip revision today, will follow up tomorrow.)   Recommendation   PT - Next Appointment 12/20/22

## 2022-12-20 LAB
ANION GAP SERPL CALCULATED.3IONS-SCNC: 5 MMOL/L (ref 5–15)
BASOPHILS # BLD AUTO: 0.01 10*3/MM3 (ref 0–0.2)
BASOPHILS NFR BLD AUTO: 0.1 % (ref 0–1.5)
BUN SERPL-MCNC: 16 MG/DL (ref 8–23)
BUN/CREAT SERPL: 18.8 (ref 7–25)
CALCIUM SPEC-SCNC: 7.5 MG/DL (ref 8.6–10.5)
CHLORIDE SERPL-SCNC: 106 MMOL/L (ref 98–107)
CO2 SERPL-SCNC: 27 MMOL/L (ref 22–29)
CREAT SERPL-MCNC: 0.85 MG/DL (ref 0.57–1)
DEPRECATED RDW RBC AUTO: 46.6 FL (ref 37–54)
EGFRCR SERPLBLD CKD-EPI 2021: 67.7 ML/MIN/1.73
EOSINOPHIL # BLD AUTO: 0 10*3/MM3 (ref 0–0.4)
EOSINOPHIL NFR BLD AUTO: 0 % (ref 0.3–6.2)
ERYTHROCYTE [DISTWIDTH] IN BLOOD BY AUTOMATED COUNT: 13.9 % (ref 12.3–15.4)
GLUCOSE SERPL-MCNC: 135 MG/DL (ref 65–99)
HCT VFR BLD AUTO: 25.1 % (ref 34–46.6)
HGB BLD-MCNC: 7.8 G/DL (ref 12–15.9)
IMM GRANULOCYTES # BLD AUTO: 0.04 10*3/MM3 (ref 0–0.05)
IMM GRANULOCYTES NFR BLD AUTO: 0.4 % (ref 0–0.5)
LYMPHOCYTES # BLD AUTO: 2.3 10*3/MM3 (ref 0.7–3.1)
LYMPHOCYTES NFR BLD AUTO: 23 % (ref 19.6–45.3)
MCH RBC QN AUTO: 28.5 PG (ref 26.6–33)
MCHC RBC AUTO-ENTMCNC: 31.1 G/DL (ref 31.5–35.7)
MCV RBC AUTO: 91.6 FL (ref 79–97)
MONOCYTES # BLD AUTO: 1 10*3/MM3 (ref 0.1–0.9)
MONOCYTES NFR BLD AUTO: 10 % (ref 5–12)
NEUTROPHILS NFR BLD AUTO: 6.66 10*3/MM3 (ref 1.7–7)
NEUTROPHILS NFR BLD AUTO: 66.5 % (ref 42.7–76)
NRBC BLD AUTO-RTO: 0 /100 WBC (ref 0–0.2)
PLATELET # BLD AUTO: 180 10*3/MM3 (ref 140–450)
PMV BLD AUTO: 9.8 FL (ref 6–12)
POTASSIUM SERPL-SCNC: 4 MMOL/L (ref 3.5–5.2)
RBC # BLD AUTO: 2.74 10*6/MM3 (ref 3.77–5.28)
SODIUM SERPL-SCNC: 138 MMOL/L (ref 136–145)
WBC NRBC COR # BLD: 10.01 10*3/MM3 (ref 3.4–10.8)

## 2022-12-20 PROCEDURE — P9016 RBC LEUKOCYTES REDUCED: HCPCS

## 2022-12-20 PROCEDURE — 85025 COMPLETE CBC W/AUTO DIFF WBC: CPT | Performed by: ORTHOPAEDIC SURGERY

## 2022-12-20 PROCEDURE — 80048 BASIC METABOLIC PNL TOTAL CA: CPT | Performed by: ORTHOPAEDIC SURGERY

## 2022-12-20 PROCEDURE — 97530 THERAPEUTIC ACTIVITIES: CPT

## 2022-12-20 PROCEDURE — 86900 BLOOD TYPING SEROLOGIC ABO: CPT

## 2022-12-20 PROCEDURE — 97162 PT EVAL MOD COMPLEX 30 MIN: CPT

## 2022-12-20 PROCEDURE — 36430 TRANSFUSION BLD/BLD COMPNT: CPT

## 2022-12-20 PROCEDURE — 25010000002 CEFAZOLIN IN DEXTROSE 2-4 GM/100ML-% SOLUTION: Performed by: ORTHOPAEDIC SURGERY

## 2022-12-20 RX ADMIN — ATENOLOL 25 MG: 25 TABLET ORAL at 08:40

## 2022-12-20 RX ADMIN — Medication 10 ML: at 20:06

## 2022-12-20 RX ADMIN — SODIUM CHLORIDE 100 ML/HR: 9 INJECTION, SOLUTION INTRAVENOUS at 02:16

## 2022-12-20 RX ADMIN — PANTOPRAZOLE SODIUM 40 MG: 40 TABLET, DELAYED RELEASE ORAL at 06:53

## 2022-12-20 RX ADMIN — Medication 10 ML: at 08:41

## 2022-12-20 RX ADMIN — SERTRALINE 75 MG: 25 TABLET, FILM COATED ORAL at 08:40

## 2022-12-20 RX ADMIN — QUETIAPINE FUMARATE 25 MG: 25 TABLET ORAL at 20:06

## 2022-12-20 RX ADMIN — ASPIRIN 81 MG: 81 TABLET, COATED ORAL at 08:40

## 2022-12-20 RX ADMIN — ASPIRIN 81 MG: 81 TABLET, COATED ORAL at 20:06

## 2022-12-20 RX ADMIN — CEFAZOLIN SODIUM 2 G: 2 INJECTION, SOLUTION INTRAVENOUS at 04:09

## 2022-12-20 RX ADMIN — HYDROCODONE BITARTRATE AND ACETAMINOPHEN 1 TABLET: 5; 325 TABLET ORAL at 20:06

## 2022-12-20 RX ADMIN — HYDROCODONE BITARTRATE AND ACETAMINOPHEN 1 TABLET: 5; 325 TABLET ORAL at 08:45

## 2022-12-20 NOTE — PLAN OF CARE
Goal Outcome Evaluation:  Plan of Care Reviewed With: patient        Progress: improving  Outcome Evaluation: POD#1 OF RIGHT HIP TOTAL REVISION. PATIENT STILL VERY SLEEPY. VSS.  PATIENT HAS NOT BEEN UP. VOIDING PER SAW. ZERO COMPLIAN OF PAIN AT THIS TIME. ABDUCTOR PILLOW IN PLACE. PATIENT ONLY ALERT TO SELF. TAKES PILLS WHOLE WITH WATER. EDUCATION PROVIDED ON SAFETY. NO EVIDENCE OF LEARNING.

## 2022-12-20 NOTE — PROGRESS NOTES
DAILY PROGRESS NOTE  [facility]    Patient Identification:  Name: Gabbi Brandon  Age: 82 y.o.  Sex: female  :  1940  MRN: [id number]         Primary Care Physician: Paulino Casas MD    Subjective:  Interval History:She complains of pain.    Objective:    Scheduled Meds:aspirin, 81 mg, Oral, Q12H  atenolol, 25 mg, Oral, Daily  fluticasone, 2 spray, Each Nare, Daily  pantoprazole, 40 mg, Oral, QAM  QUEtiapine, 25 mg, Oral, Nightly  sertraline, 75 mg, Oral, Daily  sodium chloride, 10 mL, Intravenous, Q12H  sodium chloride, 10 mL, Intravenous, Q12H      Continuous Infusions:sodium chloride, 75 mL/hr, Last Rate: Stopped (22 1000)        Vital signs in last 24 hours:  Temp:  [96.8 °F (36 °C)-98.6 °F (37 °C)] 98.6 °F (37 °C)  Heart Rate:  [70-86] 80  Resp:  [16-18] 16  BP: (106-165)/(57-88) 106/68    Intake/Output:    Intake/Output Summary (Last 24 hours) at 2022 1523  Last data filed at 2022 1354  Gross per 24 hour   Intake 1998 ml   Output 250 ml   Net 1748 ml       Exam:  /68 (BP Location: Right arm, Patient Position: Sitting)   Pulse 80   Temp 98.6 °F (37 °C) (Oral)   Resp 16   Wt 54 kg (119 lb 0.8 oz)   SpO2 91%   BMI 21.77 kg/m²     General Appearance:    Alert, cooperative, no distress   Head:    Normocephalic, without obvious abnormality, atraumatic   Eyes:       Throat:   Lips, tongue, gums normal   Neck:   Supple, symmetrical, trachea midline, no JVD   Lungs:     Clear to auscultation bilaterally, respirations unlabored   Chest Wall:    No tenderness or deformity    Heart:    Regular rate and rhythm, S1 and S2 normal, no murmur,no  Rub or gallop   Abdomen:     Soft, nontender, bowel sounds active, no masses, no organomegaly    Extremities:   Extremities normal, right hip surgical changes, no cyanosis or edema   Pulses:      Skin:   Skin is warm and dry,  no rashes or palpable lesions   Neurologic:   no focal deficits noted      Lab Results (last 72  hours)     Procedure Component Value Units Date/Time    Basic Metabolic Panel [[id number]]  (Abnormal) Collected: 12/18/22 0540    Specimen: Blood Updated: 12/18/22 0705     Glucose 95 mg/dL      BUN 14 mg/dL      Creatinine 0.58 mg/dL      Sodium 141 mmol/L      Potassium 4.0 mmol/L      Chloride 104 mmol/L      CO2 31.0 mmol/L      Calcium 8.5 mg/dL      BUN/Creatinine Ratio 24.1     Anion Gap 6.0 mmol/L      eGFR 89.4 mL/min/1.73      Comment: National Kidney Foundation and American Society of Nephrology (ASN) Task Force recommended calculation based on the Chronic Kidney Disease Epidemiology Collaboration (CKD-EPI) equation refit without adjustment for race.       Narrative:      GFR Normal >60  Chronic Kidney Disease <60  Kidney Failure <15    The GFR formula is only valid for adults with stable renal function between ages 18 and 70.    CBC (No Diff) [[id number]]  (Abnormal) Collected: 12/18/22 0540    Specimen: Blood Updated: 12/18/22 0639     WBC 6.24 10*3/mm3      RBC 3.47 10*6/mm3      Hemoglobin 10.3 g/dL      Hematocrit 31.6 %      MCV 91.1 fL      MCH 29.7 pg      MCHC 32.6 g/dL      RDW 14.1 %      RDW-SD 47.7 fl      MPV 10.0 fL      Platelets 182 10*3/mm3     Protime-INR [[id number]]  (Normal) Collected: 12/17/22 1552    Specimen: Blood Updated: 12/17/22 1636     Protime 13.4 Seconds      INR 1.01    aPTT [[id number]]  (Normal) Collected: 12/17/22 1552    Specimen: Blood Updated: 12/17/22 1636     PTT 28.0 seconds     Comprehensive Metabolic Panel [[id number]]  (Abnormal) Collected: 12/17/22 1552    Specimen: Blood Updated: 12/17/22 1625     Glucose 169 mg/dL      BUN 17 mg/dL      Creatinine 0.84 mg/dL      Sodium 143 mmol/L      Potassium 4.0 mmol/L      Chloride 105 mmol/L      CO2 32.7 mmol/L      Calcium 8.6 mg/dL      Total Protein 6.6 g/dL      Albumin 2.80 g/dL      ALT (SGPT) 7 U/L      AST (SGOT) 15 U/L      Alkaline Phosphatase 88 U/L      Total Bilirubin <0.2 mg/dL      Globulin 3.8 gm/dL       A/G Ratio 0.7 g/dL      BUN/Creatinine Ratio 20.2     Anion Gap 5.3 mmol/L      eGFR 68.6 mL/min/1.73      Comment: National Kidney Foundation and American Society of Nephrology (ASN) Task Force recommended calculation based on the Chronic Kidney Disease Epidemiology Collaboration (CKD-EPI) equation refit without adjustment for race.       Narrative:      GFR Normal >60  Chronic Kidney Disease <60  Kidney Failure <15    The GFR formula is only valid for adults with stable renal function between ages 18 and 70.    CBC & Differential [[id number]]  (Abnormal) Collected: 12/17/22 1552    Specimen: Blood Updated: 12/17/22 1606    Narrative:      The following orders were created for panel order CBC & Differential.  Procedure                               Abnormality         Status                     ---------                               -----------         ------                     CBC Auto Differential[[id number]]        Abnormal            Final result                 Please view results for these tests on the individual orders.    CBC Auto Differential [[id number]]  (Abnormal) Collected: 12/17/22 1552    Specimen: Blood Updated: 12/17/22 1606     WBC 7.04 10*3/mm3      RBC 3.66 10*6/mm3      Hemoglobin 10.8 g/dL      Hematocrit 34.7 %      MCV 94.8 fL      MCH 29.5 pg      MCHC 31.1 g/dL      RDW 14.1 %      RDW-SD 49.4 fl      MPV 9.6 fL      Platelets 199 10*3/mm3      Neutrophil % 66.2 %      Lymphocyte % 24.7 %      Monocyte % 7.4 %      Eosinophil % 1.0 %      Basophil % 0.3 %      Immature Grans % 0.4 %      Neutrophils, Absolute 4.66 10*3/mm3      Lymphocytes, Absolute 1.74 10*3/mm3      Monocytes, Absolute 0.52 10*3/mm3      Eosinophils, Absolute 0.07 10*3/mm3      Basophils, Absolute 0.02 10*3/mm3      Immature Grans, Absolute 0.03 10*3/mm3      nRBC 0.0 /100 WBC         Data Review:  Results from last 7 days   Lab Units 12/20/22  0441 12/18/22  0540 12/17/22  1552   SODIUM mmol/L 138 141 143   POTASSIUM  mmol/L 4.0 4.0 4.0   CHLORIDE mmol/L 106 104 105   CO2 mmol/L 27.0 31.0* 32.7*   BUN mg/dL 16 14 17   CREATININE mg/dL 0.85 0.58 0.84   GLUCOSE mg/dL 135* 95 169*   CALCIUM mg/dL 7.5* 8.5* 8.6     Results from last 7 days   Lab Units 12/20/22  0441 12/18/22  0540 12/17/22  1552   WBC 10*3/mm3 10.01 6.24 7.04   HEMOGLOBIN g/dL 7.8* 10.3* 10.8*   HEMATOCRIT % 25.1* 31.6* 34.7   PLATELETS 10*3/mm3 180 182 199             Lab Results   Lab Value Date/Time    TROPONINT <0.010 09/02/2020 0936    TROPONINT <0.010 08/09/2020 1656    TROPONINT <0.010 08/08/2020 0022    TROPONINT <0.010 07/01/2020 1652    TROPONINT <0.010 06/28/2020 2312    TROPONINT <0.01 09/06/2014 0100         Results from last 7 days   Lab Units 12/17/22  1552   ALK PHOS U/L 88   BILIRUBIN mg/dL <0.2   ALT (SGPT) U/L 7   AST (SGOT) U/L 15             No results found for: POCGLU  Results from last 7 days   Lab Units 12/17/22  1552   INR  1.01       Past Medical History:   Diagnosis Date   • Arthritis    • Dementia (HCC)     per daughter   • Depression    • GERD (gastroesophageal reflux disease)    • Hyperlipidemia    • Hypertension    • Hypotension 08/08/2020   • Incontinent of urine    • PNA (pneumonia)    • Seasonal allergies    • Stage 2 chronic kidney disease 09/18/2018   • UTI (urinary tract infection)        Assessment:  Active Hospital Problems    Diagnosis  POA   • **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 [M97.8XXA, Z96.649]  Not Applicable   • Acute pain of right thigh [M79.651]  Yes   • Dementia (HCC) [F03.90]  Unknown   • Closed fracture of right femur (HCC) [S72.91XA]  Unknown   • Generalized anxiety disorder [F41.1]  Yes   • Anemia, chronic disease [D63.8]  Yes   • Gastroesophageal reflux disease without esophagitis [K21.9]  Yes   • Essential hypertension [I10]  Yes   • Mixed hyperlipidemia [E78.2]  Yes      Resolved Hospital Problems   No resolved problems to display.       Plan:  Ortho consult post op care. Pain  control. Follow lab.  Transfuse 1 unit PRBC. DC planning. ? SNU for rehab..    Alfredo Reid MD  12/20/2022  15:23 EST

## 2022-12-20 NOTE — PLAN OF CARE
Goal Outcome Evaluation:  Plan of Care Reviewed With: patient           Outcome Evaluation: Pt. presents with typical post op impairments related to THR surgery that include decreased ROM, decreased strength, and decreased balance.  Pt. will benefit from skilled inpt. P.T. to address her functional deficits and to assist pt. in regaining her maximum level of independence with functional mobility.    Patient was wearing a face mask during this therapy encounter. Therapist used appropriate personal protective equipment including eye protection, mask, and gloves.  Mask used was standard procedure mask. Appropriate PPE was worn during the entire therapy session. Hand hygiene was completed before and after therapy session. Patient is not in enhanced droplet precautions.

## 2022-12-20 NOTE — PLAN OF CARE
Goal Outcome Evaluation:              Outcome Evaluation: RTH revision this shift, arrived to floor 1735 very drowsy, 2L, voiding per PW, optifoam x2 c/d/i, WBAT, NaCl @100, scds, unable to educate on comorbidities due to drowsiness, rehab at d/c, [facility]

## 2022-12-20 NOTE — PROGRESS NOTES
Patient: Gabbi Brandon  YOB: 1940     Date of Admission: 12/17/2022  3:34 PM Medical Record Number: [id number]     Attending Physician: Alfredo Reid MD    Procedure(s):  REVISION RIGHT TOTAL HIP REPLACEMENT Post Operative Day Number: 1    Subjective : No new orthopaedic complaints     Pain Relief: some relief with present medication.     Systemic Complaints: No Complaints  Vitals:    12/19/22 2100 12/20/22 0151 12/20/22 0616 12/20/22 0950   BP: 129/78 125/57 128/65 106/68   BP Location: Right arm Right arm Right arm Right arm   Patient Position: Lying Lying Lying Sitting   Pulse: 84 86 84 80   Resp: 18 16 16 16   Temp: 97.1 °F (36.2 °C) 98 °F (36.7 °C) 98.4 °F (36.9 °C) 98.6 °F (37 °C)   TempSrc: Oral Oral Oral Oral   SpO2: 99% 94% 98% 91%   Weight:           Physical Exam: 82 y.o. female    General Appearance:       Alert, cooperative, in no acute distress                  Extremities:    Dressing Clean, Dry and Intact         Incision healthy without signs or symptoms of infections         No clinical sign of DVT        Able to do good movements of digits    Pulses:   Pulses palpable and equal bilaterally           Diagnostic Tests:     Results from last 7 days   Lab Units 12/20/22  0441 12/18/22  0540 12/17/22  1552   WBC 10*3/mm3 10.01 6.24 7.04   HEMOGLOBIN g/dL 7.8* 10.3* 10.8*   HEMATOCRIT % 25.1* 31.6* 34.7   PLATELETS 10*3/mm3 180 182 199     Results from last 7 days   Lab Units 12/20/22  0441 12/18/22  0540 12/17/22  1552   SODIUM mmol/L 138 141 143   POTASSIUM mmol/L 4.0 4.0 4.0   CHLORIDE mmol/L 106 104 105   CO2 mmol/L 27.0 31.0* 32.7*   BUN mg/dL 16 14 17   CREATININE mg/dL 0.85 0.58 0.84   GLUCOSE mg/dL 135* 95 169*   CALCIUM mg/dL 7.5* 8.5* 8.6     Results from last 7 days   Lab Units 12/17/22  1552   INR  1.01   APTT seconds 28.0     Lab Results   Component Value Date    CRP 0.33 05/29/2020     Lab Results   Component Value Date    SEDRATE 34 (H) 05/29/2020     Lab  Results   Component Value Date    URICACID 5.3 05/29/2020     No results found for: CRYSTAL  Microbiology Results (last 10 days)     ** No results found for the last 240 hours. **        XR Femur 2 View Right    Result Date: 12/17/2022   As described.    This report was finalized on 12/17/2022 5:01 PM by Dr. Boogie Tran M.D.      XR Chest 1 View    Result Date: 12/18/2022  Electronically signed by Wilfredo Galvin MD on 12-18-22 at 0840    CT Lower Extremity Right Without Contrast    Result Date: 12/17/2022  Electronically signed by Tina Zapata MD on 12-17-22 at 2002    XR Hip With or Without Pelvis 1 View Right    Result Date: 12/19/2022  1. Satisfactory postoperative appearance of the right hip.  This report was finalized on 12/19/2022 4:35 PM by Dr. Trent Self M.D.      XR Hip With or Without Pelvis 2 - 3 View Right    Result Date: 12/17/2022   As described.    This report was finalized on 12/17/2022 5:01 PM by Dr. Boogie Tran M.D.              Current Medications:  Scheduled Meds:aspirin, 81 mg, Oral, Q12H  atenolol, 25 mg, Oral, Daily  fluticasone, 2 spray, Each Nare, Daily  pantoprazole, 40 mg, Oral, QAM  QUEtiapine, 25 mg, Oral, Nightly  sertraline, 75 mg, Oral, Daily  sodium chloride, 10 mL, Intravenous, Q12H  sodium chloride, 10 mL, Intravenous, Q12H      Continuous Infusions:sodium chloride, 75 mL/hr, Last Rate: Stopped (12/19/22 1000)      PRN Meds:.•  acetaminophen **OR** acetaminophen **OR** acetaminophen  •  acetaminophen  •  calcium carbonate  •  docusate sodium  •  HYDROcodone-acetaminophen  •  Morphine  •  ondansetron **OR** ondansetron  •  senna-docusate sodium  •  sodium chloride  •  [COMPLETED] Insert Peripheral IV **AND** sodium chloride  •  sodium chloride  •  sodium chloride    Assessment:    Procedure(s):  REVISION RIGHT TOTAL HIP REPLACEMENT    Patient Active Problem List   Diagnosis   • Irregular bleeding   • Urinary tract infection   • Mixed hyperlipidemia   •  Essential hypertension   • Gastroesophageal reflux disease without esophagitis   • Seasonal allergies   • Bladder spasm   • Bilateral lower extremity edema   • Hyponatremia   • DNR (do not resuscitate)   • Oropharyngeal dysphagia   • Anemia, chronic disease   • Diarrhea   • Hypocalcemia   • Non-intractable vomiting   • Hiatal hernia   • Arthritis   • Pressure injury of buttock, stage 2 ([facility])   • Closed fracture of neck of right femur ([facility])   • Closed displaced fracture of right femoral neck ([facility])   • Rheumatoid arthritis ([facility])   • Generalized arthritis   • Hypomagnesemia   • Eczema   • Generalized anxiety disorder   • Right leg pain   • Fall   • Dementia ([facility])   • Closed fracture of right femur ([facility])   • Acute pain of right thigh   • 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 Please transfuse 1 unit PRBC if ok with other services.     Weight Bearing: WBAT  Discharge Plan: OK to plan for discharge in  tomorrow from orthopadic perspective.      Halie Cantrell MD    Date: 12/20/2022    Time: 10:55 EST

## 2022-12-20 NOTE — THERAPY EVALUATION
Patient Name: Gabbi Brandon  : 1940    MRN: [id number]                              Today's Date: 2022       Admit Date: 2022    Visit Dx:     ICD-10-CM ICD-9-CM   1. Acute pain of right thigh  M79.651 729.5   2. Status post total hip replacement, right  Z96.641 V43.64   3. Fall, initial encounter  W19.XXXA E888.9     Patient Active Problem List   Diagnosis   • Irregular bleeding   • Urinary tract infection   • Mixed hyperlipidemia   • Essential hypertension   • Gastroesophageal reflux disease without esophagitis   • Seasonal allergies   • Bladder spasm   • Bilateral lower extremity edema   • Hyponatremia   • DNR (do not resuscitate)   • Oropharyngeal dysphagia   • Anemia, chronic disease   • Diarrhea   • Hypocalcemia   • Non-intractable vomiting   • Hiatal hernia   • Arthritis   • Pressure injury of buttock, stage 2 (HCC)   • Closed fracture of neck of right femur (HCC)   • Closed displaced fracture of right femoral neck (HCC)   • Rheumatoid arthritis (HCC)   • Generalized arthritis   • Hypomagnesemia   • Eczema   • Generalized anxiety disorder   • Right leg pain   • Fall   • Dementia (HCC)   • Closed fracture of right femur (HCC)   • Acute pain of right thigh   • 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     Past Medical History:   Diagnosis Date   • Arthritis    • Dementia (HCC)     per daughter   • Depression    • GERD (gastroesophageal reflux disease)    • Hyperlipidemia    • Hypertension    • Hypotension 2020   • Incontinent of urine    • PNA (pneumonia)    • Seasonal allergies    • Stage 2 chronic kidney disease 2018   • UTI (urinary tract infection)      Past Surgical History:   Procedure Laterality Date   • BREAST BIOPSY     • COLONOSCOPY N/A 2016    Procedure: COLONOSCOPY WITH POLYPECTOMY (HOT SNARE);  Surgeon: Paulino Narvaez MD;  Location: [facility] ENDOSCOPY;  Service:    • ENDOSCOPY N/A 2020    Procedure: ESOPHAGOGASTRODUODENOSCOPY  with biopsies;  Surgeon: Eugene George MD;  Location: [facility] ENDOSCOPY;  Service: Gastroenterology;  Laterality: N/A;  pre-- melena and abdominal pain  post-- hiatel hernia   • FRACTURE SURGERY     • TOTAL HIP ARTHROPLASTY Right 12/30/2020    Procedure: TOTAL HIP ARTHROPLASTY ANTERIOR WITH HANA TABLE;  Surgeon: Brent Ang II, MD;  Location: [facility] MAIN OR;  Service: Orthopedics;  Laterality: Right;   • TOTAL HIP ARTHROPLASTY REVISION Right 12/19/2022    Procedure: REVISION RIGHT TOTAL HIP REPLACEMENT;  Surgeon: Halie Cantrell MD;  Location: [facility] MAIN OR;  Service: Orthopedics;  Laterality: Right;  POSTERIOR APPROACH, CELL SAVER, ALYSON, DM CABLE SYSTEM.   • VAGINAL HYSTERECTOMY        General Information     Row Name 12/20/22 1017          Physical Therapy Time and Intention    Document Type evaluation  Pt. is s/p Right THR (posterior)  -MS     Mode of Treatment physical therapy;individual therapy  -MS     Row Name 12/20/22 1017          General Information    Patient Profile Reviewed yes  -MS     Prior Level of Function independent:  Use of RWx for ambulation prior to admission per pt. report  -MS     Existing Precautions/Restrictions fall;hip, posterior   Exit alarm  -MS     Barriers to Rehab none identified  -MS     Row Name 12/20/22 1017          Cognition    Orientation Status (Cognition) oriented to;person;place  -MS     Row Name 12/20/22 1017          Safety Issues, Functional Mobility    Comment, Safety Issues/Impairments (Mobility) Gait belt used for safety.  -MS           User Key  (r) = Recorded By, (t) = Taken By, (c) = Cosigned By    Initials Name Provider Type    MS Jorge Alberto Pizano, PT Physical Therapist               Mobility     Row Name 12/20/22 1018          Bed Mobility    Bed Mobility supine-sit;sit-supine  -MS     Supine-Sit [place] (Bed Mobility) moderate assist (50% patient effort);2 person assist  -MS     Row Name 12/20/22 1018          Sit-Stand  Transfer    Sit-Stand [place] (Transfers) minimum assist (75% patient effort);2 person assist  -MS     Assistive Device (Sit-Stand Transfers) walker, front-wheeled  -MS     Row Name 12/20/22 1018          Gait/Stairs (Locomotion)    [place] Level (Gait) minimum assist (75% patient effort);2 person assist  -MS     Assistive Device (Gait) walker, front-wheeled  -MS     Distance in Feet (Gait) 5 feet  -MS     Deviations/Abnormal Patterns (Gait) minerva decreased;stride length decreased;antalgic  -MS     Bilateral Gait Deviations forward flexed posture  -MS     Comment, (Gait/Stairs) Verbal/tactile cues given for posture correction and Rwx guidance.  -MS     Row Name 12/20/22 1018          Mobility    Extremity Weight-bearing Status right lower extremity  -MS     Right Lower Extremity (Weight-bearing Status) weight-bearing as tolerated (WBAT)  -MS           User Key  (r) = Recorded By, (t) = Taken By, (c) = Cosigned By    Initials Name Provider Type    Jorge Alberto Robins, PT Physical Therapist               Obj/Interventions     Row Name 12/20/22 1020          Range of Motion Comprehensive    Comment, General Range of Motion BUE/LLE (WFL's)  -MS     Row Name 12/20/22 1020          Strength Comprehensive (MMT)    Comment, General Manual Muscle Testing (MMT) Assessment BUE/LLE (>/= 3/5)  -MS     Row Name 12/20/22 1020          Motor Skills    Therapeutic Exercise --  Right THR ther. ex. program x 10 reps completed with assist.  -MS           User Key  (r) = Recorded By, (t) = Taken By, (c) = Cosigned By    Initials Name Provider Type    Jorge Alberto Robins, PT Physical Therapist               Goals/Plan     Row Name 12/20/22 1021          Bed Mobility Goal 1 (PT)    Activity/Assistive Device (Bed Mobility Goal 1, PT) bed mobility activities, all  -MS     [place] Level/Cues Needed (Bed Mobility Goal 1, PT) contact guard required  -MS     Time Frame (Bed Mobility Goal 1, PT) long term goal (LTG);1 week   -MS     Row Name 12/20/22 1021          Transfer Goal 1 (PT)    Activity/Assistive Device (Transfer Goal 1, PT) transfers, all;walker, rolling  -MS     [facility] Level/Cues Needed (Transfer Goal 1, PT) contact guard required  -MS     Time Frame (Transfer Goal 1, PT) long term goal (LTG);1 week  -MS     Row Name 12/20/22 1021          Gait Training Goal 1 (PT)    Activity/Assistive Device (Gait Training Goal 1, PT) gait (walking locomotion);walker, rolling  -MS     [facility] Level (Gait Training Goal 1, PT) contact guard required  -MS     Distance (Gait Training Goal 1, PT) 50 feet  -MS     Time Frame (Gait Training Goal 1, PT) long term goal (LTG);1 week  -MS     Row Name 12/20/22 1021          Therapy Assessment/Plan (PT)    Planned Therapy Interventions (PT) balance training;bed mobility training;gait training;home exercise program;patient/family education;postural re-education;transfer training;strengthening  -MS           User Key  (r) = Recorded By, (t) = Taken By, (c) = Cosigned By    Initials Name Provider Type    MS CornellPizano, Jorge Alberto L, PT Physical Therapist               Clinical Impression     Row Name 12/20/22 1020          Pain    Pretreatment Pain Rating 4/10  -MS     Posttreatment Pain Rating 4/10  -MS     Pain Location - Side/Orientation Right  -MS     Pain Location - hip  -MS     Pain Intervention(s) Medication (See MAR);Elevated;Nursing Notified;Repositioned  -MS     Row Name 12/20/22 1020          Plan of Care Review    Plan of Care Reviewed With patient  -MS     Row Name 12/20/22 1020          Therapy Assessment/Plan (PT)    Rehab Potential (PT) good, to achieve stated therapy goals  -MS     Criteria for Skilled Interventions Met (PT) skilled treatment is necessary  -MS     Therapy Frequency (PT) daily  -MS     Row Name 12/20/22 1020          Positioning and Restraints    Pre-Treatment Position in bed  -MS     Post Treatment Position chair  -MS     In Chair notified nsg;reclined;sitting;call  light within reach;encouraged to call for assist;exit alarm on  All lines intact.  -MS           User Key  (r) = Recorded By, (t) = Taken By, (c) = Cosigned By    Initials Name Provider Type    Jorge Alberto Robins, PT Physical Therapist               Outcome Measures     Row Name 12/20/22 1021          How much help from another person do you currently need...    Turning from your back to your side while in flat bed without using bedrails? 2  -MS     Moving from lying on back to sitting on the side of a flat bed without bedrails? 2  -MS     Moving to and from a bed to a chair (including a wheelchair)? 2  -MS     Standing up from a chair using your arms (e.g., wheelchair, bedside chair)? 2  -MS     Climbing 3-5 steps with a railing? 2  -MS     To walk in hospital room? 2  -MS     AM-PAC 6 Clicks Score (PT) 12  -MS     Highest level of mobility 4 --> Transferred to chair/commode  -MS     Row Name 12/20/22 1021          Functional Assessment    Outcome Measure Options AM-PAC 6 Clicks Basic Mobility (PT)  -MS           User Key  (r) = Recorded By, (t) = Taken By, (c) = Cosigned By    Initials Name Provider Type    Jorge Alberto Robins, PT Physical Therapist                             Physical Therapy Education     Title: PT OT SLP Therapies (Done)     Topic: Physical Therapy (Done)     Point: Mobility training (Done)     Learning Progress Summary           Patient Acceptance, E,D, VU,NR by MS at 12/20/2022 1021                   Point: Home exercise program (Done)     Learning Progress Summary           Patient Acceptance, E,D, VU,NR by MS at 12/20/2022 1021                   Point: Body mechanics (Done)     Learning Progress Summary           Patient Acceptance, E,D, VU,NR by MS at 12/20/2022 1021                   Point: Precautions (Done)     Learning Progress Summary           Patient Acceptance, E,D, VU,NR by MS at 12/20/2022 1021                               User Key     Initials Effective Dates Name Provider  Type Discipline    MS 06/16/21 -  Jorge Alberto Pizano PT Physical Therapist PT              PT Recommendation and Plan  Planned Therapy Interventions (PT): balance training, bed mobility training, gait training, home exercise program, patient/family education, postural re-education, transfer training, strengthening  Plan of Care Reviewed With: patient  Outcome Evaluation: Pt. presents with typical post op impairments related to THR surgery that include decreased ROM, decreased strength, and decreased balance.  Pt. will benefit from skilled inpt. P.T. to address her functional deficits and to assist pt. in regaining her maximum level of independence with functional mobility.     Time Calculation:    PT Charges     Row Name 12/20/22 1022             Time Calculation    Start Time 0850  -MS      Stop Time 0905  -MS      Time Calculation (min) 15 min  -MS      PT Received On 12/20/22  -MS      PT - Next Appointment 12/21/22  -MS      PT Goal Re-Cert Due Date 12/27/22  -MS         Time Calculation- PT    Total Timed Code Minutes- PT 14 minute(s)  -MS            User Key  (r) = Recorded By, (t) = Taken By, (c) = Cosigned By    Initials Name Provider Type    MS Jorge Alberto Pizano PT Physical Therapist              Therapy Charges for Today     Code Description Service Date Service Provider Modifiers Qty    98118619891 HC PT EVAL MOD COMPLEXITY 2 12/20/2022 Jorge Alberto Pizano, PT GP 1    02932480804 HC PT THERAPEUTIC ACT EA 15 MIN 12/20/2022 Jorge Alberto Pizano, PT GP 1    35969176758 HC PT THER SUPP EA 15 MIN 12/20/2022 Jorge Alberto Pizano, PT GP 1          PT G-Codes  Outcome Measure Options: AM-PAC 6 Clicks Basic Mobility (PT)  AM-PAC 6 Clicks Score (PT): 12  PT Discharge Summary  Anticipated Discharge Disposition (PT): skilled nursing facility    Jorge Alberto Pizano PT  12/20/2022

## 2022-12-21 LAB
ANION GAP SERPL CALCULATED.3IONS-SCNC: 4.8 MMOL/L (ref 5–15)
BASOPHILS # BLD AUTO: 0.01 10*3/MM3 (ref 0–0.2)
BASOPHILS NFR BLD AUTO: 0.1 % (ref 0–1.5)
BH BB BLOOD EXPIRATION DATE: NORMAL
BH BB BLOOD TYPE BARCODE: 6200
BH BB DISPENSE STATUS: NORMAL
BH BB PRODUCT CODE: NORMAL
BH BB UNIT NUMBER: NORMAL
BUN SERPL-MCNC: 11 MG/DL (ref 8–23)
BUN/CREAT SERPL: 16.7 (ref 7–25)
CALCIUM SPEC-SCNC: 7.4 MG/DL (ref 8.6–10.5)
CHLORIDE SERPL-SCNC: 108 MMOL/L (ref 98–107)
CO2 SERPL-SCNC: 27.2 MMOL/L (ref 22–29)
CREAT SERPL-MCNC: 0.66 MG/DL (ref 0.57–1)
CROSSMATCH INTERPRETATION: NORMAL
DEPRECATED RDW RBC AUTO: 44 FL (ref 37–54)
EGFRCR SERPLBLD CKD-EPI 2021: 86.6 ML/MIN/1.73
EOSINOPHIL # BLD AUTO: 0.05 10*3/MM3 (ref 0–0.4)
EOSINOPHIL NFR BLD AUTO: 0.6 % (ref 0.3–6.2)
ERYTHROCYTE [DISTWIDTH] IN BLOOD BY AUTOMATED COUNT: 13.9 % (ref 12.3–15.4)
GLUCOSE SERPL-MCNC: 100 MG/DL (ref 65–99)
HCT VFR BLD AUTO: 27.4 % (ref 34–46.6)
HGB BLD-MCNC: 9.2 G/DL (ref 12–15.9)
IMM GRANULOCYTES # BLD AUTO: 0.05 10*3/MM3 (ref 0–0.05)
IMM GRANULOCYTES NFR BLD AUTO: 0.6 % (ref 0–0.5)
LYMPHOCYTES # BLD AUTO: 2.06 10*3/MM3 (ref 0.7–3.1)
LYMPHOCYTES NFR BLD AUTO: 26.4 % (ref 19.6–45.3)
MCH RBC QN AUTO: 28.9 PG (ref 26.6–33)
MCHC RBC AUTO-ENTMCNC: 33.6 G/DL (ref 31.5–35.7)
MCV RBC AUTO: 86.2 FL (ref 79–97)
MONOCYTES # BLD AUTO: 0.62 10*3/MM3 (ref 0.1–0.9)
MONOCYTES NFR BLD AUTO: 7.9 % (ref 5–12)
NEUTROPHILS NFR BLD AUTO: 5.01 10*3/MM3 (ref 1.7–7)
NEUTROPHILS NFR BLD AUTO: 64.4 % (ref 42.7–76)
NRBC BLD AUTO-RTO: 0 /100 WBC (ref 0–0.2)
PLATELET # BLD AUTO: 155 10*3/MM3 (ref 140–450)
PMV BLD AUTO: 9.9 FL (ref 6–12)
POTASSIUM SERPL-SCNC: 3.5 MMOL/L (ref 3.5–5.2)
RBC # BLD AUTO: 3.18 10*6/MM3 (ref 3.77–5.28)
SODIUM SERPL-SCNC: 140 MMOL/L (ref 136–145)
UNIT  ABO: NORMAL
UNIT  RH: NORMAL
WBC NRBC COR # BLD: 7.8 10*3/MM3 (ref 3.4–10.8)

## 2022-12-21 PROCEDURE — 85025 COMPLETE CBC W/AUTO DIFF WBC: CPT | Performed by: HOSPITALIST

## 2022-12-21 PROCEDURE — 80048 BASIC METABOLIC PNL TOTAL CA: CPT | Performed by: HOSPITALIST

## 2022-12-21 RX ADMIN — ASPIRIN 81 MG: 81 TABLET, COATED ORAL at 20:28

## 2022-12-21 RX ADMIN — QUETIAPINE FUMARATE 25 MG: 25 TABLET ORAL at 20:28

## 2022-12-21 RX ADMIN — Medication 10 ML: at 20:29

## 2022-12-21 RX ADMIN — HYDROCODONE BITARTRATE AND ACETAMINOPHEN 1 TABLET: 5; 325 TABLET ORAL at 14:32

## 2022-12-21 RX ADMIN — ASPIRIN 81 MG: 81 TABLET, COATED ORAL at 10:19

## 2022-12-21 RX ADMIN — Medication 10 ML: at 10:20

## 2022-12-21 RX ADMIN — ATENOLOL 25 MG: 25 TABLET ORAL at 10:19

## 2022-12-21 RX ADMIN — HYDROCODONE BITARTRATE AND ACETAMINOPHEN 1 TABLET: 5; 325 TABLET ORAL at 06:08

## 2022-12-21 RX ADMIN — PANTOPRAZOLE SODIUM 40 MG: 40 TABLET, DELAYED RELEASE ORAL at 06:08

## 2022-12-21 RX ADMIN — HYDROCODONE BITARTRATE AND ACETAMINOPHEN 1 TABLET: 5; 325 TABLET ORAL at 10:19

## 2022-12-21 RX ADMIN — SERTRALINE 75 MG: 25 TABLET, FILM COATED ORAL at 10:19

## 2022-12-21 NOTE — PROGRESS NOTES
Patient: Gabbi Brandon  YOB: 1940     Date of Admission: 12/17/2022  3:34 PM Medical Record Number: [id number]     Attending Physician: Alfredo Reid MD    Procedure(s):  REVISION RIGHT TOTAL HIP REPLACEMENT Post Operative Day Number: 2    Subjective : No new orthopaedic complaints     Pain Relief: some relief with present medication.     Systemic Complaints: No Complaints  Vitals:    12/20/22 1641 12/20/22 1954 12/20/22 2216 12/21/22 0620   BP: 136/64 153/69 154/76 142/65   BP Location:  Right arm Right arm Right arm   Patient Position:  Lying Lying Lying   Pulse: 77 79 83 73   Resp: 16 16 16 16   Temp: 97.9 °F (36.6 °C) 99.9 °F (37.7 °C) 98.6 °F (37 °C) 98.6 °F (37 °C)   TempSrc: Oral Oral Oral Oral   SpO2: 99% 91% 97% 94%   Weight:           Physical Exam: 82 y.o. female    General Appearance:       Alert, cooperative, in no acute distress                  Extremities:    Dressing Clean, Dry and Intact         Incision healthy without signs or symptoms of infections         No clinical sign of DVT        Able to do good movements of digits    Pulses:   Pulses palpable and equal bilaterally           Diagnostic Tests:     Results from last 7 days   Lab Units 12/21/22  0440 12/20/22 0441 12/18/22  0540   WBC 10*3/mm3 7.80 10.01 6.24   HEMOGLOBIN g/dL 9.2* 7.8* 10.3*   HEMATOCRIT % 27.4* 25.1* 31.6*   PLATELETS 10*3/mm3 155 180 182     Results from last 7 days   Lab Units 12/21/22  0440 12/20/22  0441 12/18/22  0540   SODIUM mmol/L 140 138 141   POTASSIUM mmol/L 3.5 4.0 4.0   CHLORIDE mmol/L 108* 106 104   CO2 mmol/L 27.2 27.0 31.0*   BUN mg/dL 11 16 14   CREATININE mg/dL 0.66 0.85 0.58   GLUCOSE mg/dL 100* 135* 95   CALCIUM mg/dL 7.4* 7.5* 8.5*     Results from last 7 days   Lab Units 12/17/22  1552   INR  1.01   APTT seconds 28.0     Lab Results   Component Value Date    CRP 0.33 05/29/2020     Lab Results   Component Value Date    SEDRATE 34 (H) 05/29/2020     Lab Results   Component  Value Date    URICACID 5.3 05/29/2020     No results found for: CRYSTAL  Microbiology Results (last 10 days)     ** No results found for the last 240 hours. **        XR Femur 2 View Right    Result Date: 12/17/2022   As described.    This report was finalized on 12/17/2022 5:01 PM by Dr. Boogie Tran M.D.      XR Chest 1 View    Result Date: 12/18/2022  Electronically signed by Wilfredo Galvin MD on 12-18-22 at 0840    CT Lower Extremity Right Without Contrast    Result Date: 12/17/2022  Electronically signed by Tina Zapata MD on 12-17-22 at 2002    XR Hip With or Without Pelvis 1 View Right    Result Date: 12/19/2022  1. Satisfactory postoperative appearance of the right hip.  This report was finalized on 12/19/2022 4:35 PM by Dr. Trent Self M.D.      XR Hip With or Without Pelvis 2 - 3 View Right    Result Date: 12/17/2022   As described.    This report was finalized on 12/17/2022 5:01 PM by Dr. Boogie Tran M.D.              Current Medications:  Scheduled Meds:aspirin, 81 mg, Oral, Q12H  atenolol, 25 mg, Oral, Daily  fluticasone, 2 spray, Each Nare, Daily  pantoprazole, 40 mg, Oral, QAM  QUEtiapine, 25 mg, Oral, Nightly  sertraline, 75 mg, Oral, Daily  sodium chloride, 10 mL, Intravenous, Q12H  sodium chloride, 10 mL, Intravenous, Q12H      Continuous Infusions:sodium chloride, 75 mL/hr, Last Rate: Stopped (12/19/22 1000)      PRN Meds:.•  acetaminophen **OR** acetaminophen **OR** acetaminophen  •  acetaminophen  •  calcium carbonate  •  docusate sodium  •  HYDROcodone-acetaminophen  •  Morphine  •  ondansetron **OR** ondansetron  •  senna-docusate sodium  •  sodium chloride  •  [COMPLETED] Insert Peripheral IV **AND** sodium chloride  •  sodium chloride  •  sodium chloride    Assessment:    Procedure(s):  REVISION RIGHT TOTAL HIP REPLACEMENT    Patient Active Problem List   Diagnosis   • Irregular bleeding   • Urinary tract infection   • Mixed hyperlipidemia   • Essential hypertension    • Gastroesophageal reflux disease without esophagitis   • Seasonal allergies   • Bladder spasm   • Bilateral lower extremity edema   • Hyponatremia   • DNR (do not resuscitate)   • Oropharyngeal dysphagia   • Anemia, chronic disease   • Diarrhea   • Hypocalcemia   • Non-intractable vomiting   • Hiatal hernia   • Arthritis   • Pressure injury of buttock, stage 2 ([facility])   • Closed fracture of neck of right femur ([facility])   • Closed displaced fracture of right femoral neck ([facility])   • Rheumatoid arthritis ([facility])   • Generalized arthritis   • Hypomagnesemia   • Eczema   • Generalized anxiety disorder   • Right leg pain   • Fall   • Dementia ([facility])   • Closed fracture of right femur ([facility])   • Acute pain of right thigh   • 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      Maryam Rodriguez, APRN    Date: 12/21/2022    Time: 07:40 EST

## 2022-12-21 NOTE — CASE MANAGEMENT/SOCIAL WORK
Discharge Planning Assessment  [facility]     Patient Name: Gabbi Brandon  MRN: [id number]  Today's Date: 12/21/2022    Admit Date: 12/17/2022    Plan: SNF -- Referrals Pending.   Discharge Needs Assessment     Row Name 12/21/22 1612       Living Environment    People in Home child(luca), adult    Name(s) of People in Home Daughter/Sara.    Current Living Arrangements home    Primary Care Provided by self;child(luca)    Provides Primary Care For no one    Family Caregiver if Needed child(luca), adult    Quality of Family Relationships helpful;involved;supportive    Able to Return to Prior Arrangements other (see comments)  Plans SNF.       Resource/Environmental Concerns    Transportation Concerns none       Transition Planning    Patient/Family Anticipates Transition to inpatient rehabilitation facility    Patient/Family Anticipated Services at Transition        Discharge Needs Assessment    Readmission Within the Last 30 Days no previous admission in last 30 days    Equipment Currently Used at Home walker, rolling;wheelchair    Discharge Facility/Level of Care Needs nursing facility, skilled    Provided Post Acute Provider List? N/A    N/A Provider List Comment [facility]s [facility].               Discharge Plan     Row Name 12/21/22 1612       Plan    Plan SNF -- Referrals Pending.    Patient/Family in Agreement with Plan yes    Plan Comments Per Epic documentation, the patient is not fully alert/oriented at this time. Therefore CCP spoke with her daughter/Sara, verified current information and explained the role of the CCP. Patient lives with Sara. She's overall IADL and uses a walker and wheelchair as needed. She's been to [facility] and has no history with . Patient plans to d/c to SNF. They request referrals to [facility]. Referrals sent - called and left a message for [facility]. CCP will follow.              Continued Care  and Services - Admitted Since 12/17/2022     Destination     Service Provider Request Status Selected Services Address Phone Fax Patient Preferred    [facility] Pending - Request Sent N/A [address] [facility] [address] [phone] --    [facility] Pending - Request Sent N/A [address] [facility] [address] [phone] --                 Demographic Summary     Row Name 12/21/22 1611       General Information    Admission Type inpatient    Reason for Consult discharge planning    Preferred Language English       Contact Information    Permission Granted to Share Info With ;family/designee               Functional Status     Row Name 12/21/22 1611       Functional Status    Usual Activity Tolerance moderate       Functional Status, IADL    Medications independent    Meal Preparation assistive equipment    Housekeeping assistive equipment    Laundry assistive equipment    Shopping assistive equipment and person                Priti AL RN

## 2022-12-21 NOTE — PLAN OF CARE
Goal Outcome Evaluation:POD1 Right total hip revsion, VSS,afebrile, NS at 75cc/hr, received 1unit PRBCs for hgb 7.8 this morning, pain controlled with po norco, tolerating PO diet, CBC and BMP in am, Will cont to monitor. Call light in reach.

## 2022-12-21 NOTE — DISCHARGE PLACEMENT REQUEST
Nicky Gabbi J (82 y.o. Female)     Date of Birth   1940    Social Security Number       Address   [address] [facility] [address]    Home Phone   [phone]    MRN   [id number]       Cheondoism   Islam    Marital Status                               Admission Date   12/17/22    Admission Type   Emergency    Admitting Provider   Anderson Alvarez MD    Attending Provider   Alfredo Reid MD    Department, Room/Bed   [address], P896/1       Discharge Date       Discharge Disposition       Discharge Destination                               Attending Provider: Alfredo Reid MD    Allergies: Penicillins    Isolation: None   Infection: None   Code Status: CPR    Ht: 157.5 cm (62\")   Wt: 54 kg (119 lb 0.8 oz)    Admission Cmt: None   Principal Problem: 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 [M97.8XXA,Z96.649]                 Active Insurance as of 12/17/2022     Primary Coverage     Payor Plan Insurance Group Employer/Plan Group    MEDICARE MEDICARE A & B      Payor Plan Address Payor Plan Phone Number Payor Plan Fax Number Effective Dates    [address] [phone]  9/1/2003 - None Entered    [facility] [id number]       Subscriber Name Subscriber Birth Date Member ID       GABBI HENNNIG 1940 [id number]           Secondary Coverage     Payor Plan Insurance Group Employer/Plan Group    [facility] SUPP [id number]     Payor Plan Address Payor Plan Phone Number Payor Plan Fax Number Effective Dates    [address]   12/1/2016 - None Entered    [facility] [id number]       Subscriber Name Subscriber Birth Date Member ID       GABBI HENNING 1940 [id number]                 Emergency Contacts      (Rel.) Home Phone Work Phone Mobile Phone    Sara Hernandez (Daughter) [phone] -- [phone]    Rosa Singh (Daughter) [phone] -- --    [email or web address] (Son) -- -- [phone]

## 2022-12-21 NOTE — DISCHARGE INSTRUCTIONS
Discharge and Follow up Instructions:     Total Hip Replacement Discharge Instructions:    I. ACTIVITIES:  1. Exercises:  Complete exercise program as taught by the hospital physical therapist 2 times per day  Exercise program will be advanced by the physical therapist  During the day be up ambulating every 2 hours (while awake) for short distances  Complete the ankle pump exercises at least 10 times per hour (while awake)  Elevate legs when in bed and for at least 30 minutes during the day.Use cold packs 20-30 minutes approximately 5 times per day. This should be done before and after completing your exercises and at any time you are experiencing pain/ stiffness in your operative extremity.      2. Activities of Daily Living:  No tub baths, hot tubs, or swimming pools for 4 weeks  May shower and let water run over the incision on post-operative day #5 if no drainage. Do not scrub or rub the incision. Simply let the water run over the incision and pat dry.    II. Restrictions  Continue strict posterior hip precautions as taught at the hospital  Your surgeon will discuss with you when you will be able to drive again. Usual guidelines are you are to be off pain medications prior to driving.  Weight bearing is as tolerated  First week stay inside on even terrain. May go up and down stairs one stair at a time utilizing the hand rail once cleared by physical therapy to do so.  After one week, you may venture outside (if cleared to do so by physical therapist).    III. Precautions:  Everyone that comes near you should wash their hands  No elective dental, genital-urinary, or colon procedures or surgical procedures for 12 weeks after surgery unless absolutely necessary.   If dental work or surgical procedure is deemed absolutely necessary, you will need to contact your surgeon as you will need to take antibiotics 1 hour prior to any dental work (including teeth cleanings).  Please discuss with your surgeon prophylactic  antibiotics as the length of time this intervention will be necessary for you varies with each patient’s health history and situation.  Avoid sick people. If you must be around someone who is ill, they should wear a mask.  Avoid visits to the Emergency Room or Urgent Care. If you feel you need to go to the Emergency Room, please notify your Surgeon's office.  Stockings are to be worn for one week after surgery and are to be placed on in the morning and removed at night. Observe your skin when stocking is removed for any problems. Monitor the stockings to ensure that any swelling is not causing the stockings to become too tight. In this case, remove stockings immediately.      IV. INCISION CARE:  Wash your hands prior to dressing changes  Change the dressing as needed to keep incision clean and dry. Utilize dry gauze and paper tape. Avoid touching the side of the gauze that goes against the incision with your hands.  No creams or ointments to the incision  May remove dressing once the incision is free of drainage  Do not touch or pick at the incision  Check incision every day and notify surgeon immediately if any of the following signs or symptoms are noted:  Increase in redness  Increase in swelling around the incision and of the entire extremity  Increase in pain  Drainage oozing from the incision  Pulling apart of the edges of the incision  Increase in overall body temperature (greater than 100.5 degrees)  You have absorbable sutures with steristrips, please do not remove the  steristrips for 14 days, you can shower on them 6 days after surgery.     V. Medications:   1. Anticoagulants: You will be discharged on an anticoagulant. This is a prophylactic medication that helps prevent blood clots during your post-operative period.  You will be on  Aspirin 81 mg Enteric coated every 12 hours orally  for  30 days.   While taking the anticoagulant, you should avoid taking any additional aspirin, ibuprofen (Advil or  Motrin), Aleve (Naprosyn) or other non-steroidal anti-inflammatory medications.   Notify surgeon immediately if any tito bleeding is noted in the urine, stool, emesis, or from the nose or the incision. Blood in the stool will often appear as black rather than red. Blood in urine may appear as pink. Blood in emesis may appear as brown/black like coffee grounds.  You will need to apply pressure for longer periods of time to any cuts or abrasions to stop bleeding  Avoid alcohol while taking anticoagulants    2. Stool Softeners: You will be at greater risk of constipation after surgery due to being less mobile and the pain medications.   Take stool softeners as instructed by your surgeon while on pain medications. Over the counter Colace 100 mg 1-2 capsules twice daily.   If stools become too loose or too frequent, please decreases the dosage or stop the stool softener.  If constipation occurs despite use of stool softeners, you are to continue the stool softeners and add a laxative (Milk of Magnesia 1 ounce daily as needed).  Dulcolax oral tabs or suppository, or a fleets enema can also be utilized for constipation and can be obtained over the counter.   If above interventions are unsuccessful in inducing bowel movements, please contact your surgeon's office / family physician's office.  Drink plenty of fluids, and eat fruits and vegetables during your recovery time    3. Pain Medications utilized after surgery are narcotics and the law requires that the following information be given to all patients that are prescribed narcotics:  CLASSIFICATION: Pain medications are called Opioids and are narcotics  LEGALITIES: It is illegal to share narcotics with others and to drive within 24 hours of taking narcotics  POTENTIAL SIDE EFFECTS: Potential side effects of opioids include: nausea, vomiting, itching, dizziness, drowsiness, dry mouth, constipation, and difficulty urinating.  POTENTIAL ADVERSE EFFECTS:   Opioid tolerance  can develop with use of pain medications and this simply means that it requires more and more of the medication to control pain; however, this is seen more in patients that use opioids for longer periods of time.  Opioid dependence can develop with use of Opioids and this simply means that to stop the medication can cause withdrawal symptoms; however, this is seen with patients that use Opioids for longer periods of time.  Opioid addiction can develop with use of Opioids and the incidence of this is very unlikely in patients who take the medications as ordered and stop the medications as instructed.  Opioid overdose can be dangerous, but is unlikely when the medication is taken as ordered and stopped when ordered. It is important not to mix opioids with alcohol or with and type of sedative such as Benadryl as this can lead to over sedation and respiratory difficulty.  DOSAGE:   Pain medications will need to be taken consistently for the first week to decrease pain and promote adequate pain relief and participation in physical therapy.  After the initial surgical pain begins to resolve, you may begin to decrease the pain medication. By the end of 6 weeks, you should be off of pain medications.  Refills will not be given by the office during evening hours, on weekends, or after 6 weeks post-op.  To seek refills on pain medications during the initial 6 week post-operative period, you must call the office 48 hours in advance to request the refill. The office will then notify you when to  the prescription. DO NOT wait until you are out of the medication to request a refill.    V. FOLLOW-UP VISITS:  You will need to follow up in the office with your surgeon on Jan 4, 2022. Please call this number [phone]  to schedule this appointment.  If you have any concerns or suspected complications prior to your follow up visit, please call your surgeons office. Do not wait until your appointment time if you suspect  complications. These will need to be addressed in the office promptly.

## 2022-12-21 NOTE — PLAN OF CARE
Goal Outcome Evaluation:           Progress: improving  Outcome Evaluation: Pt A/O to self. Pain controlled with PRN pain medication as ordered. Pt turned q 2 hrs. Voiding spontaneously per purewick without any difficulty. VSS at this time, will continue to monitor.

## 2022-12-21 NOTE — PROGRESS NOTES
DAILY PROGRESS NOTE  [facility]    Patient Identification:  Name: Gabbi Brandon  Age: 82 y.o.  Sex: female  :  1940  MRN: [id number]         Primary Care Physician: Paulino Casas MD    Subjective:  Interval History:She complains of pain.    Objective:    Scheduled Meds:aspirin, 81 mg, Oral, Q12H  atenolol, 25 mg, Oral, Daily  fluticasone, 2 spray, Each Nare, Daily  pantoprazole, 40 mg, Oral, QAM  QUEtiapine, 25 mg, Oral, Nightly  sertraline, 75 mg, Oral, Daily  sodium chloride, 10 mL, Intravenous, Q12H  sodium chloride, 10 mL, Intravenous, Q12H      Continuous Infusions:sodium chloride, 75 mL/hr, Last Rate: Stopped (22 1000)        Vital signs in last 24 hours:  Temp:  [97.9 °F (36.6 °C)-99.9 °F (37.7 °C)] 98.6 °F (37 °C)  Heart Rate:  [73-83] 75  Resp:  [16-18] 18  BP: (136-163)/(64-80) 163/80    Intake/Output:    Intake/Output Summary (Last 24 hours) at 2022 1453  Last data filed at 2022 0620  Gross per 24 hour   Intake 810 ml   Output 1175 ml   Net -365 ml       Exam:  /80 (BP Location: Right arm, Patient Position: Lying)   Pulse 75   Temp 98.6 °F (37 °C) (Oral)   Resp 18   Wt 54 kg (119 lb 0.8 oz)   SpO2 100%   BMI 21.77 kg/m²     General Appearance:    Alert, cooperative, no distress   Head:    Normocephalic, without obvious abnormality, atraumatic   Eyes:       Throat:   Lips, tongue, gums normal   Neck:   Supple, symmetrical, trachea midline, no JVD   Lungs:     Clear to auscultation bilaterally, respirations unlabored   Chest Wall:    No tenderness or deformity    Heart:    Regular rate and rhythm, S1 and S2 normal, no murmur,no  Rub or gallop   Abdomen:     Soft, nontender, bowel sounds active, no masses, no organomegaly    Extremities:   Extremities normal, right hip surgical changes, no cyanosis or edema   Pulses:      Skin:   Skin is warm and dry,  no rashes or palpable lesions   Neurologic:   no focal deficits noted      Lab Results (last  72 hours)     Procedure Component Value Units Date/Time    Basic Metabolic Panel [[id number]]  (Abnormal) Collected: 12/18/22 0540    Specimen: Blood Updated: 12/18/22 0705     Glucose 95 mg/dL      BUN 14 mg/dL      Creatinine 0.58 mg/dL      Sodium 141 mmol/L      Potassium 4.0 mmol/L      Chloride 104 mmol/L      CO2 31.0 mmol/L      Calcium 8.5 mg/dL      BUN/Creatinine Ratio 24.1     Anion Gap 6.0 mmol/L      eGFR 89.4 mL/min/1.73      Comment: National Kidney Foundation and American Society of Nephrology (ASN) Task Force recommended calculation based on the Chronic Kidney Disease Epidemiology Collaboration (CKD-EPI) equation refit without adjustment for race.       Narrative:      GFR Normal >60  Chronic Kidney Disease <60  Kidney Failure <15    The GFR formula is only valid for adults with stable renal function between ages 18 and 70.    CBC (No Diff) [[id number]]  (Abnormal) Collected: 12/18/22 0540    Specimen: Blood Updated: 12/18/22 0639     WBC 6.24 10*3/mm3      RBC 3.47 10*6/mm3      Hemoglobin 10.3 g/dL      Hematocrit 31.6 %      MCV 91.1 fL      MCH 29.7 pg      MCHC 32.6 g/dL      RDW 14.1 %      RDW-SD 47.7 fl      MPV 10.0 fL      Platelets 182 10*3/mm3     Protime-INR [[id number]]  (Normal) Collected: 12/17/22 1552    Specimen: Blood Updated: 12/17/22 1636     Protime 13.4 Seconds      INR 1.01    aPTT [[id number]]  (Normal) Collected: 12/17/22 1552    Specimen: Blood Updated: 12/17/22 1636     PTT 28.0 seconds     Comprehensive Metabolic Panel [[id number]]  (Abnormal) Collected: 12/17/22 1552    Specimen: Blood Updated: 12/17/22 1625     Glucose 169 mg/dL      BUN 17 mg/dL      Creatinine 0.84 mg/dL      Sodium 143 mmol/L      Potassium 4.0 mmol/L      Chloride 105 mmol/L      CO2 32.7 mmol/L      Calcium 8.6 mg/dL      Total Protein 6.6 g/dL      Albumin 2.80 g/dL      ALT (SGPT) 7 U/L      AST (SGOT) 15 U/L      Alkaline Phosphatase 88 U/L      Total Bilirubin <0.2 mg/dL      Globulin 3.8 gm/dL       A/G Ratio 0.7 g/dL      BUN/Creatinine Ratio 20.2     Anion Gap 5.3 mmol/L      eGFR 68.6 mL/min/1.73      Comment: National Kidney Foundation and American Society of Nephrology (ASN) Task Force recommended calculation based on the Chronic Kidney Disease Epidemiology Collaboration (CKD-EPI) equation refit without adjustment for race.       Narrative:      GFR Normal >60  Chronic Kidney Disease <60  Kidney Failure <15    The GFR formula is only valid for adults with stable renal function between ages 18 and 70.    CBC & Differential [[id number]]  (Abnormal) Collected: 12/17/22 1552    Specimen: Blood Updated: 12/17/22 1606    Narrative:      The following orders were created for panel order CBC & Differential.  Procedure                               Abnormality         Status                     ---------                               -----------         ------                     CBC Auto Differential[[id number]]        Abnormal            Final result                 Please view results for these tests on the individual orders.    CBC Auto Differential [[id number]]  (Abnormal) Collected: 12/17/22 1552    Specimen: Blood Updated: 12/17/22 1606     WBC 7.04 10*3/mm3      RBC 3.66 10*6/mm3      Hemoglobin 10.8 g/dL      Hematocrit 34.7 %      MCV 94.8 fL      MCH 29.5 pg      MCHC 31.1 g/dL      RDW 14.1 %      RDW-SD 49.4 fl      MPV 9.6 fL      Platelets 199 10*3/mm3      Neutrophil % 66.2 %      Lymphocyte % 24.7 %      Monocyte % 7.4 %      Eosinophil % 1.0 %      Basophil % 0.3 %      Immature Grans % 0.4 %      Neutrophils, Absolute 4.66 10*3/mm3      Lymphocytes, Absolute 1.74 10*3/mm3      Monocytes, Absolute 0.52 10*3/mm3      Eosinophils, Absolute 0.07 10*3/mm3      Basophils, Absolute 0.02 10*3/mm3      Immature Grans, Absolute 0.03 10*3/mm3      nRBC 0.0 /100 WBC         Data Review:  Results from last 7 days   Lab Units 12/21/22  0440 12/20/22  0441 12/18/22  0540   SODIUM mmol/L 140 138 141   POTASSIUM  mmol/L 3.5 4.0 4.0   CHLORIDE mmol/L 108* 106 104   CO2 mmol/L 27.2 27.0 31.0*   BUN mg/dL 11 16 14   CREATININE mg/dL 0.66 0.85 0.58   GLUCOSE mg/dL 100* 135* 95   CALCIUM mg/dL 7.4* 7.5* 8.5*     Results from last 7 days   Lab Units 12/21/22  0440 12/20/22  0441 12/18/22  0540   WBC 10*3/mm3 7.80 10.01 6.24   HEMOGLOBIN g/dL 9.2* 7.8* 10.3*   HEMATOCRIT % 27.4* 25.1* 31.6*   PLATELETS 10*3/mm3 155 180 182             Lab Results   Lab Value Date/Time    TROPONINT <0.010 09/02/2020 0936    TROPONINT <0.010 08/09/2020 1656    TROPONINT <0.010 08/08/2020 0022    TROPONINT <0.010 07/01/2020 1652    TROPONINT <0.010 06/28/2020 2312    TROPONINT <0.01 09/06/2014 0100         Results from last 7 days   Lab Units 12/17/22  1552   ALK PHOS U/L 88   BILIRUBIN mg/dL <0.2   ALT (SGPT) U/L 7   AST (SGOT) U/L 15             No results found for: POCGLU  Results from last 7 days   Lab Units 12/17/22  1552   INR  1.01       Past Medical History:   Diagnosis Date   • Arthritis    • Dementia (HCC)     per daughter   • Depression    • GERD (gastroesophageal reflux disease)    • Hyperlipidemia    • Hypertension    • Hypotension 08/08/2020   • Incontinent of urine    • PNA (pneumonia)    • Seasonal allergies    • Stage 2 chronic kidney disease 09/18/2018   • UTI (urinary tract infection)        Assessment:  Active Hospital Problems    Diagnosis  POA   • **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 [M97.8XXA, Z96.649]  Not Applicable   • Acute pain of right thigh [M79.651]  Yes   • Dementia (HCC) [F03.90]  Unknown   • Closed fracture of right femur (HCC) [S72.91XA]  Unknown   • Generalized anxiety disorder [F41.1]  Yes   • Anemia, chronic disease [D63.8]  Yes   • Gastroesophageal reflux disease without esophagitis [K21.9]  Yes   • Essential hypertension [I10]  Yes   • Mixed hyperlipidemia [E78.2]  Yes      Resolved Hospital Problems   No resolved problems to display.       Plan:  Ortho consult post op care. Pain  control. Follow lab.   DC planning. ? SNU for rehab..    Alfredo Reid MD  12/21/2022  14:53 EST

## 2022-12-22 VITALS
SYSTOLIC BLOOD PRESSURE: 145 MMHG | TEMPERATURE: 98.8 F | BODY MASS INDEX: 21.77 KG/M2 | WEIGHT: 119.05 LBS | RESPIRATION RATE: 16 BRPM | DIASTOLIC BLOOD PRESSURE: 68 MMHG | HEART RATE: 80 BPM | OXYGEN SATURATION: 98 %

## 2022-12-22 LAB
ANION GAP SERPL CALCULATED.3IONS-SCNC: 5.2 MMOL/L (ref 5–15)
BASOPHILS # BLD AUTO: 0.01 10*3/MM3 (ref 0–0.2)
BASOPHILS NFR BLD AUTO: 0.1 % (ref 0–1.5)
BUN SERPL-MCNC: 10 MG/DL (ref 8–23)
BUN/CREAT SERPL: 20.4 (ref 7–25)
CALCIUM SPEC-SCNC: 7.7 MG/DL (ref 8.6–10.5)
CHLORIDE SERPL-SCNC: 104 MMOL/L (ref 98–107)
CO2 SERPL-SCNC: 28.8 MMOL/L (ref 22–29)
CREAT SERPL-MCNC: 0.49 MG/DL (ref 0.57–1)
DEPRECATED RDW RBC AUTO: 46.6 FL (ref 37–54)
EGFRCR SERPLBLD CKD-EPI 2021: 93.1 ML/MIN/1.73
EOSINOPHIL # BLD AUTO: 0.09 10*3/MM3 (ref 0–0.4)
EOSINOPHIL NFR BLD AUTO: 1.3 % (ref 0.3–6.2)
ERYTHROCYTE [DISTWIDTH] IN BLOOD BY AUTOMATED COUNT: 14.1 % (ref 12.3–15.4)
GLUCOSE SERPL-MCNC: 107 MG/DL (ref 65–99)
HCT VFR BLD AUTO: 28.8 % (ref 34–46.6)
HGB BLD-MCNC: 9.4 G/DL (ref 12–15.9)
IMM GRANULOCYTES # BLD AUTO: 0.02 10*3/MM3 (ref 0–0.05)
IMM GRANULOCYTES NFR BLD AUTO: 0.3 % (ref 0–0.5)
LYMPHOCYTES # BLD AUTO: 1.38 10*3/MM3 (ref 0.7–3.1)
LYMPHOCYTES NFR BLD AUTO: 19.7 % (ref 19.6–45.3)
MCH RBC QN AUTO: 29.5 PG (ref 26.6–33)
MCHC RBC AUTO-ENTMCNC: 32.6 G/DL (ref 31.5–35.7)
MCV RBC AUTO: 90.3 FL (ref 79–97)
MONOCYTES # BLD AUTO: 0.58 10*3/MM3 (ref 0.1–0.9)
MONOCYTES NFR BLD AUTO: 8.3 % (ref 5–12)
NEUTROPHILS NFR BLD AUTO: 4.93 10*3/MM3 (ref 1.7–7)
NEUTROPHILS NFR BLD AUTO: 70.3 % (ref 42.7–76)
NRBC BLD AUTO-RTO: 0 /100 WBC (ref 0–0.2)
PLATELET # BLD AUTO: 140 10*3/MM3 (ref 140–450)
PMV BLD AUTO: 9.6 FL (ref 6–12)
POTASSIUM SERPL-SCNC: 3.1 MMOL/L (ref 3.5–5.2)
RBC # BLD AUTO: 3.19 10*6/MM3 (ref 3.77–5.28)
SARS-COV-2 RNA RESP QL NAA+PROBE: NOT DETECTED
SODIUM SERPL-SCNC: 138 MMOL/L (ref 136–145)
WBC NRBC COR # BLD: 7.01 10*3/MM3 (ref 3.4–10.8)

## 2022-12-22 PROCEDURE — U0003 INFECTIOUS AGENT DETECTION BY NUCLEIC ACID (DNA OR RNA); SEVERE ACUTE RESPIRATORY SYNDROME CORONAVIRUS 2 (SARS-COV-2) (CORONAVIRUS DISEASE [COVID-19]), AMPLIFIED PROBE TECHNIQUE, MAKING USE OF HIGH THROUGHPUT TECHNOLOGIES AS DESCRIBED BY CMS-2020-01-R: HCPCS | Performed by: HOSPITALIST

## 2022-12-22 PROCEDURE — 80048 BASIC METABOLIC PNL TOTAL CA: CPT | Performed by: HOSPITALIST

## 2022-12-22 PROCEDURE — U0005 INFEC AGEN DETEC AMPLI PROBE: HCPCS | Performed by: HOSPITALIST

## 2022-12-22 PROCEDURE — 85025 COMPLETE CBC W/AUTO DIFF WBC: CPT | Performed by: HOSPITALIST

## 2022-12-22 RX ORDER — AMOXICILLIN 250 MG
2 CAPSULE ORAL 2 TIMES DAILY PRN
Start: 2022-12-22 | End: 2023-02-01

## 2022-12-22 RX ORDER — HYDROCODONE BITARTRATE AND ACETAMINOPHEN 5; 325 MG/1; MG/1
1 TABLET ORAL EVERY 4 HOURS PRN
Qty: 12 TABLET | Refills: 0 | Status: SHIPPED | OUTPATIENT
Start: 2022-12-22 | End: 2022-12-27

## 2022-12-22 RX ORDER — ASPIRIN 81 MG/1
81 TABLET ORAL EVERY 12 HOURS SCHEDULED
Qty: 56 TABLET | Refills: 0
Start: 2022-12-22 | End: 2023-01-19

## 2022-12-22 RX ORDER — PSEUDOEPHEDRINE HCL 30 MG
100 TABLET ORAL 2 TIMES DAILY PRN
Start: 2022-12-22 | End: 2023-01-04

## 2022-12-22 RX ORDER — ACETAMINOPHEN 325 MG/1
650 TABLET ORAL EVERY 4 HOURS PRN
Start: 2022-12-22

## 2022-12-22 RX ORDER — POTASSIUM CHLORIDE 750 MG/1
40 TABLET, FILM COATED, EXTENDED RELEASE ORAL ONCE
Status: COMPLETED | OUTPATIENT
Start: 2022-12-22 | End: 2022-12-22

## 2022-12-22 RX ADMIN — HYDROCODONE BITARTRATE AND ACETAMINOPHEN 1 TABLET: 5; 325 TABLET ORAL at 04:07

## 2022-12-22 RX ADMIN — POTASSIUM CHLORIDE 40 MEQ: 750 TABLET, EXTENDED RELEASE ORAL at 12:24

## 2022-12-22 RX ADMIN — Medication 10 ML: at 08:59

## 2022-12-22 RX ADMIN — HYDROCODONE BITARTRATE AND ACETAMINOPHEN 1 TABLET: 5; 325 TABLET ORAL at 10:30

## 2022-12-22 RX ADMIN — PANTOPRAZOLE SODIUM 40 MG: 40 TABLET, DELAYED RELEASE ORAL at 06:12

## 2022-12-22 RX ADMIN — SERTRALINE 75 MG: 25 TABLET, FILM COATED ORAL at 08:57

## 2022-12-22 RX ADMIN — ATENOLOL 25 MG: 25 TABLET ORAL at 08:57

## 2022-12-22 RX ADMIN — HYDROCODONE BITARTRATE AND ACETAMINOPHEN 1 TABLET: 5; 325 TABLET ORAL at 14:46

## 2022-12-22 RX ADMIN — ASPIRIN 81 MG: 81 TABLET, COATED ORAL at 08:57

## 2022-12-22 RX ADMIN — FLUTICASONE PROPIONATE 2 SPRAY: 50 SPRAY, METERED NASAL at 08:59

## 2022-12-22 NOTE — PLAN OF CARE
Goal Outcome Evaluation:           Progress: improving  Outcome Evaluation: 81 y/o s/POD 3 R hip replacement revision. Pt confused, AOx2 this shift, yet pleasant and able to follow commands. Pt up w/ assist x1 w/ walker and gait belt. Voiding function intact via PW and incontinent of bowel twice this shift. Neuro checks WNL, no c/o numbness/tingling. Optifoam dsg c/d/i. Pain managed via PO pills. PIV removed. Needs met. VSS. RA. Educated pt on importance of turning in bed. D/C to SNF via ambulance services. Ambulance unable to take walker with patient due to their protocol, will attempt to call daughter regarding pt walker. Updated oncoming RN of pt status.

## 2022-12-22 NOTE — CASE MANAGEMENT/SOCIAL WORK
Continued Stay Note  [facility]     Patient Name: Gabbi Brandon  MRN: [id number]  Today's Date: 12/22/2022    Admit Date: 12/17/2022    Plan: [facility] -- Accepted.   Discharge Plan     Row Name 12/22/22 1143       Plan    Plan [facility] -- Accepted.    Patient/Family in Agreement with Plan yes    Plan Comments Spoke with [facility] who has accepted and has a SNF bed for the patient today at their [place] location. In anticipation of a possible d/c today, [facility] scheduled a [facility] for today at 1700 (spoke with Cindy). Staci is also requesting a Covid-19 Test prior to discharge. Updated [facility] and [facility] - Discharge orders and Covid-19 Test noted. Updated [facility]. Await Covid-19 Test result. Packet is on the chart.               Discharge Codes    No documentation.               Expected Discharge Date and Time     Expected Discharge Date Expected Discharge Time    Dec 22, 2022             Priti AL RN

## 2022-12-22 NOTE — DISCHARGE SUMMARY
PHYSICIAN DISCHARGE SUMMARY                                                                        [facility]    Patient Identification:  Name: Gabbi Brandon  Age: 82 y.o.  Sex: female  :  1940  MRN: [id number]  Primary Care Physician: Paulino Casas MD    Admit date: 2022  Discharge date and time:2022  Discharged Condition: good    Discharge Diagnoses:  Active Hospital Problems    Diagnosis  POA    **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 [M97.8XXA, Z96.649]  Not Applicable    Acute pain of right thigh [M79.651]  Yes    Dementia (HCC) [F03.90]  Unknown    Closed fracture of right femur (HCC) [S72.91XA]  Unknown    Generalized anxiety disorder [F41.1]  Yes    Anemia, chronic disease [D63.8]  Yes    Gastroesophageal reflux disease without esophagitis [K21.9]  Yes    Essential hypertension [I10]  Yes    Mixed hyperlipidemia [E78.2]  Yes      Resolved Hospital Problems   No resolved problems to display.   Acute blood loss anemia  Mild dementia    PMHX:   Past Medical History:   Diagnosis Date    Arthritis     Dementia (HCC)     per daughter    Depression     GERD (gastroesophageal reflux disease)     Hyperlipidemia     Hypertension     Hypotension 2020    Incontinent of urine     PNA (pneumonia)     Seasonal allergies     Stage 2 chronic kidney disease 2018    UTI (urinary tract infection)      PSHX:   Past Surgical History:   Procedure Laterality Date    BREAST BIOPSY      COLONOSCOPY N/A 2016    Procedure: COLONOSCOPY WITH POLYPECTOMY (HOT SNARE);  Surgeon: Paulino Narvaez MD;  Location: [facility] ENDOSCOPY;  Service:     ENDOSCOPY N/A 2020    Procedure: ESOPHAGOGASTRODUODENOSCOPY with biopsies;  Surgeon: Eugene George MD;  Location: [facility] ENDOSCOPY;  Service: Gastroenterology;  Laterality: N/A;  pre-- melena and abdominal pain  post-- hiatel hernia     FRACTURE SURGERY      TOTAL HIP ARTHROPLASTY Right 12/30/2020    Procedure: TOTAL HIP ARTHROPLASTY ANTERIOR WITH HANA TABLE;  Surgeon: Brent Ang II, MD;  Location: [facility] OR;  Service: Orthopedics;  Laterality: Right;    TOTAL HIP ARTHROPLASTY REVISION Right 12/19/2022    Procedure: REVISION RIGHT TOTAL HIP REPLACEMENT;  Surgeon: Halie Cantrell MD;  Location: [facility] MAIN OR;  Service: Orthopedics;  Laterality: Right;  POSTERIOR APPROACH, CELL SAVER, ALYSON, DM CABLE SYSTEM.    VAGINAL HYSTERECTOMY         Hospital Course: Gabbi Brandon   is a 82 y.o. non-smoker with a history of dementia, hypertension, hyperlipidemia, GERD, anemia, and anxiety that presents to [facility] complaining of a fall and right leg pain.  She is a poor historian on exam so history is obtained from medical records.  Per ED notes, she fell at home tonight and arrived complaining of pain in her right thigh and hip.  The patient is confused and cannot answer any questions, but grimaces and yells out when the right lateral thigh is palpated.  X-ray of the right femur and right hip were negative.  A CT of the right lower extremity showed a mildly displaced oblique fracture of the proximal right femoral metaphysis.  She is being admitted for further evaluation.  The patient was admitted to the hospital and seen by orthopedic surgery.  Patient had revision of total hip replacement.  The patient was still pretty weak and after being the hospital for few days looked to be good enough to go to rehab.  She will follow-up with her primary care after released from rehab.  Also have postop check with orthopedic surgery.      Consults:     Consults       Date and Time Order Name Status Description    12/17/2022 11:30 PM Inpatient Orthopedic Surgery Consult Completed     12/17/2022  5:30 PM [facility] (on-call MD unless specified) Details            Results from last 7 days   Lab Units 12/22/22  0432   WBC 10*3/mm3  7.01   HEMOGLOBIN g/dL 9.4*   HEMATOCRIT % 28.8*   PLATELETS 10*3/mm3 140     Results from last 7 days   Lab Units 12/22/22  0431   SODIUM mmol/L 138   POTASSIUM mmol/L 3.1*   CHLORIDE mmol/L 104   CO2 mmol/L 28.8   BUN mg/dL 10   CREATININE mg/dL 0.49*   GLUCOSE mg/dL 107*   CALCIUM mg/dL 7.7*     Significant Diagnostic Studies:   WBC   Date Value Ref Range Status   12/22/2022 7.01 3.40 - 10.80 10*3/mm3 Final     Hemoglobin   Date Value Ref Range Status   12/22/2022 9.4 (L) 12.0 - 15.9 g/dL Final     Hematocrit   Date Value Ref Range Status   12/22/2022 28.8 (L) 34.0 - 46.6 % Final     Platelets   Date Value Ref Range Status   12/22/2022 140 140 - 450 10*3/mm3 Final     Sodium   Date Value Ref Range Status   12/22/2022 138 136 - 145 mmol/L Final     Potassium   Date Value Ref Range Status   12/22/2022 3.1 (L) 3.5 - 5.2 mmol/L Final     Chloride   Date Value Ref Range Status   12/22/2022 104 98 - 107 mmol/L Final     CO2   Date Value Ref Range Status   12/22/2022 28.8 22.0 - 29.0 mmol/L Final     BUN   Date Value Ref Range Status   12/22/2022 10 8 - 23 mg/dL Final     Creatinine   Date Value Ref Range Status   12/22/2022 0.49 (L) 0.57 - 1.00 mg/dL Final     Glucose   Date Value Ref Range Status   12/22/2022 107 (H) 65 - 99 mg/dL Final     Calcium   Date Value Ref Range Status   12/22/2022 7.7 (L) 8.6 - 10.5 mg/dL Final     No results found for: AST, ALT, ALKPHOS  No results found for: APTT, INR  No results found for: COLORU, CLARITYU, SPECGRAV, PHUR, PROTEINUR, GLUCOSEU, KETONESU, BLOODU, NITRITE, LEUKOCYTESUR, BILIRUBINUR, UROBILINOGEN, RBCUA, WBCUA, BACTERIA, UACOMMENT  No results found for: TROPONINT, TROPONINI, BNP  No components found for: HGBA1C;2  No components found for: TSH;2  Imaging Results (All)       Procedure Component Value Units Date/Time    XR Hip With or Without Pelvis 1 View Right [[id number]] Collected: 12/19/22 1634     Updated: 12/19/22 1638    Narrative:      XR HIP W OR WO PELVIS 1 VIEW RIGHT-   12/19/2022     HISTORY: Right hip arthroplasty.     The acetabular and proximal femoral components of the right hip  prosthesis appear in good position. Cerclage wires surround the proximal  femoral component and trochanters as well as a compression plate draped  over the greater trochanter. There is a small cortical defect in the  medial aspect of the proximal shaft of the right femur. Soft tissue air  is seen. No unexpected findings are otherwise noted.     One view is submitted.       Impression:      1. Satisfactory postoperative appearance of the right hip.     This report was finalized on 12/19/2022 4:35 PM by Dr. Trent Self M.D.       XR Hip With or Without Pelvis 1 View Right [[id number]] Collected: 12/19/22 1437     Updated: 12/19/22 1441    Narrative:      XR HIP W OR WO PELVIS 1 VIEW RIGHT-     Clinical: Hip revision     Fluoroscopy time: 6 seconds     Number of images: 2     FINDINGS: Intraoperative C-arm images of the right hip taken for the  purpose of visualization and localization during revision surgery.     This report was finalized on 12/19/2022 2:38 PM by Dr. Preston Jackson M.D.       FL C Arm During Surgery [[id number]] Resulted: 12/19/22 1408     Updated: 12/19/22 1408    Narrative:      This procedure was auto-finalized with no dictation required.    XR Chest 1 View [[id number]] Collected: 12/18/22 0618     Updated: 12/18/22 0841    Narrative:        Patient: NEAL HENNING  Time Out: 08:40  Exam(s): FILM CXR 1 VIEW     EXAM:    XR Chest, 1 View    CLINICAL HISTORY:     Reason for exam: surgical clearance.    TECHNIQUE:    Frontal view of the chest.    COMPARISON:    12 29 2020    FINDINGS:    Lungs:  Pulmonary vascular congestion.  No evidence of pneumonia.    Pleural space:  No pleural effusion. No pneumothorax.    Heart:  Cardiomegaly.    Vasculature:  Atherosclerotic calcifications in the aortic arch.    IMPRESSION:         Cardiomegaly and pulmonary vascular congestion.       Impression:          Electronically signed by Wilfredo Galvin MD on 12-18-22 at 0840    CT Lower Extremity Right Without Contrast [[id number]] Collected: 12/17/22 2003     Updated: 12/17/22 2003    Narrative:        Patient: NEAL HENNING  Time Out: 20:02  Exam(s): CT EXTREMITY RIGHT LOWER Without Contrast     EXAM:    CT Right Lower Extremity Without Intravenous Contrast    CLINICAL HISTORY:     Reason for exam: right thigh pain.    TECHNIQUE:    Axial computed tomography images of the right lower extremity without   intravenous contrast.  This CT exam was performed according to the   principle of ALARA (As Low As Reasonably Achievable) by using one or more   of the following dose reduction techniques: automated exposure control,   adjustment of the mA and or kV according to patient size, and or use of   iterative reconstruction technique.    COMPARISON:    No relevant prior studies available.    FINDINGS:    Artifacts:  Orthopedic hardware.    Bones joints:  Acute mildly displaced oblique fracture of the proximal   right femoral metaphysis.  Moderate knee effusion.    Soft tissues:  No significant abnormality.    Other findings:  3.4 cm popliteal fossa cyst.    IMPRESSION:         Mildly displaced oblique fracture of the proximal right femoral   metaphysis.  Tricompartmental degenerative changes.      Impression:          Electronically signed by Tina Zapata MD on 12-17-22 at 2002    XR Hip With or Without Pelvis 2 - 3 View Right [[id number]] Collected: 12/17/22 1659     Updated: 12/17/22 1704    Narrative:      XR HIP W OR WO PELVIS 2-3 VIEW RIGHT-, XR FEMUR 2 VW RIGHT-     INDICATIONS: Trauma     TECHNIQUE: Frontal views of the pelvis and right hip, 4 views of the  right femur     COMPARISON: 12/30/2020     FINDINGS:     No acute fracture, erosion, or dislocation is identified. Right hip  arthroplasty hardware appears intact. Degenerative changes are  conspicuous at the right knee. Degenerative changes  are also seen at the  symphysis pubis and partly included lumbar spine. Follow-up/further  evaluation can be obtained as indications persist.       Impression:         As described.           This report was finalized on 12/17/2022 5:01 PM by Dr. Boogie Tran M.D.       XR Femur 2 View Right [[id number]] Collected: 12/17/22 1659     Updated: 12/17/22 1704    Narrative:      XR HIP W OR WO PELVIS 2-3 VIEW RIGHT-, XR FEMUR 2 VW RIGHT-     INDICATIONS: Trauma     TECHNIQUE: Frontal views of the pelvis and right hip, 4 views of the  right femur     COMPARISON: 12/30/2020     FINDINGS:     No acute fracture, erosion, or dislocation is identified. Right hip  arthroplasty hardware appears intact. Degenerative changes are  conspicuous at the right knee. Degenerative changes are also seen at the  symphysis pubis and partly included lumbar spine. Follow-up/further  evaluation can be obtained as indications persist.       Impression:         As described.           This report was finalized on 12/17/2022 5:01 PM by Dr. Boogie Tran M.D.             Lab Results (last 7 days)       Procedure Component Value Units Date/Time    CBC & Differential [[id number]]  (Abnormal) Collected: 12/22/22 0432    Specimen: Blood Updated: 12/22/22 0553    Narrative:      The following orders were created for panel order CBC & Differential.  Procedure                               Abnormality         Status                     ---------                               -----------         ------                     CBC Auto Differential[[id number]]        Abnormal            Final result                 Please view results for these tests on the individual orders.    CBC Auto Differential [[id number]]  (Abnormal) Collected: 12/22/22 0432    Specimen: Blood Updated: 12/22/22 0553     WBC 7.01 10*3/mm3      RBC 3.19 10*6/mm3      Hemoglobin 9.4 g/dL      Hematocrit 28.8 %      MCV 90.3 fL      MCH 29.5 pg      MCHC 32.6 g/dL      RDW 14.1 %       RDW-SD 46.6 fl      MPV 9.6 fL      Platelets 140 10*3/mm3      Neutrophil % 70.3 %      Lymphocyte % 19.7 %      Monocyte % 8.3 %      Eosinophil % 1.3 %      Basophil % 0.1 %      Immature Grans % 0.3 %      Neutrophils, Absolute 4.93 10*3/mm3      Lymphocytes, Absolute 1.38 10*3/mm3      Monocytes, Absolute 0.58 10*3/mm3      Eosinophils, Absolute 0.09 10*3/mm3      Basophils, Absolute 0.01 10*3/mm3      Immature Grans, Absolute 0.02 10*3/mm3      nRBC 0.0 /100 WBC     Basic Metabolic Panel [[id number]]  (Abnormal) Collected: 12/22/22 0431    Specimen: Blood Updated: 12/22/22 0522     Glucose 107 mg/dL      BUN 10 mg/dL      Creatinine 0.49 mg/dL      Sodium 138 mmol/L      Potassium 3.1 mmol/L      Chloride 104 mmol/L      CO2 28.8 mmol/L      Calcium 7.7 mg/dL      BUN/Creatinine Ratio 20.4     Anion Gap 5.2 mmol/L      eGFR 93.1 mL/min/1.73      Comment: National Kidney Foundation and American Society of Nephrology (ASN) Task Force recommended calculation based on the Chronic Kidney Disease Epidemiology Collaboration (CKD-EPI) equation refit without adjustment for race.       Narrative:      GFR Normal >60  Chronic Kidney Disease <60  Kidney Failure <15    The GFR formula is only valid for adults with stable renal function between ages 18 and 70.    Basic Metabolic Panel [[id number]]  (Abnormal) Collected: 12/21/22 0440    Specimen: Blood Updated: 12/21/22 0600     Glucose 100 mg/dL      BUN 11 mg/dL      Creatinine 0.66 mg/dL      Sodium 140 mmol/L      Potassium 3.5 mmol/L      Comment: Slight hemolysis detected by analyzer. Results may be affected.        Chloride 108 mmol/L      CO2 27.2 mmol/L      Calcium 7.4 mg/dL      BUN/Creatinine Ratio 16.7     Anion Gap 4.8 mmol/L      eGFR 86.6 mL/min/1.73      Comment: National Kidney Foundation and American Society of Nephrology (ASN) Task Force recommended calculation based on the Chronic Kidney Disease Epidemiology Collaboration (CKD-EPI) equation refit  without adjustment for race.       Narrative:      GFR Normal >60  Chronic Kidney Disease <60  Kidney Failure <15    The GFR formula is only valid for adults with stable renal function between ages 18 and 70.    CBC & Differential [[id number]]  (Abnormal) Collected: 12/21/22 0440    Specimen: Blood Updated: 12/21/22 0549    Narrative:      The following orders were created for panel order CBC & Differential.  Procedure                               Abnormality         Status                     ---------                               -----------         ------                     CBC Auto Differential[[id number]]        Abnormal            Final result                 Please view results for these tests on the individual orders.    CBC Auto Differential [[id number]]  (Abnormal) Collected: 12/21/22 0440    Specimen: Blood Updated: 12/21/22 0549     WBC 7.80 10*3/mm3      RBC 3.18 10*6/mm3      Hemoglobin 9.2 g/dL      Hematocrit 27.4 %      MCV 86.2 fL      MCH 28.9 pg      MCHC 33.6 g/dL      RDW 13.9 %      RDW-SD 44.0 fl      MPV 9.9 fL      Platelets 155 10*3/mm3      Neutrophil % 64.4 %      Lymphocyte % 26.4 %      Monocyte % 7.9 %      Eosinophil % 0.6 %      Basophil % 0.1 %      Immature Grans % 0.6 %      Neutrophils, Absolute 5.01 10*3/mm3      Lymphocytes, Absolute 2.06 10*3/mm3      Monocytes, Absolute 0.62 10*3/mm3      Eosinophils, Absolute 0.05 10*3/mm3      Basophils, Absolute 0.01 10*3/mm3      Immature Grans, Absolute 0.05 10*3/mm3      nRBC 0.0 /100 WBC     CBC & Differential [[id number]]  (Abnormal) Collected: 12/20/22 0441    Specimen: Blood Updated: 12/20/22 0540    Narrative:      The following orders were created for panel order CBC & Differential.  Procedure                               Abnormality         Status                     ---------                               -----------         ------                     CBC Auto Differential[[id number]]        Abnormal            Final result                  Please view results for these tests on the individual orders.    CBC Auto Differential [[id number]]  (Abnormal) Collected: 12/20/22 0441    Specimen: Blood Updated: 12/20/22 0540     WBC 10.01 10*3/mm3      RBC 2.74 10*6/mm3      Hemoglobin 7.8 g/dL      Hematocrit 25.1 %      MCV 91.6 fL      MCH 28.5 pg      MCHC 31.1 g/dL      RDW 13.9 %      RDW-SD 46.6 fl      MPV 9.8 fL      Platelets 180 10*3/mm3      Neutrophil % 66.5 %      Lymphocyte % 23.0 %      Monocyte % 10.0 %      Eosinophil % 0.0 %      Basophil % 0.1 %      Immature Grans % 0.4 %      Neutrophils, Absolute 6.66 10*3/mm3      Lymphocytes, Absolute 2.30 10*3/mm3      Monocytes, Absolute 1.00 10*3/mm3      Eosinophils, Absolute 0.00 10*3/mm3      Basophils, Absolute 0.01 10*3/mm3      Immature Grans, Absolute 0.04 10*3/mm3      nRBC 0.0 /100 WBC     Basic Metabolic Panel [[id number]]  (Abnormal) Collected: 12/20/22 0441    Specimen: Blood Updated: 12/20/22 0538     Glucose 135 mg/dL      BUN 16 mg/dL      Creatinine 0.85 mg/dL      Sodium 138 mmol/L      Potassium 4.0 mmol/L      Chloride 106 mmol/L      CO2 27.0 mmol/L      Calcium 7.5 mg/dL      BUN/Creatinine Ratio 18.8     Anion Gap 5.0 mmol/L      eGFR 67.7 mL/min/1.73      Comment: National Kidney Foundation and American Society of Nephrology (ASN) Task Force recommended calculation based on the Chronic Kidney Disease Epidemiology Collaboration (CKD-EPI) equation refit without adjustment for race.       Narrative:      GFR Normal >60  Chronic Kidney Disease <60  Kidney Failure <15    The GFR formula is only valid for adults with stable renal function between ages 18 and 70.    Basic Metabolic Panel [[id number]]  (Abnormal) Collected: 12/18/22 0540    Specimen: Blood Updated: 12/18/22 0705     Glucose 95 mg/dL      BUN 14 mg/dL      Creatinine 0.58 mg/dL      Sodium 141 mmol/L      Potassium 4.0 mmol/L      Chloride 104 mmol/L      CO2 31.0 mmol/L      Calcium 8.5 mg/dL       BUN/Creatinine Ratio 24.1     Anion Gap 6.0 mmol/L      eGFR 89.4 mL/min/1.73      Comment: National Kidney Foundation and American Society of Nephrology (ASN) Task Force recommended calculation based on the Chronic Kidney Disease Epidemiology Collaboration (CKD-EPI) equation refit without adjustment for race.       Narrative:      GFR Normal >60  Chronic Kidney Disease <60  Kidney Failure <15    The GFR formula is only valid for adults with stable renal function between ages 18 and 70.    CBC (No Diff) [[id number]]  (Abnormal) Collected: 12/18/22 0540    Specimen: Blood Updated: 12/18/22 0639     WBC 6.24 10*3/mm3      RBC 3.47 10*6/mm3      Hemoglobin 10.3 g/dL      Hematocrit 31.6 %      MCV 91.1 fL      MCH 29.7 pg      MCHC 32.6 g/dL      RDW 14.1 %      RDW-SD 47.7 fl      MPV 10.0 fL      Platelets 182 10*3/mm3     Protime-INR [[id number]]  (Normal) Collected: 12/17/22 1552    Specimen: Blood Updated: 12/17/22 1636     Protime 13.4 Seconds      INR 1.01    aPTT [[id number]]  (Normal) Collected: 12/17/22 1552    Specimen: Blood Updated: 12/17/22 1636     PTT 28.0 seconds     Comprehensive Metabolic Panel [[id number]]  (Abnormal) Collected: 12/17/22 1552    Specimen: Blood Updated: 12/17/22 1625     Glucose 169 mg/dL      BUN 17 mg/dL      Creatinine 0.84 mg/dL      Sodium 143 mmol/L      Potassium 4.0 mmol/L      Chloride 105 mmol/L      CO2 32.7 mmol/L      Calcium 8.6 mg/dL      Total Protein 6.6 g/dL      Albumin 2.80 g/dL      ALT (SGPT) 7 U/L      AST (SGOT) 15 U/L      Alkaline Phosphatase 88 U/L      Total Bilirubin <0.2 mg/dL      Globulin 3.8 gm/dL      A/G Ratio 0.7 g/dL      BUN/Creatinine Ratio 20.2     Anion Gap 5.3 mmol/L      eGFR 68.6 mL/min/1.73      Comment: National Kidney Foundation and American Society of Nephrology (ASN) Task Force recommended calculation based on the Chronic Kidney Disease Epidemiology Collaboration (CKD-EPI) equation refit without adjustment for race.       Narrative:       GFR Normal >60  Chronic Kidney Disease <60  Kidney Failure <15    The GFR formula is only valid for adults with stable renal function between ages 18 and 70.    CBC & Differential [[id number]]  (Abnormal) Collected: 12/17/22 1552    Specimen: Blood Updated: 12/17/22 1606    Narrative:      The following orders were created for panel order CBC & Differential.  Procedure                               Abnormality         Status                     ---------                               -----------         ------                     CBC Auto Differential[[id number]]        Abnormal            Final result                 Please view results for these tests on the individual orders.    CBC Auto Differential [[id number]]  (Abnormal) Collected: 12/17/22 1552    Specimen: Blood Updated: 12/17/22 1606     WBC 7.04 10*3/mm3      RBC 3.66 10*6/mm3      Hemoglobin 10.8 g/dL      Hematocrit 34.7 %      MCV 94.8 fL      MCH 29.5 pg      MCHC 31.1 g/dL      RDW 14.1 %      RDW-SD 49.4 fl      MPV 9.6 fL      Platelets 199 10*3/mm3      Neutrophil % 66.2 %      Lymphocyte % 24.7 %      Monocyte % 7.4 %      Eosinophil % 1.0 %      Basophil % 0.3 %      Immature Grans % 0.4 %      Neutrophils, Absolute 4.66 10*3/mm3      Lymphocytes, Absolute 1.74 10*3/mm3      Monocytes, Absolute 0.52 10*3/mm3      Eosinophils, Absolute 0.07 10*3/mm3      Basophils, Absolute 0.02 10*3/mm3      Immature Grans, Absolute 0.03 10*3/mm3      nRBC 0.0 /100 WBC           /68   Pulse 80   Temp 98.8 °F (37.1 °C) (Oral)   Resp 16   Wt 54 kg (119 lb 0.8 oz)   SpO2 98%   BMI 21.77 kg/m²     Discharge Exam:  General Appearance:    Alert, cooperative, no distress                          Head:    Normocephalic, without obvious abnormality, atraumatic                          Eyes:                            Throat:   Lips, tongue, gums normal                          Neck:   Supple, symmetrical, trachea midline, no JVD                        Lungs:      Clear to auscultation bilaterally, respirations unlabored                Chest Wall:    No tenderness or deformity                        Heart:    Regular rate and rhythm, S1 and S2 normal, no murmur,no  Rub  or gallop                  Abdomen:     Soft, non-tender, bowel sounds active, no masses, no organomegaly                  Extremities:   Extremities normal, atraumatic, no cyanosis or edema                             Skin:   Skin is warm and dry,  no rashes or palpable lesions                  Neurologic:   no focal deficits noted     Disposition:  Skilled nursing facility    Activity as tolerated    Diet as tolerated  Diet Order   Procedures    Diet: Regular/House Diet; Texture: Regular Texture (IDDSI 7); Fluid Consistency: Thin (IDDSI 0)       Patient Instructions:      Discharge Medications        New Medications        Instructions Start Date   acetaminophen 325 MG tablet  Commonly known as: TYLENOL   650 mg, Oral, Every 4 Hours PRN      aspirin 81 MG EC tablet   81 mg, Oral, Every 12 Hours Scheduled      docusate sodium 100 MG capsule   100 mg, Oral, 2 Times Daily PRN      HYDROcodone-acetaminophen 5-325 MG per tablet  Commonly known as: NORCO   1 tablet, Oral, Every 4 Hours PRN      sennosides-docusate 8.6-50 MG per tablet  Commonly known as: PERICOLACE   2 tablets, Oral, 2 Times Daily PRN             Continue These Medications        Instructions Start Date   atenolol 25 MG tablet  Commonly known as: TENORMIN   25 mg, Oral, Daily      fluticasone 50 MCG/ACT nasal spray  Commonly known as: FLONASE   2 sprays, Each Nare, Daily      omeprazole 20 MG capsule  Commonly known as: priLOSEC   20 mg, Oral, Daily      QUEtiapine 25 MG tablet  Commonly known as: SEROquel   25 mg, Oral, Nightly PRN, As additional dose      QUEtiapine 25 MG tablet  Commonly known as: SEROquel   25 mg, Oral, Nightly      sertraline 50 MG tablet  Commonly known as: Zoloft   75 mg, Oral, Daily             Stop These Medications       acetaminophen-codeine 300-30 MG per tablet  Commonly known as: TYLENOL #3     celecoxib 200 MG capsule  Commonly known as: CeleBREX            Future Appointments   Date Time Provider Department Center   12/22/2022  7:30 PM EMS MED 4  [facility]   5/31/2023 10:30 AM Paulino Casas MD [facility]      Follow-up Information       Halie Cantrell MD Follow up on 1/4/2023.    Specialty: Orthopedic Surgery  Contact information:  [address]  [place] [address]  [phone]               Paulino Casas MD .    Specialties: Family Medicine, Emergency Medicine  Contact information:  [address]  [place] [address]  [phone]                           Discharge Order (From admission, onward)       Start     Ordered    12/22/22 1158  Discharge patient  Once        Expected Discharge Date: 12/22/22    Discharge Disposition: Skilled Nursing Facility (DC - External)    Physician of Record for Attribution - Please select from Treatment Team: JOSEPH REID [[id number]]    Review needed by CMO to determine Physician of Record: No       Question Answer Comment   Physician of Record for Attribution - Please select from Treatment Team JOSEPH REID    Review needed by CMO to determine Physician of Record No        12/22/22 1201                    Total time spent discharging patient including evaluation,post hospitalization follow up,  medication and post hospitalization instructions and education total time exceeds 30 minutes.    Signed:  Joseph Reid MD  12/22/2022  12:02 EST

## 2022-12-22 NOTE — PLAN OF CARE
Goal Outcome Evaluation:POD2 Right total hip revision, VSS,afebrile, pain controlled with po pain medication, BM today, HGB this am 9.2. Plan for DC to SNF at [place].

## 2022-12-22 NOTE — NURSING NOTE
Security delivered belongings to patient this afternoon, rings and necklace currently on patients person. Will CTM.

## 2022-12-22 NOTE — CASE MANAGEMENT/SOCIAL WORK
Physicians Statement of Medical Necessity for  Ambulance Transportation    GENERAL INFORMATION     Name: Gabbi Brandon  YOB: 1940  Medicare #: [id number] (See Facesheet)  Transport Date: 12/22/2022 (Valid for round trips this date, or for scheduled repetitive trips for 60 days from the date signed below.)  Origin: [facility]  Destination: [facility].  Is the Patient's stay covered under Medicare Part A (PPS/DRG?)Yes  Closest appropriate facility? Yes  If this a hosp-hosp transfer? No  Is this a hospice patient? No    MEDICAL NECESSITY QUESTIONAIRE    Ambulance Transportation is medically necessary only if other means of transportation are contraindicated or would be potentially harmful to the patient.  To meet this requirement, the patient must be either \"bed confined\" or suffer from a condition such that transport by means other than an ambulance is contraindicated by the patient's condition.  The following questions must be answered by the healthcare professional signing below for this form to be valid:     1) Describe the MEDICAL CONDITION (physical and/or mental) of this patient AT THE TIME OF AMBULANCE TRANSPORT that requires the patient to be transported in an ambulance, and why transport by other means is contraindicated by the patient's condition:   Past Medical History:   Diagnosis Date   • Arthritis    • Dementia (HCC)     per daughter   • Depression    • GERD (gastroesophageal reflux disease)    • Hyperlipidemia    • Hypertension    • Hypotension 08/08/2020   • Incontinent of urine    • PNA (pneumonia)    • Seasonal allergies    • Stage 2 chronic kidney disease 09/18/2018   • UTI (urinary tract infection)       Past Surgical History:   Procedure Laterality Date   • BREAST BIOPSY     • COLONOSCOPY N/A 08/26/2016    Procedure: COLONOSCOPY WITH POLYPECTOMY (HOT SNARE);  Surgeon: Paulino Narvaez MD;  Location: [facility] ENDOSCOPY;  Service:    • ENDOSCOPY N/A  08/11/2020    Procedure: ESOPHAGOGASTRODUODENOSCOPY with biopsies;  Surgeon: Eugene George MD;  Location: [facility] ENDOSCOPY;  Service: Gastroenterology;  Laterality: N/A;  pre-- melena and abdominal pain  post-- hiatel hernia   • FRACTURE SURGERY     • TOTAL HIP ARTHROPLASTY Right 12/30/2020    Procedure: TOTAL HIP ARTHROPLASTY ANTERIOR WITH HANA TABLE;  Surgeon: Brent Ang II, MD;  Location: [facility] MAIN OR;  Service: Orthopedics;  Laterality: Right;   • TOTAL HIP ARTHROPLASTY REVISION Right 12/19/2022    Procedure: REVISION RIGHT TOTAL HIP REPLACEMENT;  Surgeon: Halie Cantrell MD;  Location: [facility] MAIN OR;  Service: Orthopedics;  Laterality: Right;  POSTERIOR APPROACH, CELL SAVER, ALYSON, DM CABLE SYSTEM.   • VAGINAL HYSTERECTOMY        2) Is this patient \"bed confined\" as defined below?No    To be \"bed confined\" the patient must satisfy all three of the following criteria:  (1) unable to get up from bed without assistance; AND (2) unable to ambulate;  AND (3) unable to sit in a chair or wheelchair.  3) Can this patient safely be transported by car or wheelchair van (I.e., may safely sit during transport, without an attendant or monitoring?)No   4. In addition to completing questions 1-3 above, please check any of the following conditions that apply*:          *Note: supporting documentation for any boxes checked must be maintained in the patient's medical records Patient is confused and Unable to tolerate seated position for time needed to transport      SIGNATURE OF PHYSICIAN OR OTHER AUTHORIZED HEALTHCARE PROFESSIONAL    I certify that the above information is true and correct based on my evaluation of this patient, and represent that the patient requires transport by ambulance and that other forms of transport are contraindicated.  I understand that this information will be used by the Centers for Medicare and Medicaid Services (CMS) to support the determiniation of medical  necessity for ambulance services, and I represent that I have personal knowledge of the patient's condition at the time of transport.       If this box is checked, I also certify that the patient is physically or mentally incapable of signing the ambulance service's claim form and that the institution with which I am affiliated has furnished care, services or assistance to the patient.  My signature below is made on behalf of the patient pursuant to 42 .36(b)(4). In accordance with 42 .37, the specific reason(s) that the patient is physically or mentally incapable of signing the claim for is as follows:     Signature of Physician or Healthcare Professional  Date/Time:        (For Scheduled repetitive transport, this form is not valid for transports performed more than 60 days after this date).                                                                                                                                            --------------------------------------------------------------------------------------------  Printed Name and Credentials of Physician or Authorized Healthcare Professional     *Form must be signed by patient's attending physician for scheduled, repetitive transports,.  For non-repetitive ambulance transports, if unable to obtain the signature of the attending physician, any of the following may sign (please select below):     Physician  Clinical Nurse Specialist  Registered Nurse     Physician Assistant  Discharge Planner  Licensed Practical Nurse     Nurse Practitioner

## 2022-12-22 NOTE — CASE MANAGEMENT/SOCIAL WORK
Continued Stay Note  [facility]     Patient Name: Gabbi Brandon  MRN: [id number]  Today's Date: 12/22/2022    Admit Date: 12/17/2022    Plan: [facility] -- Accepted.   Discharge Plan     Row Name 12/22/22 1420       Plan    Plan [facility] -- Accepted.    Patient/Family in Agreement with Plan yes    Plan Comments Covid-19 Test resulted negative. Updated [facility] who's agreeable. Plan is for the patient to discharge to [facility] today via [facility] at 1700. Updated the patient, her daughter/Sara, [facility] and [facility] Calhoun who are all agreeable. No other needs identified. Packet is on the chart.    Final Discharge Disposition Code 03 - skilled nursing facility (SNF)    Final Note [facility].    Row Name 12/22/22 1143       Plan    Plan [facility] -- Accepted.    Patient/Family in Agreement with Plan yes    Plan Comments Spoke with [facility] who has accepted and has a SNF bed for the patient today at their [place] location. In anticipation of a possible d/c today, [facility] scheduled a [facility] for today at 1700 (spoke with Cindy). Staci is also requesting a Covid-19 Test prior to discharge. Updated [facility] and [facility] - Discharge orders and Covid-19 Test noted. Updated EDITH Calhoun. Await Covid-19 Test result. Packet is on the chart.               Discharge Codes    No documentation.               Expected Discharge Date and Time     Expected Discharge Date Expected Discharge Time    Dec 22, 2022             Priti AL RN

## 2022-12-23 LAB
BH BB BLOOD EXPIRATION DATE: NORMAL
BH BB BLOOD TYPE BARCODE: 6200
BH BB DISPENSE STATUS: NORMAL
BH BB PRODUCT CODE: NORMAL
BH BB UNIT NUMBER: NORMAL
CROSSMATCH INTERPRETATION: NORMAL
UNIT  ABO: NORMAL
UNIT  RH: NORMAL

## 2023-01-27 ENCOUNTER — HOME HEALTH ADMISSION (OUTPATIENT)
Dept: HOME HEALTH SERVICES | Facility: HOME HEALTHCARE | Age: 83
End: 2023-01-27
Payer: MEDICARE

## 2023-01-27 ENCOUNTER — TRANSCRIBE ORDERS (OUTPATIENT)
Dept: HOME HEALTH SERVICES | Facility: HOME HEALTHCARE | Age: 83
End: 2023-01-27
Payer: MEDICARE

## 2023-01-27 ENCOUNTER — TELEPHONE (OUTPATIENT)
Dept: INTERNAL MEDICINE | Facility: CLINIC | Age: 83
End: 2023-01-27
Payer: MEDICARE

## 2023-01-27 DIAGNOSIS — Z96.641 S/P TOTAL RIGHT HIP ARTHROPLASTY: Primary | ICD-10-CM

## 2023-01-27 NOTE — TELEPHONE ENCOUNTER
Patient Bia nguyen Анна is being discharged and [facility] is going to follow her, wanted to be sure Dr Casas will take care of point of care  Verbal yes given

## 2023-01-30 ENCOUNTER — HOME CARE VISIT (OUTPATIENT)
Dept: HOME HEALTH SERVICES | Facility: HOME HEALTHCARE | Age: 83
End: 2023-01-30
Payer: MEDICARE

## 2023-01-30 PROCEDURE — G0299 HHS/HOSPICE OF RN EA 15 MIN: HCPCS

## 2023-01-30 NOTE — HOME HEALTH
SOC 1/30  DIAG: periprosthetic fracture around internal prosthetic right hip joint  HX: [id number], DEMENTIA, DEPRESSION, ANXIETY, HLD, RA, B12 DEFFICIENCY.   Patient is a 81 yo female who lives in her home with her dtr who's her primary cg.  She is only transfering at this time due to injury from fall at home.   She is WBAT for transfers but NWB for ambulation to right leg. Her pain is 4/10 at visit . can go up to a 10 wiht transfers. She is on norco every 6 hrs for pain.   Education provided on pain meds, / tylenol and dosing , need to limit tylenol to no more than 4000mg a day .   They are transfering to  to go to bathroom at this time   Nursing rev medication in home only issue is vitamin d in home 2000iu and order is for 50,000 iu weekly.   they have seroquel in home that palmarcel prescribed, but looks like it was d/c at rehab,   call into MD at rehab that prescribed another med due to not liking the seroquel for her [phone] name Jose. Behavioral team.   added otc tylenol as well.     FOCUS OF CARE: T/I ON DISEASE PROCESS FOR periprosthetic fracture around internal prosthetic right hip joint AND HOME MED MGMT AND HOME SAFETY.    PLAN FOR NEXT VISIT: FOLLOW UP ON PAIN, AND MEDICATION ISSUES ABOVE.

## 2023-01-31 ENCOUNTER — HOME CARE VISIT (OUTPATIENT)
Dept: HOME HEALTH SERVICES | Facility: HOME HEALTHCARE | Age: 83
End: 2023-01-31
Payer: MEDICARE

## 2023-01-31 VITALS
RESPIRATION RATE: 20 BRPM | SYSTOLIC BLOOD PRESSURE: 100 MMHG | HEART RATE: 58 BPM | TEMPERATURE: 97.8 F | OXYGEN SATURATION: 95 % | DIASTOLIC BLOOD PRESSURE: 60 MMHG

## 2023-01-31 VITALS
OXYGEN SATURATION: 93 % | HEART RATE: 71 BPM | DIASTOLIC BLOOD PRESSURE: 70 MMHG | TEMPERATURE: 96.6 F | SYSTOLIC BLOOD PRESSURE: 120 MMHG

## 2023-01-31 PROCEDURE — G0151 HHCP-SERV OF PT,EA 15 MIN: HCPCS

## 2023-02-01 NOTE — CASE COMMUNICATION
SOC 1/30   DIAG: periprosthetic fracture around internal prosthetic right hip joint   HX: [id number], DEMENTIA, DEPRESSION, ANXIETY, HLD, RA, B12 DEFFICIENCY.   Patient is a 83 yo female who lives in her home with her dtr who's her primary cg. She is only transfering at this time due to injury from fall at home.   She is WBAT for transfers but NWB for ambulation to right leg. Her pain is 4/10 at visit . can go up to a 10 wiht transfers.  She is on norco every 6 hrs for pain.   Education provided on pain meds, / tylenol and dosing , need to limit tylenol to no more than 4000mg a day .   They are transfering to  to go to bathroom at this time   Nursing rev medication in home only issue is vitamin d in home 2000iu and order is for 50,000 iu weekly.   they have seroquel in home that jass prescribed, but looks like it was d/c at rehab,   call into MD at rehab that pres c ribed another med due to not liking the seroquel for her [phone] name Jose. Behavioral team.   added otc tylenol as well.     FOCUS OF CARE: T/I ON DISEASE PROCESS FOR periprosthetic fracture around internal prosthetic right hip joint AND HOME MED MGMT AND HOME SAFETY.     PLAN FOR NEXT VISIT: FOLLOW UP ON PAIN, AND MEDICATION ISSUES ABOVE.

## 2023-02-02 ENCOUNTER — HOME CARE VISIT (OUTPATIENT)
Dept: HOME HEALTH SERVICES | Facility: HOME HEALTHCARE | Age: 83
End: 2023-02-02
Payer: MEDICARE

## 2023-02-02 ENCOUNTER — TELEPHONE (OUTPATIENT)
Dept: INTERNAL MEDICINE | Facility: CLINIC | Age: 83
End: 2023-02-02
Payer: MEDICARE

## 2023-02-02 VITALS
DIASTOLIC BLOOD PRESSURE: 64 MMHG | SYSTOLIC BLOOD PRESSURE: 120 MMHG | OXYGEN SATURATION: 97 % | HEART RATE: 64 BPM | RESPIRATION RATE: 18 BRPM | TEMPERATURE: 97.8 F

## 2023-02-02 PROCEDURE — G0151 HHCP-SERV OF PT,EA 15 MIN: HCPCS

## 2023-02-02 PROCEDURE — G0299 HHS/HOSPICE OF RN EA 15 MIN: HCPCS

## 2023-02-02 NOTE — HOME HEALTH
"Subjective: Per daughter: \"I think she is more comfortable with the walker like you showed her versus trying to grab wheelchair and turn around. She's used to doing with walker.\"    Falls reported: none    Medication changes: none    Plan for next visit: Continue transfer training with daughter using walker, reinstruct supine/sitting right leg exercises and upgrade if tolerated, and patient daughter education as needed"

## 2023-02-02 NOTE — TELEPHONE ENCOUNTER
verbal okay for nursing order for home health discharge summary says she needs to take 02855 units of vitamin d  weekly but do no have that she has 2000 units she could take everyday

## 2023-02-03 ENCOUNTER — HOME CARE VISIT (OUTPATIENT)
Dept: HOME HEALTH SERVICES | Facility: HOME HEALTHCARE | Age: 83
End: 2023-02-03
Payer: MEDICARE

## 2023-02-03 VITALS
OXYGEN SATURATION: 99 % | TEMPERATURE: 97.6 F | SYSTOLIC BLOOD PRESSURE: 118 MMHG | HEART RATE: 68 BPM | RESPIRATION RATE: 18 BRPM | DIASTOLIC BLOOD PRESSURE: 76 MMHG

## 2023-02-03 NOTE — TELEPHONE ENCOUNTER
They don't have 50,000 available. They are wondering if they can give her 2,000 daily, is that okay

## 2023-02-03 NOTE — CASE COMMUNICATION
Nursing received call back from Dr Casas office they want her to take Vitamin D 2000 iu daily   instead of the 50,000 that was on d/c instructions. MAR updated.   Called and talked to Dtr reg this and she v/u .

## 2023-02-03 NOTE — HOME HEALTH
SOC 1/30   DIAG: periprosthetic fracture around internal prosthetic right hip joint   HX: [id number], DEMENTIA, DEPRESSION, ANXIETY, HLD, RA, B12 DEFFICIENCY.   Incision healed, doing well today .   t/i on meds reviewing.      NOTE: called Dr Casas office reg orders again and vitamin d 50.000 that they do not have.

## 2023-02-06 ENCOUNTER — HOME CARE VISIT (OUTPATIENT)
Dept: HOME HEALTH SERVICES | Facility: HOME HEALTHCARE | Age: 83
End: 2023-02-06
Payer: MEDICARE

## 2023-02-06 VITALS
RESPIRATION RATE: 18 BRPM | DIASTOLIC BLOOD PRESSURE: 60 MMHG | OXYGEN SATURATION: 94 % | TEMPERATURE: 96.8 F | SYSTOLIC BLOOD PRESSURE: 106 MMHG | HEART RATE: 64 BPM

## 2023-02-06 PROCEDURE — G0151 HHCP-SERV OF PT,EA 15 MIN: HCPCS

## 2023-02-06 PROCEDURE — G0300 HHS/HOSPICE OF LPN EA 15 MIN: HCPCS

## 2023-02-06 NOTE — HOME HEALTH
"Subjective: Per daughter: \"She's doing pretty well with the transfers since you showed me what to do with her.\"    Falls reported: none    Medication changes: none    Plan for next visit: discharge instructions and assessment, transfer training to recliner and commode from , final HEP training, and patient daughter education as needed"

## 2023-02-07 VITALS
SYSTOLIC BLOOD PRESSURE: 138 MMHG | DIASTOLIC BLOOD PRESSURE: 78 MMHG | HEART RATE: 63 BPM | RESPIRATION RATE: 18 BRPM | TEMPERATURE: 97.4 F | OXYGEN SATURATION: 91 %

## 2023-02-07 NOTE — HOME HEALTH
Patient is a CP assess  Patient has a fractue of her right femur due to osteo.  She is NWB at this time.

## 2023-02-08 ENCOUNTER — HOME CARE VISIT (OUTPATIENT)
Dept: HOME HEALTH SERVICES | Facility: HOME HEALTHCARE | Age: 83
End: 2023-02-08
Payer: MEDICARE

## 2023-02-08 VITALS
TEMPERATURE: 97.2 F | RESPIRATION RATE: 18 BRPM | HEART RATE: 64 BPM | SYSTOLIC BLOOD PRESSURE: 126 MMHG | OXYGEN SATURATION: 94 % | DIASTOLIC BLOOD PRESSURE: 70 MMHG

## 2023-02-08 PROCEDURE — G0151 HHCP-SERV OF PT,EA 15 MIN: HCPCS

## 2023-02-09 ENCOUNTER — HOME CARE VISIT (OUTPATIENT)
Dept: HOME HEALTH SERVICES | Facility: HOME HEALTHCARE | Age: 83
End: 2023-02-09
Payer: MEDICARE

## 2023-02-09 PROCEDURE — G0299 HHS/HOSPICE OF RN EA 15 MIN: HCPCS

## 2023-02-12 VITALS
HEART RATE: 78 BPM | OXYGEN SATURATION: 99 % | RESPIRATION RATE: 20 BRPM | SYSTOLIC BLOOD PRESSURE: 124 MMHG | DIASTOLIC BLOOD PRESSURE: 72 MMHG | TEMPERATURE: 97.9 F

## 2024-02-25 NOTE — ED NOTES
HPI:   Audra Brown is a 40 year old female who presents for a complete physical exam.     Wt Readings from Last 6 Encounters:   02/26/24 142 lb (64.4 kg)   12/05/23 141 lb (64 kg)   11/28/23 141 lb (64 kg)   04/17/23 139 lb (63 kg)   01/24/23 140 lb (63.5 kg)   09/18/22 141 lb (64 kg)     Body mass index is 25.15 kg/m².     Cholesterol, Total (mg/dL)   Date Value   03/14/2022 161   04/21/2021 159     CHOLESTEROL, TOTAL (mg/dL)   Date Value   02/24/2024 205 (H)   01/21/2023 167   02/11/2021 166     HDL Cholesterol (mg/dL)   Date Value   03/14/2022 52   04/21/2021 60 (H)     HDL CHOLESTEROL (mg/dL)   Date Value   02/24/2024 53   01/21/2023 55   02/11/2021 54     LDL Cholesterol (mg/dL)   Date Value   03/14/2022 91   04/21/2021 84     LDL-CHOLESTEROL (mg/dL (calc))   Date Value   02/24/2024 127 (H)   01/21/2023 96   02/11/2021 97     AST (U/L)   Date Value   02/24/2024 13   01/21/2023 13   03/14/2022 24   04/21/2021 12 (L)   02/11/2021 15     ALT (U/L)   Date Value   02/24/2024 9   01/21/2023 13   03/14/2022 36   04/21/2021 19   02/11/2021 13       last pap- 2023-  pt sees gyn Dr. Martinez  all previous paps normal  No vaginal discharge  No bladder dysfunction  Regular menses   birth control- condoms   Not performing self breast exams  last mammogram- scheduled   dexa - never   c-scope - never   No calcium and vit D supplementation  No family history of breast colon or ovarian cancer    Has not tolerated oral iron       Current Outpatient Medications   Medication Sig Dispense Refill    levothyroxine 88 MCG Oral Tab Take 1 tablet (88 mcg total) by mouth before breakfast. 90 tablet 0    ergocalciferol 1.25 MG (84342 UT) Oral Cap Take 1 capsule (50,000 Units total) by mouth once a week. Once weekly x 20 weeks.  Take with food 20 capsule 0    levothyroxine 75 MCG Oral Tab Take 1 tablet (75 mcg total) by mouth before breakfast. 90 tablet 2    Cholecalciferol (VITAMIN D3) 82768 UNITS Oral Cap Take 1 tablet by mouth. 2000  Spoke to karina, daughter,  pt has been confused since yesterday, [facility] was seeing people in the room yesterday that weren't. [facility] noticed speech change yesterday          Nataliya Disla RN  09/02/20 9747     units       Ferric Maltol (ACCRUFER) 30 MG Oral Cap Take 1 capsule by mouth daily. (Patient not taking: Reported on 2024)      Vitamin B-12 (VITAMIN B12) 500 MCG Oral Tab Take 1 tablet (500 mcg total) by mouth daily.        Past Medical History:   Diagnosis Date    Back pain     Not sure    Bloating     It depends on what i eat and befor my periods    Constipation     Fatigue     Flatulence/gas pain/belching     It depends on what i eat    Frequent use of laxatives     Senna leaves powder    Heartburn     Occasional; depends on eating    Thyroid disease       Past Surgical History:   Procedure Laterality Date      2008/ 2012    x2      Family History   Problem Relation Age of Onset    Heart Attack Father     Hypertension Father     Heart Disorder Father         2 heart attacks    Heart Disorder Mother         2 minor attacks    Hypertension Mother     Prostate Cancer Maternal Grandfather     Hypertension Paternal Grandmother     Hypertension Paternal Grandfather     Heart Disorder Paternal Grandfather         CAD      Social History:   Social History     Socioeconomic History    Marital status:    Tobacco Use    Smoking status: Never    Smokeless tobacco: Never   Substance and Sexual Activity    Alcohol use: Yes    Drug use: No   Other Topics Concern    Caffeine Concern Yes    Exercise Yes     Occ:. :  Children:     Exercise: none  Diet: good / high fiber      REVIEW OF SYSTEMS:   GENERAL: feels well otherwise  SKIN: denies any unusual skin lesions  EYES:denies blurred vision or double vision  HEENT: denies nasal congestion, sinus pain or ST  LUNGS: denies shortness of breath with exertion  CARDIOVASCULAR: denies chest pain on exertion  GI: denies abdominal pain,denies heartburn  : denies dysuria, vaginal discharge or itching   MUSCULOSKELETAL: denies back pain  NEURO: denies headaches  PSYCHE: denies depression or anxiety  HEMATOLOGIC: denies hx of  anemia  ENDOCRINE:thyroid history  ALL/ASTHMA: denies hx of allergy or asthma    EXAM:   /62   Resp 16   Ht 5' 3\" (1.6 m)   Wt 142 lb (64.4 kg)   LMP 02/26/2024 (Approximate)   BMI 25.15 kg/m²   Body mass index is 25.15 kg/m².   GENERAL: alert and oriented X 3, well developed, well nourished,in no apparent distress  CARDIO: RRR without murmur  LUNGS: clear to auscultation  NECK: supple,no adenopathy, no thyromegaly   HEENT: atraumatic, normocephalic,ears and throat are clear  EYES:PERRLA, EOMI, normal,conjunctiva are clear  SKIN: norashes,no suspicious lesions  GI: good BS's,no masses, HSM or tenderness  CHEST: no chest tenderness  BREAST: no axillary no masses no nipple discharge bilaterally   MUSCULOSKELETAL: back is not tender,FROM of the back  EXTREMITIES: no cyanosis, clubbing or edema  NEURO: cranial nerves are intact,motor and sensory are grossly intact    ASSESSMENT AND PLAN:   Audra Brown is a 40 year old female who presents with     1. Annual physical exam      2. Iron deficiency    - Oncology/Hematology Referral - In Network    3. Hypothyroidism, unspecified type    - Free T4, (Free Thyroxine)  - Assay, Thyroid Stim Hormone  - Free T3 (Triiodothryronine)  - levothyroxine 88 MCG Oral Tab; Take 1 tablet (88 mcg total) by mouth before breakfast.  Dispense: 90 tablet; Refill: 0      Questions answered and patient indicates understanding of these issues and agrees to the plan.  Follow up in 1 year or sooner if needed.

## 2024-07-18 NOTE — TELEPHONE ENCOUNTER
Scheuled at [facility] on 12/11/19.  (Pt is also doing a left dx mammogram today at [facility]).   None

## (undated) DEVICE — OPTIFOAM GENTLE SA, POSTOP, 4X12: Brand: MEDLINE

## (undated) DEVICE — MARKR SKIN W/RULR AND LBL

## (undated) DEVICE — LN SMPL CO2 SHTRM SD STREAM W/M LUER

## (undated) DEVICE — ANTIBACTERIAL UNDYED BRAIDED (POLYGLACTIN 910), SYNTHETIC ABSORBABLE SUTURE: Brand: COATED VICRYL

## (undated) DEVICE — KT ORCA ORCAPOD DISP STRL

## (undated) DEVICE — GLV SURG SIGNATURE ESSENTIAL PF LTX SZ8.5

## (undated) DEVICE — HANDPIECE SET WITH COAXIAL HIGH FLOW TIP AND SUCTION TUBE: Brand: INTERPULSE

## (undated) DEVICE — GLV SURG BIOGEL LTX PF 8 1/2

## (undated) DEVICE — PROXIMATE RH ROTATING HEAD SKIN STAPLERS (35 WIDE) CONTAINS 35 STAINLESS STEEL STAPLES: Brand: PROXIMATE

## (undated) DEVICE — 1000 S-DRAPE TOWEL DRAPE 10/BX: Brand: STERI-DRAPE™

## (undated) DEVICE — SUT ETHIB 0 CT1 CR8 18IN CX21D

## (undated) DEVICE — SUT VIC 0 CT1 36IN J946H

## (undated) DEVICE — SYR CONTRL PRESS/LO FIX/M/LL W/THMB/RNG 10ML

## (undated) DEVICE — FRCP BX RADJAW4 NDL 2.8 240CM LG OG BX40

## (undated) DEVICE — GLV SURG BIOGEL LTX PF 8

## (undated) DEVICE — DRAPE,REIN 53X77,STERILE: Brand: MEDLINE

## (undated) DEVICE — GLV SURG PREMIERPRO ORTHO LTX PF SZ8.5 BRN

## (undated) DEVICE — MAT FLR ABSORBENT LG 4FT 10 2.5FT

## (undated) DEVICE — PK ANT HIP 40

## (undated) DEVICE — TBG PENCL TELESCP MEGADYNE SMOKE EVAC 10FT

## (undated) DEVICE — GLV SURG BIOGEL LTX PF 7

## (undated) DEVICE — TRAP FLD MINIVAC MEGADYNE 100ML

## (undated) DEVICE — HEWSON SUTURE RETRIEVER: Brand: HEWSON SUTURE RETRIEVER

## (undated) DEVICE — GLV SURG SIGNATURE ESSENTIAL PF LTX SZ8

## (undated) DEVICE — NEEDLE, QUINCKE, 20GX3.5": Brand: MEDLINE

## (undated) DEVICE — PILLW ABD SM

## (undated) DEVICE — TUBING, SUCTION, 1/4" X 10', STRAIGHT: Brand: MEDLINE

## (undated) DEVICE — CANN O2 ETCO2 FITS ALL CONN CO2 SMPL A/ 7IN DISP LF

## (undated) DEVICE — PK HIP TOTL 40

## (undated) DEVICE — ADAPT CLN BIOGUARD AIR/H2O DISP

## (undated) DEVICE — RECIPROCATING BLADE HEAVY DUTY LONG, OFFSET  (77.6 X 0.77 X 11.2MM)

## (undated) DEVICE — 3M™ IOBAN™ 2 ANTIMICROBIAL INCISE DRAPE 6640EZ: Brand: IOBAN™ 2

## (undated) DEVICE — SYS CLS SKIN PREMIERPRO EXOFINFUSION 22CM

## (undated) DEVICE — APPL CHLORAPREP HI/LITE 26ML ORNG

## (undated) DEVICE — GLV SURG PREMIERPRO ORTHO LTX PF SZ8 BRN

## (undated) DEVICE — SUT ETHIB 2 CV V37 MS/4 30IN MX69G

## (undated) DEVICE — BITEBLOCK OMNI BLOC

## (undated) DEVICE — SUT MNCRYL 3/0 PS2 18IN MCP497G

## (undated) DEVICE — PK ATS CUST W CARDIOTOMY RESEVOIR

## (undated) DEVICE — SOL NACL 0.9PCT 100ML SGL

## (undated) DEVICE — SKIN PREP TRAY W/CHG: Brand: MEDLINE INDUSTRIES, INC.

## (undated) DEVICE — INTENDED FOR TISSUE SEPARATION, AND OTHER PROCEDURES THAT REQUIRE A SHARP SURGICAL BLADE TO PUNCTURE OR CUT.: Brand: BARD-PARKER ® CARBON RIB-BACK BLADES

## (undated) DEVICE — 3M™ IOBAN™ 2 ANTIMICROBIAL INCISE DRAPE 6650EZ: Brand: IOBAN™ 2

## (undated) DEVICE — ZIP 16 SURGICAL SKIN CLOSURE DEVICE, PSA: Brand: ZIP 16 SURGICAL SKIN CLOSURE DEVICE

## (undated) DEVICE — SOL ISO/ALC RUB 70PCT 4OZ

## (undated) DEVICE — PENCL E/S ULTRAVAC TELESCP NOSE HOLSTR 10FT

## (undated) DEVICE — SUT VIC 1 CT1 36IN J947H

## (undated) DEVICE — S/M FLEXIBLE ALEXIS ORTHOPAEDIC PROTECTOR: Brand: ALEXIS® ORTHOPAEDIC PROTECTOR

## (undated) DEVICE — DRAPE,U/ SHT,SPLIT,PLAS,STERIL: Brand: MEDLINE

## (undated) DEVICE — GLV SURG SENSICARE PI MIC PF SZ7 LF STRL

## (undated) DEVICE — CONTAINER,SPECIMEN,OR STERILE,4OZ: Brand: MEDLINE

## (undated) DEVICE — OPTIFOAM GENTLE SA, POSTOP, 4X8: Brand: MEDLINE

## (undated) DEVICE — SENSR O2 OXIMAX FNGR A/ 18IN NONSTR